# Patient Record
Sex: MALE | Race: WHITE | Employment: UNEMPLOYED | URBAN - METROPOLITAN AREA
[De-identification: names, ages, dates, MRNs, and addresses within clinical notes are randomized per-mention and may not be internally consistent; named-entity substitution may affect disease eponyms.]

---

## 2018-11-22 ENCOUNTER — HOSPITAL ENCOUNTER (EMERGENCY)
Age: 63
Discharge: HOME | End: 2018-11-22
Attending: EMERGENCY MEDICINE
Payer: MEDICAID

## 2018-11-22 VITALS
SYSTOLIC BLOOD PRESSURE: 149 MMHG | WEIGHT: 90 LBS | BODY MASS INDEX: 15.45 KG/M2 | HEART RATE: 73 BPM | OXYGEN SATURATION: 99 % | TEMPERATURE: 97.8 F | DIASTOLIC BLOOD PRESSURE: 92 MMHG | RESPIRATION RATE: 32 BRPM

## 2018-11-22 DIAGNOSIS — G89.29 OTHER CHRONIC PAIN: Primary | ICD-10-CM

## 2018-11-22 PROCEDURE — 99284 EMERGENCY DEPT VISIT MOD MDM: CPT

## 2018-11-22 PROCEDURE — 93005 ELECTROCARDIOGRAM TRACING: CPT

## 2018-11-22 PROCEDURE — 74011250637 HC RX REV CODE- 250/637: Performed by: EMERGENCY MEDICINE

## 2018-11-22 RX ORDER — TRAMADOL HYDROCHLORIDE 50 MG/1
100 TABLET ORAL
Status: COMPLETED | OUTPATIENT
Start: 2018-11-22 | End: 2018-11-22

## 2018-11-22 RX ORDER — TRAMADOL HYDROCHLORIDE 50 MG/1
TABLET ORAL
Status: DISCONTINUED
Start: 2018-11-22 | End: 2018-11-22 | Stop reason: HOSPADM

## 2018-11-22 RX ADMIN — TRAMADOL HYDROCHLORIDE 100 MG: 50 TABLET, FILM COATED ORAL at 01:16

## 2018-11-22 NOTE — ED NOTES
Pt having 5/10 pain relief. Told [facility] ETA 10-15 mins. Urinal provided to pt. Pt aware of ETA of Transport. Paperwork ready.

## 2018-11-22 NOTE — ED TRIAGE NOTES
Patient reports to ED c/c HTN, chronic leg pain,and generalized weakness. Pt brought to ED by EMS reports patient was dx from [facility]. Pt VSS

## 2018-11-22 NOTE — ED NOTES
Pt brought by EMS. Pt seen at [facility] ED and d/c home 2 hrs ago. Pt reports \"I just don't feel good, and my leg hurts. \"  Told that pt has concerns about BP being elevated. Current /92. Had RT upper chest pain earlier per pt. Pt has esteban AKA amputations, has a drsg CDI to LT AKA- dressed \"today\" per pt. DR \"Yuri\" has evaluated pt. Pt has full cardiac/BP/pulseox intact. EKG is complete. No further orders at this time per Dr Nishant Gutierrez. \"  Dr Moises Ramon" mentioning POC is pain medication/and d/c home. NAD observed.

## 2018-11-22 NOTE — ED PROVIDER NOTES
EMERGENCY DEPARTMENT HISTORY AND PHYSICAL EXAM 
 
Date: 11/22/2018 Patient Name: Corinne Simmering History of Presenting Illness Chief Complaint Patient presents with  Fatigue  Leg Pain History Provided By: Patient and EMS Chief Complaint: elevated blood pressure and general malaise/myalgias Duration: 1 Days Timing:  Constant Location: generalized Quality: Aching Severity: Severe Associated Symptoms: denies any other associated signs or symptoms Additional History (Context):  
 
12:33 AM 
 
Corinne Simmering is a 66 y.o. male with pertinent PMHx of HTN, polio, chronic pain, DM, bilateral leg amputation, and HLD presenting via EMS to the ED c/o elevated blood pressure and general malaise/severe generalized myalgias x today. Pt states that \"I feel like I'm going to die\". He states that he normally takes Percocet for pain, but states that he ran out. Per EMS, pt is well known to them and they transport him to [facility] regularly. Pt was seen by [facility] ER earlier today for similar complaints, but called EMS again shortly after being discharged. Per EMS, last blood pressure was 170/80. Pt has a home health nurse who comes daily. Pt specifically denies any fever/chills or N/V/D. 
 
PCP: Kerrie Bennett MD 
Pt does not drink ETOH or do illicit drugs. There are no other complaints, changes, or physical findings at this time. Current Outpatient Medications Medication Sig Dispense Refill  simvastatin (ZOCOR) 20 mg tablet Take 20 mg by mouth nightly.  lisinopril-hydrochlorothiazide (PRINZIDE, ZESTORETIC) 20-12.5 mg per tablet Take 1 Tab by mouth daily.  isosorbide mononitrate ER (IMDUR) 30 mg tablet Take 30 mg by mouth daily.  HYDROcodone-acetaminophen (LORTAB) 2.5-167 mg/5 mL oral solution Take 10 mL by mouth four (4) times daily as needed for Pain. 300 mL 0 Past History Past Medical History: 
Past Medical History:  
Diagnosis Date  Chronic pain back and legs  Diabetes ([facility] [address]  Hypercholesterolemia  Hypertension  Polio Past Surgical History: 
Past Surgical History:  
Procedure Laterality Date  HX HERNIA REPAIR    
 HX OTHER SURGICAL    
 carpal tunnel  HX OTHER SURGICAL    
 cyst  
 
 
Family History: 
Family History Problem Relation Age of Onset  Heart Attack Mother  Heart Attack Father  Malignant Hyperthermia Neg Hx  Pseudocholinesterase Deficiency Neg Hx  Delayed Awakening Neg Hx  Post-op Nausea/Vomiting Neg Hx  Emergence Delirium Neg Hx  Post-op Cognitive Dysfunction Neg Hx   
 Other Neg Hx Social History: 
Social History Tobacco Use  Smoking status: Current Every Day Smoker Packs/day: 2.00 Years: 40.00 Pack years: 80.00 Substance Use Topics  Alcohol use: No  
 Drug use: No  
 
 
Allergies: 
No Known Allergies Review of Systems Review of Systems Constitutional: Negative for chills and fever. Positive for elevated blood pressure and general malaise Gastrointestinal: Negative for diarrhea, nausea and vomiting. Musculoskeletal: Positive for myalgias (generalized). All other systems reviewed and are negative. Physical Exam  
 
Vitals:  
 11/22/18 0041 11/22/18 0047 BP: (!) 149/92 Pulse: (!) 16 Resp: 15 Temp: 97.8 °F (36.6 °C) SpO2: 99% 99% Weight: 40.8 kg (90 lb) Physical Exam  
Constitutional: He is oriented to person, place, and time. He appears well-developed. Chronically ill looking, frail elderly male in NAD HENT:  
Head: Normocephalic and atraumatic. Eyes: Pupils are equal, round, and reactive to light. Neck: Neck supple. Cardiovascular: Normal rate, regular rhythm, S1 normal, S2 normal and normal heart sounds. Pulmonary/Chest: Breath sounds normal. No respiratory distress. He has no wheezes. He has no rales. He exhibits no tenderness. Lungs are clear Abdominal: Soft. He exhibits no distension and no mass. There is no tenderness. There is no guarding. Flat Musculoskeletal: Normal range of motion. He exhibits no edema or tenderness. Left AKA stump with clean dressing and without discharge Neurological: He is alert and oriented to person, place, and time. No cranial nerve deficit. Skin: No rash noted. Psychiatric: He has a normal mood and affect. His behavior is normal. Thought content normal.  
Nursing note and vitals reviewed. Diagnostic Study Results Labs - No results found for this or any previous visit (from the past 12 hour(s)). Radiologic Studies - No orders to display Medical Decision Making I am the first provider for this patient. I reviewed the vital signs, available nursing notes, past medical history, past surgical history, family history and social history. Vital Signs-Reviewed the patient's vital signs. Pulse Oximetry Analysis - 99% on RA  
 
EKG interpretation: (EMS) NSR at 81 bpm. No STEMI. QRS duration is 84 ms. EKG read by Forrest Kitchen MD at 2000 EKG interpretation: (Preliminary) NSR at 81 bpm. No STEMI. QRS duration is 84 ms. Unchanged from transmitted EKG. EKG read by Forrest Kitchen MD at 0699 Records Reviewed: Nursing Notes, Old Medical Records and Previous electrocardiograms PROCEDURES: 
Procedures MEDICATIONS GIVEN IN THE ED: 
Medications - No data to display ED COURSE:  
12:33 AM  
Initial assessment performed. 12:46 AM Reviewed records from [place]. Pt was seen yesterday and today, and just discharged two hours ago for similar sxs. He states he just doesn't feel well, but seems like he is out of his Percocet. He appears that he may have some phantom pain. He claims his blood pressure was high, but it is nml here. He had recent blood work at [place], only showing mild hypokalemia. Will give him something for pain and discharge him home. Diagnosis and Disposition DISCHARGE NOTE: 
12:49 AM 
The patient is ready for discharge. The patient's signs, symptoms, diagnosis, and discharge instructions have been discussed and the patient and/or family has conveyed their understanding. The patient and/or family is to follow up as recommended or return to the ER should their symptoms worsen. Plan has been discussed and the patient and/or family is in agreement. Written by Francies Saint Justice Palau, ED Scribe, as dictated by Bandar Garrett MD.  
 
CLINICAL IMPRESSION: 
1. Other chronic pain PLAN: 
1. D/C Home 2. Current Discharge Medication List  
  
 
3. Follow-up Information Follow up With Specialties Details Why Contact Info Maricarmen Celis MD Internal Medicine Schedule an appointment as soon as possible for a visit for primary care follow up [address] [address] [phone] [facility] EMERGENCY DEPT Emergency Medicine  As needed, If symptoms worsen [address] [place] [address] 
[phone]  
  
 
_______________________________ Attestations: This note is prepared by Pranay Scanlon, acting as Scribe for [employer], MD. [employer], MD:  The scribe's documentation has been prepared under my direction and personally reviewed by me in its entirety. I confirm that the note above accurately reflects all work, treatment, procedures, and medical decision making performed by me. 
_______________________________

## 2018-11-22 NOTE — DISCHARGE INSTRUCTIONS
Above-the-Knee Leg Amputation: What to Expect at Home  Your Recovery  An above-the-knee amputation is surgery to remove your leg above the knee. Your doctor removed the leg while keeping as much healthy bone, skin, blood vessel, and nerve tissue as possible. After an above-the-knee leg amputation, you will probably have bandages, a rigid dressing, or a cast over the remaining part of your leg (residual limb). The leg will be swollen for at least 4 weeks after your surgery. If you have a rigid dressing or cast, your doctor will set up regular visits to change the dressing or cast and check the healing. If you have elastic bandages, your doctor will tell you how to change them. You may have pain in your remaining limb. You also may think you have feeling or pain where your leg was. This is called phantom pain. It is common and may come and go for a year or longer. Your doctor can give you medicine for both types of pain. You may have already started a rehabilitation program (rehab). You will continue this under the guidance of your doctor or physical therapist. Brennan Rosenthal will need to do a lot of work to recondition your muscles and relearn activities, balance, and coordination. Rehab can last as long as 1 year. You may have been fitted with a temporary artificial leg while you were still in the hospital. If this is the case, your doctor will teach you how to care for it. If you are getting an artificial leg, you may need to get used to it before you return to work and your other activities. You will probably not wear it all the time, so you will need to learn how to use a wheelchair, crutches, or other device. You will have to make changes in your home. Your workplace may be able to make allowances for you. Having your leg amputated is traumatic. Learning to live with new limitations can be hard and frustrating. You may feel depressed or grieve for your previous lifestyle.  It is important to understand these feelings. Talking with your family, friends, and health professionals about your frustrations is an important part of your recovery. You may also find that it helps to talk with a person who has had an amputation. Remember that even though losing a limb is difficult, it does not change who you are or prevent you from enjoying life. You will have to adapt and learn new ways to do things, but you will still be able to work and take part in sports and activities. And you can still learn, love, play, and live life to its fullest.  Many organizations can help you adjust to your new life. For example, you can find information at amputee-coalition.org. This care sheet gives you a general idea about how long it will take for you to recover. But each person recovers at a different pace. Follow the steps below to get better as quickly as possible. How can you care for yourself at home? Activity    · Be active. Talk to your doctor about what you can do. If you are active and use your remaining limb, it will heal faster.     · You may shower when your doctor okays it. Wash the remaining limb with soap and water, and pat it dry. You may need help doing this at first.     · You may need to adapt your car to your situation before you drive.     · You will probably be able to return to work and your usual routine when your remaining limb heals. This can be as soon as 4 to 8 weeks after surgery, but it may take longer. Diet    · You can eat your normal diet. If your stomach is upset, try bland, low-fat foods like plain rice, broiled chicken, toast, and yogurt.     · You may notice that your bowel movements are not regular right after your surgery. This is common. Try to avoid constipation and straining with bowel movements. Take a fiber supplement every day. If you have not had a bowel movement after a couple of days, ask your doctor about taking a mild laxative.    Medicines    · Your doctor will tell you if and when you can restart your medicines. He or she will also give you instructions about taking any new medicines.     · If you take blood thinners, such as warfarin (Coumadin), clopidogrel (Plavix), or aspirin, be sure to talk to your doctor. He or she will tell you if and when to start taking those medicines again. Make sure that you understand exactly what your doctor wants you to do.     · Take pain medicines exactly as directed. ? If the doctor gave you a prescription medicine for pain, take it as prescribed. ? If you are not taking a prescription pain medicine, ask your doctor if you can take an over-the-counter medicine.     · If you think your pain medicine is making you sick to your stomach:  ? Take your medicine after meals (unless your doctor has told you not to). ? Ask your doctor for a different pain medicine.     · If your doctor prescribed antibiotics, take them as directed. Do not stop taking them just because you feel better. You need to take the full course of antibiotics.    Remaining limb care    · You may have bandages, a rigid dressing, or a cast on your remaining limb. Your doctor will tell you how to take care of it. Depending on your dressing and the doctor's instructions:  ? Check your remaining limb daily for irritation, skin breaks, and redness. Tell your doctor about any problems you see. ? Wash your remaining limb with mild soap and warm water every night. Pat it dry.     · If you have a temporary artificial leg, remove it before you go to sleep. Exercise    · Rehabilitation is a series of exercises you do after your surgery. This helps you learn to use your remaining limb and artificial leg. You will work with your doctor and physical therapist to plan this exercise program. To get the best results, you need to do the exercises correctly and as often and as long as your doctor tells you. Your rehab program will give you a number of exercises to do. Always do them as your therapist tells you. Other instructions    · Preventing contractures is very important. A contracture occurs when a joint becomes stuck in one position. If this happens, it may be hard or impossible to straighten your remaining limb and use an artificial leg.  ? Make sure you put equal weight on both hips when you sit. Use firm chairs, and sit up straight. ? Keep your remaining limb flat with your legs together while you are lying on your back. ? Lie on your stomach as much as possible to stretch your hip joint. ? Do not sit for more than an hour or two. Stand, or lie on your stomach now and then. ? Do not put pillows under your hips or knees or between your thighs. Follow-up care is a key part of your treatment and safety. Be sure to make and go to all appointments, and call your doctor if you are having problems. It's also a good idea to know your test results and keep a list of the medicines you take. When should you call for help? Call 911 anytime you think you may need emergency care. For example, call if:    · You passed out (lost consciousness).     · You have chest pain, are short of breath, or you cough up blood.    Call your doctor now or seek immediate medical care if:    · You have pain that does not get better after you take pain medicine.     · You are sick to your stomach or cannot drink fluids.     · You have loose stitches, or your incision comes open.     · You have signs of a blood clot in your leg (called a deep vein thrombosis), such as:  ? Pain in your calf, back of the knee, thigh, or groin. ? Redness or swelling in your leg.     · You have signs of infection, such as:  ? Increased pain, swelling, warmth, or redness. ? Red streaks leading from the incision. ? Pus draining from the incision. ? A fever.     · You bleed through your bandage.    Watch closely for any changes in your health, and be sure to contact your doctor if you have any problems. Where can you learn more?   Go to http://elen.info/. Enter W069 in the search box to learn more about \"Above-the-Knee Leg Amputation: What to Expect at Home. \"  Current as of: November 29, 2017  Content Version: 11.8  © 9539-6963 [employer]. Care instructions adapted under license by [employer] (which disclaims liability or warranty for this information). If you have questions about a medical condition or this instruction, always ask your healthcare professional. [place] [address] any warranty or liability for your use of this information. Chronic Pain: Care Instructions  Your Care Instructions    Chronic pain is pain that lasts a long time (months or even years) and may or may not have a clear cause. It is different from acute pain, which usually does have a clear cause--like an injury or illness--and gets better over time. Chronic pain:  · Lasts over time but may vary from day to day. · Does not go away despite efforts to end it. · May disrupt your sleep and lead to fatigue. · May cause depression or anxiety. · May make your muscles tense, causing more pain. · Can disrupt your work, hobbies, home life, and relationships with friends and family. Chronic pain is a very real condition. It is not just in your head. Treatment can help and usually includes several methods used together, such as medicines, physical therapy, exercise, and other treatments. Learning how to relax and changing negative thought patterns can also help you cope. Chronic pain is complex. Taking an active role in your treatment will help you better manage your pain. Tell your doctor if you have trouble dealing with your pain. You may have to try several things before you find what works best for you. Follow-up care is a key part of your treatment and safety. Be sure to make and go to all appointments, and call your doctor if you are having problems.  It's also a good idea to know your test results and keep a list of the medicines you take. How can you care for yourself at home? · Pace yourself. Break up large jobs into smaller tasks. Save harder tasks for days when you have less pain, or go back and forth between hard tasks and easier ones. Take rest breaks. · Relax, and reduce stress. Relaxation techniques such as deep breathing or meditation can help. · Keep moving. Gentle, daily exercise can help reduce pain over the long run. Try low- or no-impact exercises such as walking, swimming, and stationary biking. Do stretches to stay flexible. · Try heat, cold packs, and massage. · Get enough sleep. Chronic pain can make you tired and drain your energy. Talk with your doctor if you have trouble sleeping because of pain. · Think positive. Your thoughts can affect your pain level. Do things that you enjoy to distract yourself when you have pain instead of focusing on the pain. See a movie, read a book, listen to music, or spend time with a friend. · If you think you are depressed, talk to your doctor about treatment. · Keep a daily pain diary. Record how your moods, thoughts, sleep patterns, activities, and medicine affect your pain. You may find that your pain is worse during or after certain activities or when you are feeling a certain emotion. Having a record of your pain can help you and your doctor find the best ways to treat your pain. · Take pain medicines exactly as directed. ? If the doctor gave you a prescription medicine for pain, take it as prescribed. ? If you are not taking a prescription pain medicine, ask your doctor if you can take an over-the-counter medicine. Reducing constipation caused by pain medicine  · Include fruits, vegetables, beans, and whole grains in your diet each day. These foods are high in fiber. · Drink plenty of fluids, enough so that your urine is light yellow or clear like water.  If you have kidney, heart, or liver disease and have to limit fluids, talk with your doctor before you increase the amount of fluids you drink. · If your doctor recommends it, get more exercise. Walking is a good choice. Bit by bit, increase the amount you walk every day. Try for at least 30 minutes on most days of the week. · Schedule time each day for a bowel movement. A daily routine may help. Take your time and do not strain when having a bowel movement. When should you call for help? Call your doctor now or seek immediate medical care if:    · Your pain gets worse or is out of control.     · You feel down or blue, or you do not enjoy things like you once did. You may be depressed, which is common in people with chronic pain. Depression can be treated.     · You have vomiting or cramps for more than 2 hours.    Watch closely for changes in your health, and be sure to contact your doctor if:    · You cannot sleep because of pain.     · You are very worried or anxious about your pain.     · You have trouble taking your pain medicine.     · You have any concerns about your pain medicine.     · You have trouble with bowel movements, such as:  ? No bowel movement in 3 days. ? Blood in the anal area, in your stool, or on the toilet paper. ? Diarrhea for more than 24 hours. Where can you learn more? Go to http://faiza-quoc.info/. Enter N004 in the search box to learn more about \"Chronic Pain: Care Instructions. \"  Current as of: June 4, 2018  Content Version: 11.8  © 1736-8614 [employer]. Care instructions adapted under license by [employer] (which disclaims liability or warranty for this information). If you have questions about a medical condition or this instruction, always ask your healthcare professional. [place] [address] any warranty or liability for your use of this information.

## 2018-11-22 NOTE — ED NOTES
[facility] transport arrive to bedside. Report provided to staff accordingly. Pt leaving with [facility] transport staff.

## 2018-11-22 NOTE — ED NOTES
Pt has removed himself off the monitor/has put his shirt back on. Unable to get another set of VS for pt not wanting completed. See MAR for the adm of Ultram PO to aide his leg pain. Pt being d/c home. D/C instructions reviewed with pt. ED [facility] calling to set up transportation for pt to home. Awaiting for ETA.

## 2018-12-04 ENCOUNTER — APPOINTMENT (OUTPATIENT)
Dept: GENERAL RADIOLOGY | Age: 63
End: 2018-12-04
Attending: EMERGENCY MEDICINE
Payer: MEDICAID

## 2018-12-04 ENCOUNTER — HOSPITAL ENCOUNTER (EMERGENCY)
Age: 63
Discharge: HOME | End: 2018-12-04
Attending: EMERGENCY MEDICINE
Payer: MEDICAID

## 2018-12-04 ENCOUNTER — APPOINTMENT (OUTPATIENT)
Dept: CT IMAGING | Age: 63
End: 2018-12-04
Attending: EMERGENCY MEDICINE
Payer: MEDICAID

## 2018-12-04 VITALS
TEMPERATURE: 97.4 F | WEIGHT: 94.4 LBS | OXYGEN SATURATION: 100 % | DIASTOLIC BLOOD PRESSURE: 78 MMHG | SYSTOLIC BLOOD PRESSURE: 126 MMHG | HEART RATE: 74 BPM | RESPIRATION RATE: 23 BRPM | BODY MASS INDEX: 16.2 KG/M2

## 2018-12-04 DIAGNOSIS — G89.29 OTHER CHRONIC PAIN: ICD-10-CM

## 2018-12-04 DIAGNOSIS — E16.2 HYPOGLYCEMIA: Primary | ICD-10-CM

## 2018-12-04 DIAGNOSIS — G54.6 PHANTOM PAIN (HCC): ICD-10-CM

## 2018-12-04 LAB
ALBUMIN SERPL-MCNC: 3.3 G/DL (ref 3.4–5)
ALBUMIN/GLOB SERPL: 0.8 {RATIO} (ref 0.8–1.7)
ALP SERPL-CCNC: 104 U/L (ref 45–117)
ALT SERPL-CCNC: 28 U/L (ref 16–61)
ANION GAP SERPL CALC-SCNC: 7 MMOL/L (ref 3–18)
APPEARANCE UR: CLEAR
AST SERPL-CCNC: 23 U/L (ref 15–37)
BACTERIA URNS QL MICRO: NEGATIVE /HPF
BASOPHILS # BLD: 0 K/UL (ref 0–0.1)
BASOPHILS NFR BLD: 1 % (ref 0–2)
BILIRUB SERPL-MCNC: 0.2 MG/DL (ref 0.2–1)
BILIRUB UR QL: NEGATIVE
BUN SERPL-MCNC: 35 MG/DL (ref 7–18)
BUN/CREAT SERPL: 56
CALCIUM SERPL-MCNC: 9 MG/DL (ref 8.5–10.1)
CHLORIDE SERPL-SCNC: 104 MMOL/L (ref 100–108)
CK MB CFR SERPL CALC: 4.4 % (ref 0–4)
CK MB SERPL-MCNC: 5.2 NG/ML (ref 5–25)
CK SERPL-CCNC: 119 U/L (ref 39–308)
CO2 SERPL-SCNC: 26 MMOL/L (ref 21–32)
COLOR UR: YELLOW
CREAT SERPL-MCNC: 0.62 MG/DL (ref 0.6–1.3)
DIFFERENTIAL METHOD BLD: ABNORMAL
EOSINOPHIL # BLD: 0.1 K/UL (ref 0–0.4)
EOSINOPHIL NFR BLD: 2 % (ref 0–5)
EPITH CASTS URNS QL MICRO: NORMAL /LPF (ref 0–5)
ERYTHROCYTE [DISTWIDTH] IN BLOOD BY AUTOMATED COUNT: 18 % (ref 11.6–14.5)
ERYTHROCYTE [SEDIMENTATION RATE] IN BLOOD: 45 MM/HR (ref 0–20)
GLOBULIN SER CALC-MCNC: 4.2 G/DL (ref 2–4)
GLUCOSE BLD STRIP.AUTO-MCNC: 143 MG/DL (ref 70–110)
GLUCOSE SERPL-MCNC: 55 MG/DL (ref 74–99)
GLUCOSE UR STRIP.AUTO-MCNC: NEGATIVE MG/DL
HCT VFR BLD AUTO: 37.3 % (ref 36–48)
HGB BLD-MCNC: 11.7 G/DL (ref 13–16)
HGB UR QL STRIP: NEGATIVE
HYALINE CASTS URNS QL MICRO: NORMAL /LPF (ref 0–2)
KETONES UR QL STRIP.AUTO: NEGATIVE MG/DL
LACTATE SERPL-SCNC: 1.2 MMOL/L (ref 0.4–2)
LEUKOCYTE ESTERASE UR QL STRIP.AUTO: NEGATIVE
LYMPHOCYTES # BLD: 1.3 K/UL (ref 0.9–3.6)
LYMPHOCYTES NFR BLD: 16 % (ref 21–52)
MCH RBC QN AUTO: 24.5 PG (ref 24–34)
MCHC RBC AUTO-ENTMCNC: 31.4 G/DL (ref 31–37)
MCV RBC AUTO: 78.2 FL (ref 74–97)
MONOCYTES # BLD: 0.6 K/UL (ref 0.05–1.2)
MONOCYTES NFR BLD: 7 % (ref 3–10)
NEUTS SEG # BLD: 6 K/UL (ref 1.8–8)
NEUTS SEG NFR BLD: 74 % (ref 40–73)
NITRITE UR QL STRIP.AUTO: NEGATIVE
PH UR STRIP: 5 [PH] (ref 5–8)
PLATELET # BLD AUTO: 353 K/UL (ref 135–420)
PMV BLD AUTO: 10.4 FL (ref 9.2–11.8)
POTASSIUM SERPL-SCNC: 4.3 MMOL/L (ref 3.5–5.5)
PROT SERPL-MCNC: 7.5 G/DL (ref 6.4–8.2)
PROT UR STRIP-MCNC: 30 MG/DL
RBC # BLD AUTO: 4.77 M/UL (ref 4.7–5.5)
RBC #/AREA URNS HPF: NEGATIVE /HPF (ref 0–5)
SODIUM SERPL-SCNC: 137 MMOL/L (ref 136–145)
SP GR UR REFRACTOMETRY: 1.02 (ref 1–1.03)
TROPONIN I SERPL-MCNC: 0.02 NG/ML (ref 0–0.04)
UROBILINOGEN UR QL STRIP.AUTO: 0.2 EU/DL (ref 0.2–1)
WBC # BLD AUTO: 8 K/UL (ref 4.6–13.2)
WBC URNS QL MICRO: NORMAL /HPF (ref 0–5)

## 2018-12-04 PROCEDURE — 93005 ELECTROCARDIOGRAM TRACING: CPT

## 2018-12-04 PROCEDURE — 80053 COMPREHEN METABOLIC PANEL: CPT

## 2018-12-04 PROCEDURE — 87077 CULTURE AEROBIC IDENTIFY: CPT

## 2018-12-04 PROCEDURE — 73552 X-RAY EXAM OF FEMUR 2/>: CPT

## 2018-12-04 PROCEDURE — 82553 CREATINE MB FRACTION: CPT

## 2018-12-04 PROCEDURE — 71045 X-RAY EXAM CHEST 1 VIEW: CPT

## 2018-12-04 PROCEDURE — 85025 COMPLETE CBC W/AUTO DIFF WBC: CPT

## 2018-12-04 PROCEDURE — 83605 ASSAY OF LACTIC ACID: CPT

## 2018-12-04 PROCEDURE — 74011000258 HC RX REV CODE- 258: Performed by: EMERGENCY MEDICINE

## 2018-12-04 PROCEDURE — 70450 CT HEAD/BRAIN W/O DYE: CPT

## 2018-12-04 PROCEDURE — 87070 CULTURE OTHR SPECIMN AEROBIC: CPT

## 2018-12-04 PROCEDURE — 99285 EMERGENCY DEPT VISIT HI MDM: CPT

## 2018-12-04 PROCEDURE — 96375 TX/PRO/DX INJ NEW DRUG ADDON: CPT

## 2018-12-04 PROCEDURE — 87040 BLOOD CULTURE FOR BACTERIA: CPT

## 2018-12-04 PROCEDURE — 82962 GLUCOSE BLOOD TEST: CPT

## 2018-12-04 PROCEDURE — 87186 SC STD MICRODIL/AGAR DIL: CPT

## 2018-12-04 PROCEDURE — 81001 URINALYSIS AUTO W/SCOPE: CPT

## 2018-12-04 PROCEDURE — 74011250636 HC RX REV CODE- 250/636: Performed by: EMERGENCY MEDICINE

## 2018-12-04 PROCEDURE — 96365 THER/PROPH/DIAG IV INF INIT: CPT

## 2018-12-04 PROCEDURE — 85652 RBC SED RATE AUTOMATED: CPT

## 2018-12-04 RX ORDER — ATORVASTATIN CALCIUM 20 MG/1
20 TABLET, FILM COATED ORAL DAILY
COMMUNITY

## 2018-12-04 RX ORDER — CLONAZEPAM 0.5 MG/1
0.5 TABLET ORAL
COMMUNITY
End: 2020-11-20

## 2018-12-04 RX ORDER — NITROGLYCERIN 0.4 MG/1
0.4 TABLET SUBLINGUAL
COMMUNITY

## 2018-12-04 RX ORDER — LINEZOLID 600 MG/1
600 TABLET, FILM COATED ORAL 2 TIMES DAILY
COMMUNITY
End: 2020-10-20

## 2018-12-04 RX ORDER — METOPROLOL SUCCINATE 25 MG/1
25 TABLET, EXTENDED RELEASE ORAL DAILY
COMMUNITY

## 2018-12-04 RX ORDER — TRAMADOL HYDROCHLORIDE 50 MG/1
50 TABLET ORAL
Qty: 8 TAB | Refills: 0 | Status: SHIPPED | OUTPATIENT
Start: 2018-12-04 | End: 2020-11-20

## 2018-12-04 RX ORDER — TRAMADOL HYDROCHLORIDE 50 MG/1
100 TABLET ORAL
Status: DISCONTINUED | OUTPATIENT
Start: 2018-12-04 | End: 2018-12-04 | Stop reason: HOSPADM

## 2018-12-04 RX ORDER — ONDANSETRON 2 MG/ML
4 INJECTION INTRAMUSCULAR; INTRAVENOUS
Status: COMPLETED | OUTPATIENT
Start: 2018-12-04 | End: 2018-12-04

## 2018-12-04 RX ORDER — GABAPENTIN 100 MG/1
100 CAPSULE ORAL 3 TIMES DAILY
COMMUNITY
End: 2020-11-20

## 2018-12-04 RX ORDER — SODIUM CHLORIDE 0.9 % (FLUSH) 0.9 %
5-10 SYRINGE (ML) INJECTION AS NEEDED
Status: DISCONTINUED | OUTPATIENT
Start: 2018-12-04 | End: 2018-12-04 | Stop reason: HOSPADM

## 2018-12-04 RX ORDER — AMLODIPINE BESYLATE 5 MG/1
5 TABLET ORAL DAILY
COMMUNITY

## 2018-12-04 RX ORDER — MORPHINE SULFATE 4 MG/ML
4 INJECTION INTRAVENOUS
Status: COMPLETED | OUTPATIENT
Start: 2018-12-04 | End: 2018-12-04

## 2018-12-04 RX ORDER — ACETAMINOPHEN 325 MG/1
TABLET ORAL
COMMUNITY
End: 2020-11-20

## 2018-12-04 RX ORDER — OXYCODONE AND ACETAMINOPHEN 5; 325 MG/1; MG/1
TABLET ORAL
COMMUNITY
End: 2020-11-20

## 2018-12-04 RX ADMIN — SODIUM CHLORIDE 1000 ML: 900 INJECTION, SOLUTION INTRAVENOUS at 12:34

## 2018-12-04 RX ADMIN — ONDANSETRON 4 MG: 2 INJECTION INTRAMUSCULAR; INTRAVENOUS at 12:27

## 2018-12-04 RX ADMIN — MORPHINE SULFATE 4 MG: 4 INJECTION INTRAVENOUS at 12:27

## 2018-12-04 RX ADMIN — PIPERACILLIN SODIUM,TAZOBACTAM SODIUM 3.38 G: 3; .375 INJECTION, POWDER, FOR SOLUTION INTRAVENOUS at 12:37

## 2018-12-04 NOTE — DISCHARGE INSTRUCTIONS
Hypoglycemia: Care Instructions  Your Care Instructions    Hypoglycemia means that your blood sugar is low and your body is not getting enough fuel. Some people get low blood sugar from not eating often enough. Some medicines to treat diabetes can cause low blood sugar. People who have had surgery on their stomachs or intestines may get hypoglycemia. Problems with the pancreas, kidneys, or liver also can cause low blood sugar. A snack or drink with sugar in it will raise your blood sugar and should ease your symptoms right away. Your doctor may recommend that you change or stop your medicines until you can get your blood sugar levels under control. In the long run, you may need to change your diet and eating habits so that you get enough fuel for your body throughout the day. Follow-up care is a key part of your treatment and safety. Be sure to make and go to all appointments, and call your doctor if you are having problems. It's also a good idea to know your test results and keep a list of the medicines you take. How can you care for yourself at home? · Learn to recognize the early signs of low blood sugar. Signs include:  ? Nausea. ? Hunger. ? Feeling nervous, irritable, or shaky. ? Cold, clammy, wet skin. ? Sweating (when you are not exercising). ? A fast heartbeat.  ? Numbness or tingling of the fingertips or lips. · If you feel an episode of low blood sugar coming on, drink fruit juice or sugared (not diet) soda, or eat sugar in the form of candy, cubes, or tablets. [employer] are another [employer]. · Eat small, frequent meals so that you do not get too hungry between meals. · Balance extra exercise with eating more. · Keep a written record of your low blood sugar episodes, including when you last ate and what you ate, so that you can learn what causes your blood sugar to drop.   · Make sure your family, friends, and coworkers know the symptoms of low blood sugar and know what to do to get your sugar level up. · Wear medical alert jewelry that lists your condition. You can buy this at most drugstores. When should you call for help? Call 911 anytime you think you may need emergency care. For example, call if:    · You passed out (lost consciousness).     · You are confused or cannot think clearly.     · Your blood sugar is very high or very low.    Watch closely for changes in your health, and be sure to contact your doctor if:    · Your blood sugar stays outside the level your doctor set for you.     · You have any problems. Where can you learn more? Go to http://faizaIPS Groupquoc.info/. Enter K913 in the search box to learn more about \"Hypoglycemia: Care Instructions. \"  Current as of: December 7, 2017  Content Version: 11.8  © 9375-5378 [employer]. Care instructions adapted under license by [employer] (which disclaims liability or warranty for this information). If you have questions about a medical condition or this instruction, always ask your healthcare professional. [address] any warranty or liability for your use of this information. Chronic Pain: Care Instructions  Your Care Instructions    Chronic pain is pain that lasts a long time (months or even years) and may or may not have a clear cause. It is different from acute pain, which usually does have a clear cause--like an injury or illness--and gets better over time. Chronic pain:  · Lasts over time but may vary from day to day. · Does not go away despite efforts to end it. · May disrupt your sleep and lead to fatigue. · May cause depression or anxiety. · May make your muscles tense, causing more pain. · Can disrupt your work, hobbies, home life, and relationships with friends and family. Chronic pain is a very real condition. It is not just in your head.  Treatment can help and usually includes several methods used together, such as medicines, physical therapy, exercise, and other treatments. Learning how to relax and changing negative thought patterns can also help you cope. Chronic pain is complex. Taking an active role in your treatment will help you better manage your pain. Tell your doctor if you have trouble dealing with your pain. You may have to try several things before you find what works best for you. Follow-up care is a key part of your treatment and safety. Be sure to make and go to all appointments, and call your doctor if you are having problems. It's also a good idea to know your test results and keep a list of the medicines you take. How can you care for yourself at home? · Pace yourself. Break up large jobs into smaller tasks. Save harder tasks for days when you have less pain, or go back and forth between hard tasks and easier ones. Take rest breaks. · Relax, and reduce stress. Relaxation techniques such as deep breathing or meditation can help. · Keep moving. Gentle, daily exercise can help reduce pain over the long run. Try low- or no-impact exercises such as walking, swimming, and stationary biking. Do stretches to stay flexible. · Try heat, cold packs, and massage. · Get enough sleep. Chronic pain can make you tired and drain your energy. Talk with your doctor if you have trouble sleeping because of pain. · Think positive. Your thoughts can affect your pain level. Do things that you enjoy to distract yourself when you have pain instead of focusing on the pain. See a movie, read a book, listen to music, or spend time with a friend. · If you think you are depressed, talk to your doctor about treatment. · Keep a daily pain diary. Record how your moods, thoughts, sleep patterns, activities, and medicine affect your pain. You may find that your pain is worse during or after certain activities or when you are feeling a certain emotion.  Having a record of your pain can help you and your doctor find the best ways to treat your pain.  · Take pain medicines exactly as directed. ? If the doctor gave you a prescription medicine for pain, take it as prescribed. ? If you are not taking a prescription pain medicine, ask your doctor if you can take an over-the-counter medicine. Reducing constipation caused by pain medicine  · Include fruits, vegetables, beans, and whole grains in your diet each day. These foods are high in fiber. · Drink plenty of fluids, enough so that your urine is light yellow or clear like water. If you have kidney, heart, or liver disease and have to limit fluids, talk with your doctor before you increase the amount of fluids you drink. · If your doctor recommends it, get more exercise. Walking is a good choice. Bit by bit, increase the amount you walk every day. Try for at least 30 minutes on most days of the week. · Schedule time each day for a bowel movement. A daily routine may help. Take your time and do not strain when having a bowel movement. When should you call for help? Call your doctor now or seek immediate medical care if:    · Your pain gets worse or is out of control.     · You feel down or blue, or you do not enjoy things like you once did. You may be depressed, which is common in people with chronic pain. Depression can be treated.     · You have vomiting or cramps for more than 2 hours.    Watch closely for changes in your health, and be sure to contact your doctor if:    · You cannot sleep because of pain.     · You are very worried or anxious about your pain.     · You have trouble taking your pain medicine.     · You have any concerns about your pain medicine.     · You have trouble with bowel movements, such as:  ? No bowel movement in 3 days. ? Blood in the anal area, in your stool, or on the toilet paper. ? Diarrhea for more than 24 hours. Where can you learn more? Go to http://faiza-quoc.info/.   Enter N004 in the search box to learn more about \"Chronic Pain: Care Instructions. \"  Current as of: June 4, 2018  Content Version: 11.8  © 7632-1183 [employer], [facility]. Care instructions adapted under license by [employer] (which disclaims liability or warranty for this information). If you have questions about a medical condition or this instruction, always ask your healthcare professional. [place] [address] any warranty or liability for your use of this information.

## 2018-12-04 NOTE — ED NOTES
Wound care done; dressing applied to the left lower leg at the amputation site. Patient tolerated procedure well.

## 2018-12-04 NOTE — ED NOTES
Provided urinal to patient. Specimen obtained and sent. Patient is resting quietly. Requested to lie more flat so that he \"could sleep a bit\".

## 2018-12-04 NOTE — ED NOTES
Diet ordered from the cafe. Patient continues to report being hungry. Dr Kash Xiong ordered regular diet.

## 2018-12-04 NOTE — ED PROVIDER NOTES
EMERGENCY DEPARTMENT HISTORY AND PHYSICAL EXAM 
 
Date: 12/4/2018 Patient Name: Muna Michaud History of Presenting Illness Chief Complaint Patient presents with  Fatigue History Provided By: Patient and EMS Chief Complaint: generalized weakness Duration: 1 Days Timing:  Constant and Worsening Location: generalized Quality: \"I was about to pass out\" Associated Symptoms: left leg pain and drainage Additional History (Context):  
11:58 AM  
Muna Michaud is a 66 y.o. male with PMHX of HTN and Shx of bilateral AKAs secondary to peripheral vascular disease who presents to the emergency department via EMS C/O constant, worsening,  generalized weakness, feeling like \"I was about to pass out\" starting yesterday. Other sxs include left leg pain and drainage. Pt has had a recent amputation of the bilateral lower extremities above the knee performed at [facility] 2 months ago. Pt denies fever, and any other sxs or complaints. PCP: Govind Sharma MD 
 
Current Facility-Administered Medications Medication Dose Route Frequency Provider Last Rate Last Dose  sodium chloride (NS) flush 5-10 mL  5-10 mL IntraVENous PRN Dc Clayton MD      
 traMADol (ULTRAM) tablet 100 mg  100 mg Oral NOW Nolberto Clayton MD      
 
Current Outpatient Medications Medication Sig Dispense Refill  acetaminophen (TYLENOL) 325 mg tablet Take  by mouth every four (4) hours as needed for Pain.  amLODIPine (NORVASC) 5 mg tablet Take 5 mg by mouth daily.  gabapentin (NEURONTIN) 100 mg capsule Take 100 mg by mouth three (3) times daily.  clonazePAM (KLONOPIN) 0.5 mg tablet Take 0.5 mg by mouth nightly as needed.  linezolid (ZYVOX) 600 mg tablet Take 600 mg by mouth two (2) times a day.  atorvastatin (LIPITOR) 20 mg tablet Take 20 mg by mouth daily.     
 nitroglycerin (NITROSTAT) 0.4 mg SL tablet 0.4 mg by SubLINGual route every five (5) minutes as needed for Chest Pain. Up to 3 doses.  metoprolol succinate (TOPROL XL) 25 mg XL tablet Take 25 mg by mouth daily.  oxyCODONE-acetaminophen (PERCOCET) 5-325 mg per tablet Take  by mouth every four (4) hours as needed for Pain.  traMADol (ULTRAM) 50 mg tablet Take 1 Tab by mouth every six (6) hours as needed for Pain. Max Daily Amount: 200 mg. 8 Tab 0  
 simvastatin (ZOCOR) 20 mg tablet Take 20 mg by mouth nightly.  lisinopril-hydrochlorothiazide (PRINZIDE, ZESTORETIC) 20-12.5 mg per tablet Take 1 Tab by mouth daily.  isosorbide mononitrate ER (IMDUR) 30 mg tablet Take 30 mg by mouth daily. Past History Past Medical History: 
Past Medical History:  
Diagnosis Date  Amputation of both lower extremities ([facility] [address]  Amputee, above knee, left ([facility] [address]  At risk for falls  Chronic pain   
 back and legs  Diabetes ([facility] [address]  Hypercholesterolemia  Hypertension  Polio Past Surgical History: 
Past Surgical History:  
Procedure Laterality Date  HX HERNIA REPAIR    
 HX OTHER SURGICAL    
 carpal tunnel  HX OTHER SURGICAL    
 cyst  
 
 
Family History: 
Family History Problem Relation Age of Onset  Heart Attack Mother  Heart Attack Father  Malignant Hyperthermia Neg Hx  Pseudocholinesterase Deficiency Neg Hx  Delayed Awakening Neg Hx  Post-op Nausea/Vomiting Neg Hx  Emergence Delirium Neg Hx  Post-op Cognitive Dysfunction Neg Hx   
 Other Neg Hx Social History: 
Social History Tobacco Use  Smoking status: Current Every Day Smoker Packs/day: 2.00 Years: 40.00 Pack years: 80.00 Substance Use Topics  Alcohol use: No  
 Drug use: No  
 
 
Allergies: 
No Known Allergies Review of Systems Review of Systems Constitutional: Negative for fever. Musculoskeletal:  
     Left leg pain Skin: Positive for wound (drainage from left leg wound). Neurological: Positive for weakness (generalized) and light-headedness. All other systems reviewed and are negative. Physical Exam  
 
Vitals:  
 12/04/18 1211 12/04/18 1300 12/04/18 1500 BP: 131/80 116/63 (!) 138/115 Pulse: 71 62 82 Resp: 20 25 23 Temp: 97.8 °F (36.6 °C) SpO2: 98% 100% 100% Weight: 42.8 kg (94 lb 6.4 oz) Physical Exam  
Constitutional: He is oriented to person, place, and time. He appears well-developed. He appears ill (chronic). He appears distressed (moaning and groaning in pain). HENT:  
Head: Normocephalic and atraumatic. Eyes: Pupils are equal, round, and reactive to light. Neck: Neck supple. Cardiovascular: Normal rate, regular rhythm, S1 normal, S2 normal and normal heart sounds. Pulses: 
     Femoral pulses are 2+ on the right side, and 2+ on the left side. Pulmonary/Chest: Breath sounds normal. No respiratory distress. He has no wheezes. He has no rales. He exhibits no tenderness. Abdominal: Soft. He exhibits no distension and no mass. There is no tenderness. There is no guarding. Musculoskeletal: Normal range of motion. He exhibits deformity (bilateral AKAs). He exhibits no edema. The entire left lower extremity stump is tender to palpation. Right lower extremity stump appears well healed Neurological: He is alert and oriented to person, place, and time. No cranial nerve deficit. Skin: No rash noted. Left lower extremity stump has a small pale appearing ulcerated area with some yellow purulent exudate. There are some dry scabs along the skin covering the stump. Psychiatric: He has a normal mood and affect. His behavior is normal. Thought content normal.  
Nursing note and vitals reviewed. Diagnostic Study Results Labs - Recent Results (from the past 12 hour(s)) METABOLIC PANEL, COMPREHENSIVE Collection Time: 12/04/18 12:11 PM  
Result Value Ref Range Sodium 137 136 - 145 mmol/L  Potassium 4.3 3.5 - 5.5 mmol/L  
 Chloride 104 100 - 108 mmol/L  
 CO2 26 21 - 32 mmol/L Anion gap 7 3.0 - 18 mmol/L Glucose 55 (L) 74 - 99 mg/dL BUN 35 (H) 7.0 - 18 MG/DL Creatinine 0.62 0.6 - 1.3 MG/DL  
 BUN/Creatinine ratio 56 GFR est AA >60 >60 ml/min/1.73m2 GFR est non-AA >60 >60 ml/min/1.73m2 Calcium 9.0 8.5 - 10.1 MG/DL Bilirubin, total 0.2 0.2 - 1.0 MG/DL  
 ALT (SGPT) 28 16 - 61 U/L  
 AST (SGOT) 23 15 - 37 U/L Alk. phosphatase 104 45 - 117 U/L Protein, total 7.5 6.4 - 8.2 g/dL Albumin 3.3 (L) 3.4 - 5.0 g/dL Globulin 4.2 (H) 2.0 - 4.0 g/dL A-G Ratio 0.8 0.8 - 1.7    
CBC WITH AUTOMATED DIFF Collection Time: 12/04/18 12:11 PM  
Result Value Ref Range WBC 8.0 4.6 - 13.2 K/uL  
 RBC 4.77 4.70 - 5.50 M/uL  
 HGB 11.7 (L) 13.0 - 16.0 g/dL HCT 37.3 36.0 - 48.0 % MCV 78.2 74.0 - 97.0 FL  
 MCH 24.5 24.0 - 34.0 PG  
 MCHC 31.4 31.0 - 37.0 g/dL  
 RDW 18.0 (H) 11.6 - 14.5 % PLATELET 566 799 - 197 K/uL MPV 10.4 9.2 - 11.8 FL  
 NEUTROPHILS 74 (H) 40 - 73 % LYMPHOCYTES 16 (L) 21 - 52 % MONOCYTES 7 3 - 10 % EOSINOPHILS 2 0 - 5 % BASOPHILS 1 0 - 2 %  
 ABS. NEUTROPHILS 6.0 1.8 - 8.0 K/UL  
 ABS. LYMPHOCYTES 1.3 0.9 - 3.6 K/UL  
 ABS. MONOCYTES 0.6 0.05 - 1.2 K/UL  
 ABS. EOSINOPHILS 0.1 0.0 - 0.4 K/UL  
 ABS. BASOPHILS 0.0 0.0 - 0.1 K/UL  
 DF AUTOMATED CARDIAC PANEL,(CK, CKMB & TROPONIN) Collection Time: 12/04/18 12:11 PM  
Result Value Ref Range  39 - 308 U/L  
 CK - MB 5.2 (H) <3.6 ng/ml CK-MB Index 4.4 (H) 0.0 - 4.0 % Troponin-I, Qt. 0.02 0.0 - 0.045 NG/ML  
SED RATE (ESR) Collection Time: 12/04/18 12:11 PM  
Result Value Ref Range Sed rate, automated 45 (H) 0 - 20 mm/hr LACTIC ACID Collection Time: 12/04/18 12:25 PM  
Result Value Ref Range Lactic acid 1.2 0.4 - 2.0 MMOL/L  
EKG, 12 LEAD, INITIAL Collection Time: 12/04/18 12:25 PM  
Result Value Ref Range Ventricular Rate 79 BPM  
 Atrial Rate 79 BPM  
 P-R Interval 142 ms QRS Duration 80 ms  
 Q-T Interval 394 ms QTC Calculation (Bezet) 451 ms Calculated P Axis 78 degrees Calculated R Axis 59 degrees Calculated T Axis 81 degrees Diagnosis Normal sinus rhythm Anterior infarct (cited on or before 22-NOV-2018) Abnormal ECG When compared with ECG of 22-NOV-2018 00:37, 
Nonspecific T wave abnormality now evident in Lateral leads URINALYSIS W/ RFLX MICROSCOPIC Collection Time: 12/04/18  3:00 PM  
Result Value Ref Range Color YELLOW Appearance CLEAR Specific gravity 1.017 1.005 - 1.030    
 pH (UA) 5.0 5.0 - 8.0 Protein 30 (A) NEG mg/dL Glucose NEGATIVE  NEG mg/dL Ketone NEGATIVE  NEG mg/dL Bilirubin NEGATIVE  NEG Blood NEGATIVE  NEG Urobilinogen 0.2 0.2 - 1.0 EU/dL Nitrites NEGATIVE  NEG Leukocyte Esterase NEGATIVE  NEG    
URINE MICROSCOPIC ONLY Collection Time: 12/04/18  3:00 PM  
Result Value Ref Range WBC 0 to 1 0 - 5 /hpf  
 RBC NEGATIVE  0 - 5 /hpf Epithelial cells FEW 0 - 5 /lpf Bacteria NEGATIVE  NEG /hpf Hyaline cast 1 to 2 0 - 2 /lpf  
GLUCOSE, POC Collection Time: 12/04/18  3:15 PM  
Result Value Ref Range Glucose (POC) 143 (H) 70 - 110 mg/dL Radiologic Studies -  
XR FEMUR LT 2 V Final Result IMPRESSION: 
 
No radiographically apparent acute osseous or soft tissue findings. As read by the radiologist.   
XR CHEST PORT Final Result IMPRESSION: 
 
No acute cardiopulmonary disease. aortic stenosis CT HEAD WO CONT Final Result IMPRESSION: 
 
No acute intracranial findings. As read by the radiologist.   
 
CT Results  (Last 48 hours) 12/04/18 1256  CT HEAD WO CONT Final result Impression:  IMPRESSION:  
   
No acute intracranial findings. Narrative:  EXAM: CT HEAD WITHOUT CONTRAST CLINICAL INDICATION/HISTORY: Syncope/fainting  
-Additional: None COMPARISON: 1/27/2014 TECHNIQUE: Serial axial images of the head were performed without intravenous  
contrast. Dose reduction techniques:  Automated exposure control, mAs and/or kVp  
reductions based on patient size, and iterative reconstruction. The specific  
techniques utilized on this CT exam have been documented in the patient's  
electronic medical record.  
   
_______________ FINDINGS:  
   
BRAIN: No evidence of acute intraparenchymal hemorrhage. No mass effect or  
edema. No evidence of acute cortical ischemia. Mild white matter hypodensities,  
likely chronic microvascular ischemic changes. Brain volume normal for age. EXTRA-AXIAL SPACES: No extra-axial hemorrhage. No hydrocephalus. CALVARIA: Intact. OTHER: Sinuses are clear. Nonspecific right-sided mastoid effusion. _______________ CXR Results  (Last 48 hours) 12/04/18 1345  XR CHEST PORT Final result Impression:  IMPRESSION:  
   
No acute cardiopulmonary disease.  
   
_______________ Narrative:  EXAM: XR CHEST PORT  
   
CLINICAL HISTORY: Sepsis. -Additional: None. TECHNIQUE: A portable supine AP radiographic view of the chest is compared with  
the radiograph of 01/27/2014.  
_______________ FINDINGS:  
   
The lungs and pleural spaces are clear. The cardiac silhouette, vanessa, and  
mediastinal contours are normal for age. No acute osseous abnormality is  
revealed. _______________ Medications given in the ED- Medications  
sodium chloride (NS) flush 5-10 mL (not administered)  
traMADol (ULTRAM) tablet 100 mg (not administered)  
ondansetron (ZOFRAN) injection 4 mg (4 mg IntraVENous Given 12/4/18 1227) morphine injection 4 mg (4 mg IntraVENous Given 12/4/18 1227) piperacillin-tazobactam (ZOSYN) 3.375 g in 0.9% sodium chloride (MBP/ADV) 100 mL MBP (0 g IntraVENous IV Completed 12/4/18 1307)  
sodium chloride 0.9 % bolus infusion 1,000 mL (0 mL IntraVENous IV Completed 12/4/18 3623) Medical Decision Making I am the first provider for this patient. I reviewed the vital signs, available nursing notes, past medical history, past surgical history, family history and social history. Vital Signs-Reviewed the patient's vital signs. Pulse Oximetry Analysis - 98% on room air Cardiac Monitor: 
Rate: 71 bpm 
Rhythm: NSR 
 
EKG interpretation: (Preliminary) 12:25 PM  
NSR. Rate 79 bpm. Anterior infarct, age undetermined. QRS duration 80 ms. EKG read by Bandar Garrett MD at 12:25 PM  
 
Records Reviewed: Nursing Notes and Old Medical Records Provider Notes (Medical Decision Making):  
INITIAL CLINICAL IMPRESSION and PLANS: 
The patient presents with the primary complaint(s) of: near syncope and left stump pain. The presentation, to include historical aspects and clinical findings are consistent with the DX of wound infection. However, other possible DX's to consider as primary, associated with, or exacerbated by include: 1. Psych 2. Phantom pain 3. Sepsis 4. UTI 5. TIA 6. Electrolyte abnormality Considering the above, my initial management plan to evaluate and therapeutic interventions include the following and as noted in the orders: 
 
1. Labs: SED rate, wound culture, cardiac panel, CMP, UA, Blood culture, plasma lactic acid 2. Radiology: left femur X-ray, CT Head, Chest X-ray 3. EKG Procedures: 
Procedures ED Course:  
11:58 AM Initial assessment performed. The patients presenting problems have been discussed, and they are in agreement with the care plan formulated and outlined with them. I have encouraged them to ask questions as they arise throughout their visit. Diagnosis and Disposition Discussion: I reviewed the patients medical records from [facility]. He presented there 5 times with some pain complaints.  His pain is chronic and according to the last note from [facility] on 12/1/2018, the description of his stump wound is the same as I have seen it here which appears to be a chronic problem for him. He is in pain management and I suspect his dizziness is likely from hypoglycemia. His FSBG was 55 here and was corrected with PO diet which he managed well. He was given 1 dose of Morphine and even with that he continued to ask for pain medications. I reviewed the patients . Will discharge on a small course of Tramadol to add to his Gabapentin and Lyrica. DISCHARGE NOTE: 
3:20 PM  
Anthony Avila  results have been reviewed with him. He has been counseled regarding his diagnosis, treatment, and plan. He verbally conveys understanding and agreement of the signs, symptoms, diagnosis, treatment and prognosis and additionally agrees to follow up as discussed. He also agrees with the care-plan and conveys that all of his questions have been answered. I have also provided discharge instructions for him that include: educational information regarding their diagnosis and treatment, and list of reasons why they would want to return to the ED prior to their follow-up appointment, should his condition change. He has been provided with education for proper emergency department utilization. CLINICAL IMPRESSION: 
 
1. Hypoglycemia 2. Phantom pain ([address] 3. Other chronic pain PLAN: 
1. D/C Home 2. Current Discharge Medication List  
  
START taking these medications Details  
traMADol (ULTRAM) 50 mg tablet Take 1 Tab by mouth every six (6) hours as needed for Pain. Max Daily Amount: 200 mg. Qty: 8 Tab, Refills: 0 Associated Diagnoses: Phantom pain ([address] 3. Follow-up Information Follow up With Specialties Details Why Contact Info Serene Ulloa MD Internal Medicine Schedule an appointment as soon as possible for a visit in 2 days For primary care follow up [phone] [place] [id number] [phone] [facility] EMERGENCY DEPT Emergency Medicine  As needed, If symptoms worsen [address] 
[address] [facility] [address] [phone]  
  
 
_______________________________ Attestations: This note is prepared by Frederic Frausto, acting as Scribe for Mahi Chen MD. Mahi Chen MD:  The scribe's documentation has been prepared under my direction and personally reviewed by me in its entirety. I confirm that the note above accurately reflects all work, treatment, procedures, and medical decision making performed by me. 
_______________________________

## 2018-12-04 NOTE — ED NOTES
Report given to [facility] ambulance medic. Script x1 with discharge instructions sent home with pt. Pt discharged via stretcher to home with [employer]. No distress noted.

## 2018-12-07 ENCOUNTER — HOSPITAL ENCOUNTER (EMERGENCY)
Age: 63
Discharge: HOME | End: 2018-12-08
Attending: EMERGENCY MEDICINE | Admitting: EMERGENCY MEDICINE
Payer: MEDICAID

## 2018-12-07 VITALS
RESPIRATION RATE: 29 BRPM | TEMPERATURE: 97.9 F | WEIGHT: 90 LBS | HEART RATE: 72 BPM | OXYGEN SATURATION: 100 % | DIASTOLIC BLOOD PRESSURE: 98 MMHG | BODY MASS INDEX: 15.45 KG/M2 | SYSTOLIC BLOOD PRESSURE: 141 MMHG

## 2018-12-07 DIAGNOSIS — M25.562 ARTHRALGIA OF BOTH LOWER LEGS: ICD-10-CM

## 2018-12-07 DIAGNOSIS — M25.561 ARTHRALGIA OF BOTH LOWER LEGS: ICD-10-CM

## 2018-12-07 DIAGNOSIS — G54.6 PHANTOM PAIN (HCC): Primary | ICD-10-CM

## 2018-12-07 DIAGNOSIS — G89.29 OTHER CHRONIC PAIN: ICD-10-CM

## 2018-12-07 LAB
ALBUMIN SERPL-MCNC: 3.1 G/DL (ref 3.4–5)
ALBUMIN/GLOB SERPL: 0.8 {RATIO} (ref 0.8–1.7)
ALP SERPL-CCNC: 99 U/L (ref 45–117)
ALT SERPL-CCNC: 27 U/L (ref 16–61)
AMPHET UR QL SCN: NEGATIVE
ANION GAP SERPL CALC-SCNC: 9 MMOL/L (ref 3–18)
APPEARANCE UR: CLEAR
AST SERPL-CCNC: 17 U/L (ref 15–37)
BACTERIA SPEC CULT: ABNORMAL
BACTERIA SPEC CULT: ABNORMAL
BACTERIA URNS QL MICRO: ABNORMAL /HPF
BARBITURATES UR QL SCN: NEGATIVE
BASOPHILS # BLD: 0 K/UL (ref 0–0.1)
BASOPHILS NFR BLD: 0 % (ref 0–2)
BENZODIAZ UR QL: NEGATIVE
BILIRUB SERPL-MCNC: 0.2 MG/DL (ref 0.2–1)
BILIRUB UR QL: NEGATIVE
BUN SERPL-MCNC: 21 MG/DL (ref 7–18)
BUN/CREAT SERPL: 31
CALCIUM SERPL-MCNC: 8.9 MG/DL (ref 8.5–10.1)
CANNABINOIDS UR QL SCN: POSITIVE
CHLORIDE SERPL-SCNC: 102 MMOL/L (ref 100–108)
CK MB CFR SERPL CALC: 5.9 % (ref 0–4)
CK MB SERPL-MCNC: 3.8 NG/ML (ref 5–25)
CK SERPL-CCNC: 64 U/L (ref 39–308)
CO2 SERPL-SCNC: 27 MMOL/L (ref 21–32)
COCAINE UR QL SCN: NEGATIVE
COLOR UR: YELLOW
CREAT SERPL-MCNC: 0.67 MG/DL (ref 0.6–1.3)
DIFFERENTIAL METHOD BLD: ABNORMAL
EOSINOPHIL # BLD: 0.2 K/UL (ref 0–0.4)
EOSINOPHIL NFR BLD: 2 % (ref 0–5)
EPITH CASTS URNS QL MICRO: ABNORMAL /LPF (ref 0–5)
ERYTHROCYTE [DISTWIDTH] IN BLOOD BY AUTOMATED COUNT: 17.7 % (ref 11.6–14.5)
ETHANOL SERPL-MCNC: <3 MG/DL (ref 0–3)
GLOBULIN SER CALC-MCNC: 3.7 G/DL (ref 2–4)
GLUCOSE BLD STRIP.AUTO-MCNC: 84 MG/DL (ref 70–110)
GLUCOSE SERPL-MCNC: 75 MG/DL (ref 74–99)
GLUCOSE UR STRIP.AUTO-MCNC: NEGATIVE MG/DL
GRAM STN SPEC: ABNORMAL
HCT VFR BLD AUTO: 38.8 % (ref 36–48)
HDSCOM,HDSCOM: ABNORMAL
HGB BLD-MCNC: 12 G/DL (ref 13–16)
HGB UR QL STRIP: NEGATIVE
KETONES UR QL STRIP.AUTO: ABNORMAL MG/DL
LEUKOCYTE ESTERASE UR QL STRIP.AUTO: NEGATIVE
LYMPHOCYTES # BLD: 1.4 K/UL (ref 0.9–3.6)
LYMPHOCYTES NFR BLD: 15 % (ref 21–52)
MCH RBC QN AUTO: 24.3 PG (ref 24–34)
MCHC RBC AUTO-ENTMCNC: 30.9 G/DL (ref 31–37)
MCV RBC AUTO: 78.5 FL (ref 74–97)
METHADONE UR QL: NEGATIVE
MONOCYTES # BLD: 0.6 K/UL (ref 0.05–1.2)
MONOCYTES NFR BLD: 6 % (ref 3–10)
MUCOUS THREADS URNS QL MICRO: ABNORMAL /LPF
NEUTS SEG # BLD: 7.1 K/UL (ref 1.8–8)
NEUTS SEG NFR BLD: 77 % (ref 40–73)
NITRITE UR QL STRIP.AUTO: NEGATIVE
OPIATES UR QL: NEGATIVE
PCP UR QL: NEGATIVE
PH UR STRIP: 6.5 [PH] (ref 5–8)
PLATELET # BLD AUTO: 329 K/UL (ref 135–420)
PMV BLD AUTO: 10 FL (ref 9.2–11.8)
POTASSIUM SERPL-SCNC: 3.9 MMOL/L (ref 3.5–5.5)
PROT SERPL-MCNC: 6.8 G/DL (ref 6.4–8.2)
PROT UR STRIP-MCNC: 100 MG/DL
RBC # BLD AUTO: 4.94 M/UL (ref 4.7–5.5)
RBC #/AREA URNS HPF: NEGATIVE /HPF (ref 0–5)
SERVICE CMNT-IMP: ABNORMAL
SODIUM SERPL-SCNC: 138 MMOL/L (ref 136–145)
SP GR UR REFRACTOMETRY: 1.02 (ref 1–1.03)
TROPONIN I SERPL-MCNC: 0.02 NG/ML (ref 0–0.04)
UROBILINOGEN UR QL STRIP.AUTO: 0.2 EU/DL (ref 0.2–1)
WBC # BLD AUTO: 9.2 K/UL (ref 4.6–13.2)
WBC URNS QL MICRO: NEGATIVE /HPF (ref 0–5)

## 2018-12-07 PROCEDURE — 80053 COMPREHEN METABOLIC PANEL: CPT

## 2018-12-07 PROCEDURE — 82962 GLUCOSE BLOOD TEST: CPT

## 2018-12-07 PROCEDURE — 80307 DRUG TEST PRSMV CHEM ANLYZR: CPT

## 2018-12-07 PROCEDURE — 93005 ELECTROCARDIOGRAM TRACING: CPT

## 2018-12-07 PROCEDURE — 77030011943

## 2018-12-07 PROCEDURE — 85025 COMPLETE CBC W/AUTO DIFF WBC: CPT

## 2018-12-07 PROCEDURE — 96374 THER/PROPH/DIAG INJ IV PUSH: CPT

## 2018-12-07 PROCEDURE — 87086 URINE CULTURE/COLONY COUNT: CPT

## 2018-12-07 PROCEDURE — 74011250636 HC RX REV CODE- 250/636: Performed by: NURSE PRACTITIONER

## 2018-12-07 PROCEDURE — 82553 CREATINE MB FRACTION: CPT

## 2018-12-07 PROCEDURE — 81001 URINALYSIS AUTO W/SCOPE: CPT

## 2018-12-07 PROCEDURE — 99285 EMERGENCY DEPT VISIT HI MDM: CPT

## 2018-12-07 RX ORDER — MORPHINE SULFATE 4 MG/ML
4 INJECTION INTRAVENOUS
Status: COMPLETED | OUTPATIENT
Start: 2018-12-07 | End: 2018-12-07

## 2018-12-07 RX ORDER — CLINDAMYCIN HYDROCHLORIDE 300 MG/1
300 CAPSULE ORAL 4 TIMES DAILY
Qty: 28 CAP | Refills: 0 | Status: SHIPPED | OUTPATIENT
Start: 2018-12-07 | End: 2018-12-14

## 2018-12-07 RX ADMIN — MORPHINE SULFATE 4 MG: 4 INJECTION INTRAVENOUS at 21:22

## 2018-12-08 NOTE — ED NOTES
Pt room smells of cigarette smoke, pt denies smoking pt has cigarette box in his underwear, pt's lighter is in his pocket, pt has cigarette ashes on bed linens, pt asked again if he smoked pt stated \" no the f___I didn't\" Pt's lighter removed and given to [facility] from [facility].

## 2018-12-08 NOTE — ED TRIAGE NOTES
Pt arrived to ED via EMS with c/o left leg pain. Pt on stretcher demanding warm blanket and food. Pt able to speak in complete sentences no acute distress noted.

## 2018-12-08 NOTE — DISCHARGE INSTRUCTIONS
Leg Pain: Care Instructions  Your Care Instructions  Many things can cause leg pain. Too much exercise or overuse can cause a muscle cramp (or charley horse). You can get leg cramps from not eating a balanced diet that has enough potassium, calcium, and other minerals. If you do not drink enough fluids or are taking certain medicines, you may develop leg cramps. Other causes of leg pain include injuries, blood flow problems, nerve damage, and twisted and enlarged veins (varicose veins). You can usually ease pain with self-care. Your doctor may recommend that you rest your leg and keep it elevated. Follow-up care is a key part of your treatment and safety. Be sure to make and go to all appointments, and call your doctor if you are having problems. It's also a good idea to know your test results and keep a list of the medicines you take. How can you care for yourself at home? · Take pain medicines exactly as directed. ? If the doctor gave you a prescription medicine for pain, take it as prescribed. ? If you are not taking a prescription pain medicine, ask your doctor if you can take an over-the-counter medicine. · Take any other medicines exactly as prescribed. Call your doctor if you think you are having a problem with your medicine. · Rest your leg while you have pain, and avoid standing for long periods of time. · Prop up your leg at or above the level of your heart when possible. · Make sure you are eating a balanced diet that is rich in calcium, potassium, and magnesium, especially if you are pregnant. · If directed by your doctor, put ice or a cold pack on the area for 10 to 20 minutes at a time. Put a thin cloth between the ice and your skin. · Your leg may be in a splint, a brace, or an elastic bandage, and you may have crutches to help you walk. Follow your doctor's directions about how long to wear supports and how to use the crutches. When should you call for help?   Call 911 anytime you think you may need emergency care. For example, call if:    · You have sudden chest pain and shortness of breath, or you cough up blood.     · Your leg is cool or pale or changes color.    Call your doctor now or seek immediate medical care if:    · You have increasing or severe pain.     · Your leg suddenly feels weak and you cannot move it.     · You have signs of a blood clot, such as:  ? Pain in your calf, back of the knee, thigh, or groin. ? Redness and swelling in your leg or groin.     · You have signs of infection, such as:  ? Increased pain, swelling, warmth, or redness. ? Red streaks leading from the sore area. ? Pus draining from a place on your leg. ? A fever.     · You cannot bear weight on your leg.    Watch closely for changes in your health, and be sure to contact your doctor if:    · You do not get better as expected. Where can you learn more? Go to http://faiza-quoc.info/. Enter W003 in the search box to learn more about \"Leg Pain: Care Instructions. \"  Current as of: November 20, 2017  Content Version: 11.8  © 7965-7377 [employer]. Care instructions adapted under license by [employer] (which disclaims liability or warranty for this information). If you have questions about a medical condition or this instruction, always ask your healthcare professional. [address] any warranty or liability for your use of this information.     Follow up as directed  Take medications as prescribed  Return to the ED for increased pain, swelling, redness, fever, chest pain, sob or worsening of symptoms

## 2018-12-08 NOTE — ED NOTES
I have reviewed discharge instructions with the patient. The patient verbalized understanding. I have reviewed the provider's instructions with the patient, answering all questions to his satisfaction. Discharge medications reviewed with patient and appropriate educational materials and side effects teaching were provided. Pt transported home via [address] transportation.

## 2018-12-08 NOTE — ED PROVIDER NOTES
EMERGENCY DEPARTMENT HISTORY AND PHYSICAL EXAM 
 
Date: 12/7/2018 Patient Name: Kristin Badillo History of Presenting Illness Chief Complaint Patient presents with  Leg Pain History Provided By: Patient Chief Complaint: Leg pain Duration: just PTA Timing:  Worsening Location: left Quality: Aching Severity: Mild Modifying Factors: No modifying factors Associated Symptoms: Fatigue Additional History (Context):  
8:06 PM 
Kristin Badillo is a 66 y.o. male with PMHX of Polio, DM, HTN, Hypercholesterolemia, who presents via EMS to the emergency department C/O left leg pain onset just PTA. Associated sxs include fatigue. Pt states that he \"has not slept for the last 4 nights\" Pt denies fever, chills, taking medications, EtOH use, illicit drug use, and any other sxs or complaints. PCP: Glenna Pace MD 
 
Current Outpatient Medications Medication Sig Dispense Refill  clindamycin (CLEOCIN) 300 mg capsule Take 1 Cap by mouth four (4) times daily for 7 days. 28 Cap 0  
 acetaminophen (TYLENOL) 325 mg tablet Take  by mouth every four (4) hours as needed for Pain.  amLODIPine (NORVASC) 5 mg tablet Take 5 mg by mouth daily.  gabapentin (NEURONTIN) 100 mg capsule Take 100 mg by mouth three (3) times daily.  clonazePAM (KLONOPIN) 0.5 mg tablet Take 0.5 mg by mouth nightly as needed.  linezolid (ZYVOX) 600 mg tablet Take 600 mg by mouth two (2) times a day.  atorvastatin (LIPITOR) 20 mg tablet Take 20 mg by mouth daily.  nitroglycerin (NITROSTAT) 0.4 mg SL tablet 0.4 mg by SubLINGual route every five (5) minutes as needed for Chest Pain. Up to 3 doses.  metoprolol succinate (TOPROL XL) 25 mg XL tablet Take 25 mg by mouth daily.  oxyCODONE-acetaminophen (PERCOCET) 5-325 mg per tablet Take  by mouth every four (4) hours as needed for Pain.     
 traMADol (ULTRAM) 50 mg tablet Take 1 Tab by mouth every six (6) hours as needed for Pain. Max Daily Amount: 200 mg. 8 Tab 0  
 simvastatin (ZOCOR) 20 mg tablet Take 20 mg by mouth nightly.  lisinopril-hydrochlorothiazide (PRINZIDE, ZESTORETIC) 20-12.5 mg per tablet Take 1 Tab by mouth daily.  isosorbide mononitrate ER (IMDUR) 30 mg tablet Take 30 mg by mouth daily. Past History Past Medical History: 
Past Medical History:  
Diagnosis Date  Amputation of both lower extremities ([facility] [address]  Amputee, above knee, left ([facility] [address]  At risk for falls  Chronic pain   
 back and legs  Diabetes ([facility] [address]  Hypercholesterolemia  Hypertension  Polio Past Surgical History: 
Past Surgical History:  
Procedure Laterality Date  HX HERNIA REPAIR    
 HX OTHER SURGICAL    
 carpal tunnel  HX OTHER SURGICAL    
 cyst  
 
 
Family History: 
Family History Problem Relation Age of Onset  Heart Attack Mother  Heart Attack Father  Malignant Hyperthermia Neg Hx  Pseudocholinesterase Deficiency Neg Hx  Delayed Awakening Neg Hx  Post-op Nausea/Vomiting Neg Hx  Emergence Delirium Neg Hx  Post-op Cognitive Dysfunction Neg Hx   
 Other Neg Hx Social History: 
Social History Tobacco Use  Smoking status: Current Every Day Smoker Packs/day: 2.00 Years: 40.00 Pack years: 80.00 Substance Use Topics  Alcohol use: No  
 Drug use: No  
 
 
Allergies: 
No Known Allergies Review of Systems Review of Systems Constitutional: Negative for chills and fever. Musculoskeletal: Positive for arthralgias (left leg) and myalgias (left leg). All other systems reviewed and are negative. Physical Exam  
 
Vitals:  
 12/07/18 2015 12/07/18 2115 12/07/18 2200 12/07/18 2230 BP: 136/87 (!) 142/94 106/61 (!) 141/98 Pulse: 73 80 70 72 Resp: (!) 31 (!) 40 29 29 Temp:      
SpO2: 99% 98% 98% 100% Weight:      
 
Physical Exam  
Constitutional: He is oriented to person, place, and time. Chronically ill appearing, moaning,somewhat sleepy but easily arouses and will answer questions approprietly HENT:  
Head: Normocephalic and atraumatic. Eyes: Conjunctivae and EOM are normal. Pupils are equal, round, and reactive to light. Neck: Normal range of motion. Cardiovascular: Normal rate and regular rhythm. Pulmonary/Chest: Effort normal and breath sounds normal.  
Abdominal: Soft. Bowel sounds are normal. He exhibits no distension. There is no tenderness. There is no rebound. Musculoskeletal: B/l BKA, right stump appears well healed, no swelling or TTP, no erythema, 
C/o pain left extremity, mild TTP, no swelling Left lower extremity stump has a small pale appearing ulcerated area with some yellow purulent exudate. There are some dry scabs along the skin covering the stump, gauze removed has small amount of yellow discharge. No erythema or warmth Neurological: He is alert and oriented to person, place, and time. Skin: Skin is warm and dry. Diagnostic Study Results Labs - Recent Results (from the past 12 hour(s)) GLUCOSE, POC Collection Time: 12/07/18  8:13 PM  
Result Value Ref Range Glucose (POC) 84 70 - 110 mg/dL CBC WITH AUTOMATED DIFF Collection Time: 12/07/18  8:26 PM  
Result Value Ref Range WBC 9.2 4.6 - 13.2 K/uL  
 RBC 4.94 4.70 - 5.50 M/uL  
 HGB 12.0 (L) 13.0 - 16.0 g/dL HCT 38.8 36.0 - 48.0 % MCV 78.5 74.0 - 97.0 FL  
 MCH 24.3 24.0 - 34.0 PG  
 MCHC 30.9 (L) 31.0 - 37.0 g/dL  
 RDW 17.7 (H) 11.6 - 14.5 % PLATELET 622 830 - 071 K/uL MPV 10.0 9.2 - 11.8 FL  
 NEUTROPHILS 77 (H) 40 - 73 % LYMPHOCYTES 15 (L) 21 - 52 % MONOCYTES 6 3 - 10 % EOSINOPHILS 2 0 - 5 % BASOPHILS 0 0 - 2 %  
 ABS. NEUTROPHILS 7.1 1.8 - 8.0 K/UL  
 ABS. LYMPHOCYTES 1.4 0.9 - 3.6 K/UL  
 ABS. MONOCYTES 0.6 0.05 - 1.2 K/UL  
 ABS. EOSINOPHILS 0.2 0.0 - 0.4 K/UL  
 ABS. BASOPHILS 0.0 0.0 - 0.1 K/UL  
 DF AUTOMATED METABOLIC PANEL, COMPREHENSIVE  
 Collection Time: 12/07/18  8:26 PM  
Result Value Ref Range Sodium 138 136 - 145 mmol/L Potassium 3.9 3.5 - 5.5 mmol/L Chloride 102 100 - 108 mmol/L  
 CO2 27 21 - 32 mmol/L Anion gap 9 3.0 - 18 mmol/L Glucose 75 74 - 99 mg/dL BUN 21 (H) 7.0 - 18 MG/DL Creatinine 0.67 0.6 - 1.3 MG/DL  
 BUN/Creatinine ratio 31 GFR est AA >60 >60 ml/min/1.73m2 GFR est non-AA >60 >60 ml/min/1.73m2 Calcium 8.9 8.5 - 10.1 MG/DL Bilirubin, total 0.2 0.2 - 1.0 MG/DL  
 ALT (SGPT) 27 16 - 61 U/L  
 AST (SGOT) 17 15 - 37 U/L Alk. phosphatase 99 45 - 117 U/L Protein, total 6.8 6.4 - 8.2 g/dL Albumin 3.1 (L) 3.4 - 5.0 g/dL Globulin 3.7 2.0 - 4.0 g/dL A-G Ratio 0.8 0.8 - 1.7 CARDIAC PANEL,(CK, CKMB & TROPONIN) Collection Time: 12/07/18  8:26 PM  
Result Value Ref Range CK 64 39 - 308 U/L  
 CK - MB 3.8 (H) <3.6 ng/ml CK-MB Index 5.9 (H) 0.0 - 4.0 % Troponin-I, Qt. 0.02 0.0 - 0.045 NG/ML  
ETHYL ALCOHOL Collection Time: 12/07/18  8:26 PM  
Result Value Ref Range ALCOHOL(ETHYL),SERUM <3 0 - 3 MG/DL  
EKG, 12 LEAD, INITIAL Collection Time: 12/07/18  9:01 PM  
Result Value Ref Range Ventricular Rate 81 BPM  
 Atrial Rate 81 BPM  
 P-R Interval 142 ms QRS Duration 86 ms  
 Q-T Interval 390 ms QTC Calculation (Bezet) 453 ms Calculated P Axis 78 degrees Calculated R Axis 62 degrees Calculated T Axis 64 degrees Diagnosis Normal sinus rhythm Septal infarct (cited on or before 22-NOV-2018) Abnormal ECG When compared with ECG of 04-DEC-2018 12:25, No significant change was found URINALYSIS W/ RFLX MICROSCOPIC Collection Time: 12/07/18  9:20 PM  
Result Value Ref Range Color YELLOW Appearance CLEAR Specific gravity 1.019 1.005 - 1.030    
 pH (UA) 6.5 5.0 - 8.0 Protein 100 (A) NEG mg/dL Glucose NEGATIVE  NEG mg/dL Ketone TRACE (A) NEG mg/dL Bilirubin NEGATIVE  NEG  Blood NEGATIVE  NEG    
 Urobilinogen 0.2 0.2 - 1.0 EU/dL Nitrites NEGATIVE  NEG Leukocyte Esterase NEGATIVE  NEG    
DRUG SCREEN, URINE Collection Time: 12/07/18  9:20 PM  
Result Value Ref Range BENZODIAZEPINES NEGATIVE  NEG    
 BARBITURATES NEGATIVE  NEG    
 THC (TH-CANNABINOL) POSITIVE (A) NEG    
 OPIATES NEGATIVE  NEG    
 PCP(PHENCYCLIDINE) NEGATIVE  NEG    
 COCAINE NEGATIVE  NEG    
 AMPHETAMINES NEGATIVE  NEG METHADONE NEGATIVE  NEG HDSCOM (NOTE) URINE MICROSCOPIC ONLY Collection Time: 12/07/18  9:20 PM  
Result Value Ref Range WBC NEGATIVE  0 - 5 /hpf  
 RBC NEGATIVE  0 - 5 /hpf Epithelial cells FEW 0 - 5 /lpf Bacteria 2+ (A) NEG /hpf Mucus FEW (A) NEG /lpf Radiologic Studies - No orders to display CT Results  (Last 48 hours) None CXR Results  (Last 48 hours) None Medications given in the ED- Medications  
morphine injection 4 mg (4 mg IntraVENous Given 12/7/18 2122) Medical Decision Making I am the first provider for this patient. I reviewed the vital signs, available nursing notes, past medical history, past surgical history, family history and social history. Vital Signs-Reviewed the patient's vital signs. Pulse Oximetry Analysis - 100% on RA Cardiac Monitor: 
Rate: 81 bpm 
Rhythm: NSR 
 
EKG interpretation: (Preliminary) 8:02 PM  
NSR, 81 bpm, No STEMI 
EKG read by [facility] MOUNA at 9:03 PM  
 
Records Reviewed: Nursing Notes and Old Medical Records Pt was seen 12/04/18 c/o generalized weakness feeling near syncope leg pain and drainage. Recent amputation bilateral lower extremities at [facility] 2 months ago. XR of left femur shows no acute process CXR shows nothing acute CT of head shows nothing acute. He was d/c on Tramadol. Review of record from [facility] showed went to [facility] 5 times with the same complaint. Last note from [facility] was on 12/01/18.  Was given Tramadol Gabapentin and Lyrica. BS was 55 and was replaced. SED rate 45. Normal white blood cell count. Provider Notes (Medical Decision Making):  
Discussion: 
Chronically ill pt presents with bilateral leg pain worse on the left. Has been seen for similar sxs at multiple facilites including this facility on 12/04/18. Labs show no signs of infection. per old records leg appears to be in same condition has been . Wound culture obtained on the 4th showed susceptibility to Clindamycin. Pt continues to request pain medications. He was already perscribed Tramadol, Lyrica, and Gabapentin. He will take those medications as previously prescribed. Understands reasons to return. Procedures: 
Procedures ED Course:  
8:06 PM Initial assessment performed. The patients presenting problems have been discussed, and they are in agreement with the care plan formulated and outlined with them. I have encouraged them to ask questions as they arise throughout their visit. Diagnosis and Disposition DISCHARGE NOTE: 
10:43 PM 
Lakeisha Ramirez  results have been reviewed with him. He has been counseled regarding his diagnosis, treatment, and plan. He verbally conveys understanding and agreement of the signs, symptoms, diagnosis, treatment and prognosis and additionally agrees to follow up as discussed. He also agrees with the care-plan and conveys that all of his questions have been answered. I have also provided discharge instructions for him that include: educational information regarding their diagnosis and treatment, and list of reasons why they would want to return to the ED prior to their follow-up appointment, should his condition change. He has been provided with education for proper emergency department utilization. CLINICAL IMPRESSION: 
 
1. Phantom pain ([address] 2. Arthralgia of both lower legs 3. Other chronic pain PLAN: 
1. D/C Home 2.   
Discharge Medication List as of 12/7/2018 10:44 PM  
 START taking these medications Details  
clindamycin (CLEOCIN) 300 mg capsule Take 1 Cap by mouth four (4) times daily for 7 days. , Print, Disp-28 Cap, R-0  
  
  
CONTINUE these medications which have NOT CHANGED Details  
acetaminophen (TYLENOL) 325 mg tablet Take  by mouth every four (4) hours as needed for Pain., Historical Med  
  
amLODIPine (NORVASC) 5 mg tablet Take 5 mg by mouth daily. , Historical Med  
  
gabapentin (NEURONTIN) 100 mg capsule Take 100 mg by mouth three (3) times daily. , Historical Med  
  
clonazePAM (KLONOPIN) 0.5 mg tablet Take 0.5 mg by mouth nightly as needed., Historical Med  
  
linezolid (ZYVOX) 600 mg tablet Take 600 mg by mouth two (2) times a day., Historical Med  
  
atorvastatin (LIPITOR) 20 mg tablet Take 20 mg by mouth daily. , Historical Med  
  
nitroglycerin (NITROSTAT) 0.4 mg SL tablet 0.4 mg by SubLINGual route every five (5) minutes as needed for Chest Pain. Up to 3 doses. , Historical Med  
  
metoprolol succinate (TOPROL XL) 25 mg XL tablet Take 25 mg by mouth daily. , Historical Med  
  
oxyCODONE-acetaminophen (PERCOCET) 5-325 mg per tablet Take  by mouth every four (4) hours as needed for Pain., Historical Med  
  
traMADol (ULTRAM) 50 mg tablet Take 1 Tab by mouth every six (6) hours as needed for Pain. Max Daily Amount: 200 mg., Print, Disp-8 Tab, R-0  
  
simvastatin (ZOCOR) 20 mg tablet Take 20 mg by mouth nightly., Historical Med  
  
lisinopril-hydrochlorothiazide (PRINZIDE, ZESTORETIC) 20-12.5 mg per tablet Take 1 Tab by mouth daily. , Historical Med  
  
isosorbide mononitrate ER (IMDUR) 30 mg tablet Take 30 mg by mouth daily. , Historical Med 3. Follow-up Information Follow up With Specialties Details Why Contact Info Govind Sharma MD Internal Medicine Schedule an appointment as soon as possible for a visit For primary care follow up [address] [address] [facility] [address] [place] [address] 
[phone] [facility] EMERGENCY DEPT Emergency Medicine Go to As needed, if symptoms worsen [address] [id number] 
[phone]  
  
 
_______________________________ Attestations: This note is prepared by [facility], acting as Scribe for Patience FREIRE. Patience FREIRE:  The scribe's documentation has been prepared under my direction and personally reviewed by me in its entirety. I confirm that the note above accurately reflects all work, treatment, procedures, and medical decision making performed by me. 
_______________________________

## 2018-12-10 LAB
BACTERIA SPEC CULT: NORMAL
SERVICE CMNT-IMP: NORMAL

## 2018-12-28 LAB
ATRIAL RATE: 81 BPM
CALCULATED P AXIS, ECG09: 74 DEGREES
CALCULATED R AXIS, ECG10: 65 DEGREES
CALCULATED T AXIS, ECG11: 61 DEGREES
DIAGNOSIS, 93000: NORMAL
P-R INTERVAL, ECG05: 130 MS
Q-T INTERVAL, ECG07: 388 MS
QRS DURATION, ECG06: 84 MS
QTC CALCULATION (BEZET), ECG08: 450 MS
VENTRICULAR RATE, ECG03: 81 BPM

## 2018-12-31 LAB
ATRIAL RATE: 81 BPM
CALCULATED P AXIS, ECG09: 78 DEGREES
CALCULATED R AXIS, ECG10: 62 DEGREES
CALCULATED T AXIS, ECG11: 64 DEGREES
DIAGNOSIS, 93000: NORMAL
P-R INTERVAL, ECG05: 142 MS
Q-T INTERVAL, ECG07: 390 MS
QRS DURATION, ECG06: 86 MS
QTC CALCULATION (BEZET), ECG08: 453 MS
VENTRICULAR RATE, ECG03: 81 BPM

## 2019-01-03 ENCOUNTER — TELEPHONE (OUTPATIENT)
Dept: PAIN MANAGEMENT | Age: 64
End: 2019-01-03

## 2019-01-03 NOTE — TELEPHONE ENCOUNTER
Patient LVM on nurse triage line requesting a call back. Returned patient's call and the phone was answered by another gentleman and passed to the patient. Patient stated he was calling to check the status of his new patient referral. Per Jacey's [place] note, a notification that we are unable to accept new patients at this time was faxed to [facility] [phone] fax [phone] on 12/21/18. Let patient know that he may want to contact his PCP to find out if his referral has been redirected to another facility. Patient verbalized understanding.

## 2019-01-30 LAB
ATRIAL RATE: 79 BPM
CALCULATED P AXIS, ECG09: 78 DEGREES
CALCULATED R AXIS, ECG10: 59 DEGREES
CALCULATED T AXIS, ECG11: 81 DEGREES
DIAGNOSIS, 93000: NORMAL
P-R INTERVAL, ECG05: 142 MS
Q-T INTERVAL, ECG07: 394 MS
QRS DURATION, ECG06: 80 MS
QTC CALCULATION (BEZET), ECG08: 451 MS
VENTRICULAR RATE, ECG03: 79 BPM

## 2019-02-18 ENCOUNTER — HOSPITAL ENCOUNTER (EMERGENCY)
Age: 64
Discharge: HOME | End: 2019-02-19
Attending: EMERGENCY MEDICINE
Payer: MEDICAID

## 2019-02-18 DIAGNOSIS — R79.89 ELEVATED BRAIN NATRIURETIC PEPTIDE (BNP) LEVEL: ICD-10-CM

## 2019-02-18 DIAGNOSIS — J44.1 COPD EXACERBATION (HCC): Primary | ICD-10-CM

## 2019-02-18 LAB
ARTERIAL PATENCY WRIST A: YES
BASE EXCESS BLD CALC-SCNC: 2 MMOL/L
BDY SITE: ABNORMAL
GAS FLOW.O2 O2 DELIVERY SYS: ABNORMAL L/MIN
HCO3 BLD-SCNC: 25.6 MMOL/L (ref 22–26)
O2/TOTAL GAS SETTING VFR VENT: 21 %
PCO2 BLD: 34.9 MMHG (ref 35–45)
PH BLD: 7.47 [PH] (ref 7.35–7.45)
PO2 BLD: 69 MMHG (ref 80–100)
SAO2 % BLD: 95 % (ref 92–97)
SERVICE CMNT-IMP: ABNORMAL
SPECIMEN TYPE: ABNORMAL
TOTAL RESP. RATE, ITRR: 23

## 2019-02-18 PROCEDURE — 82803 BLOOD GASES ANY COMBINATION: CPT

## 2019-02-18 PROCEDURE — 99284 EMERGENCY DEPT VISIT MOD MDM: CPT

## 2019-02-18 PROCEDURE — 83880 ASSAY OF NATRIURETIC PEPTIDE: CPT

## 2019-02-18 PROCEDURE — 82550 ASSAY OF CK (CPK): CPT

## 2019-02-18 PROCEDURE — 36600 WITHDRAWAL OF ARTERIAL BLOOD: CPT

## 2019-02-18 PROCEDURE — 93005 ELECTROCARDIOGRAM TRACING: CPT

## 2019-02-18 RX ORDER — CLONAZEPAM 0.5 MG/1
0.5 TABLET ORAL
Status: COMPLETED | OUTPATIENT
Start: 2019-02-18 | End: 2019-02-19

## 2019-02-19 ENCOUNTER — APPOINTMENT (OUTPATIENT)
Dept: GENERAL RADIOLOGY | Age: 64
End: 2019-02-19
Attending: EMERGENCY MEDICINE
Payer: MEDICAID

## 2019-02-19 VITALS
RESPIRATION RATE: 20 BRPM | DIASTOLIC BLOOD PRESSURE: 84 MMHG | HEART RATE: 71 BPM | WEIGHT: 99.21 LBS | BODY MASS INDEX: 17.03 KG/M2 | TEMPERATURE: 98.1 F | OXYGEN SATURATION: 97 % | SYSTOLIC BLOOD PRESSURE: 166 MMHG

## 2019-02-19 LAB
BNP SERPL-MCNC: 4291 PG/ML (ref 0–900)
CK MB CFR SERPL CALC: 4 % (ref 0–4)
CK MB SERPL-MCNC: 2.2 NG/ML (ref 5–25)
CK SERPL-CCNC: 55 U/L (ref 39–308)
TROPONIN I SERPL-MCNC: 0.04 NG/ML (ref 0–0.04)

## 2019-02-19 PROCEDURE — 96374 THER/PROPH/DIAG INJ IV PUSH: CPT

## 2019-02-19 PROCEDURE — 74011250637 HC RX REV CODE- 250/637: Performed by: EMERGENCY MEDICINE

## 2019-02-19 PROCEDURE — 71045 X-RAY EXAM CHEST 1 VIEW: CPT

## 2019-02-19 PROCEDURE — 74011250636 HC RX REV CODE- 250/636: Performed by: EMERGENCY MEDICINE

## 2019-02-19 RX ORDER — METHYLPREDNISOLONE 4 MG/1
TABLET ORAL
Qty: 1 DOSE PACK | Refills: 0 | Status: SHIPPED | OUTPATIENT
Start: 2019-02-19 | End: 2020-10-20

## 2019-02-19 RX ORDER — FUROSEMIDE 10 MG/ML
20 INJECTION INTRAMUSCULAR; INTRAVENOUS
Status: COMPLETED | OUTPATIENT
Start: 2019-02-19 | End: 2019-02-19

## 2019-02-19 RX ADMIN — FUROSEMIDE 20 MG: 10 INJECTION, SOLUTION INTRAMUSCULAR; INTRAVENOUS at 00:54

## 2019-02-19 RX ADMIN — CLONAZEPAM 0.5 MG: 0.5 TABLET ORAL at 00:22

## 2019-02-19 NOTE — ED NOTES
Patient armband removed and shredded I have reviewed discharge instructions with the patient. The patient verbalized understanding. Pt refused to sign d/c instructions; witnessed by primary nurse Jamie Krishnamurthy.
Pt removed b/p cuff pt refuses to keep cuff on.
Pt urinated on floor in room again, pt provided urinal again and instructed where it is.
Pt urinated on floor in room pt provided urinal.  

no fever and no chills.

## 2019-02-19 NOTE — ED TRIAGE NOTES
Pt brought to ED by [facility] c/c SOB, and CP en route to home from [facility] ER. Per EMS patient was discharged from [facility] and patient was being taken home when began coughing. Pt c/o CP per [facility] , and SpO2 decreased to 88%. Pt arrives to ED refuses to wear O2 via NC, SpO2 94% on RA.

## 2019-02-19 NOTE — DISCHARGE INSTRUCTIONS
COPD Exacerbation Plan: Care Instructions  Your Care Instructions    If you have chronic obstructive pulmonary disease (COPD), your usual shortness of breath could suddenly get worse. You may start coughing more and have more mucus. This flare-up is called a COPD exacerbation (say \"bc-OAY-gg-BAY-lara\"). A lung infection or air pollution could set off an exacerbation. Sometimes it can happen after a quick change in temperature or being around chemicals. Work with your doctor to make a plan for dealing with an exacerbation. You can better manage it if you plan ahead. Follow-up care is a key part of your treatment and safety. Be sure to make and go to all appointments, and call your doctor if you are having problems. It's also a good idea to know your test results and keep a list of the medicines you take. How can you care for yourself at home? During an exacerbation  · Do not panic if you start to have one. Quick treatment at home may help you prevent serious breathing problems. If you have a COPD exacerbation plan that you developed with your doctor, follow it. · Take your medicines exactly as your doctor tells you.  ? Use your inhaler as directed by your doctor. If your symptoms do not get better after you use your medicine, have someone take you to the emergency room. Call an ambulance if necessary. ? With inhaled medicines, a spacer or a nebulizer may help you get more medicine to your lungs. Ask your doctor or pharmacist how to use them properly. Practice using the spacer in front of a mirror before you have an exacerbation. This may help you get the medicine into your lungs quickly. ? If your doctor has given you steroid pills, take them as directed. ? Your doctor may have given you a prescription for antibiotics, which you can fill if you need it. ? Talk to your doctor if you have any problems with your medicine.  And call your doctor if you have to use your antibiotic or steroid pills.  Preventing an exacerbation  · Do not smoke. This is the most important step you can take to prevent more damage to your lungs and prevent problems. If you already smoke, it is never too late to stop. If you need help quitting, talk to your doctor about stop-smoking programs and medicines. These can increase your chances of quitting for good. · Take your daily medicines as prescribed. · Avoid colds and flu. ? Get a pneumococcal vaccine. ? Get a flu vaccine each year, as soon as it is available. Ask those you live or work with to do the same, so they will not get the flu and infect you. ? Try to stay away from people with colds or the flu. ? Wash your hands often. · Avoid secondhand smoke; air pollution; cold, dry air; hot, humid air; and high altitudes. Stay at home with your windows closed when air pollution is bad. · Learn breathing techniques for COPD, such as breathing through pursed lips. These techniques can help you breathe easier during an exacerbation. When should you call for help? Call 911 anytime you think you may need emergency care. For example, call if:    · You have severe trouble breathing.     · You have severe chest pain.    Call your doctor now or seek immediate medical care if:    · You have new or worse shortness of breath.     · You develop new chest pain.     · You are coughing more deeply or more often, especially if you notice more mucus or a change in the color of your mucus.     · You cough up blood.     · You have new or increased swelling in your legs or belly.     · You have a fever.    Watch closely for changes in your health, and be sure to contact your doctor if:    · You need to use your antibiotic or steroid pills.     · Your symptoms are getting worse. Where can you learn more? Go to http://faiza-quoc.info/. Enter K465 in the search box to learn more about \"COPD Exacerbation Plan: Care Instructions. \"  Current as of: September 5, 2018  Content Version: 11.9  © 4947-2608 [employer]. Care instructions adapted under license by [employer] (which disclaims liability or warranty for this information). If you have questions about a medical condition or this instruction, always ask your healthcare professional. [address] any warranty or liability for your use of this information.

## 2019-02-19 NOTE — ED PROVIDER NOTES
EMERGENCY DEPARTMENT HISTORY AND PHYSICAL EXAM 
 
Date: 2/18/2019 Patient Name: Rhonda Maravilla History of Presenting Illness Chief Complaint Patient presents with  Shortness of Breath History Provided By: Patient and EMS Chief Complaint: SOB Duration: Just PTA Timing:  Acute Location: Lungs Severity: Pt de-satted to 88% on RA Modifying Factors: Pt placed on O2 via NC Associated Symptoms: chest pain, mild HA, and worsening cough Additional History (Context):  
11:17 PM 
Rhonda Maravilla is a 66 y.o. male with PMHx of DM, HTN, emphysema s/p bilateral AKA who presents to the emergency department C/O SOB, onset just PTA. Associated sxs include chest pain, mild HA, and worsening cough. Pt was just seen at [facility] for bronchitis and discharged with Suzie. During transport to nursing home via EMS, pt began C/O SOB and chest pain. EMS states pt was de-satting to 88% on RA and placed on O2 via NC however, EMS reports that pt refused to wear his NC. Pt stated that he wanted to go to the hospital and EMS called his nursing home who agreed to have pt seen. Pt is not on O2 at home. Pt denies any other sxs or complaints. PCP: Zofia Raymundo MD 
 
Current Outpatient Medications Medication Sig Dispense Refill  methylPREDNISolone (MEDROL, MARIS,) 4 mg tablet Use as directed 1 Dose Pack 0  
 acetaminophen (TYLENOL) 325 mg tablet Take  by mouth every four (4) hours as needed for Pain.  amLODIPine (NORVASC) 5 mg tablet Take 5 mg by mouth daily.  gabapentin (NEURONTIN) 100 mg capsule Take 100 mg by mouth three (3) times daily.  clonazePAM (KLONOPIN) 0.5 mg tablet Take 0.5 mg by mouth nightly as needed.  linezolid (ZYVOX) 600 mg tablet Take 600 mg by mouth two (2) times a day.  atorvastatin (LIPITOR) 20 mg tablet Take 20 mg by mouth daily.     
 nitroglycerin (NITROSTAT) 0.4 mg SL tablet 0.4 mg by SubLINGual route every five (5) minutes as needed for Chest Pain. Up to 3 doses.  metoprolol succinate (TOPROL XL) 25 mg XL tablet Take 25 mg by mouth daily.  oxyCODONE-acetaminophen (PERCOCET) 5-325 mg per tablet Take  by mouth every four (4) hours as needed for Pain.  traMADol (ULTRAM) 50 mg tablet Take 1 Tab by mouth every six (6) hours as needed for Pain. Max Daily Amount: 200 mg. 8 Tab 0  
 simvastatin (ZOCOR) 20 mg tablet Take 20 mg by mouth nightly.  lisinopril-hydrochlorothiazide (PRINZIDE, ZESTORETIC) 20-12.5 mg per tablet Take 1 Tab by mouth daily.  isosorbide mononitrate ER (IMDUR) 30 mg tablet Take 30 mg by mouth daily. Past History Past Medical History: 
Past Medical History:  
Diagnosis Date  Amputation of both lower extremities ([facility] [address]  Amputee, above knee, left ([facility] [address]  At risk for falls  Chronic pain   
 back and legs  Diabetes ([facility] [address]  Hypercholesterolemia  Hypertension  Polio Past Surgical History: 
Past Surgical History:  
Procedure Laterality Date  HX HERNIA REPAIR    
 HX OTHER SURGICAL    
 carpal tunnel  HX OTHER SURGICAL    
 cyst  
 
 
Family History: 
Family History Problem Relation Age of Onset  Heart Attack Mother  Heart Attack Father  Malignant Hyperthermia Neg Hx  Pseudocholinesterase Deficiency Neg Hx  Delayed Awakening Neg Hx  Post-op Nausea/Vomiting Neg Hx  Emergence Delirium Neg Hx  Post-op Cognitive Dysfunction Neg Hx   
 Other Neg Hx Social History: 
Social History Tobacco Use  Smoking status: Current Every Day Smoker Packs/day: 2.00 Years: 40.00 Pack years: 80.00 Substance Use Topics  Alcohol use: No  
 Drug use: No  
 
 
Allergies: 
No Known Allergies Review of Systems Review of Systems Respiratory: Positive for cough and shortness of breath. Cardiovascular: Positive for chest pain. Neurological: Positive for headaches (mild). All other systems reviewed and are negative. Physical Exam  
 
Vitals:  
 02/18/19 2330 02/18/19 2345 02/19/19 0015 02/19/19 6220 BP: 146/85 163/77 166/84 Pulse: 65 71 72 71 Resp: 26 24 20 Temp:      
SpO2: 94% 96% 97% Weight:      
 
Physical Exam  
Constitutional: He is oriented to person, place, and time. He appears well-developed and well-nourished. No distress. Chronically ill looking male in no distress HENT:  
Head: Normocephalic and atraumatic. Eyes: Pupils are equal, round, and reactive to light. Neck: Neck supple. Cardiovascular: Normal rate, regular rhythm, S1 normal, S2 normal and normal heart sounds. Pulmonary/Chest: Breath sounds normal. No respiratory distress. He has no wheezes. He has no rales. He exhibits no tenderness. Abdominal: Soft. He exhibits no distension and no mass. There is no tenderness. There is no guarding. Musculoskeletal: Normal range of motion. He exhibits no edema or tenderness. S/p bilateral AKA Neurological: He is alert and oriented to person, place, and time. No cranial nerve deficit. Skin: No rash noted. Psychiatric: He has a normal mood and affect. His behavior is normal. Thought content normal.  
Nursing note and vitals reviewed. Diagnostic Study Results Labs - Recent Results (from the past 12 hour(s)) CARDIAC PANEL,(CK, CKMB & TROPONIN) Collection Time: 02/18/19 11:20 PM  
Result Value Ref Range CK 55 39 - 308 U/L  
 CK - MB 2.2 <3.6 ng/ml CK-MB Index 4.0 0.0 - 4.0 % Troponin-I, QT 0.04 0.0 - 0.045 NG/ML  
NT-PRO BNP Collection Time: 02/18/19 11:20 PM  
Result Value Ref Range NT pro-BNP 4,291 (H) 0 - 900 PG/ML  
EKG, 12 LEAD, INITIAL Collection Time: 02/18/19 11:27 PM  
Result Value Ref Range Ventricular Rate 67 BPM  
 Atrial Rate 67 BPM  
 P-R Interval 156 ms QRS Duration 84 ms Q-T Interval 440 ms QTC Calculation (Bezet) 464 ms Calculated P Axis 74 degrees Calculated R Axis 23 degrees Calculated T Axis 33 degrees Diagnosis Normal sinus rhythm Septal infarct (cited on or before 22-NOV-2018) Abnormal ECG When compared with ECG of 07-DEC-2018 21:01, Non-specific change in ST segment in Inferior leads Nonspecific T wave abnormality now evident in Inferior leads POC G3 Collection Time: 02/18/19 11:43 PM  
Result Value Ref Range Device: ROOM AIR    
 FIO2 (POC) 21 % pH (POC) 7.475 (H) 7.35 - 7.45    
 pCO2 (POC) 34.9 (L) 35.0 - 45.0 MMHG  
 pO2 (POC) 69 (L) 80 - 100 MMHG  
 HCO3 (POC) 25.6 22 - 26 MMOL/L  
 sO2 (POC) 95 92 - 97 % Base excess (POC) 2 mmol/L Allens test (POC) YES Total resp. rate 23 Site RIGHT RADIAL Specimen type (POC) ARTERIAL Performed by Sánchez Russo Radiologic Studies -   
XR CHEST PORT Final Result Impression:  
-------------- No active pulmonary disease. Medical Decision Making I am the first provider for this patient. I reviewed the vital signs, available nursing notes, past medical history, past surgical history, family history and social history. Vital Signs-Reviewed the patient's vital signs. Pulse Oximetry Analysis - 94% on RA Cardiac Monitor: 
Rate: 69 bpm 
Rhythm: bpm 
 
EKG interpretation: (Preliminary) 11:27 PM 
NSR at 67 bpm. Septal infarct. EKG read by Vaishali Anne MD  
 
Records Reviewed: Nursing Notes, Old Medical Records, Previous electrocardiograms, Previous Radiology Studies and Previous Laboratory Studies Procedures: 
Procedures MEDICATIONS GIVEN: 
Medications  
clonazePAM (KlonoPIN) tablet 0.5 mg (0.5 mg Oral Given 2/19/19 0022) furosemide (LASIX) injection 20 mg (20 mg IntraVENous Given 2/19/19 0054) ED Course:  
11:17 PM  
Initial assessment performed.  The patients presenting problems have been discussed, and they are in agreement with the care plan formulated and outlined with them. I have encouraged them to ask questions as they arise throughout their visit. Discussion: 
Pt resting comfortably. He states he feels pretty good now and is asking when he can go home. His lungs are clear. I reviewed pts old chart and was see twice at [facility] for same. He was diagnosed with bronchitis and he had Zithromax sent to his pharmacy. He went back for pretty much the same thing: persistent cough. At discharge, was being taken home by [facility] who reports pulse ox of 80% and pt feeling bad and pt wanting to come here. On arrival, he appeared somewhat anxious and pulse ox was 97% and stating that he felt like a dead dog but giving no other specifics. Repeated EKG with no other acute findings. Reviewed labs done at [facility] which were normal for the most part. Repeated cardiac panel which was also normal. BNP was elevated, I am not aware of his baseline. Repeated CXR and noted nothing acute. At some point, he was asking for his Klonopin. I also gave him Solu-medrol and nebulizer treatment and because of elevated BNP and being on Lasix at home, I gave him 20 mg Lasix IV with good diuresis. Will add Medrol dose pack to his medications from [facility] and advised him to continue his other medications and to follow up with his PCP. Diagnosis and Disposition DISCHARGE NOTE: 
3:16 AM 
Yeimi Michele's  results have been reviewed with him. He has been counseled regarding his diagnosis, treatment, and plan. He verbally conveys understanding and agreement of the signs, symptoms, diagnosis, treatment and prognosis and additionally agrees to follow up as discussed. He also agrees with the care-plan and conveys that all of his questions have been answered.   I have also provided discharge instructions for him that include: educational information regarding their diagnosis and treatment, and list of reasons why they would want to return to the ED prior to their follow-up appointment, should his condition change. He has been provided with education for proper emergency department utilization. CLINICAL IMPRESSION: 
 
1. COPD exacerbation ([address] 2. Elevated brain natriuretic peptide (BNP) level PLAN: 
1. D/C Home 2. Current Discharge Medication List  
  
START taking these medications Details  
methylPREDNISolone (MEDROL, MARIS,) 4 mg tablet Use as directed Qty: 1 Dose Pack, Refills: 0  
  
  
 
3. Follow-up Information Follow up With Specialties Details Why Contact Info Danielle Hernandez MD Internal Medicine Schedule an appointment as soon as possible for a visit in 1 day For primary care follow up [address] [address] [address] [address] [phone] [facility] EMERGENCY DEPT Emergency Medicine  As needed, If symptoms worsen [address] [facility] [address] 
[phone]  
  
 
_______________________________ Attestations: This note is prepared by Huy Ford, acting as Scribe for Dc Faye MD. Dc Faye MD:  The scribe's documentation has been prepared under my direction and personally reviewed by me in its entirety. I confirm that the note above accurately reflects all work, treatment, procedures, and medical decision making performed by me. 
 
_______________________________

## 2019-02-21 LAB
ATRIAL RATE: 67 BPM
CALCULATED P AXIS, ECG09: 74 DEGREES
CALCULATED R AXIS, ECG10: 23 DEGREES
CALCULATED T AXIS, ECG11: 33 DEGREES
DIAGNOSIS, 93000: NORMAL
P-R INTERVAL, ECG05: 156 MS
Q-T INTERVAL, ECG07: 440 MS
QRS DURATION, ECG06: 84 MS
QTC CALCULATION (BEZET), ECG08: 464 MS
VENTRICULAR RATE, ECG03: 67 BPM

## 2020-10-14 ENCOUNTER — APPOINTMENT (OUTPATIENT)
Dept: GENERAL RADIOLOGY | Age: 65
DRG: 139 | End: 2020-10-14
Attending: EMERGENCY MEDICINE
Payer: MEDICAID

## 2020-10-14 ENCOUNTER — HOSPITAL ENCOUNTER (INPATIENT)
Age: 65
LOS: 6 days | Discharge: HOME | DRG: 139 | End: 2020-10-20
Attending: EMERGENCY MEDICINE | Admitting: HOSPITALIST
Payer: MEDICAID

## 2020-10-14 DIAGNOSIS — J44.1 COPD EXACERBATION (HCC): ICD-10-CM

## 2020-10-14 DIAGNOSIS — J18.9 COMMUNITY ACQUIRED PNEUMONIA OF RIGHT LUNG, UNSPECIFIED PART OF LUNG: ICD-10-CM

## 2020-10-14 DIAGNOSIS — S88.911A AMPUTATION OF BOTH LOWER EXTREMITIES, INITIAL ENCOUNTER (HCC): ICD-10-CM

## 2020-10-14 DIAGNOSIS — F17.210 CIGARETTE NICOTINE DEPENDENCE WITHOUT COMPLICATION: ICD-10-CM

## 2020-10-14 DIAGNOSIS — Z71.89 ADVANCED CARE PLANNING/COUNSELING DISCUSSION: ICD-10-CM

## 2020-10-14 DIAGNOSIS — R53.81 DEBILITY: ICD-10-CM

## 2020-10-14 DIAGNOSIS — A41.9 SEPSIS, DUE TO UNSPECIFIED ORGANISM, UNSPECIFIED WHETHER ACUTE ORGAN DYSFUNCTION PRESENT (HCC): Primary | ICD-10-CM

## 2020-10-14 DIAGNOSIS — S88.912A AMPUTATION OF BOTH LOWER EXTREMITIES, INITIAL ENCOUNTER (HCC): ICD-10-CM

## 2020-10-14 PROBLEM — H91.90 HARD OF HEARING: Status: ACTIVE | Noted: 2020-10-14

## 2020-10-14 PROBLEM — H54.8 LEGAL BLINDNESS: Status: ACTIVE | Noted: 2020-10-14

## 2020-10-14 LAB
ALBUMIN SERPL-MCNC: 2.9 G/DL (ref 3.4–5)
ALBUMIN/GLOB SERPL: 0.6 {RATIO} (ref 0.8–1.7)
ALP SERPL-CCNC: 130 U/L (ref 45–117)
ALT SERPL-CCNC: 38 U/L (ref 16–61)
AMPHET UR QL SCN: NEGATIVE
ANION GAP SERPL CALC-SCNC: 9 MMOL/L (ref 3–18)
ARTERIAL PATENCY WRIST A: YES
AST SERPL-CCNC: 33 U/L (ref 10–38)
BARBITURATES UR QL SCN: NEGATIVE
BASE DEFICIT BLD-SCNC: 5 MMOL/L
BASOPHILS # BLD: 0 K/UL (ref 0–0.1)
BASOPHILS NFR BLD: 0 % (ref 0–2)
BDY SITE: ABNORMAL
BENZODIAZ UR QL: NEGATIVE
BILIRUB SERPL-MCNC: 0.4 MG/DL (ref 0.2–1)
BODY TEMPERATURE: 98.6
BUN SERPL-MCNC: 44 MG/DL (ref 7–18)
BUN/CREAT SERPL: 32 (ref 12–20)
CALCIUM SERPL-MCNC: 9.1 MG/DL (ref 8.5–10.1)
CANNABINOIDS UR QL SCN: POSITIVE
CHLORIDE SERPL-SCNC: 99 MMOL/L (ref 100–111)
CO2 SERPL-SCNC: 26 MMOL/L (ref 21–32)
COCAINE UR QL SCN: NEGATIVE
COVID-19 RAPID TEST, COVR: NOT DETECTED
CREAT SERPL-MCNC: 1.38 MG/DL (ref 0.6–1.3)
DIFFERENTIAL METHOD BLD: ABNORMAL
EOSINOPHIL # BLD: 0 K/UL (ref 0–0.4)
EOSINOPHIL NFR BLD: 0 % (ref 0–5)
ERYTHROCYTE [DISTWIDTH] IN BLOOD BY AUTOMATED COUNT: 17.8 % (ref 11.6–14.5)
ETHANOL SERPL-MCNC: <3 MG/DL (ref 0–3)
GAS FLOW.O2 O2 DELIVERY SYS: ABNORMAL L/MIN
GLOBULIN SER CALC-MCNC: 4.7 G/DL (ref 2–4)
GLUCOSE SERPL-MCNC: 88 MG/DL (ref 74–99)
HCO3 BLD-SCNC: 19.2 MMOL/L (ref 22–26)
HCT VFR BLD AUTO: 38 % (ref 36–48)
HDSCOM,HDSCOM: ABNORMAL
HGB BLD-MCNC: 12.4 G/DL (ref 13–16)
LACTATE SERPL-SCNC: 1.1 MMOL/L (ref 0.4–2)
LACTATE SERPL-SCNC: 2.8 MMOL/L (ref 0.4–2)
LYMPHOCYTES # BLD: 0.6 K/UL (ref 0.9–3.6)
LYMPHOCYTES NFR BLD: 6 % (ref 21–52)
MAGNESIUM SERPL-MCNC: 2.2 MG/DL (ref 1.6–2.6)
MCH RBC QN AUTO: 28.1 PG (ref 24–34)
MCHC RBC AUTO-ENTMCNC: 32.6 G/DL (ref 31–37)
MCV RBC AUTO: 86.2 FL (ref 74–97)
METHADONE UR QL: NEGATIVE
MONOCYTES # BLD: 1 K/UL (ref 0.05–1.2)
MONOCYTES NFR BLD: 10 % (ref 3–10)
NEUTS SEG # BLD: 8.4 K/UL (ref 1.8–8)
NEUTS SEG NFR BLD: 84 % (ref 40–73)
O2/TOTAL GAS SETTING VFR VENT: 21 %
OPIATES UR QL: NEGATIVE
PCO2 BLD: 29.4 MMHG (ref 35–45)
PCP UR QL: NEGATIVE
PH BLD: 7.42 [PH] (ref 7.35–7.45)
PLATELET # BLD AUTO: 299 K/UL (ref 135–420)
PMV BLD AUTO: 10.4 FL (ref 9.2–11.8)
PO2 BLD: 71 MMHG (ref 80–100)
POTASSIUM SERPL-SCNC: 3.8 MMOL/L (ref 3.5–5.5)
PROT SERPL-MCNC: 7.6 G/DL (ref 6.4–8.2)
RBC # BLD AUTO: 4.41 M/UL (ref 4.7–5.5)
SAO2 % BLD: 95 % (ref 92–97)
SERVICE CMNT-IMP: ABNORMAL
SODIUM SERPL-SCNC: 134 MMOL/L (ref 136–145)
SOURCE, COVRS: NORMAL
SPECIMEN TYPE, XMCV1T: NORMAL
SPECIMEN TYPE: ABNORMAL
TOTAL RESP. RATE, ITRR: 20
WBC # BLD AUTO: 10.1 K/UL (ref 4.6–13.2)

## 2020-10-14 PROCEDURE — 96374 THER/PROPH/DIAG INJ IV PUSH: CPT

## 2020-10-14 PROCEDURE — 80053 COMPREHEN METABOLIC PANEL: CPT

## 2020-10-14 PROCEDURE — 36600 WITHDRAWAL OF ARTERIAL BLOOD: CPT

## 2020-10-14 PROCEDURE — 74011000250 HC RX REV CODE- 250: Performed by: EMERGENCY MEDICINE

## 2020-10-14 PROCEDURE — 74011000250 HC RX REV CODE- 250: Performed by: HOSPITALIST

## 2020-10-14 PROCEDURE — 74011250637 HC RX REV CODE- 250/637: Performed by: EMERGENCY MEDICINE

## 2020-10-14 PROCEDURE — 83735 ASSAY OF MAGNESIUM: CPT

## 2020-10-14 PROCEDURE — 87040 BLOOD CULTURE FOR BACTERIA: CPT

## 2020-10-14 PROCEDURE — 65660000000 HC RM CCU STEPDOWN

## 2020-10-14 PROCEDURE — 80307 DRUG TEST PRSMV CHEM ANLYZR: CPT

## 2020-10-14 PROCEDURE — 83605 ASSAY OF LACTIC ACID: CPT

## 2020-10-14 PROCEDURE — 94640 AIRWAY INHALATION TREATMENT: CPT

## 2020-10-14 PROCEDURE — 74011000258 HC RX REV CODE- 258: Performed by: HOSPITALIST

## 2020-10-14 PROCEDURE — 85025 COMPLETE CBC W/AUTO DIFF WBC: CPT

## 2020-10-14 PROCEDURE — 74011250637 HC RX REV CODE- 250/637: Performed by: HOSPITALIST

## 2020-10-14 PROCEDURE — 96375 TX/PRO/DX INJ NEW DRUG ADDON: CPT

## 2020-10-14 PROCEDURE — 82803 BLOOD GASES ANY COMBINATION: CPT

## 2020-10-14 PROCEDURE — 99285 EMERGENCY DEPT VISIT HI MDM: CPT

## 2020-10-14 PROCEDURE — 74011636637 HC RX REV CODE- 636/637: Performed by: EMERGENCY MEDICINE

## 2020-10-14 PROCEDURE — 74011250636 HC RX REV CODE- 250/636: Performed by: HOSPITALIST

## 2020-10-14 PROCEDURE — 74011250636 HC RX REV CODE- 250/636: Performed by: FAMILY MEDICINE

## 2020-10-14 PROCEDURE — 93005 ELECTROCARDIOGRAM TRACING: CPT

## 2020-10-14 PROCEDURE — 87635 SARS-COV-2 COVID-19 AMP PRB: CPT

## 2020-10-14 PROCEDURE — 74011250636 HC RX REV CODE- 250/636: Performed by: EMERGENCY MEDICINE

## 2020-10-14 PROCEDURE — 94760 N-INVAS EAR/PLS OXIMETRY 1: CPT

## 2020-10-14 PROCEDURE — 71045 X-RAY EXAM CHEST 1 VIEW: CPT

## 2020-10-14 RX ORDER — SODIUM CHLORIDE 0.9 % (FLUSH) 0.9 %
5-40 SYRINGE (ML) INJECTION EVERY 8 HOURS
Status: DISCONTINUED | OUTPATIENT
Start: 2020-10-14 | End: 2020-10-20 | Stop reason: HOSPADM

## 2020-10-14 RX ORDER — FACIAL-BODY WIPES
10 EACH TOPICAL DAILY PRN
Status: DISCONTINUED | OUTPATIENT
Start: 2020-10-14 | End: 2020-10-20 | Stop reason: HOSPADM

## 2020-10-14 RX ORDER — ARFORMOTEROL TARTRATE 15 UG/2ML
15 SOLUTION RESPIRATORY (INHALATION)
Status: DISCONTINUED | OUTPATIENT
Start: 2020-10-14 | End: 2020-10-20 | Stop reason: HOSPADM

## 2020-10-14 RX ORDER — LORAZEPAM 2 MG/ML
1 INJECTION INTRAMUSCULAR ONCE
Status: COMPLETED | OUTPATIENT
Start: 2020-10-14 | End: 2020-10-14

## 2020-10-14 RX ORDER — PROMETHAZINE HYDROCHLORIDE 25 MG/1
12.5 TABLET ORAL
Status: DISCONTINUED | OUTPATIENT
Start: 2020-10-14 | End: 2020-10-20 | Stop reason: HOSPADM

## 2020-10-14 RX ORDER — SODIUM CHLORIDE 0.9 % (FLUSH) 0.9 %
5-40 SYRINGE (ML) INJECTION AS NEEDED
Status: DISCONTINUED | OUTPATIENT
Start: 2020-10-14 | End: 2020-10-20 | Stop reason: HOSPADM

## 2020-10-14 RX ORDER — SODIUM CHLORIDE 0.9 % (FLUSH) 0.9 %
5-10 SYRINGE (ML) INJECTION AS NEEDED
Status: DISCONTINUED | OUTPATIENT
Start: 2020-10-14 | End: 2020-10-20 | Stop reason: HOSPADM

## 2020-10-14 RX ORDER — ONDANSETRON 2 MG/ML
4 INJECTION INTRAMUSCULAR; INTRAVENOUS
Status: COMPLETED | OUTPATIENT
Start: 2020-10-14 | End: 2020-10-14

## 2020-10-14 RX ORDER — PREDNISONE 20 MG/1
60 TABLET ORAL
Status: COMPLETED | OUTPATIENT
Start: 2020-10-14 | End: 2020-10-14

## 2020-10-14 RX ORDER — ONDANSETRON 2 MG/ML
4 INJECTION INTRAMUSCULAR; INTRAVENOUS
Status: DISCONTINUED | OUTPATIENT
Start: 2020-10-14 | End: 2020-10-20 | Stop reason: HOSPADM

## 2020-10-14 RX ORDER — ACETAMINOPHEN 650 MG/1
650 SUPPOSITORY RECTAL
Status: DISCONTINUED | OUTPATIENT
Start: 2020-10-14 | End: 2020-10-20 | Stop reason: HOSPADM

## 2020-10-14 RX ORDER — IPRATROPIUM BROMIDE AND ALBUTEROL SULFATE 2.5; .5 MG/3ML; MG/3ML
3 SOLUTION RESPIRATORY (INHALATION)
Status: DISCONTINUED | OUTPATIENT
Start: 2020-10-14 | End: 2020-10-20 | Stop reason: HOSPADM

## 2020-10-14 RX ORDER — GUAIFENESIN 600 MG/1
600 TABLET, EXTENDED RELEASE ORAL EVERY 12 HOURS
Status: DISCONTINUED | OUTPATIENT
Start: 2020-10-14 | End: 2020-10-20 | Stop reason: HOSPADM

## 2020-10-14 RX ORDER — HEPARIN SODIUM 5000 [USP'U]/ML
5000 INJECTION, SOLUTION INTRAVENOUS; SUBCUTANEOUS EVERY 8 HOURS
Status: DISCONTINUED | OUTPATIENT
Start: 2020-10-14 | End: 2020-10-20 | Stop reason: HOSPADM

## 2020-10-14 RX ORDER — SODIUM CHLORIDE 9 MG/ML
100 INJECTION, SOLUTION INTRAVENOUS CONTINUOUS
Status: DISCONTINUED | OUTPATIENT
Start: 2020-10-14 | End: 2020-10-15

## 2020-10-14 RX ORDER — ACETAMINOPHEN 325 MG/1
650 TABLET ORAL
Status: DISCONTINUED | OUTPATIENT
Start: 2020-10-14 | End: 2020-10-20 | Stop reason: HOSPADM

## 2020-10-14 RX ORDER — HYDROCODONE BITARTRATE AND ACETAMINOPHEN 5; 325 MG/1; MG/1
1 TABLET ORAL
Status: COMPLETED | OUTPATIENT
Start: 2020-10-14 | End: 2020-10-14

## 2020-10-14 RX ORDER — METOPROLOL SUCCINATE 25 MG/1
25 TABLET, EXTENDED RELEASE ORAL DAILY
Status: DISCONTINUED | OUTPATIENT
Start: 2020-10-15 | End: 2020-10-20 | Stop reason: HOSPADM

## 2020-10-14 RX ORDER — LORAZEPAM 2 MG/ML
1 INJECTION, SOLUTION INTRAMUSCULAR; INTRAVENOUS ONCE
Status: DISCONTINUED | OUTPATIENT
Start: 2020-10-14 | End: 2020-10-14

## 2020-10-14 RX ORDER — BUDESONIDE 0.5 MG/2ML
500 INHALANT ORAL
Status: DISCONTINUED | OUTPATIENT
Start: 2020-10-14 | End: 2020-10-20 | Stop reason: HOSPADM

## 2020-10-14 RX ORDER — ATORVASTATIN CALCIUM 10 MG/1
10 TABLET, FILM COATED ORAL DAILY
Status: DISCONTINUED | OUTPATIENT
Start: 2020-10-15 | End: 2020-10-20 | Stop reason: HOSPADM

## 2020-10-14 RX ORDER — IPRATROPIUM BROMIDE AND ALBUTEROL SULFATE 2.5; .5 MG/3ML; MG/3ML
3 SOLUTION RESPIRATORY (INHALATION)
Status: COMPLETED | OUTPATIENT
Start: 2020-10-14 | End: 2020-10-14

## 2020-10-14 RX ADMIN — PIPERACILLIN AND TAZOBACTAM 3.38 G: 3; .375 INJECTION, POWDER, LYOPHILIZED, FOR SOLUTION INTRAVENOUS at 22:27

## 2020-10-14 RX ADMIN — IPRATROPIUM BROMIDE AND ALBUTEROL SULFATE 3 ML: .5; 3 SOLUTION RESPIRATORY (INHALATION) at 14:57

## 2020-10-14 RX ADMIN — GUAIFENESIN 600 MG: 600 TABLET, EXTENDED RELEASE ORAL at 22:27

## 2020-10-14 RX ADMIN — SODIUM CHLORIDE 1000 ML: 900 INJECTION, SOLUTION INTRAVENOUS at 16:42

## 2020-10-14 RX ADMIN — CEFTRIAXONE 2 G: 2 INJECTION, POWDER, FOR SOLUTION INTRAMUSCULAR; INTRAVENOUS at 16:06

## 2020-10-14 RX ADMIN — SODIUM CHLORIDE 100 ML/HR: 900 INJECTION, SOLUTION INTRAVENOUS at 22:27

## 2020-10-14 RX ADMIN — PREDNISONE 60 MG: 20 TABLET ORAL at 14:55

## 2020-10-14 RX ADMIN — ONDANSETRON 4 MG: 2 INJECTION INTRAMUSCULAR; INTRAVENOUS at 16:40

## 2020-10-14 RX ADMIN — ARFORMOTEROL TARTRATE 15 MCG: 15 SOLUTION RESPIRATORY (INHALATION) at 20:55

## 2020-10-14 RX ADMIN — BUDESONIDE 500 MCG: 0.5 INHALANT RESPIRATORY (INHALATION) at 20:55

## 2020-10-14 RX ADMIN — AZITHROMYCIN MONOHYDRATE 500 MG: 500 INJECTION, POWDER, LYOPHILIZED, FOR SOLUTION INTRAVENOUS at 16:10

## 2020-10-14 RX ADMIN — Medication 10 ML: at 22:32

## 2020-10-14 RX ADMIN — HYDROCODONE BITARTRATE AND ACETAMINOPHEN 1 TABLET: 5; 325 TABLET ORAL at 16:16

## 2020-10-14 RX ADMIN — LORAZEPAM 1 MG: 2 INJECTION INTRAMUSCULAR; INTRAVENOUS at 23:08

## 2020-10-14 RX ADMIN — VANCOMYCIN HYDROCHLORIDE 750 MG: 750 INJECTION, POWDER, LYOPHILIZED, FOR SOLUTION INTRAVENOUS at 22:27

## 2020-10-14 NOTE — ROUTINE PROCESS
[facility] Medic Code Sepsis -  
- Time of RRT: N/A 
- Time of Code Sepsis: 8238 
- Team: 
Physician Dr. Molina Dawson Bedside Nurse RN Bean Rosas Medic RRTM MAURISIO Supervisor ROB Glynn - SIRS # 1 [id number]  SIRS # 2 None determined yet - Possible source of infection: Pneumonia - Organ dysfunction related to sepsis: LA > 2 
- Labs:  (pull in Chem 7 and CBC) - Initial Vitals: (pull in from [facility]) - Blood Cultures collected times 2 OR collected within last 24 hours:  1445 
- Lactic Acid Collection time OR within last 6 hours: 1445 
- Antibiotic Start time: 1606 
- Lactic Acid Result: 2.8 
- Target Fluid Bolus Amount: NONE, SEE NOTE BELOW 
- Time of Fluid Bolus initiated: N/A 
- Reason for no target fluid bolus: XRay, Pneumonia, patient weighs 80 lbs. - New PIV / or line: YES, TWO 1ST @ 1445, 2ND @ 1450. -  Time of Fluid Bolus Completion: N/A 
- Cardiopulmonary response after fluid completion: N/A 
- Time of Repeat Lactic Acid: Drawn @ 1610 
- Repeat Lactic Acid Result: 1.1 
- Repeat Vitals: (pull in from [facility]) - Vasopressors Needed? NEGATIVE 
- Debriefed with RN Yes, nothing more needed. - Additional Notes: - Transfer to higher level of care?  TBD

## 2020-10-14 NOTE — ED PROVIDER NOTES
EMERGENCY DEPARTMENT HISTORY AND PHYSICAL EXAM    Date: 10/14/2020  Patient Name: Jessica Prasad.    History of Presenting Illness     Chief Complaint   Patient presents with    Cough         History Provided By: Patient    Additional History (Context):   Jessica Higgins is a 63 y.o. male with PMHX legally blind, lateral AKA, nicotine dependence, emphysema, hypertension presents to the emergency department via EMS C/O worsening cough, shortness of breath after being seen and evaluated at an [facility] emergency department, [facility], yesterday. He said he was prescribed antibiotics however unable to pick it up. Patient reporting right-sided pain along his lower ribs. Pt denies fever, nausea, vomiting, and any other sxs or complaints.      PCP: Lizz Figueroa MD    Current Facility-Administered Medications   Medication Dose Route Frequency Provider Last Rate Last Dose    sodium chloride (NS) flush 5-10 mL  5-10 mL IntraVENous PRN Catalino Villagran DO        cefTRIAXone (ROCEPHIN) 2 g in sterile water (preservative free) 20 mL IV syringe  2 g IntraVENous Q24H Catalino Villagran DO   2 g at 10/14/20 1606    azithromycin (ZITHROMAX) 500 mg in 0.9% sodium chloride 250 mL (VIAL-MATE)  500 mg IntraVENous Q24H Catalino Villagran DO   500 mg at 10/14/20 1610    HYDROcodone-acetaminophen (NORCO) 5-325 mg per tablet 1 Tab  1 Tab Oral NOW Catalino Villagran DO        ondansetron [facility]) injection 4 mg  4 mg IntraVENous NOW Catalino Villagran DO        0.9% sodium chloride infusion  100 mL/hr IntraVENous CONTINUOUS Jovon Jesus MD        sodium chloride (NS) flush 5-40 mL  5-40 mL IntraVENous Q8H Jovon Jesus MD        sodium chloride (NS) flush 5-40 mL  5-40 mL IntraVENous PRN Jovon Jesus MD        acetaminophen (TYLENOL) tablet 650 mg  650 mg Oral Q6H PRN Jovon Jesus MD        Or    acetaminophen (TYLENOL) suppository 650 mg  650 mg Rectal Q6H PRN Kael Shaw, Miranda Parker MD        bisacodyL (DULCOLAX) suppository 10 mg  10 mg Rectal DAILY PRN Horacio Hutson MD        promethazine [facility]) tablet 12.5 mg  12.5 mg Oral Q6H PRN Horacio Hutson MD        Or    ondansetron [facility]) injection 4 mg  4 mg IntraVENous Q6H PRN Horacio Hutson MD        heparin (porcine) injection 5,000 Units  5,000 Units SubCUTAneous Q8H Horacio Hutson MD        sodium chloride 0.9 % bolus infusion 1,000 mL  1,000 mL IntraVENous Sandy Dominguez MD         Current Outpatient Medications   Medication Sig Dispense Refill    methylPREDNISolone (MEDROL, MARIS,) 4 mg tablet Use as directed 1 Dose Pack 0    acetaminophen (TYLENOL) 325 mg tablet Take  by mouth every four (4) hours as needed for Pain.  amLODIPine (NORVASC) 5 mg tablet Take 5 mg by mouth daily.  gabapentin (NEURONTIN) 100 mg capsule Take 100 mg by mouth three (3) times daily.  clonazePAM (KLONOPIN) 0.5 mg tablet Take 0.5 mg by mouth nightly as needed.  linezolid (ZYVOX) 600 mg tablet Take 600 mg by mouth two (2) times a day.  atorvastatin (LIPITOR) 20 mg tablet Take 20 mg by mouth daily.  nitroglycerin (NITROSTAT) 0.4 mg SL tablet 0.4 mg by SubLINGual route every five (5) minutes as needed for Chest Pain. Up to 3 doses.  metoprolol succinate (TOPROL XL) 25 mg XL tablet Take 25 mg by mouth daily.  oxyCODONE-acetaminophen (PERCOCET) 5-325 mg per tablet Take  by mouth every four (4) hours as needed for Pain.  traMADol (ULTRAM) 50 mg tablet Take 1 Tab by mouth every six (6) hours as needed for Pain. Max Daily Amount: 200 mg. 8 Tab 0    simvastatin (ZOCOR) 20 mg tablet Take 20 mg by mouth nightly.  lisinopril-hydrochlorothiazide (PRINZIDE, ZESTORETIC) 20-12.5 mg per tablet Take 1 Tab by mouth daily.  isosorbide mononitrate ER (IMDUR) 30 mg tablet Take 30 mg by mouth daily.          Past History     Past Medical History:  Past Medical History:   Diagnosis Date    Amputation of both lower extremities ([address]     Amputee, above knee, left (HCC)     At risk for falls     Chronic pain     back and legs    Diabetes ([facility] [address]     Hypercholesterolemia     Hypertension     Polio        Past Surgical History:  Past Surgical History:   Procedure Laterality Date    HX HERNIA REPAIR      HX OTHER SURGICAL      carpal tunnel     HX OTHER SURGICAL      cyst       Family History:  Family History   Problem Relation Age of Onset    Heart Attack Mother     Heart Attack Father     Malignant Hyperthermia Neg Hx     Pseudocholinesterase Deficiency Neg Hx     Delayed Awakening Neg Hx     Post-op Nausea/Vomiting Neg Hx     Emergence Delirium Neg Hx     Post-op Cognitive Dysfunction Neg Hx     Other Neg Hx        Social History:  Social History     Tobacco Use    Smoking status: Current Every Day Smoker     Packs/day: 2.00     Years: 40.00     Pack years: 80.00   Substance Use Topics    Alcohol use: No    Drug use: No       Allergies:  No Known Allergies      Review of Systems   Review of Systems   Constitutional: Negative for chills and fever. HENT: Negative for congestion, ear pain, sinus pain and sore throat. Eyes: Negative for pain and visual disturbance. Respiratory: Positive for cough and shortness of breath. Cardiovascular: Positive for chest pain. Negative for leg swelling. Gastrointestinal: Negative for abdominal pain, constipation, diarrhea, nausea and vomiting. Genitourinary: Negative for dysuria and hematuria. Musculoskeletal: Negative for back pain and neck pain. Skin: Negative for pallor and rash. Neurological: Negative for dizziness, tremors, weakness, light-headedness and headaches. All other systems reviewed and are negative.       Physical Exam     Vitals:    10/14/20 1443 10/14/20 1500 10/14/20 1600 10/14/20 1630   BP: 124/75 (!) 145/72 120/69 135/68   Pulse: 80 80 71    Resp: (!) 32 27 21    Temp: 98.6 °F (37 °C)      SpO2: 95% 90% 93% 92%   Weight: 36.3 kg (80 lb)        Physical Exam    Nursing note and vitals reviewed    Constitutional: Thin frail elderly  male, appears older than stated age, in moderate respiratory distress  Head: Normocephalic, Atraumatic  Eyes: Pupils are equal, round, and reactive to light, EOMI  Neck: Supple, non-tender  Cardiovascular: Regular rate and rhythm, no murmurs, rubs, or gallops, + 2 radial pulses bilaterally  Chest: Neck and dyspneic, symmetrical chest excursion bilaterally  Lungs: Patient with significant rhonchi and crackles bilaterally, moderate expiratory wheezes  Abdomen: Soft, non tender, non distended, normoactive bowel sounds  Back: No evidence of trauma or deformity  Extremities: Bilateral AKA.   Skin: Warm and dry, normal cap refill  Neuro: Alert and appropriate, CN intact, normal speech, moving all 4 extremities freely and symmetrically      Diagnostic Study Results     Labs -     Recent Results (from the past 12 hour(s))   EKG, 12 LEAD, INITIAL    Collection Time: 10/14/20  2:41 PM   Result Value Ref Range    Ventricular Rate 81 BPM    Atrial Rate 81 BPM    P-R Interval 128 ms    QRS Duration 94 ms    Q-T Interval 372 ms    QTC Calculation (Bezet) 432 ms    Calculated P Axis 38 degrees    Calculated R Axis 63 degrees    Calculated T Axis 73 degrees    Diagnosis       Normal sinus rhythm with sinus arrhythmia  Anterior infarct (cited on or before 22-NOV-2018)  Abnormal ECG  When compared with ECG of 18-FEB-2019 23:27,  Non-specific change in ST segment in Inferior leads  Nonspecific T wave abnormality no longer evident in Inferior leads     CBC WITH AUTOMATED DIFF    Collection Time: 10/14/20  2:45 PM   Result Value Ref Range    WBC 10.1 4.6 - 13.2 K/uL    RBC 4.41 (L) 4.70 - 5.50 M/uL    HGB 12.4 (L) 13.0 - 16.0 g/dL    HCT 38.0 36.0 - 48.0 %    MCV 86.2 74.0 - 97.0 FL    MCH 28.1 24.0 - 34.0 PG    MCHC 32.6 31.0 - 37.0 g/dL    RDW 17.8 (H) 11.6 - 14.5 %    PLATELET 674 180 - 019 K/uL    MPV 10.4 9.2 - 11.8 FL    NEUTROPHILS 84 (H) 40 - 73 %    LYMPHOCYTES 6 (L) 21 - 52 %    MONOCYTES 10 3 - 10 %    EOSINOPHILS 0 0 - 5 %    BASOPHILS 0 0 - 2 %    ABS. NEUTROPHILS 8.4 (H) 1.8 - 8.0 K/UL    ABS. LYMPHOCYTES 0.6 (L) 0.9 - 3.6 K/UL    ABS. MONOCYTES 1.0 0.05 - 1.2 K/UL    ABS. EOSINOPHILS 0.0 0.0 - 0.4 K/UL    ABS. BASOPHILS 0.0 0.0 - 0.1 K/UL    DF AUTOMATED     METABOLIC PANEL, COMPREHENSIVE    Collection Time: 10/14/20  2:45 PM   Result Value Ref Range    Sodium 134 (L) 136 - 145 mmol/L    Potassium 3.8 3.5 - 5.5 mmol/L    Chloride 99 (L) 100 - 111 mmol/L    CO2 26 21 - 32 mmol/L    Anion gap 9 3.0 - 18 mmol/L    Glucose 88 74 - 99 mg/dL    BUN 44 (H) 7.0 - 18 MG/DL    Creatinine 1.38 (H) 0.6 - 1.3 MG/DL    BUN/Creatinine ratio 32 (H) 12 - 20      GFR est AA >60 >60 ml/min/1.73m2    GFR est non-AA 52 (L) >60 ml/min/1.73m2    Calcium 9.1 8.5 - 10.1 MG/DL    Bilirubin, total 0.4 0.2 - 1.0 MG/DL    ALT (SGPT) 38 16 - 61 U/L    AST (SGOT) 33 10 - 38 U/L    Alk. phosphatase 130 (H) 45 - 117 U/L    Protein, total 7.6 6.4 - 8.2 g/dL    Albumin 2.9 (L) 3.4 - 5.0 g/dL    Globulin 4.7 (H) 2.0 - 4.0 g/dL    A-G Ratio 0.6 (L) 0.8 - 1.7     MAGNESIUM    Collection Time: 10/14/20  2:45 PM   Result Value Ref Range    Magnesium 2.2 1.6 - 2.6 mg/dL   LACTIC ACID    Collection Time: 10/14/20  2:45 PM   Result Value Ref Range    Lactic acid 2.8 (HH) 0.4 - 2.0 MMOL/L   ETHYL ALCOHOL    Collection Time: 10/14/20  2:45 PM   Result Value Ref Range    ALCOHOL(ETHYL),SERUM <3 0 - 3 MG/DL   POC G3    Collection Time: 10/14/20  3:41 PM   Result Value Ref Range    Device: ROOM AIR      FIO2 (POC) 21 %    pH (POC) 7.42 7.35 - 7.45      pCO2 (POC) 29.4 (L) 35.0 - 45.0 MMHG    pO2 (POC) 71 (L) 80 - 100 MMHG    HCO3 (POC) 19.2 (L) 22 - 26 MMOL/L    sO2 (POC) 95 92 - 97 %    Base deficit (POC) 5 mmol/L    Allens test (POC) YES      Total resp. rate 20      Site RIGHT BRACHIAL      Patient temp.  98.6      Specimen type (POC) ARTERIAL      Performed by Pamalee Shone, URINE    Collection Time: 10/14/20  3:53 PM   Result Value Ref Range    BENZODIAZEPINES Negative NEG      BARBITURATES Negative NEG      THC (TH-CANNABINOL) Positive (A) NEG      OPIATES Negative NEG      PCP(PHENCYCLIDINE) Negative NEG      COCAINE Negative NEG      AMPHETAMINES Negative NEG      METHADONE Negative NEG      HDSCOM (NOTE)        Radiologic Studies -   XR CHEST PORT   Final Result   IMPRESSION:      Patchy multifocal airspace disease compatible with pneumonia. Recommend   radiographic follow-up to document expected clinical resolution in 4-6 weeks'   time. CT Results  (Last 48 hours)    None        CXR Results  (Last 48 hours)               10/14/20 1509  XR CHEST PORT Final result    Impression:  IMPRESSION:       Patchy multifocal airspace disease compatible with pneumonia. Recommend   radiographic follow-up to document expected clinical resolution in 4-6 weeks'   time. Narrative:  EXAM: XR CHEST PORT       CLINICAL INDICATION/HISTORY: Shortness of breath with cough   -Additional: None       COMPARISON: Several prior studies, most recently 2/19/2019       TECHNIQUE: Frontal view of the chest       _______________       FINDINGS:       HEART AND MEDIASTINUM: Normal cardiac size and mediastinal contours. LUNGS AND PLEURAL SPACES: Patchy multifocal opacities noted throughout bilateral   upper lobes, right greater than left as well as throughout the periphery of the   right lower lobe. No evidence of pneumothorax. BONY THORAX AND SOFT TISSUES: No acute osseous abnormality       _______________                   Medical Decision Making   I am the first provider for this patient. I reviewed the vital signs, available nursing notes, past medical history, past surgical history, family history and social history. Vital Signs-Reviewed the patient's vital signs.     Pulse Oximetry Analysis -95% on room air    Cardiac Monitor:  Rate: 80 bpm  Rhythm: Regular    Records Reviewed: Nursing Notes and Old Medical Records    Provider Notes:   63 y.o. male presenting with worsening shortness of breath, cough, right-sided chest pain. Was diagnosed with pneumonia and discharged with oral antibiotics yesterday. On exam patient appears ill and frail. In moderate respiratory distress. He has significant crackles, rhonchi and wheezes. Patient is tachypneic, dyspneic. Meeting sirs criteria. Will initiate septic work-up and start antibiotics. Procedures:  Procedures    ED Course:   2:44 PM   Initial assessment performed. The patients presenting problems have been discussed, and they are in agreement with the care plan formulated and outlined with them. I have encouraged them to ask questions as they arise throughout their visit. SMOKING CESSATION:  The patient was counseled on the dangers of tobacco use, and was advised to quit. Reviewed strategies to maximize success, including removing cigarettes and smoking materials from environment. Discussion took 3-5 minutes, and pt expressed understanding. 3:13 PM  Reassessment, patient to hypoxic and 98% on room air. Will check ABG.    4:03 PM  Code sepsis initiated. ABx initiated, will hold IVF due to end-stage, concern for respiratory compromise. Patient not hypotensive, not requiring aggressive fluid hydration. 4:36 PM  X-ray with multifocal pneumonia. Will admit the patient for hypoxia, CAP         Diagnosis and Disposition     4:35 PM  I have spent 90 minutes of critical care time involved in lab review, consultations with specialist, family decision-making, and documentation. During this entire length of time I was immediately available to the patient. Critical Care:   The reason for providing this level of medical care for this critically ill patient was due a critical illness that impaired one or more vital organ systems such that there was a high probability of imminent or life threatening deterioration in the patients condition. This care involved high complexity decision making to assess, manipulate, and support vital system functions, to treat this degreee vital organ system failure and to prevent further life threatening deterioration of the patients condition. Core Measures:  For Hospitalized Patients:    1. Hospitalization Decision Time:  The decision to hospitalize the patient was made by Isaías Rayo DO at 4:37 PM on 10/14/2020    2. Aspirin: Aspirin was not given because the patient did not present with a stroke at the time of their Emergency Department evaluation    4:03 PM  Patient is being admitted to the hospital by Leyla Lorenzo MD. The results of their tests and reasons for their admission have been discussed with them and/or available family. They convey agreement and understanding for the need to be admitted and for their admission diagnosis. CONDITIONS ON ADMISSION:  Sepsis is present at the time of admission. Pneumonia is present at the time of admission. MRSA is not present at the time of admission. Wound infection is not present at the time of admission. Pressure Ulcer is not present at the time of admission. CLINICAL IMPRESSION:    1. Sepsis, due to unspecified organism, unspecified whether acute organ dysfunction present ([address]    2. Community acquired pneumonia of right lung, unspecified part of lung    3. COPD exacerbation ([address]    4. Cigarette nicotine dependence without complication      ____________________________________     Please note that this dictation was completed with [employer], the computer voice recognition software. Quite often unanticipated grammatical, syntax, homophones, and other interpretive errors are inadvertently transcribed by the computer software. Please disregard these errors. Please excuse any errors that have escaped final proofreading.

## 2020-10-14 NOTE — H&P
History & Physical    Patient: Coby Calero Sr. MRN: [id number]  CSN: [id number]    YOB: 1955  Age: 63 y.o. Sex: male      DOA: 10/14/2020  Primary Care Provider:  Sakshi Greer MD      Assessment/Plan     Hospital Problems  Date Reviewed: 10/21/2013          Codes Class Noted POA    Amputation of both lower extremities ([facility] ICD-10-CM: K25.985U, N32.416M  ICD-9-CM: 897.6  Unknown Unknown        * (Principal) PNA (pneumonia) ICD-10-CM: J18.9  ICD-9-CM: 5  Unknown Unknown        ALEXANDER (acute kidney injury) ([facility] ICD-10-CM: N17.9  ICD-9-CM: 798. 9  Unknown Unknown        Hyponatremia ICD-10-CM: E87.1  ICD-9-CM: 276.1  Unknown Unknown        Marijuana abuse ICD-10-CM: F12.10  ICD-9-CM: 305.20  Unknown Unknown        Emphysema lung (HCC) ICD-10-CM: J43.9  ICD-9-CM: 492.8  Unknown Unknown        CAP (community acquired pneumonia) ICD-10-CM: J18.9  ICD-9-CM: 126  10/14/2020 Unknown        Sepsis ([facility] ICD-10-CM: A41.9  ICD-9-CM: 038.9, 995.91  10/14/2020 Unknown        Hard of hearing ICD-10-CM: H91.90  ICD-9-CM: 389.9  10/14/2020 Unknown        Legal blindness ICD-10-CM: H54.8  ICD-9-CM: 369.4  10/14/2020 Unknown                Admit to tele     Sepsis   Due to pna   Septic protocol, pan cx , iv fluid ,lactic acid monitoring  Iv abx started in Er and will continue, received iv n bolus       pna   Continue azithromycin , change to vanc and zosyn   Breathing treatment and symptom treatment   Will check strep and legionella   Rapid covid 19 negative      Emphysema lung   Breathing tx     ALEXANDER   Ns infusion   Continue monitoring    Hyponatremia   Due to lung disease   Ns infusion    Bilateral aka and head hearing and legal blindness   Fall precaution     marijuana use     Dm type II   ssi     Will have palliative care team on board for code status   Estimate  length of stay : 2-3 day    DVT : heparin ppi proph  CC: SOB        HPI:     Coby Calero Sr. is a 63 y.o. male with bilateral aka , emphysema, diabetes, CAD, hypertension, legal blindness, hard of hearing was sent to ER due to shortness of breath. He presented w shortness of breath for several days, he went to [facility], he was giving antibiotics for pneumonia treatment. His shortness of breath was not improving. Checks x-ray in ER indicated  multiple focal pneumonia. Lactic acid was 2.8. Sepsis protocol started in ER.  IV antibiotics was started with breathing treatment and his shortness of breath got improvin. rapid Covid testing was done- was negative.      Visit Vitals  /74 (BP 1 Location: Left arm, BP Patient Position: Sitting)   Pulse 73   Temp 98.6 °F (37 °C)   Resp 24   Wt 36.3 kg (80 lb)   SpO2 94%   BMI 13.73 kg/m²      O2 Device: Room air      Past Medical History:   Diagnosis Date    Amputation of both lower extremities (HCC)     Amputee, above knee, left (HCC)     At risk for falls     Chronic pain     back and legs    Diabetes ([facility]     Hypercholesterolemia     Hypertension     Polio        Past Surgical History:   Procedure Laterality Date    HX HERNIA REPAIR      HX OTHER SURGICAL      carpal tunnel     HX OTHER SURGICAL      cyst       Family History   Problem Relation Age of Onset    Heart Attack Mother     Heart Attack Father     Malignant Hyperthermia Neg Hx     Pseudocholinesterase Deficiency Neg Hx     Delayed Awakening Neg Hx     Post-op Nausea/Vomiting Neg Hx     Emergence Delirium Neg Hx     Post-op Cognitive Dysfunction Neg Hx     Other Neg Hx        Social History     Socioeconomic History    Marital status:      Spouse name: Not on file    Number of children: Not on file    Years of education: Not on file    Highest education level: Not on file   Tobacco Use    Smoking status: Current Every Day Smoker     Packs/day: 2.00     Years: 40.00     Pack years: 80.00   Substance and Sexual Activity    Alcohol use: No    Drug use: No       Prior to Admission medications    Medication Sig Start Date End Date Taking? Authorizing Provider   methylPREDNISolone (MEDROL, MARIS,) 4 mg tablet Use as directed 19   Parish Clayton MD   acetaminophen (TYLENOL) 325 mg tablet Take  by mouth every four (4) hours as needed for Pain. Tiffany Saravia MD   amLODIPine (NORVASC) 5 mg tablet Take 5 mg by mouth daily. Tiffany Saravia MD   gabapentin (NEURONTIN) 100 mg capsule Take 100 mg by mouth three (3) times daily. Tiffany Saravia MD   clonazePAM (KLONOPIN) 0.5 mg tablet Take 0.5 mg by mouth nightly as needed. Tiffany Saravia MD   linezolid (ZYVOX) 600 mg tablet Take 600 mg by mouth two (2) times a day. Tiffany Saravia MD   atorvastatin (LIPITOR) 20 mg tablet Take 20 mg by mouth daily. Tiffany Saravia MD   nitroglycerin (NITROSTAT) 0.4 mg SL tablet 0.4 mg by SubLINGual route every five (5) minutes as needed for Chest Pain. Up to 3 doses. Tiffany Saravia MD   metoprolol succinate (TOPROL XL) 25 mg XL tablet Take 25 mg by mouth daily. Tiffany Saravia MD   oxyCODONE-acetaminophen (PERCOCET) 5-325 mg per tablet Take  by mouth every four (4) hours as needed for Pain. Tiffany Saravia MD   traMADol (ULTRAM) 50 mg tablet Take 1 Tab by mouth every six (6) hours as needed for Pain. Max Daily Amount: 200 mg. 18   Parish Clayton MD   simvastatin (ZOCOR) 20 mg tablet Take 20 mg by mouth nightly. Provider, Historical   lisinopril-hydrochlorothiazide (PRINZIDE, ZESTORETIC) 20-12.5 mg per tablet Take 1 Tab by mouth daily. Provider, Historical   isosorbide mononitrate ER (IMDUR) 30 mg tablet Take 30 mg by mouth daily. Provider, Historical       No Known Allergies    Review of Systems  rom is limited to hard hearing. Denies any chest pain.          Physical Exam:     Physical Exam:  Visit Vitals  /74 (BP 1 Location: Left arm, BP Patient Position: Sitting)   Pulse 73   Temp 98.6 °F (37 °C)   Resp 24   Wt 36.3 kg (80 lb)   SpO2 94%   BMI 13.73 kg/m²      O2 Device: Room air    Temp (24hrs), Av.5 °F (36.9 °C), Min:98.4 °F (36.9 °C), Max:98.6 °F (37 °C)    No intake/output data recorded. 10/13 0701 - 10/14 1900  In: 1000 [I.V.:1000]  Out: 350 [Urine:350]    General:  Awake, cooperative, no distress. Head:  Normocephalic, without obvious abnormality, atraumatic. Eyes:  Conjunctivae/corneas clear, sclera anicteric, PERRL, EOMs intact. Nose: Nares normal. No drainage or sinus tenderness. Throat: Lips, mucosa, and tongue normal. .   Neck: Supple, symmetrical, trachea midline, no adenopathy. Lungs:   Clear to auscultation bilaterally. Heart:  Regular rate and rhythm, S1, S2 normal,+ murmur, no click, rub or gallop. Abdomen: Soft, non-tender. Bowel sounds normal. No masses,  No organomegaly. Extremities: Extremities normal, , no cyanosis or edema. Bilateral aka    Pulses: 2+ and symmetric all extremities. Skin: Skin color-pink, texture, turgor normal. No rashes or lesions. Capillary refill normal    Neurologic: CNII-XII intact. No focal motor or sensory deficit.        Labs Reviewed:    BMP:   Lab Results   Component Value Date/Time     (L) 10/14/2020 02:45 PM    K 3.8 10/14/2020 02:45 PM    CL 99 (L) 10/14/2020 02:45 PM    CO2 26 10/14/2020 02:45 PM    AGAP 9 10/14/2020 02:45 PM    GLU 88 10/14/2020 02:45 PM    BUN 44 (H) 10/14/2020 02:45 PM    CREA 1.38 (H) 10/14/2020 02:45 PM    GFRAA >60 10/14/2020 02:45 PM    GFRNA 52 (L) 10/14/2020 02:45 PM     CMP:   Lab Results   Component Value Date/Time     (L) 10/14/2020 02:45 PM    K 3.8 10/14/2020 02:45 PM    CL 99 (L) 10/14/2020 02:45 PM    CO2 26 10/14/2020 02:45 PM    AGAP 9 10/14/2020 02:45 PM    GLU 88 10/14/2020 02:45 PM    BUN 44 (H) 10/14/2020 02:45 PM    CREA 1.38 (H) 10/14/2020 02:45 PM    GFRAA >60 10/14/2020 02:45 PM    GFRNA 52 (L) 10/14/2020 02:45 PM    CA 9.1 10/14/2020 02:45 PM    MG 2.2 10/14/2020 02:45 PM    ALB 2.9 (L) 10/14/2020 02:45 PM    TP 7.6 10/14/2020 02:45 PM    GLOB 4.7 (H) 10/14/2020 02:45 PM    AGRAT 0.6 (L) 10/14/2020 02:45 PM    ALT 38 10/14/2020 02:45 PM     CBC:   Lab Results   Component Value Date/Time    WBC 10.1 10/14/2020 02:45 PM    HGB 12.4 (L) 10/14/2020 02:45 PM    HCT 38.0 10/14/2020 02:45 PM     10/14/2020 02:45 PM     All Cardiac Markers in the last 24 hours: No results found for: CPK, CK, CKMMB, CKMB, RCK3, CKMBT, CKNDX, CKND1, CHUY, TROPT, TROIQ, DANIEL, TROPT, TNIPOC, BNP, BNPP  Recent Glucose Results:   Lab Results   Component Value Date/Time    GLU 88 10/14/2020 02:45 PM     ABG:   Lab Results   Component Value Date/Time    PHI 7.42 10/14/2020 03:41 PM    PCO2I 29.4 (L) 10/14/2020 03:41 PM    PO2I 71 (L) 10/14/2020 03:41 PM    HCO3I 19.2 (L) 10/14/2020 03:41 PM    FIO2I 21 10/14/2020 03:41 PM     COAGS: No results found for: APTT, PTP, INR, INREXT, INREXT  Liver Panel:   Lab Results   Component Value Date/Time    ALB 2.9 (L) 10/14/2020 02:45 PM    TP 7.6 10/14/2020 02:45 PM    GLOB 4.7 (H) 10/14/2020 02:45 PM    AGRAT 0.6 (L) 10/14/2020 02:45 PM    ALT 38 10/14/2020 02:45 PM     (H) 10/14/2020 02:45 PM     Pancreatic Markers: No results found for: AMYLPOCT, AML, LIPPOCT, LPSE    Xr Chest Port    Result Date: 10/14/2020  EXAM: XR CHEST PORT CLINICAL INDICATION/HISTORY: Shortness of breath with cough -Additional: None COMPARISON: Several prior studies, most recently 2/19/2019 TECHNIQUE: Frontal view of the chest _______________ FINDINGS: HEART AND MEDIASTINUM: Normal cardiac size and mediastinal contours. LUNGS AND PLEURAL SPACES: Patchy multifocal opacities noted throughout bilateral upper lobes, right greater than left as well as throughout the periphery of the right lower lobe. No evidence of pneumothorax. BONY THORAX AND SOFT TISSUES: No acute osseous abnormality _______________     IMPRESSION: Patchy multifocal airspace disease compatible with pneumonia. Recommend radiographic follow-up to document expected clinical resolution in 4-6 weeks' time.      Procedures/imaging: see electronic medical records for all procedures/Xrays and details which were not copied into this note but were reviewed prior to creation of Sally Machado MD, Internal Medicine     CC: Yi Cooper MD

## 2020-10-14 NOTE — PROGRESS NOTES
Pharmacy Dosing Services: Ceftriaxone   Indication CAP  Weight = 36.3 kg ( 80 pounds )   WBC = 10.1  Ceftriaxone 2 gm IV q24h was changed to Ceftriaxone 1 gm IV q24h per protocol   Kayy Espitia RP

## 2020-10-14 NOTE — ED TRIAGE NOTES
Pt arrives per EMS w/ c/o cough and SOB x1 week, EMS reports pt was seen at [facility] yesterday dx w/ PNA and given oral abx. Per ems, pt requested to go anywhere but [facility].

## 2020-10-14 NOTE — PROGRESS NOTES
1830 TRANSFER - IN REPORT:    Verbal report received from Ja Mittal RN(name) on Yumit.  being received from Emergency Department(unit) for routine progression of care      Report consisted of patients Situation, Background, Assessment and   Recommendations(SBAR). Information from the following report(s) SBAR, ED Summary, MAR, Accordion and Cardiac Rhythm NSR was reviewed with the receiving nurse. Opportunity for questions and clarification was provided. Assessment completed upon patients arrival to unit and care assumed. 1935 Bedside and Verbal shift change report given to Yesica Jensen RN (oncoming nurse) by Nina Moss RN (offgoing nurse). Report included the following information SBAR, MAR, Accordion and Cardiac Rhythm NSR.

## 2020-10-15 ENCOUNTER — APPOINTMENT (OUTPATIENT)
Dept: CT IMAGING | Age: 65
DRG: 139 | End: 2020-10-15
Attending: FAMILY MEDICINE
Payer: MEDICAID

## 2020-10-15 PROBLEM — G93.40 ACUTE ENCEPHALOPATHY: Status: ACTIVE | Noted: 2020-10-15

## 2020-10-15 LAB
ANION GAP SERPL CALC-SCNC: 10 MMOL/L (ref 3–18)
APPEARANCE UR: CLEAR
ATRIAL RATE: 81 BPM
BACTERIA URNS QL MICRO: ABNORMAL /HPF
BASOPHILS # BLD: 0 K/UL (ref 0–0.1)
BASOPHILS NFR BLD: 0 % (ref 0–2)
BILIRUB UR QL: NEGATIVE
BUN SERPL-MCNC: 36 MG/DL (ref 7–18)
BUN/CREAT SERPL: 40 (ref 12–20)
CALCIUM SERPL-MCNC: 8.1 MG/DL (ref 8.5–10.1)
CALCULATED P AXIS, ECG09: 38 DEGREES
CALCULATED R AXIS, ECG10: 63 DEGREES
CALCULATED T AXIS, ECG11: 73 DEGREES
CHLORIDE SERPL-SCNC: 106 MMOL/L (ref 100–111)
CO2 SERPL-SCNC: 22 MMOL/L (ref 21–32)
COLOR UR: YELLOW
CREAT SERPL-MCNC: 0.89 MG/DL (ref 0.6–1.3)
DIAGNOSIS, 93000: NORMAL
DIFFERENTIAL METHOD BLD: ABNORMAL
EOSINOPHIL # BLD: 0 K/UL (ref 0–0.4)
EOSINOPHIL NFR BLD: 0 % (ref 0–5)
EPITH CASTS URNS QL MICRO: NEGATIVE /LPF (ref 0–5)
ERYTHROCYTE [DISTWIDTH] IN BLOOD BY AUTOMATED COUNT: 17.8 % (ref 11.6–14.5)
GLUCOSE SERPL-MCNC: 118 MG/DL (ref 74–99)
GLUCOSE UR STRIP.AUTO-MCNC: 500 MG/DL
HCT VFR BLD AUTO: 34 % (ref 36–48)
HGB BLD-MCNC: 11 G/DL (ref 13–16)
HGB UR QL STRIP: NEGATIVE
KETONES UR QL STRIP.AUTO: NEGATIVE MG/DL
L PNEUMO AG UR QL IA: NEGATIVE
LEUKOCYTE ESTERASE UR QL STRIP.AUTO: NEGATIVE
LYMPHOCYTES # BLD: 0.4 K/UL (ref 0.9–3.6)
LYMPHOCYTES NFR BLD: 6 % (ref 21–52)
MCH RBC QN AUTO: 27.6 PG (ref 24–34)
MCHC RBC AUTO-ENTMCNC: 32.4 G/DL (ref 31–37)
MCV RBC AUTO: 85.4 FL (ref 74–97)
MONOCYTES # BLD: 0.3 K/UL (ref 0.05–1.2)
MONOCYTES NFR BLD: 5 % (ref 3–10)
NEUTS SEG # BLD: 6.3 K/UL (ref 1.8–8)
NEUTS SEG NFR BLD: 89 % (ref 40–73)
NITRITE UR QL STRIP.AUTO: NEGATIVE
P-R INTERVAL, ECG05: 128 MS
PH UR STRIP: 6 [PH] (ref 5–8)
PLATELET # BLD AUTO: 264 K/UL (ref 135–420)
PMV BLD AUTO: 10.7 FL (ref 9.2–11.8)
POTASSIUM SERPL-SCNC: 4 MMOL/L (ref 3.5–5.5)
PROT UR STRIP-MCNC: 30 MG/DL
Q-T INTERVAL, ECG07: 372 MS
QRS DURATION, ECG06: 94 MS
QTC CALCULATION (BEZET), ECG08: 432 MS
RBC # BLD AUTO: 3.98 M/UL (ref 4.7–5.5)
RBC #/AREA URNS HPF: ABNORMAL /HPF (ref 0–5)
S PNEUM AG UR QL: NEGATIVE
SODIUM SERPL-SCNC: 138 MMOL/L (ref 136–145)
SP GR UR REFRACTOMETRY: 1.02 (ref 1–1.03)
UROBILINOGEN UR QL STRIP.AUTO: 1 EU/DL (ref 0.2–1)
VENTRICULAR RATE, ECG03: 81 BPM
WBC # BLD AUTO: 7 K/UL (ref 4.6–13.2)
WBC URNS QL MICRO: ABNORMAL /HPF (ref 0–5)

## 2020-10-15 PROCEDURE — 80048 BASIC METABOLIC PNL TOTAL CA: CPT

## 2020-10-15 PROCEDURE — 74011250636 HC RX REV CODE- 250/636: Performed by: FAMILY MEDICINE

## 2020-10-15 PROCEDURE — 99221 1ST HOSP IP/OBS SF/LOW 40: CPT | Performed by: NURSE PRACTITIONER

## 2020-10-15 PROCEDURE — 81001 URINALYSIS AUTO W/SCOPE: CPT

## 2020-10-15 PROCEDURE — 74011250637 HC RX REV CODE- 250/637: Performed by: FAMILY MEDICINE

## 2020-10-15 PROCEDURE — 74011000258 HC RX REV CODE- 258: Performed by: HOSPITALIST

## 2020-10-15 PROCEDURE — 36415 COLL VENOUS BLD VENIPUNCTURE: CPT

## 2020-10-15 PROCEDURE — 87449 NOS EACH ORGANISM AG IA: CPT

## 2020-10-15 PROCEDURE — 74011250636 HC RX REV CODE- 250/636: Performed by: EMERGENCY MEDICINE

## 2020-10-15 PROCEDURE — 87450 LEGIONELLA PNEUMOPHILA AG, URINE: CPT

## 2020-10-15 PROCEDURE — 77030040831 HC BAG URINE DRNG MDII -A

## 2020-10-15 PROCEDURE — 74011250637 HC RX REV CODE- 250/637: Performed by: HOSPITALIST

## 2020-10-15 PROCEDURE — 76450000000

## 2020-10-15 PROCEDURE — 85025 COMPLETE CBC W/AUTO DIFF WBC: CPT

## 2020-10-15 PROCEDURE — 74011000250 HC RX REV CODE- 250: Performed by: HOSPITALIST

## 2020-10-15 PROCEDURE — 70450 CT HEAD/BRAIN W/O DYE: CPT

## 2020-10-15 PROCEDURE — 94760 N-INVAS EAR/PLS OXIMETRY 1: CPT

## 2020-10-15 PROCEDURE — 94640 AIRWAY INHALATION TREATMENT: CPT

## 2020-10-15 PROCEDURE — 65660000000 HC RM CCU STEPDOWN

## 2020-10-15 PROCEDURE — 74011250636 HC RX REV CODE- 250/636: Performed by: HOSPITALIST

## 2020-10-15 RX ORDER — LORAZEPAM 2 MG/ML
2 INJECTION INTRAMUSCULAR
Status: DISCONTINUED | OUTPATIENT
Start: 2020-10-15 | End: 2020-10-15

## 2020-10-15 RX ORDER — LORAZEPAM 2 MG/ML
1 INJECTION INTRAMUSCULAR
Status: DISCONTINUED | OUTPATIENT
Start: 2020-10-15 | End: 2020-10-19

## 2020-10-15 RX ORDER — LORAZEPAM 1 MG/1
2 TABLET ORAL
Status: DISCONTINUED | OUTPATIENT
Start: 2020-10-15 | End: 2020-10-19

## 2020-10-15 RX ORDER — LORAZEPAM 1 MG/1
1 TABLET ORAL
Status: DISCONTINUED | OUTPATIENT
Start: 2020-10-15 | End: 2020-10-15

## 2020-10-15 RX ORDER — SODIUM CHLORIDE 0.9 % (FLUSH) 0.9 %
5-40 SYRINGE (ML) INJECTION EVERY 8 HOURS
Status: DISCONTINUED | OUTPATIENT
Start: 2020-10-15 | End: 2020-10-16 | Stop reason: SDUPTHER

## 2020-10-15 RX ORDER — SODIUM CHLORIDE 0.9 % (FLUSH) 0.9 %
5-40 SYRINGE (ML) INJECTION AS NEEDED
Status: DISCONTINUED | OUTPATIENT
Start: 2020-10-15 | End: 2020-10-16 | Stop reason: SDUPTHER

## 2020-10-15 RX ORDER — QUETIAPINE FUMARATE 25 MG/1
50 TABLET, FILM COATED ORAL
Status: DISCONTINUED | OUTPATIENT
Start: 2020-10-15 | End: 2020-10-16

## 2020-10-15 RX ORDER — SODIUM CHLORIDE 0.9 % (FLUSH) 0.9 %
5-40 SYRINGE (ML) INJECTION EVERY 8 HOURS
Status: DISCONTINUED | OUTPATIENT
Start: 2020-10-15 | End: 2020-10-20 | Stop reason: HOSPADM

## 2020-10-15 RX ORDER — LORAZEPAM 2 MG/ML
1 INJECTION INTRAMUSCULAR
Status: DISCONTINUED | OUTPATIENT
Start: 2020-10-15 | End: 2020-10-15

## 2020-10-15 RX ORDER — LORAZEPAM 1 MG/1
1 TABLET ORAL
Status: DISCONTINUED | OUTPATIENT
Start: 2020-10-15 | End: 2020-10-19

## 2020-10-15 RX ORDER — OXYCODONE HYDROCHLORIDE 5 MG/1
10 TABLET ORAL
Status: DISCONTINUED | OUTPATIENT
Start: 2020-10-15 | End: 2020-10-20 | Stop reason: HOSPADM

## 2020-10-15 RX ORDER — IBUPROFEN 200 MG
1 TABLET ORAL DAILY
Status: DISCONTINUED | OUTPATIENT
Start: 2020-10-15 | End: 2020-10-20 | Stop reason: HOSPADM

## 2020-10-15 RX ORDER — LORAZEPAM 2 MG/ML
1 INJECTION INTRAMUSCULAR ONCE
Status: COMPLETED | OUTPATIENT
Start: 2020-10-15 | End: 2020-10-15

## 2020-10-15 RX ORDER — LORAZEPAM 1 MG/1
2 TABLET ORAL
Status: DISCONTINUED | OUTPATIENT
Start: 2020-10-15 | End: 2020-10-15

## 2020-10-15 RX ORDER — LORAZEPAM 2 MG/ML
3 INJECTION INTRAMUSCULAR
Status: DISCONTINUED | OUTPATIENT
Start: 2020-10-15 | End: 2020-10-15

## 2020-10-15 RX ORDER — SODIUM CHLORIDE 0.9 % (FLUSH) 0.9 %
5-40 SYRINGE (ML) INJECTION AS NEEDED
Status: DISCONTINUED | OUTPATIENT
Start: 2020-10-15 | End: 2020-10-20 | Stop reason: HOSPADM

## 2020-10-15 RX ORDER — LORAZEPAM 2 MG/ML
2 INJECTION INTRAMUSCULAR
Status: DISCONTINUED | OUTPATIENT
Start: 2020-10-15 | End: 2020-10-19

## 2020-10-15 RX ORDER — LORAZEPAM 2 MG/ML
3 INJECTION INTRAMUSCULAR
Status: DISCONTINUED | OUTPATIENT
Start: 2020-10-15 | End: 2020-10-19

## 2020-10-15 RX ADMIN — BUDESONIDE 500 MCG: 0.5 INHALANT RESPIRATORY (INHALATION) at 07:28

## 2020-10-15 RX ADMIN — HEPARIN SODIUM 5000 UNITS: 5000 INJECTION INTRAVENOUS; SUBCUTANEOUS at 03:16

## 2020-10-15 RX ADMIN — LORAZEPAM 1 MG: 1 TABLET ORAL at 09:49

## 2020-10-15 RX ADMIN — LORAZEPAM 1 MG: 2 INJECTION INTRAMUSCULAR; INTRAVENOUS at 05:27

## 2020-10-15 RX ADMIN — GUAIFENESIN 600 MG: 600 TABLET, EXTENDED RELEASE ORAL at 09:49

## 2020-10-15 RX ADMIN — Medication 5 ML: at 14:00

## 2020-10-15 RX ADMIN — PIPERACILLIN AND TAZOBACTAM 3.38 G: 3; .375 INJECTION, POWDER, LYOPHILIZED, FOR SOLUTION INTRAVENOUS at 20:25

## 2020-10-15 RX ADMIN — HEPARIN SODIUM 5000 UNITS: 5000 INJECTION INTRAVENOUS; SUBCUTANEOUS at 09:50

## 2020-10-15 RX ADMIN — BUDESONIDE 500 MCG: 0.5 INHALANT RESPIRATORY (INHALATION) at 19:28

## 2020-10-15 RX ADMIN — ARFORMOTEROL TARTRATE 15 MCG: 15 SOLUTION RESPIRATORY (INHALATION) at 19:28

## 2020-10-15 RX ADMIN — LORAZEPAM 3 MG: 2 INJECTION INTRAMUSCULAR; INTRAVENOUS at 22:34

## 2020-10-15 RX ADMIN — PIPERACILLIN AND TAZOBACTAM 3.38 G: 3; .375 INJECTION, POWDER, LYOPHILIZED, FOR SOLUTION INTRAVENOUS at 09:50

## 2020-10-15 RX ADMIN — METOPROLOL SUCCINATE 25 MG: 25 TABLET, EXTENDED RELEASE ORAL at 09:49

## 2020-10-15 RX ADMIN — ATORVASTATIN CALCIUM 10 MG: 10 TABLET, FILM COATED ORAL at 09:49

## 2020-10-15 RX ADMIN — PROMETHAZINE HYDROCHLORIDE 12.5 MG: 25 TABLET ORAL at 09:49

## 2020-10-15 RX ADMIN — VANCOMYCIN HYDROCHLORIDE 750 MG: 750 INJECTION, POWDER, LYOPHILIZED, FOR SOLUTION INTRAVENOUS at 21:58

## 2020-10-15 RX ADMIN — LORAZEPAM 1 MG: 2 INJECTION INTRAMUSCULAR; INTRAVENOUS at 03:43

## 2020-10-15 RX ADMIN — GUAIFENESIN 600 MG: 600 TABLET, EXTENDED RELEASE ORAL at 20:26

## 2020-10-15 RX ADMIN — AZITHROMYCIN MONOHYDRATE 500 MG: 500 INJECTION, POWDER, LYOPHILIZED, FOR SOLUTION INTRAVENOUS at 18:30

## 2020-10-15 RX ADMIN — HEPARIN SODIUM 5000 UNITS: 5000 INJECTION INTRAVENOUS; SUBCUTANEOUS at 19:04

## 2020-10-15 RX ADMIN — PIPERACILLIN AND TAZOBACTAM 3.38 G: 3; .375 INJECTION, POWDER, LYOPHILIZED, FOR SOLUTION INTRAVENOUS at 03:17

## 2020-10-15 RX ADMIN — ARFORMOTEROL TARTRATE 15 MCG: 15 SOLUTION RESPIRATORY (INHALATION) at 07:28

## 2020-10-15 NOTE — PROGRESS NOTES
Problem: General Medical Care Plan  Goal: *Vital signs within specified parameters  Outcome: Progressing Towards Goal  Goal: *Labs within defined limits  Outcome: Progressing Towards Goal  Goal: *Absence of infection signs and symptoms  Outcome: Progressing Towards Goal  Goal: *Optimal pain control at patient's stated goal  Outcome: Progressing Towards Goal  Goal: *Skin integrity maintained  Outcome: Progressing Towards Goal  Goal: *Fluid volume balance  Outcome: Progressing Towards Goal  Goal: *Optimize nutritional status  Outcome: Progressing Towards Goal  Goal: *Anxiety reduced or absent  Outcome: Progressing Towards Goal  Goal: *Progressive mobility and function (eg: ADL's)  Outcome: Progressing Towards Goal     Problem: Patient Education: Go to Patient Education Activity  Goal: Patient/Family Education  Outcome: Progressing Towards Goal     Problem: Pain  Goal: *Control of Pain  Outcome: Progressing Towards Goal  Goal: *PALLIATIVE CARE:  Alleviation of Pain  Outcome: Progressing Towards Goal     Problem: Patient Education: Go to Patient Education Activity  Goal: Patient/Family Education  Outcome: Progressing Towards Goal     Problem: Patient Education: Go to Patient Education Activity  Goal: Patient/Family Education  Outcome: Progressing Towards Goal     Problem: Pneumonia: Day 1  Goal: Off Pathway (Use only if patient is Off Pathway)  Outcome: Progressing Towards Goal  Goal: Activity/Safety  Outcome: Progressing Towards Goal  Goal: Consults, if ordered  Outcome: Progressing Towards Goal  Goal: Diagnostic Test/Procedures  Outcome: Progressing Towards Goal  Goal: Nutrition/Diet  Outcome: Progressing Towards Goal  Goal: Medications  Outcome: Progressing Towards Goal  Goal: Respiratory  Outcome: Progressing Towards Goal  Goal: Treatments/Interventions/Procedures  Outcome: Progressing Towards Goal  Goal: Psychosocial  Outcome: Progressing Towards Goal  Goal: *Oxygen saturation within defined limits  Outcome: Progressing Towards Goal  Goal: *Influenza vaccine administered (October-March)  Outcome: Progressing Towards Goal  Goal: *Pneumoccocal vaccine administered  Outcome: Progressing Towards Goal  Goal: *Hemodynamically stable  Outcome: Progressing Towards Goal  Goal: *Demonstrates progressive activity  Outcome: Progressing Towards Goal  Goal: *Tolerating diet  Outcome: Progressing Towards Goal     Problem: Pneumonia: Day 2  Goal: Off Pathway (Use only if patient is Off Pathway)  Outcome: Progressing Towards Goal  Goal: Activity/Safety  Outcome: Progressing Towards Goal  Goal: Consults, if ordered  Outcome: Progressing Towards Goal  Goal: Diagnostic Test/Procedures  Outcome: Progressing Towards Goal  Goal: Nutrition/Diet  Outcome: Progressing Towards Goal  Goal: Discharge Planning  Outcome: Progressing Towards Goal  Goal: Medications  Outcome: Progressing Towards Goal  Goal: Respiratory  Outcome: Progressing Towards Goal  Goal: Treatments/Interventions/Procedures  Outcome: Progressing Towards Goal  Goal: Psychosocial  Outcome: Progressing Towards Goal  Goal: *Oxygen saturation within defined limits  Outcome: Progressing Towards Goal  Goal: *Hemodynamically stable  Outcome: Progressing Towards Goal  Goal: *Demonstrates progressive activity  Outcome: Progressing Towards Goal  Goal: *Tolerating diet  Outcome: Progressing Towards Goal  Goal: *Optimal pain control at patient's stated goal  Outcome: Progressing Towards Goal     Problem: Pneumonia: Day 3  Goal: Off Pathway (Use only if patient is Off Pathway)  Outcome: Progressing Towards Goal  Goal: Activity/Safety  Outcome: Progressing Towards Goal  Goal: Consults, if ordered  Outcome: Progressing Towards Goal  Goal: Diagnostic Test/Procedures  Outcome: Progressing Towards Goal  Goal: Nutrition/Diet  Outcome: Progressing Towards Goal  Goal: Discharge Planning  Outcome: Progressing Towards Goal  Goal: Medications  Outcome: Progressing Towards Goal  Goal: Respiratory  Outcome: Progressing Towards Goal  Goal: Treatments/Interventions/Procedures  Outcome: Progressing Towards Goal  Goal: Psychosocial  Outcome: Progressing Towards Goal  Goal: *Oxygen saturation within defined limits  Outcome: Progressing Towards Goal  Goal: *Hemodynamically stable  Outcome: Progressing Towards Goal  Goal: *Demonstrates progressive activity  Outcome: Progressing Towards Goal  Goal: *Tolerating diet  Outcome: Progressing Towards Goal  Goal: *Describes available resources and support systems  Outcome: Progressing Towards Goal  Goal: *Optimal pain control at patient's stated goal  Outcome: Progressing Towards Goal     Problem: Pneumonia: Day 4  Goal: Off Pathway (Use only if patient is Off Pathway)  Outcome: Progressing Towards Goal  Goal: Activity/Safety  Outcome: Progressing Towards Goal  Goal: Nutrition/Diet  Outcome: Progressing Towards Goal  Goal: Discharge Planning  Outcome: Progressing Towards Goal  Goal: Medications  Outcome: Progressing Towards Goal  Goal: Respiratory  Outcome: Progressing Towards Goal  Goal: Treatments/Interventions/Procedures  Outcome: Progressing Towards Goal  Goal: Psychosocial  Outcome: Progressing Towards Goal     Problem: Pneumonia: Discharge Outcomes  Goal: *Demonstrates progressive activity  Outcome: Progressing Towards Goal  Goal: *Describes follow-up/return visits to physicians  Outcome: Progressing Towards Goal  Goal: *Tolerating diet  Outcome: Progressing Towards Goal  Goal: *Verbalizes name, dosage, time, side effects, and number of days to continue medications  Outcome: Progressing Towards Goal  Goal: *Influenza immunization  Outcome: Progressing Towards Goal  Goal: *Pneumococcal immunization  Outcome: Progressing Towards Goal  Goal: *Respiratory status at baseline  Outcome: Progressing Towards Goal  Goal: *Vital signs within defined limits  Outcome: Progressing Towards Goal  Goal: *Describes available resources and support systems  Outcome: Progressing Towards Goal  Goal: *Optimal pain control at patient's stated goal  Outcome: Progressing Towards Goal

## 2020-10-15 NOTE — PROGRESS NOTES
10/15/2020 PT note: consult received and chart reviewed. Evaluation attempted. Pt lethargic and not appropriate for evaluation at this time. Pt just given Ativan and unable to wake up. Nurse Corrina Smalls made aware. Will f/u at later time as pt appropriate. Thank you for this referral.   Sofy Watts, PT    PT evaluation again attempted. Pt found seated EOB with sitter present. Pt confused, appears restless and oriented to person only; not appropriate for participation with PT evaluation at this time. Pt is Bilat AKA, and legally blind at baseline. Demonstrating intact sitting balance EOB by observation. Pt PLOF and living situation unknown. Will re-attempt tomorrow and discuss case with care mgmt; may not be appropriate for PT intervention. Thank you for this evaluation.   Sofy Watts, PT

## 2020-10-15 NOTE — PROGRESS NOTES
OT note: Pt awake for 2nd attempt but confused sitting EOB and oriented to person only with sitter at bedside. Unable to get prior level of function from pt. Pt is B AKA and legally blind at baseline. Question appropriateness of pt and assist needed prior to admission. Noted SOB and decreased activity tolerance. Will re-attempt and reassess if pt is medically appropriate for therapy.  Thank you Fanta Bullock OTR/MARIAN

## 2020-10-15 NOTE — PROGRESS NOTES
Patient Name: Domo Duval   Age: 63 y.o. Sex:  male  YOB: 1955   Medical Record Number: [id number]      Antimicrobial  Vancomycin   Indication CAP   Dose / Interval           Current Antimicrobial Therapy (168h ago, onward)      Ordered     Start Stop    10/14/20 2024  vancomycin (VANCOCIN) 500 mg in 0.9% sodium chloride (MBP/ADV) 100 mL MBP  500 mg,   IntraVENous,   EVERY 24 HOURS      10/15/20 2100 --    10/14/20 2024  vancomycin (VANCOCIN) 750 mg in 0.9% sodium chloride 250 mL (VIAL-MATE)  750 mg,   IntraVENous,   ONCE      10/14/20 2100 10/15/20 0859    10/14/20 1918  piperacillin-tazobactam (ZOSYN) 3.375 g in 0.9% sodium chloride (MBP/ADV) 100 mL MBP  3.375 g,   IntraVENous,   EVERY 6 HOURS      10/14/20 2000 10/21/20 1959    10/14/20 1503  azithromycin (ZITHROMAX) 500 mg in 0.9% sodium chloride 250 mL (VIAL-MATE)  500 mg,   IntraVENous,   EVERY 24 HOURS      10/14/20 1504 --               Last Level (if applicable) No results found for: DOSE, TMG, DTG  Vancomycin   No results found for: VANCP, VANCT, VANCR   Gentamicin   No results found for: GENP, GENT, GENR]  Tobramycin   No results found for: TOBP, TOBT, TOBR   Amikacin   No results found for: AMIKP, DAMIKP, AMIKT, DAMIKT, DAMIKR     Cultures (7 most recent)   GRAM STAIN   Date Value Ref Range Status   12/04/2018 FEW WBC'S   Final   12/04/2018 MODERATE GRAM POSITIVE COCCI IN PAIRS   Final   12/04/2018 FEW GRAM POSITIVE COCCI IN GROUPS   Final     Culture result:   Date Value Ref Range Status   12/07/2018 NO GROWTH 2 DAYS   Final   12/04/2018 MODERATE STAPHYLOCOCCUS AUREUS (A)   Final   12/04/2018 MODERATE DIPHTHEROIDS (A)   Final   12/04/2018 NO GROWTH 6 DAYS   Final   12/04/2018 NO GROWTH 6 DAYS   Final   01/27/2014   Final    MRSA target DNA is detected (presumptive positive for MRSA colonization).   CALLED TO AND CORRECTLY REPEATED BY: Troy Pinedo RN ICU AT 1340 ON 01/28/2014 BY ROJAS.   01/27/2014 NO GROWTH 6 DAYS  Final Renal Overview (72 hr)         Recent Labs     10/14/20  1445   BUN 44*   CREA 1.38*       CrCl (Current): CrCl cannot be calculated (Unknown ideal weight.). Lactic Acid    (Sepsis Criteria: >2.0 mmol/L) Lab Results   Component Value Date/Time    Lactic acid 1.1 10/14/2020 04:10 PM    Lactic acid 2.8 (HH) 10/14/2020 02:45 PM    Lactic acid 1.2 2018 12:25 PM      Procalcitonin (0.10-0.49 NG/ML) No results found for: PCT   Hepatic Overview (72 hr) Recent Labs     10/14/20  1445   ALT 38   *      Temp (24-hr Max) Temp (24hrs), Av.4 °F (36.9 °C), Min:97.9 °F (36.6 °C), Max:98.6 °F (37 °C)       Hematology Recent Labs     10/14/20  1610 10/14/20  1445   WBC  --  10.1   HGB  --  12.4*   HCT  --  38.0   PLT  --  299   GRANS  --  84*   ANEU  --  8.4*   LAC 1.1 2.8*        DOT  1     Notes   Estimated Pharmacokinetic Parameters (based on population kinetics)  Vd: 27 L (0.7 L/kg)  Flako: 0.025 hr-1 (T1/2 = 27.7 hrs)    Dosing Recommendations  Vancomycin dose: 500 mg IV Q24hrs (infused over 0.5 hrs)  Estimated peak: 41.1 mcg/mL  Estimated trough:15.1 mcg/mL  Estimated AUC:JERE: 751 mcg*hr/mL (assumed JERE 1 mcg/mL)    A/P:  1. Recommend vancomycin 500 mg IV Q24hrs (13 mg/kg)  2. Consider a vancomycin trough level prior to the 4th dose. 3. Please monitor renal function (urine output, BUN/SCr). Dose adjustments may be necessary with a significant change in renal function.   4. Vancomycin 750 mg IV x1 given in [facility]  Clinical Pharmacist  [phone]

## 2020-10-15 NOTE — ACP (ADVANCE CARE PLANNING)
Patient has no AMD or Living Will. Patient is  and has 4 children but 3 living (2 sons and 1 daughter). Legal next of kin is the majority of the three living children. Spoke with son Raymundo Awad who states he's patient's primary caregiver and assists with making all medical decisions. Anitra Villeda stated his half-siblings Raleigh Ribeiro and Erlin Nieto don't talk nor visit with father. Anitra Villeda doesn't even know how to get in touch with half siblings. At this time Anitra Villeda is acting legal next of kin. When patient is alert and oriented will attempt to have AMD completed to note medical decision makers.      FULL code    NOK: Raymundo Awad (son)  [phone] (h)  [phone] (c)

## 2020-10-15 NOTE — PROGRESS NOTES
Hospitalist Progress Note-critical care note     Patient: Brea Blanchard Sr. MRN: [id number]  CSN: [id number]    YOB: 1955  Age: 63 y.o. Sex: male    DOA: 10/14/2020 LOS:  LOS: 1 day            Chief complaint: acute encephalopathy, pna. Emphysema lung     Assessment/Plan         Hospital Problems  Date Reviewed: 10/21/2013          Codes Class Noted POA    Acute encephalopathy ICD-10-CM: G93.40  ICD-9-CM: 348.30  10/15/2020 Unknown        Amputation of both lower extremities ([facility] ICD-10-CM: Q04.029W, I15.319S  ICD-9-CM: 897.6  Unknown Unknown        * (Principal) PNA (pneumonia) ICD-10-CM: J18.9  ICD-9-CM: 401  Unknown Unknown        ALEXANDER (acute kidney injury) ([facility] ICD-10-CM: N17.9  ICD-9-CM: 450. 9  Unknown Unknown        Hyponatremia ICD-10-CM: E87.1  ICD-9-CM: 276.1  Unknown Unknown        Marijuana abuse ICD-10-CM: F12.10  ICD-9-CM: 305.20  Unknown Unknown        Emphysema lung (HCC) ICD-10-CM: J43.9  ICD-9-CM: 492.8  Unknown Unknown        CAP (community acquired pneumonia) ICD-10-CM: J18.9  ICD-9-CM: 304  10/14/2020 Unknown        Sepsis ([facility] ICD-10-CM: A41.9  ICD-9-CM: 038.9, 995.91  10/14/2020 Unknown        Hard of hearing ICD-10-CM: H91.90  ICD-9-CM: 389.9  10/14/2020 Unknown        Legal blindness ICD-10-CM: H54.8  ICD-9-CM: 369.4  10/14/2020 Unknown              Sepsis   Due to pna   Lactic wnl now  bcx no growth          pna   Continue azithromycin , vanc and zosyn , f/u with the cx   Breathing treatment and symptom treatment   Strep pneumo  and legionella negative   Rapid covid 19 negative       Emphysema lung   Breathing tx      ALEXANDER   Resolved per Ns infusion      Hyponatremia   Resolved      Bilateral aka and head hearing and legal blindness   Fall precaution      marijuana use      Dm type II   ssi     Acute encephalopathy -like sundowning   Ct head no acute issue  Received ativan   Discussed with son -he does not drink alcohol at tall, agitated at home   Will add mariela       Called son Beatris Wolff    His  Cell phone [phone] would like to be called when father is ready for d.c , he would like to have father going home    All questions have been answered. 55 total min's spent on patient care including >50% on counseling/coordinating care. Discussed the above assessments. also discussed labs, medications and hospital course    Estimate  length of stay : 1-2 days     Disposition :tbd,   Review of systems:    Unable to obtain due to ativan given AM     Vital signs/Intake and Output:  Visit Vitals  /60 (BP 1 Location: Right arm, BP Patient Position: At rest)   Pulse 73   Temp 97.5 °F (36.4 °C)   Resp 24   Ht 5' 4\" (1.626 m)   Wt 36.3 kg (80 lb)   SpO2 96%   BMI 13.73 kg/m²     Current Shift:  10/15 0701 - 10/15 1900  In: 720 [P.O.:620; I.V.:100]  Out: -   Last three shifts:  10/13 1901 - 10/15 0700  In: 1000 [I.V.:1000]  Out: 350 [Urine:350]    Physical Exam:  General: Sleeping   HEENT: NC, Atraumatic. PERRLA, anicteric sclerae. Lungs: CTA Bilaterally. No Wheezing/Rhonchi/Rales. Heart:  Regular  rhythm,  No murmur, No Rubs, No Gallops  Abdomen: Soft, Non distended, Non tender. +Bowel sounds,   Extremities: No c/c/e bilateral aka   Psych:   Calm   Neurologic:  Unable to obtain due to sleeping           Labs: Results:       Chemistry Recent Labs     10/15/20  0410 10/14/20  1445   * 88    134*   K 4.0 3.8    99*   CO2 22 26   BUN 36* 44*   CREA 0.89 1.38*   CA 8.1* 9.1   AGAP 10 9   BUCR 40* 32*   AP  --  130*   TP  --  7.6   ALB  --  2.9*   GLOB  --  4.7*   AGRAT  --  0.6*      CBC w/Diff Recent Labs     10/15/20  0410 10/14/20  1445   WBC 7.0 10.1   RBC 3.98* 4.41*   HGB 11.0* 12.4*   HCT 34.0* 38.0    299   GRANS 89* 84*   LYMPH 6* 6*   EOS 0 0      Cardiac Enzymes No results for input(s): CPK, CKND1, CHUY in the last 72 hours.     No lab exists for component: CKRMB, TROIP   Coagulation No results for input(s): PTP, INR, APTT, INREXT in the last 72 hours. Lipid Panel No results found for: CHOL, CHOLPOCT, CHOLX, CHLST, CHOLV, 577225, HDL, HDLP, LDL, LDLC, DLDLP, 497265, VLDLC, VLDL, TGLX, TRIGL, TRIGP, TGLPOCT, CHHD, CHHDX   BNP No results for input(s): BNPP in the last 72 hours. Liver Enzymes Recent Labs     10/14/20  1445   TP 7.6   ALB 2.9*   *      Thyroid Studies Lab Results   Component Value Date/Time    TSH 0.42 01/27/2014 04:08 PM        Procedures/imaging: see electronic medical records for all procedures/Xrays and details which were not copied into this note but were reviewed prior to creation of Plan    Ct Head Wo Cont    Result Date: 10/15/2020  EXAM: CT head INDICATION: Dental status change COMPARISON: None. TECHNIQUE: Axial CT imaging of the head was performed without intravenous contrast. One or more dose reduction techniques were used on this CT: automated exposure control, adjustment of the mAs and/or kVp according to patient size, and iterative reconstruction techniques. The specific techniques used on this CT exam have been documented in the patient's electronic medical record. Digital Imaging and Communications in Medicine (DICOM) format image data are available to nonaffiliated external healthcare facilities or entities on a secure, media free, reciprocally searchable basis with patient authorization for at least a 12 month period after this study. _______________ FINDINGS: BRAIN AND POSTERIOR FOSSA: The sulci, folia, ventricles and basal cisterns are within normal limits for the patient's with age concordant atrophy There is no intracranial hemorrhage, mass effect, or midline shift. Mild periventricular low-attenuation. Although nonspecific this is frequently seen with chronic small vessel ischemic change. Otherwise, there are no areas of abnormal parenchymal attenuation. EXTRA-AXIAL SPACES AND MENINGES: There are no abnormal extra-axial fluid collections. CALVARIUM: Intact. SINUSES: Clear. OTHER: None. _______________     IMPRESSION: No acute intracranial abnormalities. Xr Chest Port    Result Date: 10/14/2020  EXAM: XR CHEST PORT CLINICAL INDICATION/HISTORY: Shortness of breath with cough -Additional: None COMPARISON: Several prior studies, most recently 2/19/2019 TECHNIQUE: Frontal view of the chest _______________ FINDINGS: HEART AND MEDIASTINUM: Normal cardiac size and mediastinal contours. LUNGS AND PLEURAL SPACES: Patchy multifocal opacities noted throughout bilateral upper lobes, right greater than left as well as throughout the periphery of the right lower lobe. No evidence of pneumothorax. BONY THORAX AND SOFT TISSUES: No acute osseous abnormality _______________     IMPRESSION: Patchy multifocal airspace disease compatible with pneumonia. Recommend radiographic follow-up to document expected clinical resolution in 4-6 weeks' time.        Dylan Elizabeth MD
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
## 2020-10-15 NOTE — PROGRESS NOTES
1900 Bedside and Verbal shift change report given to AMEENA De Jesus (oncoming nurse) by Trina Saeed RN (offgoing nurse). Report included the following information SBAR, Kardex, MAR, Accordion and Recent Results. 200 Paged MD. Pt is anxious and requesting for something to help him sleep. He also states \" I just want to go home. Call me a taxi. Get me out of here. \"     [id number]  Notified  MD that the patient wishes to go outside to smoke. Nurse informed the patient that smoking in the hospital is not allowed. Nurse recommended a nicotine patch. Pt is anxious and agitated and is attempting to scoot out of bed. He periodically complains of phantom pain d/t his bilateral aka. . MD to place orders. 6157 Pt lungs have expiratory wheezes. Nurse paged RT to assist in breathing treatment. 0345 Pt refused breathing treatment and states \" I want a cigarette! \"    0400 Nurse notified MD that the patient is confused and that the charge nurse suggest that he may be alcohol withdrawal. CIWA was 12 for agitation, anxiety, and confusion. MD placed orders. 0430 Attempted to take patient down to CT. He woke up anxious and consistently moving around. Unable to complete CT. Will return once pt is calm. Geraldo BARKER at bedside to assess the patient. Orders received for a sitter and to dc fluids. Pt was given ativan for ciwa of 11.     0535 CT completed. Bedside and Verbal shift change report given to Sandra Salomon RN (oncoming nurse) by April De Jesus RN (offgoing nurse).  Report included the following information SBAR, Kardex, Accordion, Med Rec Status and Cardiac Rhythm SR.

## 2020-10-15 NOTE — PROGRESS NOTES
Speech-Language Pathology  Consult received and chart reviewed. Evaluation attempted. Pt lethargic and not appropriate for evaluation at this time. Pt just given Ativan and unable to wake up. Nurse Jyoti Rossi made aware. Will f/u at later time as pt appropriate. Thank you for this referral.   Mamie ROSALES  [address] [place] [address] LarryEd, [address] [facility]

## 2020-10-15 NOTE — ROUTINE PROCESS
Bedside and Verbal shift change report given to ALIA Holcomb RN (oncoming nurse) by Aggie Archer RN (offgoing nurse). Report included the following information SBAR, Kardex, Intake/Output and MAR.

## 2020-10-15 NOTE — PROGRESS NOTES
The patient was quiet when I arrived, but when he realized he had someone there who was new or different than the people he'd been making his routine requests; he began to ask me for those same items. He asked for his phone and his medicine. Reassurance that the nurses would bring his medications on time did not satisfy the patient. Reassurance that his phone is not in his room with him also was unsatisfactory to him. He also asked me if I could drive. When I said yes he asked me to take him to the pharmacy. When I asked him what is the day of the week he stated that he didn't have his watch on to tell me the day.     [address]   [phone] - Office

## 2020-10-15 NOTE — PROGRESS NOTES
OT eval attempt. Chart reviewed and pt lethargic and not appropriate. Pt just given ativan and unable to wake up. Will re-attempt eval later as pt appropriate.  Thank you Anupam Pinzon OTR/L

## 2020-10-15 NOTE — PROGRESS NOTES
Pharmacy Dosing Services: Vancomycin    Consult for Vancomycin Dosing by Pharmacy by Dr. Ashley Shafer provided for this 63y.o. year old male , for indication of CAP  Day of Therapy 2 of 7    Scr = 0.89     CrCl = 43.6 ml/min    Scr improved from Scr = 1.38 on 10/14/20)     Due to improvement in Scr, maintenance dose adjusted to:  Vancomycin 750 mg IV q24h, next dose scheduled for 10/15/20 at 22:00. Dose calculated to approximate a therapeutic trough:  15 - 20 mcg/mL. Pharmacy to follow daily and will make changes to dose and/or frequency based on clinical status. Ht Readings from Last 1 Encounters:   10/15/20 162.6 cm (64\")        Wt Readings from Last 1 Encounters:   10/14/20 36.3 kg (80 lb)        Previous Regimen Vancomycin 500 mg IV q24h   Other Current Antibiotics Zosyn  / Azithromycin   Significant Cultures pending   Serum Creatinine Lab Results   Component Value Date/Time    Creatinine 0.89 10/15/2020 04:10 AM      Creatinine Clearance Estimated Creatinine Clearance: 43.6 mL/min (based on SCr of 0.89 mg/dL).    BUN Lab Results   Component Value Date/Time    BUN 36 (H) 10/15/2020 04:10 AM      WBC Lab Results   Component Value Date/Time    WBC 7.0 10/15/2020 04:10 AM      H/H Lab Results   Component Value Date/Time    HGB 11.0 (L) 10/15/2020 04:10 AM      Platelets Lab Results   Component Value Date/Time    PLATELET 420 44/27/4880 04:10 AM      Temp 97.5 °F (36.4 °C)     Pharmacist Ronak Soriano, [facility]

## 2020-10-15 NOTE — PROGRESS NOTES
Chart reviewed. Pt admitted to tele for PNA. Pt noted to be lethargic today. Will need therapy recs to assist with dc planning. Pt apparently hard of hearing and with vision deficits. Pt has rapid neg covid 10/14. CM will follow for transition of care needs [phone]    Reason for Admission:   Per H&P pt is \"is a 63 y.o. male with bilateral aka , emphysema, diabetes, CAD, hypertension, legal blindness, hard of hearing was sent to ER due to shortness of breath. He presented w shortness of breath for several days, he went to [facility], he was giving antibiotics for pneumonia treatment. His shortness of breath was not improving. Checks x-ray in ER indicated  multiple focal pneumonia. Lactic acid was 2.8. Sepsis protocol started in ER.  IV antibiotics was started with breathing treatment and his shortness of breath got improvin. rapid Covid testing was done- was negative. \"               RUR Score:   26%      PCP: First and Last name: listed as MD Higinio Montoya   Name of Practice:   Are you a current patient: Yes/No:   Approximate date of last visit:    Can you do a virtual visit with your PCP:              Resources/supports as identified by patient/family:                   Top Challenges facing patient (as identified by patient/family and CM): Finances/Medication cost?                    Transportation? Support system or lack thereof? Living arrangements? Self-care/ADLs/Cognition?             Current Advanced Directive/Advance Care Plan:                            Plan for utilizing home health:    TBD                 Transition of Care Plan:     TBD

## 2020-10-15 NOTE — PROGRESS NOTES
0730: Bedside and Verbal shift change report given to [facility] (oncoming nurse) by Louisa Cruz (offgoing nurse). Report included the following information SBAR and Kardex.      1034: MD Jess Reese gives this nurse update on pt condition and tx plan for the day, no more ativan for CIWA this pt at this time  Pooja Pedraza RN

## 2020-10-15 NOTE — PROGRESS NOTES
Comprehensive Nutrition Assessment    Type and Reason for Visit: Initial    Nutrition Recommendations/Plan: Supplement: add magic cup TID. Nutrition Assessment:  Hx of amputation above knee of both sides, diabetes, hypercholesterolemia, HTN, polio. Pt admitted w/ PNA, spesis, ALEXANDER, hyponatremia, marijuana use, emphysema. Pt is hard of hearing and blind. 10/15/20 1315   Malnutrition Assessment   Context of Malnutrition Chronic illness   Chronic Illness - Energy Intake 7 - 75% or less est energy requirements for 1 month or longer   Chronic Illness - Weight Loss Unable to assess   Chronic Illness - Body Fat Loss 7 - Severe body fat loss   Chronic Illness - Body Fat Loss Locations Buccal region;Orbital   Chronic Illness - Muscle Mass Loss 1 - Mild muscle mass loss   Chronic Illness - Muscle Mass Location Temples (temporalis); Clavicles (pectoralis &deltoids)   Chronic Illness - Fluid Accumulation Unable to assess   Chronic Illness -  Strength Not performed   Chronic Illness - Malnutrition Score  15   Malnutrition Status Severe malnutrition       Estimated Daily Nutrient Needs:  Energy (kcal):  1539  Protein (g):  43-52       Fluid (ml/day):  1539    Nutrition Related Findings:  Lab: Ca 8.1, glucose 118. Med: lipitor, ducolax, phenergan. No BM yet. Reported eating a little bit of breakfast, and was waiting for lunch.       Wounds:    None       Current Nutrition Therapies:  DIET DYSPHAGIA MECH ALTERED (NDD2)    Anthropometric Measures:  Height:  5' 4\" (162.6 cm)  Current Body Wt:  36.3 kg (80 lb 0.4 oz)   Admission Body Wt:  80 lb 0.4 oz    Usual Body Wt:  74 lb 15.3 oz(7/4/20)     Ideal Body Wt:  130 lbs:  61.6 %   Adjusted Body Weight:  96.2; Weight Adjustment for: Amputation   Adjusted BMI:  16.5    BMI Category:  Underweight (BMI less than 18.5)       Nutrition Diagnosis:   Inadequate oral intake related to impaired respiratory function as evidenced by intake 0-25%  Serve protein malnutrition related to inadequate oral intake as evidenced by serve muscle and fat loss. Nutrition Interventions:   Food and/or Nutrient Delivery: Continue current diet  Nutrition Education and Counseling: No recommendations at this time  Coordination of Nutrition Care: Continued inpatient monitoring, Interdisciplinary rounds    Goals:  Encourage PO intake >50% at most meals thoughout the next 4-6 days       Nutrition Monitoring and Evaluation:   Behavioral-Environmental Outcomes:    Food/Nutrient Intake Outcomes: Food and nutrient intake  Physical Signs/Symptoms Outcomes: Biochemical data, Skin, Weight, GI status, Nausea/vomiting    Discharge Planning:     Too soon to determine     Electronically signed by Baron Menjivar on 10/15/2020 at 1:50 PM

## 2020-10-16 LAB
ANION GAP SERPL CALC-SCNC: 7 MMOL/L (ref 3–18)
BASOPHILS # BLD: 0 K/UL (ref 0–0.1)
BASOPHILS NFR BLD: 0 % (ref 0–2)
BUN SERPL-MCNC: 25 MG/DL (ref 7–18)
BUN/CREAT SERPL: 27 (ref 12–20)
CALCIUM SERPL-MCNC: 8.6 MG/DL (ref 8.5–10.1)
CHLORIDE SERPL-SCNC: 108 MMOL/L (ref 100–111)
CO2 SERPL-SCNC: 26 MMOL/L (ref 21–32)
CREAT SERPL-MCNC: 0.93 MG/DL (ref 0.6–1.3)
DIFFERENTIAL METHOD BLD: ABNORMAL
EOSINOPHIL # BLD: 0.1 K/UL (ref 0–0.4)
EOSINOPHIL NFR BLD: 1 % (ref 0–5)
ERYTHROCYTE [DISTWIDTH] IN BLOOD BY AUTOMATED COUNT: 17.7 % (ref 11.6–14.5)
GLUCOSE BLD STRIP.AUTO-MCNC: 85 MG/DL (ref 70–110)
GLUCOSE SERPL-MCNC: 80 MG/DL (ref 74–99)
HCT VFR BLD AUTO: 34.8 % (ref 36–48)
HGB BLD-MCNC: 11.1 G/DL (ref 13–16)
LYMPHOCYTES # BLD: 1 K/UL (ref 0.9–3.6)
LYMPHOCYTES NFR BLD: 12 % (ref 21–52)
MCH RBC QN AUTO: 27.4 PG (ref 24–34)
MCHC RBC AUTO-ENTMCNC: 31.9 G/DL (ref 31–37)
MCV RBC AUTO: 85.9 FL (ref 74–97)
MONOCYTES # BLD: 0.7 K/UL (ref 0.05–1.2)
MONOCYTES NFR BLD: 9 % (ref 3–10)
NEUTS SEG # BLD: 6.3 K/UL (ref 1.8–8)
NEUTS SEG NFR BLD: 78 % (ref 40–73)
PLATELET # BLD AUTO: 314 K/UL (ref 135–420)
PMV BLD AUTO: 9.7 FL (ref 9.2–11.8)
POTASSIUM SERPL-SCNC: 4.1 MMOL/L (ref 3.5–5.5)
RBC # BLD AUTO: 4.05 M/UL (ref 4.7–5.5)
SODIUM SERPL-SCNC: 141 MMOL/L (ref 136–145)
WBC # BLD AUTO: 8.1 K/UL (ref 4.6–13.2)

## 2020-10-16 PROCEDURE — 74011250636 HC RX REV CODE- 250/636: Performed by: HOSPITALIST

## 2020-10-16 PROCEDURE — 85025 COMPLETE CBC W/AUTO DIFF WBC: CPT

## 2020-10-16 PROCEDURE — 80048 BASIC METABOLIC PNL TOTAL CA: CPT

## 2020-10-16 PROCEDURE — 65660000000 HC RM CCU STEPDOWN

## 2020-10-16 PROCEDURE — 74011250637 HC RX REV CODE- 250/637: Performed by: FAMILY MEDICINE

## 2020-10-16 PROCEDURE — 82962 GLUCOSE BLOOD TEST: CPT

## 2020-10-16 PROCEDURE — 74011250637 HC RX REV CODE- 250/637: Performed by: HOSPITALIST

## 2020-10-16 PROCEDURE — 94760 N-INVAS EAR/PLS OXIMETRY 1: CPT

## 2020-10-16 PROCEDURE — 74011250636 HC RX REV CODE- 250/636: Performed by: EMERGENCY MEDICINE

## 2020-10-16 PROCEDURE — 92610 EVALUATE SWALLOWING FUNCTION: CPT

## 2020-10-16 PROCEDURE — 74011250636 HC RX REV CODE- 250/636: Performed by: FAMILY MEDICINE

## 2020-10-16 PROCEDURE — 36415 COLL VENOUS BLD VENIPUNCTURE: CPT

## 2020-10-16 PROCEDURE — 94640 AIRWAY INHALATION TREATMENT: CPT

## 2020-10-16 PROCEDURE — 74011000250 HC RX REV CODE- 250: Performed by: HOSPITALIST

## 2020-10-16 PROCEDURE — 74011000258 HC RX REV CODE- 258: Performed by: HOSPITALIST

## 2020-10-16 RX ORDER — QUETIAPINE FUMARATE 100 MG/1
100 TABLET, FILM COATED ORAL
Status: DISCONTINUED | OUTPATIENT
Start: 2020-10-16 | End: 2020-10-20

## 2020-10-16 RX ORDER — INSULIN LISPRO 100 [IU]/ML
INJECTION, SOLUTION INTRAVENOUS; SUBCUTANEOUS
Status: DISCONTINUED | OUTPATIENT
Start: 2020-10-16 | End: 2020-10-20 | Stop reason: HOSPADM

## 2020-10-16 RX ORDER — HALOPERIDOL 5 MG/ML
5 INJECTION INTRAMUSCULAR ONCE
Status: COMPLETED | OUTPATIENT
Start: 2020-10-16 | End: 2020-10-16

## 2020-10-16 RX ADMIN — LORAZEPAM 2 MG: 2 INJECTION INTRAMUSCULAR; INTRAVENOUS at 04:46

## 2020-10-16 RX ADMIN — HEPARIN SODIUM 5000 UNITS: 5000 INJECTION INTRAVENOUS; SUBCUTANEOUS at 17:26

## 2020-10-16 RX ADMIN — AZITHROMYCIN MONOHYDRATE 500 MG: 500 INJECTION, POWDER, LYOPHILIZED, FOR SOLUTION INTRAVENOUS at 17:22

## 2020-10-16 RX ADMIN — OXYCODONE 10 MG: 5 TABLET ORAL at 10:55

## 2020-10-16 RX ADMIN — Medication 5 ML: at 14:45

## 2020-10-16 RX ADMIN — Medication 10 ML: at 02:41

## 2020-10-16 RX ADMIN — METOPROLOL SUCCINATE 25 MG: 25 TABLET, EXTENDED RELEASE ORAL at 11:00

## 2020-10-16 RX ADMIN — GUAIFENESIN 600 MG: 600 TABLET, EXTENDED RELEASE ORAL at 22:35

## 2020-10-16 RX ADMIN — HALOPERIDOL LACTATE 5 MG: 5 INJECTION, SOLUTION INTRAMUSCULAR at 06:33

## 2020-10-16 RX ADMIN — PIPERACILLIN AND TAZOBACTAM 3.38 G: 3; .375 INJECTION, POWDER, LYOPHILIZED, FOR SOLUTION INTRAVENOUS at 17:22

## 2020-10-16 RX ADMIN — VANCOMYCIN HYDROCHLORIDE 750 MG: 750 INJECTION, POWDER, LYOPHILIZED, FOR SOLUTION INTRAVENOUS at 22:35

## 2020-10-16 RX ADMIN — ATORVASTATIN CALCIUM 10 MG: 10 TABLET, FILM COATED ORAL at 11:00

## 2020-10-16 RX ADMIN — HEPARIN SODIUM 5000 UNITS: 5000 INJECTION INTRAVENOUS; SUBCUTANEOUS at 09:00

## 2020-10-16 RX ADMIN — ARFORMOTEROL TARTRATE 15 MCG: 15 SOLUTION RESPIRATORY (INHALATION) at 21:16

## 2020-10-16 RX ADMIN — GUAIFENESIN 600 MG: 600 TABLET, EXTENDED RELEASE ORAL at 11:00

## 2020-10-16 RX ADMIN — PIPERACILLIN AND TAZOBACTAM 3.38 G: 3; .375 INJECTION, POWDER, LYOPHILIZED, FOR SOLUTION INTRAVENOUS at 02:46

## 2020-10-16 RX ADMIN — BUDESONIDE 500 MCG: 0.5 INHALANT RESPIRATORY (INHALATION) at 21:16

## 2020-10-16 RX ADMIN — LORAZEPAM 1 MG: 2 INJECTION INTRAMUSCULAR; INTRAVENOUS at 02:42

## 2020-10-16 RX ADMIN — LORAZEPAM 2 MG: 2 INJECTION INTRAMUSCULAR; INTRAVENOUS at 03:45

## 2020-10-16 RX ADMIN — PIPERACILLIN AND TAZOBACTAM 3.38 G: 3; .375 INJECTION, POWDER, LYOPHILIZED, FOR SOLUTION INTRAVENOUS at 14:45

## 2020-10-16 RX ADMIN — QUETIAPINE FUMARATE 50 MG: 25 TABLET ORAL at 00:26

## 2020-10-16 RX ADMIN — PIPERACILLIN AND TAZOBACTAM 3.38 G: 3; .375 INJECTION, POWDER, LYOPHILIZED, FOR SOLUTION INTRAVENOUS at 20:14

## 2020-10-16 RX ADMIN — HEPARIN SODIUM 5000 UNITS: 5000 INJECTION INTRAVENOUS; SUBCUTANEOUS at 02:40

## 2020-10-16 NOTE — PROGRESS NOTES
2024-alert and oriented to person only, confused to place, situation, and time. Lungs CTA. BS active x 4 quads. Bilateral AKA. Incontinent of bowel and bladder. Patient is legally blind. [place] bilaterally. Patient has pulled off tele box and refuses to put it back on. IV access to left arm.    2204-Pulled IV out. Dr. Leroy Garcia. 2210-Dr. June Diaz informed of patient throwing things, cussing at staff, having visual hallucination, and auditory hallucinations. [address] put back in place. New orders to give 3 mg now. 0242-1 mg Ativan given for CIWAH score of 15.  0345-Ativan 2 mg given per [address] 0445-Ativan 2 mg given per [address]  0542-IV to right hand pulled out by patient. 0556-Anita See informed of Ativan not working. New order for Haldol 5 mg given. 0630-Haldol 5 mg given, Ativan not working. 0716-CIWAH score high at 28, waiting for Haldol to start working. Shift summary-patient has been agitated, having auditory hallucinations, visual hallucinations, throwing things, cussing at staff, and trying to get OOB. He has been given Ativan through the noc. New IV placed to right hand d/t patient pulled out two IVs. Pulled out third IV to right hand. No IV in place at this time.  Dr. June Diaz gave order for Haldol 5 mg IM at 556 am.

## 2020-10-16 NOTE — PROGRESS NOTES
Problem: Dysphagia (Adult)  Goal: *Acute Goals and Plan of Care (Insert Text)  Description: Recommendations:  Diet: Puree/HONEY thick liquids  Meds: Crushed in puree  Aspiration Precautions  Oral Care TID  Other: MBSS when mental status improves    Goals:  Patient will:  1. Tolerate PO trials with 0 s/s overt distress in 4/5 trials  2. Utilize compensatory swallow strategies/maneuvers (decrease bite/sip, size/rate, alt. liq/sol) with min cues in 4/5 trials  3. Perform oral-motor/laryngeal exercises to increase oropharyngeal swallow function with min cues  4. Complete an objective swallow study (i.e., MBSS) to assess swallow integrity, r/o aspiration, and determine of safest LRD, min A  Outcome: Progressing Towards Goal  SPEECH LANGUAGE PATHOLOGY BEDSIDE SWALLOW EVALUATION    Patient: Parag Borges Sr. (66 y.o. male)  Date: 10/16/2020  Primary Diagnosis: Sepsis ([address] [A41.9]  CAP (community acquired pneumonia) [J18.9]        Precautions: Aspiration  PLOF: Living with family    ASSESSMENT :  Based on the objective data described below, the patient presents with moderate oral and suspected mod-severe pharyngeal dysphagia. CXR completed 10/14/20 revealed \"patchy multifocal airspace disease compatible with pneumonia\". Patient alert, disoriented, agitated, speech unintelligible other than repeatedly stating \"drive\". Oral-motor exam limited, as patient did not follow any commands, however pt observed to have incomplete dentition. Presented with thin liquid via straw, demo immediate wet vocal quality, suspect silent aspiration. Nectar-thick liquid via cup; delayed wet, persistent cough. S/sx of aspiration resolved with small sips of honey-thick liquid and purees. Delayed swallow initiation with all consistencies. Solid trial attempted, however patient did not attempt to masticate cracker despite verbal cues, SLP removed.  Recommend initiation of cautious puree/HONEY thick liquid diet with strict aspiration precautions, feeding assistance. Patient would likely benefit from [facility] when mental status improves, however do not anticipate he would be able to complete it today due to agitation. D/w Dr. Emilee Buerger. SLP will continue to follow as indicated. Patient will benefit from skilled intervention to address the above impairments. Patient's rehabilitation potential is considered to be Guarded  Factors which may influence rehabilitation potential include:   []            None noted  [x]            Mental ability/status  [x]            Medical condition  [x]            Home/family situation and support systems  [x]            Safety awareness  []            Pain tolerance/management  []            Other:      PLAN :  Recommendations and Planned Interventions:  Puree/HONEY thick liquid  Frequency/Duration: Patient will be followed by speech-language pathology 1-2 times per day/4-7 days per week to address goals. Discharge Recommendations: Skilled Nursing Facility     SUBJECTIVE:   Patient stated Drive.     OBJECTIVE:     Past Medical History:   Diagnosis Date    Amputation of both lower extremities ([facility]     Amputee, above knee, left (HCC)     At risk for falls     Chronic pain     back and legs    Diabetes ([facility]     Hypercholesterolemia     Hypertension     Polio      Past Surgical History:   Procedure Laterality Date    HX HERNIA REPAIR      HX OTHER SURGICAL      carpal tunnel     HX OTHER SURGICAL      cyst     Home Situation:   Home Situation  Home Environment: Private residence  # Steps to Enter: 1  One/Two Story Residence: One story  Living Alone: No  Support Systems: Child(megan)  Patient Expects to be Discharged to[de-identified] Private residence  Current DME Used/Available at Home: None    Diet prior to admission: Regular/thin  Current Diet:  Puree/HONEY     Cognitive and Communication Status:  Neurologic State: Confused, Irritable  Orientation Level: Disoriented X4  Cognition: No command following     Perseveration: Perseverates during conversation, Perseverates during mobility  Safety/Judgement: Decreased awareness of environment, Decreased awareness of need for assistance, Decreased awareness of need for safety, Decreased insight into deficits  Oral Assessment:  Oral Assessment  Labial: No impairment  Dentition: Natural;Poor  Oral Hygiene: Fair  Lingual: No impairment  Velum: Unable to visualize  Mandible: No impairment  P.O. Trials:  Patient Position: HOB 60  Vocal quality prior to P.O.: No impairment  Consistency Presented: Thin liquid; Nectar thick liquid;Honey thick liquid;Puree; Solid  How Presented: SLP-fed/presented;Cup/sip;Straw;Spoon     Bolus Acceptance: Impaired  Bolus Formation/Control: Impaired  Type of Impairment: Incomplete;Mastication  Propulsion: Delayed (# of seconds)  Oral Residue: Greater than 50% of bolus  Initiation of Swallow: Delayed (# of seconds)  Laryngeal Elevation: Decreased  Aspiration Signs/Symptoms: Delayed cough/throat clear; Infiltrate on chest xray; Change vocal quality  Pharyngeal Phase Characteristics: Suspected pharyngeal residue  Effective Modifications: Small sips and bites  Cues for Modifications: Maximal       Oral Phase Severity: Moderate  Pharyngeal Phase Severity : Moderate-severe    PAIN:  Pain level pre-treatment: 0/10   Pain level post-treatment: 0/10     After treatment:   []            Patient left in no apparent distress sitting up in chair  [x]            Patient left in no apparent distress in bed  [x]            Call bell left within reach  [x]            Nursing notified  []            Family present  [x]            Sitter present  []            Bed alarm activated    COMMUNICATION/EDUCATION:   [x]            Aspiration precautions; swallow safety; compensatory techniques. []            Patient/family have participated as able in goal setting and plan of care. []            Patient/family agree to work toward stated goals and plan of care.   []            Patient understands intent and goals of therapy; neutral about participation. [x]            Patient unable to participate in goal setting/plan of care; educ ongoing with interdisciplinary staff  []         Posted safety precautions in patient's room.     Thank you for this referral,  Dov Gordon SLP  Time Calculation: 13 mins

## 2020-10-16 NOTE — PROGRESS NOTES
DC Plan: LTC?    1225  Chart reviewed. Pt admitted to tele. Attempted to see pt at bedside earlier, pt with sitter, pt disoriented and unable to participate in conversation. Palliative following. LTC referrals were sent for continuity of care. Received call from Justin Trevizo in admissions @ [facility], they have LTC bed on Monday for pt, if family agrees. It appears UAI was completed by [facility] April 2019. CM will cont to follow [phone]  Attempted to call pts son Ruperto Stewart on cell phone and VM full, attempted to call son on listed home phone and no VM set up.  Will need to discuss LTC as safe dc option

## 2020-10-16 NOTE — PALLIATIVE CARE
Palliative care team including SIMI Valdivia and this NP met with patient at bedside. He is lethargic and speech garbled. When asked how to pronounce his last name, he responded by spelling it. Attempted to reach patient's daughter, Darrick Spencer, at the number listed in the chart. It does not appear to be a valid telephone number. No answer.

## 2020-10-16 NOTE — PROGRESS NOTES
PT note: Evaluation attempted. Per chart, pt remains confused. Pt sitter [place] present. Pt lethargic, sleeping and does not arouse for appropriate participation with PT evaluation. Care Mgr noted noted regarding LTC at discharge. In view of above, and as pt has not been appropriate for PT evaluation, will acknowledge and discontinue PT orders at this time.  Thank you for this referral.  Judy Canchola, PT

## 2020-10-16 NOTE — PROGRESS NOTES
Occupational Therapy Evaluation Attempt/Discharge:    Chart reviewed. Attempted OT evaluation, pt with sitter present and sleeping soundly. Pt responding to loud noise however unable to open eyes. Pt with pending palliative care consult. Pt not appropriate for OT services at this time, will discontinue OT orders. Please re-order OT services is pt appropriate for therapy participation.     Thank you for the referral.  Elvia Notice, OTR/L

## 2020-10-16 NOTE — PROGRESS NOTES
Speech Therapy Note:    SLP orders received and attempted however, patient sleeping soundly. RN, Ino Starks requests patient not to be woken up due to ongoing agitation. SLP will f/u later this day or as medically indicated.  Thank you for this referral.     Lay Velasco M.S., [address] [facility]  Speech Language Pathologist

## 2020-10-16 NOTE — PROGRESS NOTES
Hospitalist Progress Note-critical care note     Patient: Maximino Tuttle Sr. MRN: [id number]  CSN: [id number]    YOB: 1955  Age: 63 y.o. Sex: male    DOA: 10/14/2020 LOS:  LOS: 2 days            Chief complaint: acute encephalopathy, pna. Emphysema lung     Assessment/Plan         Hospital Problems  Date Reviewed: 10/21/2013          Codes Class Noted POA    Acute encephalopathy ICD-10-CM: G93.40  ICD-9-CM: 348.30  10/15/2020 Unknown        [facility] both [facility] ICD-10-CM: A05.629S, S94.117D  ICD-9-CM: 897.6  Unknown Unknown        * (Principal) PNA (pneumonia) ICD-10-CM: J18.9  ICD-9-CM: 758  Unknown Unknown        ALEXANDER (acute kidney injury) ([facility] ICD-10-CM: N17.9  ICD-9-CM: 917. 9  Unknown Unknown        Hyponatremia ICD-10-CM: E87.1  ICD-9-CM: 276.1  Unknown Unknown        Marijuana abuse ICD-10-CM: F12.10  ICD-9-CM: 305.20  Unknown Unknown        Emphysema lung ([facility] ICD-10-CM: J43.9  ICD-9-CM: 492.8  Unknown Unknown        CAP (community acquired pneumonia) ICD-10-CM: J18.9  ICD-9-CM: 136  10/14/2020 Unknown        Sepsis ([facility] ICD-10-CM: A41.9  ICD-9-CM: 038.9, 995.91  10/14/2020 Unknown        Hard of hearing ICD-10-CM: H91.90  ICD-9-CM: 389.9  10/14/2020 Unknown        Legal blindness ICD-10-CM: H54.8  ICD-9-CM: 369.4  10/14/2020 Unknown              Sepsis r/o       pna   Continue azithromycin , vanc and zosyn , f/u with the cx -so far negative   Breathing treatment and symptom treatment   Strep pneumo  and legionella negative   Rapid covid 19 negative    Aspiration precaution        Emphysema lung   Breathing tx      ALEXANDER   Resolved per Ns infusion      Hyponatremia   Resolved      Bilateral aka and head hearing and legal blindness   Fall precaution      marijuana use      Dm type II   ssi     Acute encephalopathy -like sundowning   Agitated last night, received ativan and haldol   Ct head no acute issue  Received ativan   Will increase Seroquel to 100 mg      sitter was at the bedside   Appreciated speech at bedside, aspiration precaution        Disposition :tbd,   Review of systems:    Unable to obtain due to agiation     Vital signs/Intake and Output:  Visit Vitals  BP (!) 167/91   Pulse 92   Temp 99.3 °F (37.4 °C)   Resp 20   Ht 5' 4\" (1.626 m)   Wt 36.3 kg (80 lb)   SpO2 92%   BMI 13.73 kg/m²     Current Shift:  10/16 0701 - 10/16 1900  In: 50 [P.O.:50]  Out: -   Last three shifts:  10/14 1901 - 10/16 0700  In: 720 [P.O.:620; I.V.:100]  Out: 700 [Urine:700]    Physical Exam:  General: Alert, follow some command   HEENT: NC, Atraumatic. PERRLA, anicteric sclerae. Lungs: Rales of rt side, no wheezing   Heart:  Regular  rhythm,  No murmur, No Rubs, No Gallops  Abdomen: Soft, Non distended, Non tender. +Bowel sounds,   Extremities: No c/c/e bilateral aka   Psych:   Agitated    Neurologic:   Alert and agitated, follow some commands         Labs: Results:       Chemistry Recent Labs     10/16/20  0044 10/15/20  0410 10/14/20  1445   GLU 80 118* 88    138 134*   K 4.1 4.0 3.8    106 99*   CO2 26 22 26   BUN 25* 36* 44*   CREA 0.93 0.89 1.38*   CA 8.6 8.1* 9.1   AGAP 7 10 9   BUCR 27* 40* 32*   AP  --   --  130*   TP  --   --  7.6   ALB  --   --  2.9*   GLOB  --   --  4.7*   AGRAT  --   --  0.6*      CBC w/Diff Recent Labs     10/16/20  0044 10/15/20  0410 10/14/20  1445   WBC 8.1 7.0 10.1   RBC 4.05* 3.98* 4.41*   HGB 11.1* 11.0* 12.4*   HCT 34.8* 34.0* 38.0    264 299   GRANS 78* 89* 84*   LYMPH 12* 6* 6*   EOS 1 0 0      Cardiac Enzymes No results for input(s): CPK, CKND1, CHUY in the last 72 hours. No lab exists for component: CKRMB, TROIP   Coagulation No results for input(s): PTP, INR, APTT, INREXT, INREXT in the last 72 hours.     Lipid Panel No results found for: CHOL, CHOLPOCT, CHOLX, CHLST, CHOLV, 146923, HDL, HDLP, LDL, LDLC, DLDLP, 839070, VLDLC, VLDL, TGLX, TRIGL, TRIGP, TGLPOCT, CHHD, CHHDX   BNP No results for input(s): BNPP in the last 72 hours.   Liver Enzymes Recent Labs     10/14/20  1445   TP 7.6   ALB 2.9*   *      Thyroid Studies Lab Results   Component Value Date/Time    TSH 0.42 01/27/2014 04:08 PM        Procedures/imaging: see electronic medical records for all procedures/Xrays and details which were not copied into this note but were reviewed prior to creation of Plan    Ct Head Wo Cont    Result Date: 10/15/2020  EXAM: CT head INDICATION: Dental status change COMPARISON: None. TECHNIQUE: Axial CT imaging of the head was performed without intravenous contrast. One or more dose reduction techniques were used on this CT: automated exposure control, adjustment of the mAs and/or kVp according to patient size, and iterative reconstruction techniques. The specific techniques used on this CT exam have been documented in the patient's electronic medical record. Digital Imaging and Communications in Medicine (DICOM) format image data are available to nonaffiliated external healthcare facilities or entities on a secure, media free, reciprocally searchable basis with patient authorization for at least a 12 month period after this study. _______________ FINDINGS: BRAIN AND POSTERIOR FOSSA: The sulci, folia, ventricles and basal cisterns are within normal limits for the patient's with age concordant atrophy There is no intracranial hemorrhage, mass effect, or midline shift. Mild periventricular low-attenuation. Although nonspecific this is frequently seen with chronic small vessel ischemic change. Otherwise, there are no areas of abnormal parenchymal attenuation. EXTRA-AXIAL SPACES AND MENINGES: There are no abnormal extra-axial fluid collections. CALVARIUM: Intact. SINUSES: Clear. OTHER: None. _______________     IMPRESSION: No acute intracranial abnormalities.     Xr Chest Port    Result Date: 10/14/2020  EXAM: XR CHEST PORT CLINICAL INDICATION/HISTORY: Shortness of breath with cough -Additional: None COMPARISON: Several prior studies, most recently 2/19/2019 TECHNIQUE: Frontal view of the chest _______________ FINDINGS: HEART AND MEDIASTINUM: Normal cardiac size and mediastinal contours. LUNGS AND PLEURAL SPACES: Patchy multifocal opacities noted throughout bilateral upper lobes, right greater than left as well as throughout the periphery of the right lower lobe. No evidence of pneumothorax. BONY THORAX AND SOFT TISSUES: No acute osseous abnormality _______________     IMPRESSION: Patchy multifocal airspace disease compatible with pneumonia. Recommend radiographic follow-up to document expected clinical resolution in 4-6 weeks' time.        Indra Wan MD

## 2020-10-16 NOTE — PROGRESS NOTES
0730: Bedside and Verbal shift change report given to [address] (oncoming nurse) by Sintia Olson RN (offgoing nurse). Report included the following information SBAR and Kardex.      1019: MD Salvador Vanessa discuss pt tx with case management,

## 2020-10-16 NOTE — PROGRESS NOTES
Physician Progress Note      PATIENTFrkalin Moreau  CSN #:                  [id number]  :                       1955  ADMIT DATE:       10/14/2020 2:40 PM  [address] [place] DATE:  RESPONDING  PROVIDER #:        Graeme Cummings MD          QUERY TEXT:    Dear Attending,    Patient admitted with pneumonia. Also noted documentation of sepsis in the 10-14-20 H&P. No elevation in temp nor in WBC noted. Please indicate one of the following and document in the medical record: The medical record reflects the following:  >   Risk Factors: Pt admitted with pneumonia; had elevated lactic acid, elevated resp rate  > Clinical Indicators:  - Per 10-14-20 H&P:  Sepsis due to pna  - Per 10-14-20 ED vital signs:  Temp: 98.6, 98.4;  Pulse range: 80 -> 71;  Resp Rate range: 32 -> 21;  - Per 10-14-20 labs: WBC 10.1 (no bands found); lactic acid 2.8, 1.1;  - Per 10-15-20 labs: WBC 7.0 (no bands found)  > Treatment: labs; IV abx; IV fluids    Thank you for your time,  [facility] Hoffman, [address] [place] [address]  Options provided:  -- Sepsis present as evidenced by, Please document evidence. -- Sepsis was ruled out after study  -- Other - I will add my own diagnosis  -- Disagree - Not applicable / Not valid  -- Disagree - Clinically unable to determine / Unknown  -- Refer to Clinical Documentation Reviewer    PROVIDER RESPONSE TEXT:    Sepsis was ruled out after study. Query created by: Flor Andrade on 10/15/2020 3:32 PM      QUERY TEXT:    Dear Attending,    Pt admitted with pneumonia. Noted documentation of severe protein calorie malnutrition on 10-15-20 by Registered Dietitian consultant.   If possible, please document in progress notes and discharge summary:    The medical record reflects the following:  > Risk Factors: 63 yr old with multiple comorbidities  > Clinical Indicators: Per 10-15-20 Registered Dietitian Nutrition Diagnosis: severe protein malnutrition related to inadequate oral intake as evidenced by severe muscle and fat loss. > Treatment: Registered Dietitian consult; add magic cup TID    Thank you for your time,  Betty Fontenot, [address]  Options provided:  -- severe protein calorie malnutrition confirmed  -- severe protein calorie malnutrition ruled out  -- Other - I will add my own diagnosis  -- Disagree - Not applicable / Not valid  -- Disagree - Clinically unable to determine / Unknown  -- Refer to Clinical Documentation Reviewer    PROVIDER RESPONSE TEXT:    The diagnosis of severe protein calorie malnutrition was confirmed. Query created by:  Malou Pascual on 10/15/2020 3:48 PM      Electronically signed by:  Chirag Griffin MD 10/16/2020 2:09 PM

## 2020-10-16 NOTE — ROUTINE PROCESS
Bedside and Verbal shift change report given to Michelle Mcguire RN (oncoming nurse) by Jennifer Koch RN (offgoing nurse). Report included the following information SBAR, Kardex, Intake/Output and MAR.

## 2020-10-17 ENCOUNTER — APPOINTMENT (OUTPATIENT)
Dept: GENERAL RADIOLOGY | Age: 65
DRG: 139 | End: 2020-10-17
Attending: INTERNAL MEDICINE
Payer: MEDICAID

## 2020-10-17 LAB
ANION GAP SERPL CALC-SCNC: 11 MMOL/L (ref 3–18)
ARTERIAL PATENCY WRIST A: ABNORMAL
BASE DEFICIT BLD-SCNC: 4 MMOL/L
BASOPHILS # BLD: 0 K/UL (ref 0–0.1)
BASOPHILS NFR BLD: 0 % (ref 0–2)
BDY SITE: ABNORMAL
BODY TEMPERATURE: 98.5
BUN SERPL-MCNC: 13 MG/DL (ref 7–18)
BUN/CREAT SERPL: 21 (ref 12–20)
CALCIUM SERPL-MCNC: 8.7 MG/DL (ref 8.5–10.1)
CHLORIDE SERPL-SCNC: 106 MMOL/L (ref 100–111)
CO2 SERPL-SCNC: 23 MMOL/L (ref 21–32)
CREAT SERPL-MCNC: 0.63 MG/DL (ref 0.6–1.3)
DIFFERENTIAL METHOD BLD: ABNORMAL
EOSINOPHIL # BLD: 0.2 K/UL (ref 0–0.4)
EOSINOPHIL NFR BLD: 2 % (ref 0–5)
ERYTHROCYTE [DISTWIDTH] IN BLOOD BY AUTOMATED COUNT: 17.6 % (ref 11.6–14.5)
GAS FLOW.O2 O2 DELIVERY SYS: ABNORMAL L/MIN
GAS FLOW.O2 SETTING OXYMISER: 2 L/M
GLUCOSE BLD STRIP.AUTO-MCNC: 104 MG/DL (ref 70–110)
GLUCOSE BLD STRIP.AUTO-MCNC: 85 MG/DL (ref 70–110)
GLUCOSE BLD STRIP.AUTO-MCNC: 90 MG/DL (ref 70–110)
GLUCOSE SERPL-MCNC: 86 MG/DL (ref 74–99)
HCO3 BLD-SCNC: 20.7 MMOL/L (ref 22–26)
HCT VFR BLD AUTO: 37.1 % (ref 36–48)
HGB BLD-MCNC: 12 G/DL (ref 13–16)
LYMPHOCYTES # BLD: 0.9 K/UL (ref 0.9–3.6)
LYMPHOCYTES NFR BLD: 12 % (ref 21–52)
MCH RBC QN AUTO: 27.5 PG (ref 24–34)
MCHC RBC AUTO-ENTMCNC: 32.3 G/DL (ref 31–37)
MCV RBC AUTO: 84.9 FL (ref 74–97)
MONOCYTES # BLD: 0.7 K/UL (ref 0.05–1.2)
MONOCYTES NFR BLD: 10 % (ref 3–10)
NEUTS SEG # BLD: 5.3 K/UL (ref 1.8–8)
NEUTS SEG NFR BLD: 76 % (ref 40–73)
O2/TOTAL GAS SETTING VFR VENT: 0.8 %
PCO2 BLD: 30.8 MMHG (ref 35–45)
PH BLD: 7.44 [PH] (ref 7.35–7.45)
PLATELET # BLD AUTO: 338 K/UL (ref 135–420)
PMV BLD AUTO: 10.1 FL (ref 9.2–11.8)
PO2 BLD: 108 MMHG (ref 80–100)
POTASSIUM SERPL-SCNC: 3.3 MMOL/L (ref 3.5–5.5)
RBC # BLD AUTO: 4.37 M/UL (ref 4.7–5.5)
SAO2 % BLD: 98 % (ref 92–97)
SERVICE CMNT-IMP: ABNORMAL
SODIUM SERPL-SCNC: 140 MMOL/L (ref 136–145)
SPECIMEN TYPE: ABNORMAL
TOTAL RESP. RATE, ITRR: 20
VANCOMYCIN TROUGH SERPL-MCNC: 5 UG/ML (ref 10–20)
WBC # BLD AUTO: 7.1 K/UL (ref 4.6–13.2)

## 2020-10-17 PROCEDURE — 71045 X-RAY EXAM CHEST 1 VIEW: CPT

## 2020-10-17 PROCEDURE — 82803 BLOOD GASES ANY COMBINATION: CPT

## 2020-10-17 PROCEDURE — 82962 GLUCOSE BLOOD TEST: CPT

## 2020-10-17 PROCEDURE — 85025 COMPLETE CBC W/AUTO DIFF WBC: CPT

## 2020-10-17 PROCEDURE — 74011250636 HC RX REV CODE- 250/636: Performed by: INTERNAL MEDICINE

## 2020-10-17 PROCEDURE — 80202 ASSAY OF VANCOMYCIN: CPT

## 2020-10-17 PROCEDURE — 74011000258 HC RX REV CODE- 258: Performed by: HOSPITALIST

## 2020-10-17 PROCEDURE — 74011000250 HC RX REV CODE- 250: Performed by: HOSPITALIST

## 2020-10-17 PROCEDURE — 36415 COLL VENOUS BLD VENIPUNCTURE: CPT

## 2020-10-17 PROCEDURE — 74011250636 HC RX REV CODE- 250/636: Performed by: EMERGENCY MEDICINE

## 2020-10-17 PROCEDURE — 36600 WITHDRAWAL OF ARTERIAL BLOOD: CPT

## 2020-10-17 PROCEDURE — 74011250636 HC RX REV CODE- 250/636: Performed by: HOSPITALIST

## 2020-10-17 PROCEDURE — 74011250637 HC RX REV CODE- 250/637: Performed by: HOSPITALIST

## 2020-10-17 PROCEDURE — 94640 AIRWAY INHALATION TREATMENT: CPT

## 2020-10-17 PROCEDURE — 74011250637 HC RX REV CODE- 250/637: Performed by: FAMILY MEDICINE

## 2020-10-17 PROCEDURE — 65660000000 HC RM CCU STEPDOWN

## 2020-10-17 PROCEDURE — 80048 BASIC METABOLIC PNL TOTAL CA: CPT

## 2020-10-17 PROCEDURE — 74011250636 HC RX REV CODE- 250/636: Performed by: FAMILY MEDICINE

## 2020-10-17 RX ORDER — POTASSIUM CHLORIDE 7.45 MG/ML
10 INJECTION INTRAVENOUS ONCE
Status: COMPLETED | OUTPATIENT
Start: 2020-10-17 | End: 2020-10-17

## 2020-10-17 RX ADMIN — AZITHROMYCIN MONOHYDRATE 500 MG: 500 INJECTION, POWDER, LYOPHILIZED, FOR SOLUTION INTRAVENOUS at 18:52

## 2020-10-17 RX ADMIN — METOPROLOL SUCCINATE 25 MG: 25 TABLET, EXTENDED RELEASE ORAL at 09:08

## 2020-10-17 RX ADMIN — HEPARIN SODIUM 5000 UNITS: 5000 INJECTION INTRAVENOUS; SUBCUTANEOUS at 08:53

## 2020-10-17 RX ADMIN — POTASSIUM CHLORIDE 10 MEQ: 7.46 INJECTION, SOLUTION INTRAVENOUS at 17:47

## 2020-10-17 RX ADMIN — LORAZEPAM 2 MG: 2 INJECTION INTRAMUSCULAR; INTRAVENOUS at 18:58

## 2020-10-17 RX ADMIN — ARFORMOTEROL TARTRATE 15 MCG: 15 SOLUTION RESPIRATORY (INHALATION) at 09:51

## 2020-10-17 RX ADMIN — LORAZEPAM 2 MG: 2 INJECTION INTRAMUSCULAR; INTRAVENOUS at 08:46

## 2020-10-17 RX ADMIN — LORAZEPAM 2 MG: 2 INJECTION INTRAMUSCULAR; INTRAVENOUS at 12:19

## 2020-10-17 RX ADMIN — IPRATROPIUM BROMIDE AND ALBUTEROL SULFATE 3 ML: .5; 3 SOLUTION RESPIRATORY (INHALATION) at 18:10

## 2020-10-17 RX ADMIN — LORAZEPAM 2 MG: 2 INJECTION INTRAMUSCULAR; INTRAVENOUS at 02:08

## 2020-10-17 RX ADMIN — BUDESONIDE 500 MCG: 0.5 INHALANT RESPIRATORY (INHALATION) at 09:59

## 2020-10-17 RX ADMIN — PIPERACILLIN AND TAZOBACTAM 3.38 G: 3; .375 INJECTION, POWDER, LYOPHILIZED, FOR SOLUTION INTRAVENOUS at 08:52

## 2020-10-17 RX ADMIN — HEPARIN SODIUM 5000 UNITS: 5000 INJECTION INTRAVENOUS; SUBCUTANEOUS at 17:55

## 2020-10-17 RX ADMIN — Medication 10 ML: at 17:56

## 2020-10-17 RX ADMIN — PIPERACILLIN AND TAZOBACTAM 3.38 G: 3; .375 INJECTION, POWDER, LYOPHILIZED, FOR SOLUTION INTRAVENOUS at 01:35

## 2020-10-17 RX ADMIN — HEPARIN SODIUM 5000 UNITS: 5000 INJECTION INTRAVENOUS; SUBCUTANEOUS at 01:36

## 2020-10-17 RX ADMIN — BUDESONIDE 500 MCG: 0.5 INHALANT RESPIRATORY (INHALATION) at 20:23

## 2020-10-17 RX ADMIN — PIPERACILLIN AND TAZOBACTAM 3.38 G: 3; .375 INJECTION, POWDER, LYOPHILIZED, FOR SOLUTION INTRAVENOUS at 14:00

## 2020-10-17 RX ADMIN — LORAZEPAM 1 MG: 2 INJECTION INTRAMUSCULAR; INTRAVENOUS at 23:12

## 2020-10-17 RX ADMIN — ARFORMOTEROL TARTRATE 15 MCG: 15 SOLUTION RESPIRATORY (INHALATION) at 20:23

## 2020-10-17 RX ADMIN — LORAZEPAM 1 MG: 2 INJECTION INTRAMUSCULAR; INTRAVENOUS at 17:42

## 2020-10-17 RX ADMIN — IPRATROPIUM BROMIDE AND ALBUTEROL SULFATE 3 ML: .5; 3 SOLUTION RESPIRATORY (INHALATION) at 09:51

## 2020-10-17 NOTE — PROGRESS NOTES
Hospitalist Progress Note    Patient: Curry Bird Sr. MRN: [id number]  CSN: [id number]    YOB: 1955  Age: 63 y.o. Sex: male    DOA: 10/14/2020 LOS:  LOS: 3 days                Assessment and Plan:    Principal Problem:    PNA (pneumonia) ()    Active Problems:    Amputation of both lower extremities ([facility] ()      ALEXANDER (acute kidney injury) ([facility] [address] [facility] ()      Hyponatremia ()      Marijuana abuse ()      Emphysema lung ([facility] [address] [facility] ()      CAP (community acquired pneumonia) (10/14/2020)      Hard of hearing (10/14/2020)      Legal blindness (10/14/2020)      Acute encephalopathy (10/15/2020)        PNA: he is on antibiotics but is still short of breath. Will add oxygen    Recheck cxr and abg    COPD:  On nebs and was doing well    ALEXANDER: resolved    encephalopathy          Chief complaint:  acute encephalopathy, pna. Emphysema lung       Subjective:  He is a little more short of breath right now          Review of systems:    General: No fevers or chills. Cardiovascular: No chest pain or pressure. No palpitations. Pulmonary: No shortness of breath. Gastrointestinal: No nausea, vomiting. Objective:    Vital signs/Intake and Output:  Visit Vitals  BP (!) 143/92   Pulse 94   Temp 97.8 °F (36.6 °C)   Resp 20   Ht 5' 4\" (1.626 m)   Wt 36.3 kg (80 lb)   SpO2 95%   BMI 13.73 kg/m²     Current Shift:  10/17 0701 - 10/17 1900  In: 100 [I.V.:100]  Out: 300 [Urine:300]  Last three shifts:  10/15 1901 - 10/17 0700  In: 170 [P.O.:170]  Out: 1450 [Urine:1450]    Physical Exam:  General: NAD, AAOx3. Non-toxic. HEENT: NC/AT. PERRLA, EOMI.  MMM. Lungs: Nml inspection. CTA B/L. No wheezing, rales or rhonchi. Heart:  S1S2 RRR,  PMI mid 5th IC space. No M/RG. Abdomen: Soft, NT/ND.  BS+. No peritoneal signs. Extremities: No C/C/E. Psych:   Nml affect. Neurologic:  2-12 intact. Strength 5/5 throughout.   Sensation symmetrical.          Labs: Results:       Chemistry Recent Labs     10/17/20  0117 10/16/20  0044 10/15/20  0410   GLU 86 80 118*    141 138   K 3.3* 4.1 4.0    108 106   CO2 23 26 22   BUN 13 25* 36*   CREA 0.63 0.93 0.89   CA 8.7 8.6 8.1*   AGAP 11 7 10   BUCR 21* 27* 40*      CBC w/Diff Recent Labs     10/17/20  0117 10/16/20  0044 10/15/20  0410   WBC 7.1 8.1 7.0   RBC 4.37* 4.05* 3.98*   HGB 12.0* 11.1* 11.0*   HCT 37.1 34.8* 34.0*    314 264   GRANS 76* 78* 89*   LYMPH 12* 12* 6*   EOS 2 1 0      Cardiac Enzymes No results for input(s): CPK, CKND1, CHUY in the last 72 hours. No lab exists for component: CKRMB, TROIP   Coagulation No results for input(s): PTP, INR, APTT, INREXT in the last 72 hours. Lipid Panel No results found for: CHOL, CHOLPOCT, CHOLX, CHLST, CHOLV, 907209, HDL, HDLP, LDL, LDLC, DLDLP, 894167, VLDLC, VLDL, TGLX, TRIGL, TRIGP, TGLPOCT, CHHD, CHHDX   BNP No results for input(s): BNPP in the last 72 hours. Liver Enzymes No results for input(s): TP, ALB, TBIL, AP in the last 72 hours.     No lab exists for component: SGOT, GPT, DBIL   Thyroid Studies Lab Results   Component Value Date/Time    TSH 0.42 01/27/2014 04:08 PM        Procedures/imaging: see electronic medical records for all procedures/Xrays and details which were not copied into this note but were reviewed prior to creation of Plan

## 2020-10-17 NOTE — ROUTINE PROCESS
0730: Assumed patient care from off going [address]: Notified by Lisa Sood pts bp 140/106. Patient refused PO medication this morning during assessment. This nurse attempted again with no success. Paged physician to inform patients BP and refusal of medication . 1000: After another attempt for patient to take PO medication, attempt was successful. Will continue to monitor patients bp.  
 
1530: Report given to oncoming nurse Timi Reyes.

## 2020-10-17 NOTE — PROGRESS NOTES
Attempted to call pts annette Shirley on cell phone and VM full, attempted to call son on listed home phone and no VM set up, phone just cont to ring and ring. Will need to discuss LTC as safe dc option. LTC beds available for pt. UAI done by [facility].      CM will cont to follow for transition of care needs and will be available via hospital  on weekends

## 2020-10-17 NOTE — PROGRESS NOTES
10/17/20 1811   Post-Treatment   Breathing Pattern Regular   Breath Sounds Bilateral Crackles;Rales   Pulse 102   SpO2 96 %   Respirations 18   Treatment Tolerance Well   Pt.  Lungs are course, Patient is confused and complaining of shot of breath

## 2020-10-17 NOTE — ROUTINE PROCESS
Bedside and Verbal shift change report given to ROB Andino by Marli Dhillon. Report included the following information SBAR, Kardex, OR Summary, Intake/Output and MAR.

## 2020-10-17 NOTE — PROGRESS NOTES
1955 - Bedside report received from MANDI, [facility]. Patient in bed. Pain 0/10. Sitter at bedside. 2025 - Patient in bed at this time. IV to RUE  intact and patent. + CMS. Pt A & O x 1. LS clear, dim, on RA. Abdomen soft, NT and ND. + BS to all 4 quadrants. Denies nausea. Markos. AKA noted. Pt is blind and deaf. Pain 0/10. Call light within reach. 0208-Pt mow up, yelling aloud a certain name, sitting up in bed and demand to have me puts his clothes on, pt agitated. Will give the ativan held earlier. Pt medicated with ativan once this shift. Sitter at bedside.

## 2020-10-18 ENCOUNTER — APPOINTMENT (OUTPATIENT)
Dept: GENERAL RADIOLOGY | Age: 65
DRG: 139 | End: 2020-10-18
Attending: INTERNAL MEDICINE
Payer: MEDICAID

## 2020-10-18 LAB
ANION GAP SERPL CALC-SCNC: 8 MMOL/L (ref 3–18)
ATRIAL RATE: 108 BPM
BASOPHILS # BLD: 0 K/UL (ref 0–0.1)
BASOPHILS NFR BLD: 0 % (ref 0–2)
BUN SERPL-MCNC: 10 MG/DL (ref 7–18)
BUN/CREAT SERPL: 18 (ref 12–20)
CALCIUM SERPL-MCNC: 8.9 MG/DL (ref 8.5–10.1)
CALCULATED P AXIS, ECG09: 56 DEGREES
CALCULATED R AXIS, ECG10: 4 DEGREES
CALCULATED T AXIS, ECG11: 76 DEGREES
CHLORIDE SERPL-SCNC: 108 MMOL/L (ref 100–111)
CO2 SERPL-SCNC: 24 MMOL/L (ref 21–32)
CREAT SERPL-MCNC: 0.56 MG/DL (ref 0.6–1.3)
DIAGNOSIS, 93000: NORMAL
DIFFERENTIAL METHOD BLD: ABNORMAL
EOSINOPHIL # BLD: 0.2 K/UL (ref 0–0.4)
EOSINOPHIL NFR BLD: 3 % (ref 0–5)
ERYTHROCYTE [DISTWIDTH] IN BLOOD BY AUTOMATED COUNT: 17.2 % (ref 11.6–14.5)
GLUCOSE BLD STRIP.AUTO-MCNC: 88 MG/DL (ref 70–110)
GLUCOSE BLD STRIP.AUTO-MCNC: 89 MG/DL (ref 70–110)
GLUCOSE BLD STRIP.AUTO-MCNC: 90 MG/DL (ref 70–110)
GLUCOSE BLD STRIP.AUTO-MCNC: 96 MG/DL (ref 70–110)
GLUCOSE SERPL-MCNC: 78 MG/DL (ref 74–99)
HCT VFR BLD AUTO: 36.7 % (ref 36–48)
HGB BLD-MCNC: 11.9 G/DL (ref 13–16)
LYMPHOCYTES # BLD: 1.1 K/UL (ref 0.9–3.6)
LYMPHOCYTES NFR BLD: 15 % (ref 21–52)
MCH RBC QN AUTO: 27.6 PG (ref 24–34)
MCHC RBC AUTO-ENTMCNC: 32.4 G/DL (ref 31–37)
MCV RBC AUTO: 85.2 FL (ref 74–97)
MONOCYTES # BLD: 0.7 K/UL (ref 0.05–1.2)
MONOCYTES NFR BLD: 10 % (ref 3–10)
NEUTS SEG # BLD: 4.9 K/UL (ref 1.8–8)
NEUTS SEG NFR BLD: 72 % (ref 40–73)
P-R INTERVAL, ECG05: 124 MS
PLATELET # BLD AUTO: 339 K/UL (ref 135–420)
PMV BLD AUTO: 9.9 FL (ref 9.2–11.8)
POTASSIUM SERPL-SCNC: 3.8 MMOL/L (ref 3.5–5.5)
Q-T INTERVAL, ECG07: 324 MS
QRS DURATION, ECG06: 90 MS
QTC CALCULATION (BEZET), ECG08: 434 MS
RBC # BLD AUTO: 4.31 M/UL (ref 4.7–5.5)
SODIUM SERPL-SCNC: 140 MMOL/L (ref 136–145)
VENTRICULAR RATE, ECG03: 108 BPM
WBC # BLD AUTO: 6.9 K/UL (ref 4.6–13.2)

## 2020-10-18 PROCEDURE — 94640 AIRWAY INHALATION TREATMENT: CPT

## 2020-10-18 PROCEDURE — 82962 GLUCOSE BLOOD TEST: CPT

## 2020-10-18 PROCEDURE — 65660000000 HC RM CCU STEPDOWN

## 2020-10-18 PROCEDURE — 74011250637 HC RX REV CODE- 250/637: Performed by: FAMILY MEDICINE

## 2020-10-18 PROCEDURE — 93005 ELECTROCARDIOGRAM TRACING: CPT

## 2020-10-18 PROCEDURE — 74011000250 HC RX REV CODE- 250: Performed by: INTERNAL MEDICINE

## 2020-10-18 PROCEDURE — 71045 X-RAY EXAM CHEST 1 VIEW: CPT

## 2020-10-18 PROCEDURE — 74011250636 HC RX REV CODE- 250/636: Performed by: EMERGENCY MEDICINE

## 2020-10-18 PROCEDURE — 74011250637 HC RX REV CODE- 250/637: Performed by: HOSPITALIST

## 2020-10-18 PROCEDURE — 74011000258 HC RX REV CODE- 258: Performed by: HOSPITALIST

## 2020-10-18 PROCEDURE — 74011000250 HC RX REV CODE- 250: Performed by: HOSPITALIST

## 2020-10-18 PROCEDURE — 85025 COMPLETE CBC W/AUTO DIFF WBC: CPT

## 2020-10-18 PROCEDURE — 36415 COLL VENOUS BLD VENIPUNCTURE: CPT

## 2020-10-18 PROCEDURE — 74011250637 HC RX REV CODE- 250/637: Performed by: INTERNAL MEDICINE

## 2020-10-18 PROCEDURE — 74011250636 HC RX REV CODE- 250/636: Performed by: HOSPITALIST

## 2020-10-18 PROCEDURE — 80048 BASIC METABOLIC PNL TOTAL CA: CPT

## 2020-10-18 PROCEDURE — 74011250636 HC RX REV CODE- 250/636: Performed by: FAMILY MEDICINE

## 2020-10-18 RX ORDER — METOPROLOL TARTRATE 5 MG/5ML
5 INJECTION INTRAVENOUS ONCE
Status: COMPLETED | OUTPATIENT
Start: 2020-10-18 | End: 2020-10-18

## 2020-10-18 RX ORDER — AMLODIPINE BESYLATE 5 MG/1
5 TABLET ORAL DAILY
Status: DISCONTINUED | OUTPATIENT
Start: 2020-10-18 | End: 2020-10-20 | Stop reason: HOSPADM

## 2020-10-18 RX ADMIN — Medication 10 ML: at 09:31

## 2020-10-18 RX ADMIN — ATORVASTATIN CALCIUM 10 MG: 10 TABLET, FILM COATED ORAL at 09:30

## 2020-10-18 RX ADMIN — METOPROLOL TARTRATE 5 MG: 5 INJECTION INTRAVENOUS at 14:32

## 2020-10-18 RX ADMIN — ARFORMOTEROL TARTRATE 15 MCG: 15 SOLUTION RESPIRATORY (INHALATION) at 07:24

## 2020-10-18 RX ADMIN — HEPARIN SODIUM 5000 UNITS: 5000 INJECTION INTRAVENOUS; SUBCUTANEOUS at 01:53

## 2020-10-18 RX ADMIN — OXYCODONE 10 MG: 5 TABLET ORAL at 13:36

## 2020-10-18 RX ADMIN — GUAIFENESIN 600 MG: 600 TABLET, EXTENDED RELEASE ORAL at 22:59

## 2020-10-18 RX ADMIN — Medication 10 ML: at 14:33

## 2020-10-18 RX ADMIN — LORAZEPAM 2 MG: 2 INJECTION INTRAMUSCULAR; INTRAVENOUS at 22:58

## 2020-10-18 RX ADMIN — AMLODIPINE BESYLATE 5 MG: 5 TABLET ORAL at 12:46

## 2020-10-18 RX ADMIN — IPRATROPIUM BROMIDE AND ALBUTEROL SULFATE 3 ML: .5; 3 SOLUTION RESPIRATORY (INHALATION) at 07:24

## 2020-10-18 RX ADMIN — VANCOMYCIN HYDROCHLORIDE 750 MG: 750 INJECTION, POWDER, LYOPHILIZED, FOR SOLUTION INTRAVENOUS at 02:23

## 2020-10-18 RX ADMIN — LORAZEPAM 2 MG: 2 INJECTION INTRAMUSCULAR; INTRAVENOUS at 02:09

## 2020-10-18 RX ADMIN — GUAIFENESIN 600 MG: 600 TABLET, EXTENDED RELEASE ORAL at 09:30

## 2020-10-18 RX ADMIN — PIPERACILLIN AND TAZOBACTAM 3.38 G: 3; .375 INJECTION, POWDER, LYOPHILIZED, FOR SOLUTION INTRAVENOUS at 22:58

## 2020-10-18 RX ADMIN — PIPERACILLIN AND TAZOBACTAM 3.38 G: 3; .375 INJECTION, POWDER, LYOPHILIZED, FOR SOLUTION INTRAVENOUS at 01:20

## 2020-10-18 RX ADMIN — IPRATROPIUM BROMIDE AND ALBUTEROL SULFATE 3 ML: .5; 3 SOLUTION RESPIRATORY (INHALATION) at 11:59

## 2020-10-18 RX ADMIN — HEPARIN SODIUM 5000 UNITS: 5000 INJECTION INTRAVENOUS; SUBCUTANEOUS at 17:45

## 2020-10-18 RX ADMIN — HEPARIN SODIUM 5000 UNITS: 5000 INJECTION INTRAVENOUS; SUBCUTANEOUS at 09:31

## 2020-10-18 RX ADMIN — BUDESONIDE 500 MCG: 0.5 INHALANT RESPIRATORY (INHALATION) at 07:24

## 2020-10-18 RX ADMIN — METOPROLOL SUCCINATE 25 MG: 25 TABLET, EXTENDED RELEASE ORAL at 09:30

## 2020-10-18 RX ADMIN — PIPERACILLIN AND TAZOBACTAM 3.38 G: 3; .375 INJECTION, POWDER, LYOPHILIZED, FOR SOLUTION INTRAVENOUS at 14:32

## 2020-10-18 RX ADMIN — Medication 10 ML: at 00:57

## 2020-10-18 RX ADMIN — PIPERACILLIN AND TAZOBACTAM 3.38 G: 3; .375 INJECTION, POWDER, LYOPHILIZED, FOR SOLUTION INTRAVENOUS at 09:30

## 2020-10-18 RX ADMIN — LORAZEPAM 2 MG: 2 INJECTION INTRAMUSCULAR; INTRAVENOUS at 12:33

## 2020-10-18 RX ADMIN — AZITHROMYCIN MONOHYDRATE 500 MG: 500 INJECTION, POWDER, LYOPHILIZED, FOR SOLUTION INTRAVENOUS at 15:30

## 2020-10-18 NOTE — ROUTINE PROCESS
0700- Called by Marino Crespo RN for secondary Pt assessment. Found Pt sitting up in bed, tripoding and tachypneic on 2lpm NC SpO2 99%. Lung sounds diminished throughout, Pt moving very little air. Pt disoriented, at baseline mental status per RN, Pt on CIWA protocols. Called RT for assistance with respiratory distress. RT arrived and administered PRN nebulizer treatment. Pt uncooperative with directions to cough deeply. Kristen Draper who directed an RRT be called. RRT called overhead, Dayshift hospitalist arrived and ordered stat portable CXR. Pt lung sounds improved with nebulizer treatment, significantly more air being moved. 0730- Nebulizer treatment continued, Pt yelling in confusion which improved air movement in lungs. SpO2 100%, tachypnea and work of breathing improved. 0745- Pt breathing condition continued to improve. RT Kenyon at bedside stated he would be following the Pt throughout the rest of the shift. Debriefed with RN. RRT concluded.

## 2020-10-18 NOTE — PROGRESS NOTES
Attempted to call pts annette Cardenas on cell phone and VM full, attempted to call son on listed home phone and no VM set up, phone just cont to ring and ring. Will need to discuss LTC as safe dc option. LTC beds available for pt at [facility].  UAI done by [facility].      CM will cont to follow for transition of care needs and will be available via hospital  on weekends

## 2020-10-18 NOTE — PROGRESS NOTES
0809-Pt agitated and confused. Dr Chrissy Quiñones instructed not to administer Ativan until results for chest Xray is in.  0943-Pt more calm. Ate some breakfast and took meds  1130- Dr Chrissy Quiñones made aware of Pt's Bp. New orders received. 1340- Pt Complaints of chest pain. EKG done, pain meds administered. Will notify Md  26- Dr Chrissy Quiñones made aware Pt complaints of Chest pain.   A copy of EKG handed to MD. New orders received to administer Lopressor 5 mg IV once

## 2020-10-18 NOTE — ROUTINE PROCESS
Bedside and Verbal shift change report given to Allen Ramirez RN (oncoming nurse) by Laura Vu RN (offgoing nurse). Report included the following information SBAR and Kardex.

## 2020-10-18 NOTE — ROUTINE PROCESS
Bedside and Verbal shift change report given to Prabhakar Rizzo RN (oncoming nurse) by Aneesh George RN (offgoing nurse). Report included the following information SBAR and Kardex.

## 2020-10-18 NOTE — PROGRESS NOTES
Pt restless and agitated, yelling out loud \"give me my blood pressure pill, I'm gonna have a heart attack\" .  made aware and EKG done as ordered.

## 2020-10-18 NOTE — PROGRESS NOTES
Problem: General Medical Care Plan  Goal: *Vital signs within specified parameters  Outcome: Not Progressing Towards Goal  Goal: *Labs within defined limits  Outcome: Not Progressing Towards Goal  Goal: *Absence of infection signs and symptoms  Outcome: Not Progressing Towards Goal  Goal: *Optimal pain control at patient's stated goal  Outcome: Not Progressing Towards Goal  Goal: *Skin integrity maintained  Outcome: Not Progressing Towards Goal  Goal: *Fluid volume balance  Outcome: Not Progressing Towards Goal  Goal: *Optimize nutritional status  Outcome: Not Progressing Towards Goal  Goal: *Anxiety reduced or absent  Outcome: Not Progressing Towards Goal  Goal: *Progressive mobility and function (eg: ADL's)  Outcome: Not Progressing Towards Goal     Problem: Patient Education: Go to Patient Education Activity  Goal: Patient/Family Education  Outcome: Not Progressing Towards Goal     Problem: Pain  Goal: *Control of Pain  Outcome: Not Progressing Towards Goal  Goal: *PALLIATIVE CARE:  Alleviation of Pain  Outcome: Not Progressing Towards Goal     Problem: Patient Education: Go to Patient Education Activity  Goal: Patient/Family Education  Outcome: Not Progressing Towards Goal     Problem: Patient Education: Go to Patient Education Activity  Goal: Patient/Family Education  Outcome: Not Progressing Towards Goal     Problem: Pneumonia: Day 1  Goal: Off Pathway (Use only if patient is Off Pathway)  Outcome: Not Progressing Towards Goal  Goal: Activity/Safety  Outcome: Not Progressing Towards Goal  Goal: Consults, if ordered  Outcome: Not Progressing Towards Goal  Goal: Diagnostic Test/Procedures  Outcome: Not Progressing Towards Goal  Goal: Nutrition/Diet  Outcome: Not Progressing Towards Goal  Goal: Medications  Outcome: Not Progressing Towards Goal  Goal: Respiratory  Outcome: Not Progressing Towards Goal  Goal: Treatments/Interventions/Procedures  Outcome: Not Progressing Towards Goal  Goal: Psychosocial  Outcome: Not Progressing Towards Goal  Goal: *Oxygen saturation within defined limits  Outcome: Not Progressing Towards Goal  Goal: *Influenza vaccine administered (October-March)  Outcome: Not Progressing Towards Goal  Goal: *Pneumoccocal vaccine administered  Outcome: Not Progressing Towards Goal  Goal: *Hemodynamically stable  Outcome: Not Progressing Towards Goal  Goal: *Demonstrates progressive activity  Outcome: Not Progressing Towards Goal  Goal: *Tolerating diet  Outcome: Not Progressing Towards Goal     Problem: Pneumonia: Day 2  Goal: Off Pathway (Use only if patient is Off Pathway)  Outcome: Not Progressing Towards Goal  Goal: Activity/Safety  Outcome: Not Progressing Towards Goal  Goal: Consults, if ordered  Outcome: Not Progressing Towards Goal  Goal: Diagnostic Test/Procedures  Outcome: Not Progressing Towards Goal  Goal: Nutrition/Diet  Outcome: Not Progressing Towards Goal  Goal: Discharge Planning  Outcome: Not Progressing Towards Goal  Goal: Medications  Outcome: Not Progressing Towards Goal  Goal: Respiratory  Outcome: Not Progressing Towards Goal  Goal: Treatments/Interventions/Procedures  Outcome: Not Progressing Towards Goal  Goal: Psychosocial  Outcome: Not Progressing Towards Goal  Goal: *Oxygen saturation within defined limits  Outcome: Not Progressing Towards Goal  Goal: *Hemodynamically stable  Outcome: Not Progressing Towards Goal  Goal: *Demonstrates progressive activity  Outcome: Not Progressing Towards Goal  Goal: *Tolerating diet  Outcome: Not Progressing Towards Goal  Goal: *Optimal pain control at patient's stated goal  Outcome: Not Progressing Towards Goal     Problem: Pneumonia: Day 3  Goal: Off Pathway (Use only if patient is Off Pathway)  Outcome: Not Progressing Towards Goal  Goal: Activity/Safety  Outcome: Not Progressing Towards Goal  Goal: Consults, if ordered  Outcome: Not Progressing Towards Goal  Goal: Diagnostic Test/Procedures  Outcome: Not Progressing Towards Goal  Goal: Nutrition/Diet  Outcome: Not Progressing Towards Goal  Goal: Discharge Planning  Outcome: Not Progressing Towards Goal  Goal: Medications  Outcome: Not Progressing Towards Goal  Goal: Respiratory  Outcome: Not Progressing Towards Goal  Goal: Treatments/Interventions/Procedures  Outcome: Not Progressing Towards Goal  Goal: Psychosocial  Outcome: Not Progressing Towards Goal  Goal: *Oxygen saturation within defined limits  Outcome: Not Progressing Towards Goal  Goal: *Hemodynamically stable  Outcome: Not Progressing Towards Goal  Goal: *Demonstrates progressive activity  Outcome: Not Progressing Towards Goal  Goal: *Tolerating diet  Outcome: Not Progressing Towards Goal  Goal: *Describes available resources and support systems  Outcome: Not Progressing Towards Goal  Goal: *Optimal pain control at patient's stated goal  Outcome: Not Progressing Towards Goal     Problem: Pneumonia: Day 4  Goal: Off Pathway (Use only if patient is Off Pathway)  Outcome: Not Progressing Towards Goal  Goal: Activity/Safety  Outcome: Not Progressing Towards Goal  Goal: Nutrition/Diet  Outcome: Not Progressing Towards Goal  Goal: Discharge Planning  Outcome: Not Progressing Towards Goal  Goal: Medications  Outcome: Not Progressing Towards Goal  Goal: Respiratory  Outcome: Not Progressing Towards Goal  Goal: Treatments/Interventions/Procedures  Outcome: Not Progressing Towards Goal  Goal: Psychosocial  Outcome: Not Progressing Towards Goal     Problem: Pneumonia: Discharge Outcomes  Goal: *Demonstrates progressive activity  Outcome: Not Progressing Towards Goal  Goal: *Describes follow-up/return visits to physicians  Outcome: Not Progressing Towards Goal  Goal: *Tolerating diet  Outcome: Not Progressing Towards Goal  Goal: *Verbalizes name, dosage, time, side effects, and number of days to continue medications  Outcome: Not Progressing Towards Goal  Goal: *Influenza immunization  Outcome: Not Progressing Towards Goal  Goal: *Pneumococcal immunization  Outcome: Not Progressing Towards Goal  Goal: *Respiratory status at baseline  Outcome: Not Progressing Towards Goal  Goal: *Vital signs within defined limits  Outcome: Not Progressing Towards Goal  Goal: *Describes available resources and support systems  Outcome: Not Progressing Towards Goal  Goal: *Optimal pain control at patient's stated goal  Outcome: Not Progressing Towards Goal

## 2020-10-18 NOTE — PROGRESS NOTES
Hospitalist Progress Note    Patient: Beverly Noel Sr. MRN: [id number]  CSN: [id number]    YOB: 1955  Age: 63 y.o.   Sex: male    DOA: 10/14/2020 LOS:  LOS: 4 days            Patient Active Problem List   Diagnosis Code    Bursitis of elbow M70.30    Medication overdose T50.901A    Polio A80.9    Depressive disorder, not elsewhere classified F32.9    Type II or unspecified type diabetes mellitus without mention of complication, uncontrolled [id number]    Hypotension, unspecified I95.9    Amputation of both lower extremities ([address] S88.911A, Q58.888E    PNA (pneumonia) J18.9    ALEXANDER (acute kidney injury) ([address] N17.9    Hyponatremia E87.1    Marijuana abuse F12.10    Emphysema lung ([address] J43.9    CAP (community acquired pneumonia) J18.9    Sepsis ([address] A41.9    Hard of hearing H91.90    Legal blindness H54.8    Acute encephalopathy G93.40        IMPRESSION and Plan:    Beverly Noel Sr. is a 63 y.o. male with   Patient Active Problem List    Diagnosis Date Noted    Acute encephalopathy 10/15/2020    CAP (community acquired pneumonia) 10/14/2020    Sepsis ([address] 10/14/2020    Hard of hearing 10/14/2020    Legal blindness 10/14/2020    Amputation of both lower extremities ([address]     PNA (pneumonia)     ALEXANDER (acute kidney injury) ([address]     Hyponatremia     Marijuana abuse     Emphysema lung ([address]     Medication overdose 01/27/2014    Polio 01/27/2014    Depressive disorder, not elsewhere classified 01/27/2014    Type II or unspecified type diabetes mellitus without mention of complication, uncontrolled 01/27/2014    Hypotension, unspecified 01/27/2014    Bursitis of elbow 08/11/2012     Principal Problem:    PNA (pneumonia) ()    Active Problems:    Amputation of both lower extremities ([address] ()      ALEXANDER (acute kidney injury) ([address] ()      Hyponatremia ()      Marijuana abuse ()      Emphysema lung ([address] ()      CAP (community acquired pneumonia) (10/14/2020)      Hard of hearing (10/14/2020)      Legal blindness (10/14/2020)      Acute encephalopathy (10/15/2020)        Resp distress -- improved     B/LPNA:    cont iV abx as ordered. Cont municex. Sputum gs and culture. Can dc Vanc. Repeat cxr    COPD / emphysema:   cont on nebs    ALEXANDER: resolved     Metabolic encephalopathy with sundowning - cont serooquel    Bilateral aka and head hearing and legal blindness   Fall precaution      marijuana use      Dm type II           Patient's condition is         Recommend to continue hospitalization. Discussed with patient. Chief Complaints:   Chief Complaint   Patient presents with    Cough     SUBJECTIVE:  Pt is seen and examined. chart reviwed  Still co cough, and agitations    Review of systems:    Review of Systems   Constitutional: Positive for malaise/fatigue. Negative for fever. HENT: Negative. Eyes: Negative. Respiratory: Positive for cough and shortness of breath. Cardiovascular: Negative for chest pain, palpitations, orthopnea and leg swelling. Gastrointestinal: Negative. Negative for abdominal pain, diarrhea and heartburn. Genitourinary: Negative for dysuria and hematuria. Skin: Negative. Neurological: Positive for weakness. Psychiatric/Behavioral: Negative for depression, substance abuse and suicidal ideas. The patient is nervous/anxious and has insomnia.         PE:  Patient Vitals for the past 24 hrs:   BP Temp Pulse Resp SpO2   10/18/20 1159     98 %   10/18/20 1115 (!) 180/116  (!) 114     10/18/20 1059 (!) 181/118 97.6 °F (36.4 °C) 99 (!) 32 100 %   10/18/20 0710 (!) 179/101 97.5 °F (36.4 °C) 88 (!) 35 100 %   10/18/20 0215 (!) 142/106 97.6 °F (36.4 °C) 99 28 100 %   10/17/20 2230 (!) 176/89 97.3 °F (36.3 °C) 80 24 97 %   10/17/20 2000 (!) 159/101 97.4 °F (36.3 °C) 96 28 91 %   10/17/20 1835 (!) 149/75 97.5 °F (36.4 °C) (!) 101 22 97 %   10/17/20 1749 (!) 143/92 97.8 °F (36.6 °C) 94 20 95 %   10/17/20 1533 123/84 97.7 °F (36.5 °C)  21 99 % 10/17/20 1238 (!) 150/94 97.5 °F (36.4 °C) 90 22 96 %       Intake/Output Summary (Last 24 hours) at 10/18/2020 1229  Last data filed at 10/18/2020 0008  Gross per 24 hour   Intake 120 ml   Output 550 ml   Net -430 ml     Patient Vitals for the past 120 hrs:   Weight   10/14/20 1443 36.3 kg (80 lb)   10/16/20 0214 36.3 kg (80 lb)         Physical Exam  Vitals signs and nursing note reviewed. Constitutional:       General: He is in acute distress. Neck:      Musculoskeletal: Normal range of motion and neck supple. Vascular: No JVD. Cardiovascular:      Rate and Rhythm: Normal rate and regular rhythm. Heart sounds: Normal heart sounds. Pulmonary:      Effort: Tachypnea and respiratory distress present. Breath sounds: Examination of the right-lower field reveals decreased breath sounds and rhonchi. Examination of the left-lower field reveals decreased breath sounds and rhonchi. Decreased breath sounds and rhonchi present. Abdominal:      General: Bowel sounds are normal. There is no distension. Palpations: Abdomen is soft. Tenderness: There is no abdominal tenderness. There is no rebound. Musculoskeletal: Normal range of motion. Skin:     General: Skin is warm and dry. Neurological:      Mental Status: He is alert and oriented to person, place, and time. Psychiatric:         Mood and Affect: Affect normal.             Intake and Output:  Current Shift:  No intake/output data recorded. Last three shifts:  10/16 1901 - 10/18 0700  In: 220 [P.O.:120;  I.V.:100]  Out: 1000 [Urine:1000]    Lab/Data Reviewed:  Recent Results (from the past 8 hour(s))   GLUCOSE, POC    Collection Time: 10/18/20  6:18 AM   Result Value Ref Range    Glucose (POC) 89 70 - 110 mg/dL   GLUCOSE, POC    Collection Time: 10/18/20 11:17 AM   Result Value Ref Range    Glucose (POC) 96 70 - 110 mg/dL     Medications:  Current Facility-Administered Medications   Medication Dose Route Frequency    Vancomycin trough draw at 2130 on 10/17/20 ( 30 minutes prior to dose at 2200 on 10/17/20 )  1 Each Other Rx Dosing/Monitoring    vancomycin (VANCOCIN) 750 mg in 0.9% sodium chloride 250 mL (VIAL-MATE)  750 mg IntraVENous Q12H    insulin lispro (HUMALOG) injection   SubCUTAneous AC&HS    QUEtiapine (SEROquel) tablet 100 mg  100 mg Oral QHS    nicotine (NICODERM CQ) 14 mg/24 hr patch 1 Patch  1 Patch TransDERmal DAILY    oxyCODONE IR (ROXICODONE) tablet 10 mg  10 mg Oral Q6H PRN    sodium chloride (NS) flush 5-40 mL  5-40 mL IntraVENous Q8H    sodium chloride (NS) flush 5-40 mL  5-40 mL IntraVENous PRN    LORazepam (ATIVAN) tablet 1 mg  1 mg Oral Q1H PRN    Or    LORazepam (ATIVAN) injection 1 mg  1 mg IntraVENous Q1H PRN    LORazepam (ATIVAN) tablet 2 mg  2 mg Oral Q1H PRN    Or    LORazepam (ATIVAN) injection 2 mg  2 mg IntraVENous Q1H PRN    LORazepam (ATIVAN) injection 3 mg  3 mg IntraVENous Q15MIN PRN    sodium chloride (NS) flush 5-10 mL  5-10 mL IntraVENous PRN    azithromycin (ZITHROMAX) 500 mg in 0.9% sodium chloride 250 mL (VIAL-MATE)  500 mg IntraVENous Q24H    sodium chloride (NS) flush 5-40 mL  5-40 mL IntraVENous Q8H    sodium chloride (NS) flush 5-40 mL  5-40 mL IntraVENous PRN    acetaminophen (TYLENOL) tablet 650 mg  650 mg Oral Q6H PRN    Or    acetaminophen (TYLENOL) suppository 650 mg  650 mg Rectal Q6H PRN    bisacodyL (DULCOLAX) suppository 10 mg  10 mg Rectal DAILY PRN    promethazine (PHENERGAN) tablet 12.5 mg  12.5 mg Oral Q6H PRN    Or    ondansetron (ZOFRAN) injection 4 mg  4 mg IntraVENous Q6H PRN    heparin (porcine) injection 5,000 Units  5,000 Units SubCUTAneous Q8H    budesonide (PULMICORT) 500 mcg/2 ml nebulizer suspension  500 mcg Nebulization BID RT    arformoteroL (BROVANA) neb solution 15 mcg  15 mcg Nebulization BID RT    albuterol-ipratropium (DUO-NEB) 2.5 MG-0.5 MG/3 ML  3 mL Nebulization Q4H PRN    guaiFENesin ER (MUCINEX) tablet 600 mg  600 mg Oral Q12H    atorvastatin (LIPITOR) tablet 10 mg  10 mg Oral DAILY    metoprolol succinate (TOPROL-XL) XL tablet 25 mg  25 mg Oral DAILY    piperacillin-tazobactam (ZOSYN) 3.375 g in 0.9% sodium chloride (MBP/ADV) 100 mL MBP  3.375 g IntraVENous Q6H    Vancomycin- dosed by pharmacy  1 Each Other Rx Dosing/Monitoring       Recent Results (from the past 24 hour(s))   GLUCOSE, POC    Collection Time: 10/17/20  5:54 PM   Result Value Ref Range    Glucose (POC) 104 70 - 110 mg/dL   POC G3    Collection Time: 10/17/20  8:21 PM   Result Value Ref Range    Device: NASAL CANNULA      Flow rate (POC) 2.0 L/M    FIO2 (POC) 0.8 %    pH (POC) 7.44 7.35 - 7.45      pCO2 (POC) 30.8 (L) 35.0 - 45.0 MMHG    pO2 (POC) 108 (H) 80 - 100 MMHG    HCO3 (POC) 20.7 (L) 22 - 26 MMOL/L    sO2 (POC) 98 (H) 92 - 97 %    Base deficit (POC) 4 mmol/L    Allens test (POC) N/A      Total resp. rate 20      Site RIGHT RADIAL      Patient temp. 98.5      Specimen type (POC) ARTERIAL      Performed by Qi Nava    GLUCOSE, POC    Collection Time: 10/17/20  9:09 PM   Result Value Ref Range    Glucose (POC) 90 70 - 110 mg/dL   Gargara, TROUGH    Collection Time: 10/17/20 10:50 PM   Result Value Ref Range    Vancomycin,trough 5.0 (L) 10.0 - 20.0 ug/mL   CBC WITH AUTOMATED DIFF    Collection Time: 10/18/20  2:33 AM   Result Value Ref Range    WBC 6.9 4.6 - 13.2 K/uL    RBC 4.31 (L) 4.70 - 5.50 M/uL    HGB 11.9 (L) 13.0 - 16.0 g/dL    HCT 36.7 36.0 - 48.0 %    MCV 85.2 74.0 - 97.0 FL    MCH 27.6 24.0 - 34.0 PG    MCHC 32.4 31.0 - 37.0 g/dL    RDW 17.2 (H) 11.6 - 14.5 %    PLATELET 881 240 - 685 K/uL    MPV 9.9 9.2 - 11.8 FL    NEUTROPHILS 72 40 - 73 %    LYMPHOCYTES 15 (L) 21 - 52 %    MONOCYTES 10 3 - 10 %    EOSINOPHILS 3 0 - 5 %    BASOPHILS 0 0 - 2 %    ABS. NEUTROPHILS 4.9 1.8 - 8.0 K/UL    ABS. LYMPHOCYTES 1.1 0.9 - 3.6 K/UL    ABS. MONOCYTES 0.7 0.05 - 1.2 K/UL    ABS. EOSINOPHILS 0.2 0.0 - 0.4 K/UL    ABS.  BASOPHILS 0.0 0.0 - 0.1 K/UL    DF AUTOMATED METABOLIC PANEL, BASIC    Collection Time: 10/18/20  2:33 AM   Result Value Ref Range    Sodium 140 136 - 145 mmol/L    Potassium 3.8 3.5 - 5.5 mmol/L    Chloride 108 100 - 111 mmol/L    CO2 24 21 - 32 mmol/L    Anion gap 8 3.0 - 18 mmol/L    Glucose 78 74 - 99 mg/dL    BUN 10 7.0 - 18 MG/DL    Creatinine 0.56 (L) 0.6 - 1.3 MG/DL    BUN/Creatinine ratio 18 12 - 20      GFR est AA >60 >60 ml/min/1.73m2    GFR est non-AA >60 >60 ml/min/1.73m2    Calcium 8.9 8.5 - 10.1 MG/DL   GLUCOSE, POC    Collection Time: 10/18/20  6:18 AM   Result Value Ref Range    Glucose (POC) 89 70 - 110 mg/dL   GLUCOSE, POC    Collection Time: 10/18/20 11:17 AM   Result Value Ref Range    Glucose (POC) 96 70 - 110 mg/dL       Procedures/imaging: see electronic medical records for all procedures/Xrays and details which were not copied into this note but were reviewed prior to creation of Mila Pascual MD   10/18/2020, 12:29 PM

## 2020-10-18 NOTE — PROGRESS NOTES
Patient Name: Alistair Field.   Age: 63 y.o.   Sex:  male  YOB: 1955   Medical Record Number: [id number]      Antimicrobial  Vancomycin   Indication CAP   Dose / Interval           Current Antimicrobial Therapy (168h ago, onward)      Ordered     Start Stop    10/17/20 2351  vancomycin (VANCOCIN) 750 mg in 0.9% sodium chloride 250 mL (VIAL-MATE)  750 mg,   IntraVENous,   EVERY 12 HOURS      10/18/20 0000 10/23/20 2359    10/15/20 1054  vancomycin (VANCOCIN) 750 mg in 0.9% sodium chloride 250 mL (VIAL-MATE)  750 mg,   IntraVENous,   EVERY 24 HOURS      10/15/20 2200 10/21/20 2159    10/14/20 1918  piperacillin-tazobactam (ZOSYN) 3.375 g in 0.9% sodium chloride (MBP/ADV) 100 mL MBP  3.375 g,   IntraVENous,   EVERY 6 HOURS      10/14/20 2000 10/21/20 1959    10/14/20 1503  azithromycin (ZITHROMAX) 500 mg in 0.9% sodium chloride 250 mL (VIAL-MATE)  500 mg,   IntraVENous,   EVERY 24 HOURS      10/14/20 1504 --               Last Level (if applicable) No results found for: DOSE, TMG, DTG  Vancomycin   Lab Results   Component Value Date/Time    Vancomycin,trough 5.0 (L) 10/17/2020 10:50 PM      Gentamicin   No results found for: GENP, GENT, GENR]  Tobramycin   No results found for: TOBP, TOBT, TOBR   Amikacin   No results found for: AMIKP, DAMIKP, AMIKT, DAMIKT, DAMIKR     Cultures (7 most recent)   GRAM STAIN   Date Value Ref Range Status   12/04/2018 FEW WBC'S   Final   12/04/2018 MODERATE GRAM POSITIVE COCCI IN PAIRS   Final   12/04/2018 FEW GRAM POSITIVE COCCI IN GROUPS   Final     Culture result:   Date Value Ref Range Status   10/14/2020 NO GROWTH 3 DAYS   Preliminary   10/14/2020 NO GROWTH 3 DAYS   Preliminary   12/07/2018 NO GROWTH 2 DAYS   Final   12/04/2018 MODERATE STAPHYLOCOCCUS AUREUS (A)   Final   12/04/2018 MODERATE DIPHTHEROIDS (A)   Final   12/04/2018 NO GROWTH 6 DAYS   Final   12/04/2018 NO GROWTH 6 DAYS   Final      Renal Overview (72 hr)         Recent Labs     10/17/20  0117 10/16/20  0044 10/15/20  0410   BUN 13 25* 36*   CREA 0.63 0.93 0.89       CrCl (Current): Estimated Creatinine Clearance: 55.5 mL/min (by C-G formula based on SCr of 0.63 mg/dL). Lactic Acid    (Sepsis Criteria: >2.0 mmol/L) Lab Results   Component Value Date/Time    Lactic acid 1.1 10/14/2020 04:10 PM    Lactic acid 2.8 (HH) 10/14/2020 02:45 PM    Lactic acid 1.2 2018 12:25 PM      Procalcitonin (0.10-0.49 NG/ML) No results found for: PCT   Hepatic Overview (72 hr) No results for input(s): ALT, AP in the last 72 hours. No lab exists for component: SGOT   Temp (24-hr Max) Temp (24hrs), Av.6 °F (36.4 °C), Min:97.3 °F (36.3 °C), Max:97.9 °F (36.6 °C)       Hematology Recent Labs     10/17/20  0117 10/16/20  0044 10/15/20  0410   WBC 7.1 8.1 7.0   HGB 12.0* 11.1* 11.0*   HCT 37.1 34.8* 34.0*    314 264   GRANS 76* 78* 89*   ANEU 5.3 6.3 6.3        DOT  4     Notes Estimated Pharmacokinetic Parameters (based on one level)  Vd: 28 L (0.7 L/kg)  Flako: 0.075 hr-1 (T1/2 = 9.2 hrs)    Dosing Recommendations  Vancomycin dose: 750 mg IV Q12hrs (infused over 1 hr)  Estimated peak: 43.5 mcg/mL  Estimated trough: 19.1 mcg/mL  Estimated AUC:JERE: 715 mcg*hr/mL (assumed JERE 1 mcg/mL)    A/P:  1. Recommend vancomycin 750 mg IV Q12hrs (19 mg/kg)  2. Consider a vancomycin trough level prior to the 4th dose. 3. Please monitor renal function (urine output, BUN/SCr). Dose adjustments may be necessary with a significant change in renal function.        FAWN Barraza [facility]  Clinical Pharmacist  [phone]

## 2020-10-19 LAB
ALBUMIN SERPL-MCNC: 2.5 G/DL (ref 3.4–5)
ALBUMIN/GLOB SERPL: 0.6 {RATIO} (ref 0.8–1.7)
ALP SERPL-CCNC: 96 U/L (ref 45–117)
ALT SERPL-CCNC: 35 U/L (ref 16–61)
ANION GAP SERPL CALC-SCNC: 8 MMOL/L (ref 3–18)
AST SERPL-CCNC: 30 U/L (ref 10–38)
BASOPHILS # BLD: 0 K/UL (ref 0–0.1)
BASOPHILS NFR BLD: 0 % (ref 0–2)
BILIRUB SERPL-MCNC: 0.4 MG/DL (ref 0.2–1)
BNP SERPL-MCNC: 3682 PG/ML (ref 0–900)
BUN SERPL-MCNC: 9 MG/DL (ref 7–18)
BUN/CREAT SERPL: 17 (ref 12–20)
CALCIUM SERPL-MCNC: 8.6 MG/DL (ref 8.5–10.1)
CHLORIDE SERPL-SCNC: 105 MMOL/L (ref 100–111)
CO2 SERPL-SCNC: 25 MMOL/L (ref 21–32)
CREAT SERPL-MCNC: 0.54 MG/DL (ref 0.6–1.3)
DIFFERENTIAL METHOD BLD: ABNORMAL
EOSINOPHIL # BLD: 0.2 K/UL (ref 0–0.4)
EOSINOPHIL NFR BLD: 4 % (ref 0–5)
ERYTHROCYTE [DISTWIDTH] IN BLOOD BY AUTOMATED COUNT: 17 % (ref 11.6–14.5)
GLOBULIN SER CALC-MCNC: 4.1 G/DL (ref 2–4)
GLUCOSE BLD STRIP.AUTO-MCNC: 100 MG/DL (ref 70–110)
GLUCOSE BLD STRIP.AUTO-MCNC: 81 MG/DL (ref 70–110)
GLUCOSE BLD STRIP.AUTO-MCNC: 88 MG/DL (ref 70–110)
GLUCOSE BLD STRIP.AUTO-MCNC: 94 MG/DL (ref 70–110)
GLUCOSE SERPL-MCNC: 73 MG/DL (ref 74–99)
HCT VFR BLD AUTO: 36.4 % (ref 36–48)
HGB BLD-MCNC: 11.7 G/DL (ref 13–16)
LYMPHOCYTES # BLD: 0.9 K/UL (ref 0.9–3.6)
LYMPHOCYTES NFR BLD: 13 % (ref 21–52)
MAGNESIUM SERPL-MCNC: 1.6 MG/DL (ref 1.6–2.6)
MCH RBC QN AUTO: 27.6 PG (ref 24–34)
MCHC RBC AUTO-ENTMCNC: 32.1 G/DL (ref 31–37)
MCV RBC AUTO: 85.8 FL (ref 74–97)
MONOCYTES # BLD: 0.7 K/UL (ref 0.05–1.2)
MONOCYTES NFR BLD: 11 % (ref 3–10)
NEUTS SEG # BLD: 4.9 K/UL (ref 1.8–8)
NEUTS SEG NFR BLD: 72 % (ref 40–73)
PHOSPHATE SERPL-MCNC: 2.5 MG/DL (ref 2.5–4.9)
PLATELET # BLD AUTO: 339 K/UL (ref 135–420)
PMV BLD AUTO: 9.8 FL (ref 9.2–11.8)
POTASSIUM SERPL-SCNC: 3.1 MMOL/L (ref 3.5–5.5)
PROT SERPL-MCNC: 6.6 G/DL (ref 6.4–8.2)
RBC # BLD AUTO: 4.24 M/UL (ref 4.7–5.5)
SODIUM SERPL-SCNC: 138 MMOL/L (ref 136–145)
WBC # BLD AUTO: 6.8 K/UL (ref 4.6–13.2)

## 2020-10-19 PROCEDURE — 65660000000 HC RM CCU STEPDOWN

## 2020-10-19 PROCEDURE — 74011250636 HC RX REV CODE- 250/636: Performed by: HOSPITALIST

## 2020-10-19 PROCEDURE — 84100 ASSAY OF PHOSPHORUS: CPT

## 2020-10-19 PROCEDURE — 74011250637 HC RX REV CODE- 250/637: Performed by: FAMILY MEDICINE

## 2020-10-19 PROCEDURE — 74011250637 HC RX REV CODE- 250/637: Performed by: HOSPITALIST

## 2020-10-19 PROCEDURE — 83735 ASSAY OF MAGNESIUM: CPT

## 2020-10-19 PROCEDURE — 80053 COMPREHEN METABOLIC PANEL: CPT

## 2020-10-19 PROCEDURE — 36415 COLL VENOUS BLD VENIPUNCTURE: CPT

## 2020-10-19 PROCEDURE — 94640 AIRWAY INHALATION TREATMENT: CPT

## 2020-10-19 PROCEDURE — 74011250637 HC RX REV CODE- 250/637: Performed by: INTERNAL MEDICINE

## 2020-10-19 PROCEDURE — 74011000258 HC RX REV CODE- 258: Performed by: HOSPITALIST

## 2020-10-19 PROCEDURE — 85025 COMPLETE CBC W/AUTO DIFF WBC: CPT

## 2020-10-19 PROCEDURE — 83880 ASSAY OF NATRIURETIC PEPTIDE: CPT

## 2020-10-19 PROCEDURE — 74011000250 HC RX REV CODE- 250: Performed by: HOSPITALIST

## 2020-10-19 PROCEDURE — 94760 N-INVAS EAR/PLS OXIMETRY 1: CPT

## 2020-10-19 PROCEDURE — 82962 GLUCOSE BLOOD TEST: CPT

## 2020-10-19 RX ORDER — POTASSIUM CHLORIDE 20 MEQ/1
40 TABLET, EXTENDED RELEASE ORAL
Status: COMPLETED | OUTPATIENT
Start: 2020-10-19 | End: 2020-10-19

## 2020-10-19 RX ORDER — HYDRALAZINE HYDROCHLORIDE 25 MG/1
25 TABLET, FILM COATED ORAL
Status: DISCONTINUED | OUTPATIENT
Start: 2020-10-19 | End: 2020-10-20 | Stop reason: HOSPADM

## 2020-10-19 RX ORDER — CLONAZEPAM 0.5 MG/1
0.25 TABLET ORAL EVERY 8 HOURS
Status: DISCONTINUED | OUTPATIENT
Start: 2020-10-19 | End: 2020-10-20 | Stop reason: HOSPADM

## 2020-10-19 RX ADMIN — CLONAZEPAM 0.25 MG: 0.5 TABLET ORAL at 15:52

## 2020-10-19 RX ADMIN — BUDESONIDE 500 MCG: 0.5 INHALANT RESPIRATORY (INHALATION) at 07:57

## 2020-10-19 RX ADMIN — ARFORMOTEROL TARTRATE 15 MCG: 15 SOLUTION RESPIRATORY (INHALATION) at 07:57

## 2020-10-19 RX ADMIN — HEPARIN SODIUM 5000 UNITS: 5000 INJECTION INTRAVENOUS; SUBCUTANEOUS at 17:46

## 2020-10-19 RX ADMIN — HEPARIN SODIUM 5000 UNITS: 5000 INJECTION INTRAVENOUS; SUBCUTANEOUS at 09:56

## 2020-10-19 RX ADMIN — Medication 20 ML: at 01:28

## 2020-10-19 RX ADMIN — HEPARIN SODIUM 5000 UNITS: 5000 INJECTION INTRAVENOUS; SUBCUTANEOUS at 01:27

## 2020-10-19 RX ADMIN — POTASSIUM CHLORIDE 40 MEQ: 20 TABLET, EXTENDED RELEASE ORAL at 15:00

## 2020-10-19 RX ADMIN — HYDRALAZINE HYDROCHLORIDE 25 MG: 25 TABLET, FILM COATED ORAL at 23:05

## 2020-10-19 RX ADMIN — OXYCODONE 10 MG: 5 TABLET ORAL at 13:34

## 2020-10-19 RX ADMIN — PIPERACILLIN AND TAZOBACTAM 3.38 G: 3; .375 INJECTION, POWDER, LYOPHILIZED, FOR SOLUTION INTRAVENOUS at 04:10

## 2020-10-19 RX ADMIN — METOPROLOL SUCCINATE 25 MG: 25 TABLET, EXTENDED RELEASE ORAL at 09:55

## 2020-10-19 RX ADMIN — AMLODIPINE BESYLATE 5 MG: 5 TABLET ORAL at 09:56

## 2020-10-19 RX ADMIN — CLONAZEPAM 0.25 MG: 0.5 TABLET ORAL at 22:21

## 2020-10-19 RX ADMIN — LORAZEPAM 2 MG: 1 TABLET ORAL at 09:55

## 2020-10-19 RX ADMIN — GUAIFENESIN 600 MG: 600 TABLET, EXTENDED RELEASE ORAL at 22:21

## 2020-10-19 RX ADMIN — ACETAMINOPHEN 650 MG: 325 TABLET ORAL at 06:55

## 2020-10-19 RX ADMIN — QUETIAPINE FUMARATE 100 MG: 100 TABLET ORAL at 22:21

## 2020-10-19 RX ADMIN — Medication 10 ML: at 06:52

## 2020-10-19 RX ADMIN — LORAZEPAM 2 MG: 1 TABLET ORAL at 13:34

## 2020-10-19 NOTE — PROGRESS NOTES
Assumed care of male pt. Sitter present in room. Pt with no IV access,pt continually pulls out lines Dr. Nora Sim aware.

## 2020-10-19 NOTE — PROGRESS NOTES
Son of patient, Gabo Reyes, called nurses station, states best number to reach him at is [phone]. Left message on voicemail of LANETTE Ferguson. Son states that he is willing to take pt home and will come get patient if necessary. Son given the contact information for care manager and instructed to call at 0900 tomorrow.

## 2020-10-19 NOTE — PROGRESS NOTES
Pt yelling out constantly, sitter at bedside, medicated for CIWA of 25 and for complaints of pain in bilateral amputation stumps.

## 2020-10-19 NOTE — PROGRESS NOTES
2246 Bedside and Verbal shift change report given to Lilia Prasad RN (oncoming nurse) by Reno Mahmood RN (offgoing nurse). Report included the following information HUNGAR, MAR, Jhon and Cardiac Rhythm NSR.

## 2020-10-19 NOTE — PROGRESS NOTES
Hospitalist Progress Note-critical care note     Patient: Prabhu Dan Sr. MRN: [id number]  CSN: [id number]    YOB: 1955  Age: 63 y.o. Sex: male    DOA: 10/14/2020 LOS:  LOS: 5 days            Chief complaint: acute encephalopathy, pna. Emphysema lung     Assessment/Plan         Hospital Problems  Date Reviewed: 10/21/2013          Codes Class Noted POA    Acute encephalopathy ICD-10-CM: G93.40  ICD-9-CM: 348.30  10/15/2020 Unknown        Amputation of both lower extremities ([facility] ICD-10-CM: J34.637I, Z58.388M  ICD-9-CM: 897.6  Unknown Unknown        * (Principal) PNA (pneumonia) ICD-10-CM: J18.9  ICD-9-CM: 524  Unknown Unknown        ALEXANDER (acute kidney injury) ([facility] ICD-10-CM: N17.9  ICD-9-CM: 327. 9  Unknown Unknown        Hyponatremia ICD-10-CM: E87.1  ICD-9-CM: 276.1  Unknown Unknown        Marijuana abuse ICD-10-CM: F12.10  ICD-9-CM: 305.20  Unknown Unknown        Emphysema lung (HCC) ICD-10-CM: J43.9  ICD-9-CM: 492.8  Unknown Unknown        CAP (community acquired pneumonia) ICD-10-CM: J18.9  ICD-9-CM: 403  10/14/2020 Unknown        Hard of hearing ICD-10-CM: H91.90  ICD-9-CM: 389.9  10/14/2020 Unknown        Legal blindness ICD-10-CM: H54.8  ICD-9-CM: 369.4  10/14/2020 Unknown                   pna   Will d.c  azithromycin , vanc  Continue zosyn   Breathing treatment and symptom treatment   Strep pneumo  and legionella negative   Rapid covid 19 negative    Aspiration precaution        Emphysema lung   Breathing tx      ALEXANDER   Resolved per Ns infusion      Hyponatremia   Resolved      Bilateral aka and head hearing and legal blindness   Fall precaution      marijuana use      Dm type II   ssi     Acute encephalopathy -like sundowning   Continue Seroquel to 100 mg   Will schedule low dose klonipine -use at home-will avoid iv ativan   Per son reported he does not use alcohol      sitter was at the bedside         rn : given ativan     Will replace K     Disposition :tbd,   Review of systems:    Unable to obtain due to given ativan     Vital signs/Intake and Output:  Visit Vitals  BP (!) 152/97   Pulse 87   Temp 97.6 °F (36.4 °C)   Resp 20   Ht 5' 4\" (1.626 m)   Wt 39.1 kg (86 lb 1.6 oz)   SpO2 95%   BMI 14.78 kg/m²     Current Shift:  No intake/output data recorded. Last three shifts:  10/17 1901 - 10/19 0700  In: 340 [P.O.:240; I.V.:100]  Out: 1250 [Urine:1250]    Physical Exam:  General: Sleeping   HEENT: NC, Atraumatic. PERRLA, anicteric sclerae. Lungs: Rales of rt side, no wheezing   Heart:  Regular  rhythm,  No murmur, No Rubs, No Gallops  Abdomen: Soft, Non distended, Non tender. +Bowel sounds,   Extremities: No c/c/e bilateral aka   Psych:   Calm   Neurologic:   Unable to obtain due to sleeping         Labs: Results:       Chemistry Recent Labs     10/19/20  0425 10/18/20  0233 10/17/20  0117   GLU 73* 78 86    140 140   K 3.1* 3.8 3.3*    108 106   CO2 25 24 23   BUN 9 10 13   CREA 0.54* 0.56* 0.63   CA 8.6 8.9 8.7   AGAP 8 8 11   BUCR 17 18 21*   AP 96  --   --    TP 6.6  --   --    ALB 2.5*  --   --    GLOB 4.1*  --   --    AGRAT 0.6*  --   --       CBC w/Diff Recent Labs     10/19/20  0425 10/18/20  0233 10/17/20  0117   WBC 6.8 6.9 7.1   RBC 4.24* 4.31* 4.37*   HGB 11.7* 11.9* 12.0*   HCT 36.4 36.7 37.1    339 338   GRANS 72 72 76*   LYMPH 13* 15* 12*   EOS 4 3 2      Cardiac Enzymes No results for input(s): CPK, CKND1, CHUY in the last 72 hours. No lab exists for component: CKRMB, TROIP   Coagulation No results for input(s): PTP, INR, APTT, INREXT, INREXT in the last 72 hours. Lipid Panel No results found for: CHOL, CHOLPOCT, CHOLX, CHLST, CHOLV, 315744, HDL, HDLP, LDL, LDLC, DLDLP, 669682, VLDLC, VLDL, TGLX, TRIGL, TRIGP, TGLPOCT, CHHD, CHHDX   BNP No results for input(s): BNPP in the last 72 hours.    Liver Enzymes Recent Labs     10/19/20  0425   TP 6.6   ALB 2.5*   AP 96      Thyroid Studies Lab Results   Component Value Date/Time    TSH 0.42 01/27/2014 04:08 PM        Procedures/imaging: see electronic medical records for all procedures/Xrays and details which were not copied into this note but were reviewed prior to creation of Plan    Ct Head Wo Cont    Result Date: 10/15/2020  EXAM: CT head INDICATION: Dental status change COMPARISON: None. TECHNIQUE: Axial CT imaging of the head was performed without intravenous contrast. One or more dose reduction techniques were used on this CT: automated exposure control, adjustment of the mAs and/or kVp according to patient size, and iterative reconstruction techniques. The specific techniques used on this CT exam have been documented in the patient's electronic medical record. Digital Imaging and Communications in Medicine (DICOM) format image data are available to nonaffiliated external healthcare facilities or entities on a secure, media free, reciprocally searchable basis with patient authorization for at least a 12 month period after this study. _______________ FINDINGS: BRAIN AND POSTERIOR FOSSA: The sulci, folia, ventricles and basal cisterns are within normal limits for the patient's with age concordant atrophy There is no intracranial hemorrhage, mass effect, or midline shift. Mild periventricular low-attenuation. Although nonspecific this is frequently seen with chronic small vessel ischemic change. Otherwise, there are no areas of abnormal parenchymal attenuation. EXTRA-AXIAL SPACES AND MENINGES: There are no abnormal extra-axial fluid collections. CALVARIUM: Intact. SINUSES: Clear. OTHER: None. _______________     IMPRESSION: No acute intracranial abnormalities. Xr Chest Port    Result Date: 10/14/2020  EXAM: XR CHEST PORT CLINICAL INDICATION/HISTORY: Shortness of breath with cough -Additional: None COMPARISON: Several prior studies, most recently 2/19/2019 TECHNIQUE: Frontal view of the chest _______________ FINDINGS: HEART AND MEDIASTINUM: Normal cardiac size and mediastinal contours.  LUNGS AND PLEURAL SPACES: Patchy multifocal opacities noted throughout bilateral upper lobes, right greater than left as well as throughout the periphery of the right lower lobe. No evidence of pneumothorax. BONY THORAX AND SOFT TISSUES: No acute osseous abnormality _______________     IMPRESSION: Patchy multifocal airspace disease compatible with pneumonia. Recommend radiographic follow-up to document expected clinical resolution in 4-6 weeks' time.        Kimmy Dugan MD

## 2020-10-19 NOTE — PROGRESS NOTES
Physician Progress Note      Ly Dickey  CSN #:                  [id number]  :                       1955  ADMIT DATE:       10/14/2020 2:40 PM  [address] [place] DATE:  RESPONDING  PROVIDER #:        Cony Sandoval MD          QUERY TEXT:    Dear Attending,    Pt admitted with pneuonia and has encephalopathy documented. If possible, please document in progress notes and discharge summary further specificity regarding the type of encephalopathy:    The medical record reflects the following:  Risk Factors: 63 yr old legally blind, [place] male admitted with pna, ALEXANDER, hyponatremia, marijuana abuse;  Clinical Indicators: Per Hospitalist PN of 10-16-20: acute encephalopathy - like sundowning; CT head no acute issue  Treatment: imaging; Ativan; Seroquel increased; sitter    Thank you for your time,  Venkata Reardon, [facility]  Options provided:  -- Metabolic encephalopathy  -- Toxic encephalopathy  -- Encephalopathy due to - please specify  -- Toxic metabolic encephalopathy  -- Other - I will add my own diagnosis  -- Disagree - Not applicable / Not valid  -- Disagree - Clinically unable to determine / Unknown  -- Refer to Clinical Documentation Reviewer    PROVIDER RESPONSE TEXT:    This patient has metabolic encephalopathy. Query created by:  Cedric Celeste on 10/16/2020 7:43 PM      Electronically signed by:  Cnoy Sandoval MD 10/19/2020 2:22 PM

## 2020-10-19 NOTE — ROUTINE PROCESS
0130  Pt resting in bed, eyes closed. Appears to be asleep. Sitter at bedside, stating pt had been very confused so far and staying awake all day. Pt appears to be comfortable, resp even and unlab. Will not awaken pt at this time to help pt retain rest tonight. 0430  Has been resting quietly, appears comfortable when asleep. Now being wakened by loud noises from adjoining room, very restless and in constant motion. . Asking for food. Refusing all care. 0630  Incont of soft BM, cleaned by sitter. 0755  [facility]Bedside shift change report given to THEO Hopkins (oncoming nurse) by DONNA Martino RN (offgoing nurse). Report included the following information SBAR, Intake/Output, MAR, Recent Results, Cardiac Rhythm unkown, pt taking off monitor and Alarm Parameters .

## 2020-10-19 NOTE — PROGRESS NOTES
Speech Therapy Note:     SLP attempt dysphagia therapy follow up, however, patient sleeping soundly with sitter in room. RN, Eden Lopez, states patient recently medicated per CIWA protocol, and complaints of pain in bilateral legs. Requests patient not to be woken up. SLP will f/u later this day or as medically indicated. Thank you for this referral.      Mamie Maurice Draft, [id number] [facility]  Speech-Language Pathologist

## 2020-10-19 NOTE — PROGRESS NOTES
Problem: General Medical Care Plan  Goal: *Vital signs within specified parameters  Outcome: Progressing Towards Goal  Goal: *Labs within defined limits  Outcome: Progressing Towards Goal  Goal: *Absence of infection signs and symptoms  Outcome: Progressing Towards Goal  Goal: *Optimal pain control at patient's stated goal  Outcome: Progressing Towards Goal  Goal: *Skin integrity maintained  Outcome: Progressing Towards Goal  Goal: *Fluid volume balance  Outcome: Progressing Towards Goal  Goal: *Optimize nutritional status  Outcome: Progressing Towards Goal  Goal: *Anxiety reduced or absent  Outcome: Progressing Towards Goal  Goal: *Progressive mobility and function (eg: ADL's)  Outcome: Progressing Towards Goal     Problem: Pneumonia: Day 4  Goal: Off Pathway (Use only if patient is Off Pathway)  Outcome: Progressing Towards Goal  Goal: Activity/Safety  Outcome: Progressing Towards Goal  Goal: Nutrition/Diet  Outcome: Progressing Towards Goal  Goal: Discharge Planning  Outcome: Progressing Towards Goal  Goal: Medications  Outcome: Progressing Towards Goal  Goal: Respiratory  Outcome: Progressing Towards Goal  Goal: Treatments/Interventions/Procedures  Outcome: Progressing Towards Goal  Goal: Psychosocial  Outcome: Progressing Towards Goal     Problem: Pneumonia: Discharge Outcomes  Goal: *Demonstrates progressive activity  Outcome: Progressing Towards Goal  Goal: *Describes follow-up/return visits to physicians  Outcome: Progressing Towards Goal  Goal: *Tolerating diet  Outcome: Progressing Towards Goal  Goal: *Verbalizes name, dosage, time, side effects, and number of days to continue medications  Outcome: Progressing Towards Goal  Goal: *Influenza immunization  Outcome: Progressing Towards Goal  Goal: *Pneumococcal immunization  Outcome: Progressing Towards Goal  Goal: *Respiratory status at baseline  Outcome: Progressing Towards Goal  Goal: *Vital signs within defined limits  Outcome: Progressing Towards Goal  Goal: *Describes available resources and support systems  Outcome: Progressing Towards Goal  Goal: *Optimal pain control at patient's stated goal  Outcome: Progressing Towards Goal   The care plan was initiated and reviewed with pt. Care plan options were discussed and questions answered. The pt does not understand instructions and family will follow up as directed.

## 2020-10-19 NOTE — ROUTINE PROCESS
Bedside and Verbal shift change report given to Olivia Gaston RN(oncoming nurse) by Vazquez Tellez RN (offgoing nurse). Report included the following information SBAR and Kardex.

## 2020-10-19 NOTE — PROGRESS NOTES
Chart reviewed and called patients son Noelle Hong, called both contact numbers listed on Facesheet  No answer or voicemail on home contact [phone] and cell [phone] did not go thru received [email or web address] message when dialed, cm can be contacted at [phone] if needed.

## 2020-10-19 NOTE — PROGRESS NOTES
800 Assumed care of patient from Helen Leblanc RN    135 Assessment completed, patient is alert and oriented x's 4 with periods of confusion. NSR on the monitor, lung fields are clear, diminished throughout on RA with no cough noted. Patient is tolerating a dysphagia diet without any complications. Scheduled medications administered as ordered without any complications. Patient is currently sitting up in bed agitated with sitter at bedside, no s/s of any distress, will continue to monitor    1245 Bedside and Verbal shift change report given to Huy Nichols RN (oncoming nurse) by Kelly Pak RN (offgoing nurse). Report included the following information SBAR, MAR, Accordion and Cardiac Rhythm NSR.

## 2020-10-19 NOTE — ROUTINE PROCESS
Bedside shift change report given to [address] (oncoming nurse) by Umesh Sebastian RN (offgoing nurse). Report included the following information SBAR, Kardex, Intake/Output, MAR and Recent Results.

## 2020-10-20 VITALS
HEART RATE: 89 BPM | DIASTOLIC BLOOD PRESSURE: 118 MMHG | BODY MASS INDEX: 13.08 KG/M2 | SYSTOLIC BLOOD PRESSURE: 163 MMHG | OXYGEN SATURATION: 99 % | TEMPERATURE: 98.6 F | HEIGHT: 64 IN | RESPIRATION RATE: 18 BRPM | WEIGHT: 76.6 LBS

## 2020-10-20 LAB
BACTERIA SPEC CULT: NORMAL
BACTERIA SPEC CULT: NORMAL
GLUCOSE BLD STRIP.AUTO-MCNC: 106 MG/DL (ref 70–110)
GLUCOSE BLD STRIP.AUTO-MCNC: 156 MG/DL (ref 70–110)
GLUCOSE BLD STRIP.AUTO-MCNC: 81 MG/DL (ref 70–110)
SERVICE CMNT-IMP: NORMAL
SERVICE CMNT-IMP: NORMAL

## 2020-10-20 PROCEDURE — 74011000250 HC RX REV CODE- 250: Performed by: HOSPITALIST

## 2020-10-20 PROCEDURE — 74011636637 HC RX REV CODE- 636/637: Performed by: FAMILY MEDICINE

## 2020-10-20 PROCEDURE — 74011250637 HC RX REV CODE- 250/637: Performed by: INTERNAL MEDICINE

## 2020-10-20 PROCEDURE — 92526 ORAL FUNCTION THERAPY: CPT

## 2020-10-20 PROCEDURE — 99232 SBSQ HOSP IP/OBS MODERATE 35: CPT | Performed by: NURSE PRACTITIONER

## 2020-10-20 PROCEDURE — 74011250636 HC RX REV CODE- 250/636: Performed by: FAMILY MEDICINE

## 2020-10-20 PROCEDURE — 94640 AIRWAY INHALATION TREATMENT: CPT

## 2020-10-20 PROCEDURE — 74011250637 HC RX REV CODE- 250/637: Performed by: HOSPITALIST

## 2020-10-20 PROCEDURE — 74011250637 HC RX REV CODE- 250/637: Performed by: FAMILY MEDICINE

## 2020-10-20 PROCEDURE — 82962 GLUCOSE BLOOD TEST: CPT

## 2020-10-20 PROCEDURE — 74011250636 HC RX REV CODE- 250/636: Performed by: HOSPITALIST

## 2020-10-20 RX ORDER — HALOPERIDOL 5 MG/ML
INJECTION INTRAMUSCULAR
Status: DISPENSED
Start: 2020-10-20 | End: 2020-10-20

## 2020-10-20 RX ORDER — AMOXICILLIN AND CLAVULANATE POTASSIUM 875; 125 MG/1; MG/1
1 TABLET, FILM COATED ORAL EVERY 12 HOURS
Qty: 4 TAB | Refills: 0 | Status: SHIPPED | OUTPATIENT
Start: 2020-10-20 | End: 2020-11-20

## 2020-10-20 RX ORDER — HALOPERIDOL 5 MG/ML
5 INJECTION INTRAMUSCULAR ONCE
Status: COMPLETED | OUTPATIENT
Start: 2020-10-20 | End: 2020-10-20

## 2020-10-20 RX ORDER — AMOXICILLIN AND CLAVULANATE POTASSIUM 875; 125 MG/1; MG/1
1 TABLET, FILM COATED ORAL EVERY 12 HOURS
Status: DISCONTINUED | OUTPATIENT
Start: 2020-10-20 | End: 2020-10-20 | Stop reason: HOSPADM

## 2020-10-20 RX ADMIN — GUAIFENESIN 600 MG: 600 TABLET, EXTENDED RELEASE ORAL at 09:00

## 2020-10-20 RX ADMIN — METOPROLOL SUCCINATE 25 MG: 25 TABLET, EXTENDED RELEASE ORAL at 09:00

## 2020-10-20 RX ADMIN — BUDESONIDE 500 MCG: 0.5 INHALANT RESPIRATORY (INHALATION) at 08:59

## 2020-10-20 RX ADMIN — ARFORMOTEROL TARTRATE 15 MCG: 15 SOLUTION RESPIRATORY (INHALATION) at 08:59

## 2020-10-20 RX ADMIN — OXYCODONE 10 MG: 5 TABLET ORAL at 11:52

## 2020-10-20 RX ADMIN — HEPARIN SODIUM 5000 UNITS: 5000 INJECTION INTRAVENOUS; SUBCUTANEOUS at 05:36

## 2020-10-20 RX ADMIN — HYDRALAZINE HYDROCHLORIDE 25 MG: 25 TABLET, FILM COATED ORAL at 17:21

## 2020-10-20 RX ADMIN — ATORVASTATIN CALCIUM 10 MG: 10 TABLET, FILM COATED ORAL at 09:00

## 2020-10-20 RX ADMIN — AMLODIPINE BESYLATE 5 MG: 5 TABLET ORAL at 09:00

## 2020-10-20 RX ADMIN — AMOXICILLIN AND CLAVULANATE POTASSIUM 1 TABLET: 875; 125 TABLET, FILM COATED ORAL at 09:47

## 2020-10-20 RX ADMIN — HALOPERIDOL LACTATE 5 MG: 5 INJECTION, SOLUTION INTRAMUSCULAR at 02:36

## 2020-10-20 RX ADMIN — INSULIN LISPRO 2 UNITS: 100 INJECTION, SOLUTION INTRAVENOUS; SUBCUTANEOUS at 11:58

## 2020-10-20 RX ADMIN — CLONAZEPAM 0.25 MG: 0.5 TABLET ORAL at 06:54

## 2020-10-20 RX ADMIN — CLONAZEPAM 0.25 MG: 0.5 TABLET ORAL at 16:28

## 2020-10-20 NOTE — ROUTINE PROCESS
TRANSFER - IN REPORT: 
 
Verbal report received from AMEENA Scott RN(name) on [facility].  being received from [facility](unit) for routine progression of care Report consisted of patients Situation, Background, Assessment and  
Recommendations(SBAR). Information from the following report(s) SBAR, Kardex, Intake/Output and MAR was reviewed with the receiving nurse. Opportunity for questions and clarification was provided. Assessment completed upon patients arrival to unit and care assumed.

## 2020-10-20 NOTE — PROGRESS NOTES
D/C Plan: Personal Care and  physician follow up     Noted pt transfer to [address] [place] [address]. CM received a call from pt's son, Anupam Woodruff ([phone]). CM discussed transition of care options. Pt's son has indicated he would like pt to return home with personal care. Pt/family does not feel pt would benefit from [facility] upon discharge. Pt's son has indicted he has been working with pt's CM in the community for personal care. CM offered assistance and pt's son has indicated he will make these arrangements. Provider has indicated pt is medically stable to transition home today with family support. Pt's son is aware and in agreement. CM offered assistance with transport home today. Pt's son has indicated he will transport pt home today. No other transition of care needs have been identified. Care Management Interventions  Mode of Transport at Discharge:  Other (see comment)(Family)  Transition of Care Consult (CM Consult): Discharge Planning, Other(home with personal care)  Health Maintenance Reviewed: Yes  Physical Therapy Consult: Yes  Current Support Network: Relative's Home(lives with son)  Confirm Follow Up Transport: Family  The Plan for Transition of Care is Related to the Following Treatment Goals : Home with family support and personal care  Discharge Location  Discharge Placement: Home with family assistance(and personal care to be arranged by family)

## 2020-10-20 NOTE — PROGRESS NOTES
1900 Bedside and Verbal shift change report given to AMEENA Vela (oncoming nurse) by Jamar Naik RN (offgoing nurse). Report included the following information SBAR, Kardex, MAR, Accordion and Recent Results. 56 Notified MD that the patients BP has consistently been elevated throughout the day. Last BP is 171/105. Pt is at times moving around in bed. Elevated BP may be due to restlessness and agitation. MD to review chart. 2328 . Shit report given to [employer].

## 2020-10-20 NOTE — DISCHARGE SUMMARY
Discharge Summary    Patient: Drake Egan Sr. MRN: [id number]  CSN: [id number]    YOB: 1955  Age: 63 y.o. Sex: male    DOA: 10/14/2020 LOS:  LOS: 6 days   Discharge Date:      Primary Care Provider:  Ayla Chance MD    Admission Diagnoses: Sepsis ([facility] [A41.9]  CAP (community acquired pneumonia) [J18.9]    Discharge Diagnoses:    Hospital Problems  Date Reviewed: 10/21/2013          Codes Class Noted POA    Acute encephalopathy ICD-10-CM: G93.40  ICD-9-CM: 348.30  10/15/2020 Unknown        Amputation of both lower extremities ([facility] ICD-10-CM: C26.462P, C54.958H  ICD-9-CM: 897.6  Unknown Unknown        * (Principal) PNA (pneumonia) ICD-10-CM: J18.9  ICD-9-CM: 5  Unknown Unknown        ALEXANDER (acute kidney injury) ([facility] ICD-10-CM: N17.9  ICD-9-CM: 584. 9  Unknown Unknown        Hyponatremia ICD-10-CM: E87.1  ICD-9-CM: 276.1  Unknown Unknown        Marijuana abuse ICD-10-CM: F12.10  ICD-9-CM: 305.20  Unknown Unknown        Emphysema lung (HCC) ICD-10-CM: J43.9  ICD-9-CM: 492.8  Unknown Unknown        CAP (community acquired pneumonia) ICD-10-CM: J18.9  ICD-9-CM: 116  10/14/2020 Unknown        Hard of hearing ICD-10-CM: H91.90  ICD-9-CM: 389.9  10/14/2020 Unknown        Legal blindness ICD-10-CM: H54.8  ICD-9-CM: 369.4  10/14/2020 Unknown              Discharge Condition: stable     Discharge Medications:     Current Discharge Medication List      START taking these medications    Details   amoxicillin-clavulanate (AUGMENTIN) 875-125 mg per tablet Take 1 Tab by mouth every twelve (12) hours. Qty: 4 Tab, Refills: 0         CONTINUE these medications which have NOT CHANGED    Details   acetaminophen (TYLENOL) 325 mg tablet Take  by mouth every four (4) hours as needed for Pain. amLODIPine (NORVASC) 5 mg tablet Take 5 mg by mouth daily. gabapentin (NEURONTIN) 100 mg capsule Take 100 mg by mouth three (3) times daily.       clonazePAM (KLONOPIN) 0.5 mg tablet Take 0.5 mg by mouth nightly as needed. atorvastatin (LIPITOR) 20 mg tablet Take 20 mg by mouth daily. nitroglycerin (NITROSTAT) 0.4 mg SL tablet 0.4 mg by SubLINGual route every five (5) minutes as needed for Chest Pain. Up to 3 doses. metoprolol succinate (TOPROL XL) 25 mg XL tablet Take 25 mg by mouth daily. oxyCODONE-acetaminophen (PERCOCET) 5-325 mg per tablet Take  by mouth every four (4) hours as needed for Pain.      traMADol (ULTRAM) 50 mg tablet Take 1 Tab by mouth every six (6) hours as needed for Pain. Max Daily Amount: 200 mg. Qty: 8 Tab, Refills: 0    Associated Diagnoses: Phantom pain (HCC)      simvastatin (ZOCOR) 20 mg tablet Take 20 mg by mouth nightly. lisinopril-hydrochlorothiazide (PRINZIDE, ZESTORETIC) 20-12.5 mg per tablet Take 1 Tab by mouth daily. isosorbide mononitrate ER (IMDUR) 30 mg tablet Take 30 mg by mouth daily. STOP taking these medications       methylPREDNISolone (MEDROL, MARIS,) 4 mg tablet Comments:   Reason for Stopping:         linezolid (ZYVOX) 600 mg tablet Comments:   Reason for Stopping:               Procedures : none     Consults: None      PHYSICAL EXAM   Visit Vitals  BP (!) 148/78   Pulse 82   Temp 98 °F (36.7 °C)   Resp 17   Ht 5' 4\" (1.626 m)   Wt 34.7 kg (76 lb 9.6 oz)   SpO2 98%   BMI 13.15 kg/m²     General: Awake, cooperative, no acute distress    HEENT: NC, Atraumatic. PERRLA, EOMI. Anicteric sclerae. Lungs:  CTA Bilaterally. No Wheezing/Rhonchi/Rales. Heart:  Regular  rhythm,  No murmur, No Rubs, No Gallops  Abdomen: Soft, Non distended, Non tender. +Bowel sounds,   Extremities: No c/c, bilateral aka   Psych:   Not anxious or agitated. Neurologic:  No acute neurological deficits. Admission HPI :   Martinez Gustafson is a 63 y.o. male with bilateral aka , emphysema, diabetes, CAD, hypertension, legal blindness, hard of hearing was sent to ER due to shortness of breath.   He presented w shortness of breath for several days, he went to [facility], he was giving antibiotics for pneumonia treatment. His shortness of breath was not improving. Checks x-ray in ER indicated  multiple focal pneumonia. Lactic acid was 2.8. Sepsis protocol started in ER.  IV antibiotics was started with breathing treatment and his shortness of breath got improvin. rapid Covid testing was done- was negative. Hospital Course :    Sanya Vaca is a 63 y.o. male with bilateral aka , emphysema, diabetes, CAD, hypertension, legal blindness, hard of hearing was admitted due to PNA. covid 19 was undetected. Iv abx was started for pna with breathing treatment. With the treatment, he remained room air. Speech was on board for evaluation and aspiration precaution was performed. With the treatment, he remained room air with 98 % O2 Sat. Fall precaution was performed. Pt is hard hearing and blindness, sitter was at the bedside. He did presented agitation during his stay and haloidal and ativan were given. Ct head done no acute issue. He will be benefit for going back familiar environment. Son agrees to take him back home      Activity: Activity as tolerated    Diet: Dysphagia diet-pureed    Follow-up: PCP    Disposition: home     Minutes spent on discharge: 45 min       Labs: Results:       Chemistry Recent Labs     10/19/20  0425 10/18/20  0233   GLU 73* 78    140   K 3.1* 3.8    108   CO2 25 24   BUN 9 10   CREA 0.54* 0.56*   CA 8.6 8.9   AGAP 8 8   BUCR 17 18   AP 96  --    TP 6.6  --    ALB 2.5*  --    GLOB 4.1*  --    AGRAT 0.6*  --       CBC w/Diff Recent Labs     10/19/20  0425 10/18/20  0233   WBC 6.8 6.9   RBC 4.24* 4.31*   HGB 11.7* 11.9*   HCT 36.4 36.7    339   GRANS 72 72   LYMPH 13* 15*   EOS 4 3      Cardiac Enzymes No results for input(s): CPK, CKND1, CHUY in the last 72 hours. No lab exists for component: CKRMB, TROIP   Coagulation No results for input(s): PTP, INR, APTT, INREXT in the last 72 hours.     Lipid Panel No results found for: CHOL, CHOLPOCT, CHOLX, CHLST, CHOLV, 895886, HDL, HDLP, LDL, LDLC, DLDLP, 201534, VLDLC, VLDL, TGLX, TRIGL, TRIGP, TGLPOCT, CHHD, CHHDX   BNP No results for input(s): BNPP in the last 72 hours. Liver Enzymes Recent Labs     10/19/20  0425   TP 6.6   ALB 2.5*   AP 96      Thyroid Studies Lab Results   Component Value Date/Time    TSH 0.42 01/27/2014 04:08 PM          @micro    Significant Diagnostic Studies: Ct Head Wo Cont    Result Date: 10/15/2020  EXAM: CT head INDICATION: Dental status change COMPARISON: None. TECHNIQUE: Axial CT imaging of the head was performed without intravenous contrast. One or more dose reduction techniques were used on this CT: automated exposure control, adjustment of the mAs and/or kVp according to patient size, and iterative reconstruction techniques. The specific techniques used on this CT exam have been documented in the patient's electronic medical record. Digital Imaging and Communications in Medicine (DICOM) format image data are available to nonaffiliated external healthcare facilities or entities on a secure, media free, reciprocally searchable basis with patient authorization for at least a 12 month period after this study. _______________ FINDINGS: BRAIN AND POSTERIOR FOSSA: The sulci, folia, ventricles and basal cisterns are within normal limits for the patient's with age concordant atrophy There is no intracranial hemorrhage, mass effect, or midline shift. Mild periventricular low-attenuation. Although nonspecific this is frequently seen with chronic small vessel ischemic change. Otherwise, there are no areas of abnormal parenchymal attenuation. EXTRA-AXIAL SPACES AND MENINGES: There are no abnormal extra-axial fluid collections. CALVARIUM: Intact. SINUSES: Clear. OTHER: None. _______________     IMPRESSION: No acute intracranial abnormalities.     Xr Chest Port    Result Date: 10/18/2020  EXAM: CHEST RADIOGRAPH CLINICAL INDICATION/HISTORY: SBO > Additional: None COMPARISON: 10/17/2020. TECHNIQUE: Portable frontal view of the chest _______________ FINDINGS: SUPPORT DEVICES: None. HEART AND MEDIASTINUM: No appreciable cardiomegaly. Remaining mediastinal contours within normal limits. LUNGS AND PLEURAL SPACES: No significant change of bilateral upper lobe parenchymal opacities. Hyperexpansion of the lungs. No evidence for pneumothorax. BONY THORAX AND SOFT TISSUES: Unremarkable. _______________     IMPRESSION: 1. No significant change of bilateral lung pneumonia. Follow-up to resolution is recommended. 2. Emphysema. Xr Chest Port    Result Date: 10/18/2020  EXAM: CHEST RADIOGRAPH CLINICAL INDICATION/HISTORY: shortness of breath   > Additional: None COMPARISON: October 14, 2020. TECHNIQUE: Portable frontal view of the chest _______________ FINDINGS: SUPPORT DEVICES: None. HEART AND MEDIASTINUM: No appreciable cardiomegaly. Remaining mediastinal contours within normal limits. LUNGS AND PLEURAL SPACES: No significant change of bilateral upper lobe parenchymal opacities. Hyperexpansion of the lungs. No evidence for pneumothorax or pleural effusion. BONY THORAX AND SOFT TISSUES: Unremarkable. _______________     IMPRESSION: 1. No significant change of bilateral lung pneumonia. Follow-up to resolution is recommended to exclude underlying mass. 2. Emphysema. Xr Chest Port    Result Date: 10/14/2020  EXAM: XR CHEST PORT CLINICAL INDICATION/HISTORY: Shortness of breath with cough -Additional: None COMPARISON: Several prior studies, most recently 2/19/2019 TECHNIQUE: Frontal view of the chest _______________ FINDINGS: HEART AND MEDIASTINUM: Normal cardiac size and mediastinal contours. LUNGS AND PLEURAL SPACES: Patchy multifocal opacities noted throughout bilateral upper lobes, right greater than left as well as throughout the periphery of the right lower lobe. No evidence of pneumothorax.  BONY THORAX AND SOFT TISSUES: No acute osseous abnormality _______________     IMPRESSION: Patchy multifocal airspace disease compatible with pneumonia. Recommend radiographic follow-up to document expected clinical resolution in 4-6 weeks' time.              [facility]     CC: Haroon Millard MD

## 2020-10-20 NOTE — PROGRESS NOTES
Problem: General Medical Care Plan  Goal: *Vital signs within specified parameters  Outcome: Progressing Towards Goal  Goal: *Labs within defined limits  Outcome: Progressing Towards Goal  Goal: *Optimal pain control at patient's stated goal  Outcome: Progressing Towards Goal  Goal: *Skin integrity maintained  Outcome: Progressing Towards Goal  Goal: *Fluid volume balance  Outcome: Progressing Towards Goal  Goal: *Optimize nutritional status  Outcome: Progressing Towards Goal     Problem: Patient Education: Go to Patient Education Activity  Goal: Patient/Family Education  Outcome: Progressing Towards Goal     Problem: Pain  Goal: *Control of Pain  Outcome: Progressing Towards Goal  Goal: *PALLIATIVE CARE:  Alleviation of Pain  Outcome: Progressing Towards Goal     Problem: Patient Education: Go to Patient Education Activity  Goal: Patient/Family Education  Outcome: Progressing Towards Goal     Problem: Falls - Risk of  Goal: *Absence of Falls  Description: Document Toby Fall Risk and appropriate interventions in the flowsheet.   Outcome: Progressing Towards Goal  Note: Fall Risk Interventions:  Mobility Interventions: Patient to call before getting OOB, Communicate number of staff needed for ambulation/transfer, Assess mobility with egress test    Mentation Interventions: Adequate sleep, hydration, pain control, Door open when patient unattended, Evaluate medications/consider consulting pharmacy, More frequent rounding, Reorient patient, Room close to nurse's station, Toileting rounds    Medication Interventions: Teach patient to arise slowly, Patient to call before getting OOB, Evaluate medications/consider consulting pharmacy    Elimination Interventions: Call light in reach, Patient to call for help with toileting needs, Toileting schedule/hourly rounds, Urinal in reach, Stay With Me (per policy)              Problem: Patient Education: Go to Patient Education Activity  Goal: Patient/Family Education  Outcome: Progressing Towards Goal     Problem: Pressure Injury - Risk of  Goal: *Prevention of pressure injury  Description: Document Shelton Scale and appropriate interventions in the flowsheet.   Outcome: Progressing Towards Goal  Note: Pressure Injury Interventions:  Sensory Interventions: Assess changes in LOC, Keep linens dry and wrinkle-free, Maintain/enhance activity level, Minimize linen layers, Assess need for specialty bed    Moisture Interventions: Assess need for specialty bed, Minimize layers, Offer toileting Q_hr, Limit adult briefs    Activity Interventions: PT/OT evaluation, Pressure redistribution bed/mattress(bed type), Increase time out of bed, Assess need for specialty bed    Mobility Interventions: HOB 30 degrees or less, Pressure redistribution bed/mattress (bed type), PT/OT evaluation    Nutrition Interventions: Document food/fluid/supplement intake    Friction and Shear Interventions: Minimize layers, Lift team/patient mobility team, HOB 30 degrees or less, Apply protective barrier, creams and emollients                Problem: Patient Education: Go to Patient Education Activity  Goal: Patient/Family Education  Outcome: Progressing Towards Goal     Problem: Nutrition Deficit  Goal: *Optimize nutritional status  Outcome: Progressing Towards Goal     Problem: Patient Education: Go to Patient Education Activity  Goal: Patient/Family Education  Outcome: Progressing Towards Goal
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
## 2020-10-20 NOTE — PROGRESS NOTES
Problem: Dysphagia (Adult)  Goal: *Acute Goals and Plan of Care (Insert Text)  Description: Recommendations:  Diet: Puree/HONEY thick liquids  Meds: Crushed in puree  Aspiration Precautions  Oral Care TID  Other: MBSS when mental status improves    Goals:  Patient will:  1. Tolerate PO trials with 0 s/s overt distress in 4/5 trials  2. Utilize compensatory swallow strategies/maneuvers (decrease bite/sip, size/rate, alt. liq/sol) with min cues in 4/5 trials  3. Perform oral-motor/laryngeal exercises to increase oropharyngeal swallow function with min cues  4. Complete an objective swallow study (i.e., MBSS) to assess swallow integrity, r/o aspiration, and determine of safest LRD, min A  Outcome: Progressing Towards Goal  SPEECH LANGUAGE PATHOLOGY BEDSIDE SWALLOW THERAPY     Patient: Phill Villa  (66 y.o. male)  Date: 10/20/2020  Primary Diagnosis: Sepsis ([address] [A41.9]  CAP (community acquired pneumonia) [J18.9]        Precautions: Aspiration  PLOF: Living with family     ASSESSMENT :  Based on the objective data described below, the patient presents with moderate oral and suspected mod-severe pharyngeal dysphagia. CXR completed 10/14/20 revealed \"patchy multifocal airspace disease compatible with pneumonia\". Patient alert, disoriented, agitated, repeatedly stating \"I want coffee. \" Oral-motor exam limited, as patient did not follow any commands, however pt observed to have incomplete dentition. Presented with thin liquid via straw, demo immediate wet vocal quality, suspect silent aspiration. Nectar-thick liquid via cup; delayed wet, persistent cough. S/sx of aspiration resolved with small sips of honey-thick liquid and purees. Delayed swallow initiation with all consistencies. Solid trial attempted, however patient did not attempt to masticate cracker despite verbal cues, SLP removed. Recommend initiation of cautious puree/HONEY thick liquid diet with strict aspiration precautions, feeding assistance.  SLP will continue to follow as indicated.      Patient will benefit from skilled intervention to address the above impairments. Patient's rehabilitation potential is considered to be Guarded  Factors which may influence rehabilitation potential include:   []? None noted  [x]? Mental ability/status  [x]? Medical condition  [x]? Home/family situation and support systems  [x]? Safety awareness  []? Pain tolerance/management  []? Other:       PLAN :  Recommendations and Planned Interventions:  Puree/HONEY thick liquid  Frequency/Duration: Patient will be followed by speech-language pathology 1-2 times per day/4-7 days per week to address goals. Discharge Recommendations: Skilled Nursing Facility      SUBJECTIVE:   Patient stated I want coffee. \"     OBJECTIVE:           Past Medical History:   Diagnosis Date    [facility]putation of both lower extremities [address]      Amputee, above knee, left (HCC)      At risk for falls      Chronic pain       back and legs    Diabetes (HCC)      Hypercholesterolemia      Hypertension      Polio              Past Surgical History:   Procedure Laterality Date    HX HERNIA REPAIR        HX OTHER SURGICAL         carpal tunnel     HX OTHER SURGICAL         cyst      Home Situation:   Home Situation  Home Environment: Private residence  # Steps to Enter: 1  One/Two Story Residence: One story  Living Alone: No  Support Systems: Child(megan)  Patient Expects to be Discharged to[de-identified] Private residence  Current DME Used/Available at Home: None     Diet prior to admission: Regular/thin  Current Diet:  Puree/HONEY      Cognitive and Communication Status:  Neurologic State: Confused, Irritable  Orientation Level: Disoriented X4  Cognition: No command following  Perseveration: Perseverates during conversation, Perseverates during mobility  Safety/Judgement: Decreased awareness of environment, Decreased awareness of need for assistance, Decreased awareness of need for safety, Decreased insight into deficits  Oral Assessment:  Oral Assessment  Labial: No impairment  Dentition: Natural;Poor  Oral Hygiene: Fair  Lingual: No impairment  Velum: Unable to visualize  Mandible: No impairment  P.O. Trials:  Patient Position: HOB 60  Vocal quality prior to P.O.: No impairment  Consistency Presented: Thin liquid; Nectar thick liquid;Honey thick liquid;Puree; Solid  How Presented: SLP-fed/presented;Cup/sip;Straw;Spoon  Bolus Acceptance: Impaired  Bolus Formation/Control: Impaired  Type of Impairment: Incomplete;Mastication  Propulsion: Delayed (# of seconds)  Oral Residue: Greater than 50% of bolus  Initiation of Swallow: Delayed (# of seconds)  Laryngeal Elevation: Decreased  Aspiration Signs/Symptoms: Delayed cough/throat clear; Infiltrate on chest xray; Change vocal quality  Pharyngeal Phase Characteristics: Suspected pharyngeal residue  Effective Modifications: Small sips and bites  Cues for Modifications: Maximal     Oral Phase Severity: Moderate  Pharyngeal Phase Severity : Moderate-severe     PAIN:  Pain level pre-treatment: 0/10   Pain level post-treatment: 0/10      After treatment:   []? Patient left in no apparent distress sitting up in chair  [x]? Patient left in no apparent distress in bed  [x]? Call bell left within reach  [x]? Nursing notified  []? Family present  [x]? Sitter present  []? Bed alarm activated     COMMUNICATION/EDUCATION:   [x]? Aspiration precautions; swallow safety; compensatory techniques. []? Patient/family have participated as able in goal setting and plan of care. []? Patient/family agree to work toward stated goals and plan of care. []? Patient understands intent and goals of therapy; neutral about participation. [x]?             Patient unable to participate in goal setting/plan of care; educ ongoing with interdisciplinary staff  []? Posted safety precautions in patient's room.     Thank you for this referral,  JOSEFINA Arauz.Ed, CCC-SLP  Time Calculation: 13 mins

## 2020-10-20 NOTE — ROUTINE PROCESS
Dual AVS reviewed with MIRIAM RN. All medications reviewed individually with patient. Opportunities for questions and concerns provided. Patient to be discharged via (mode of transport ie. Car, ambulance or air transport) car. Patient's arm band appropriately discarded.

## 2020-10-20 NOTE — ROUTINE PROCESS
Bedside and Verbal shift change report given to JOSE ARMANDO Reid RN (oncoming nurse) by [employer] RN (offgoing nurse). Report included the following information SBAR, Kardex, Intake/Output and MAR.

## 2020-10-20 NOTE — PROGRESS NOTES
0009-Alert and oriented x 3 with confusion especially to situation. No IV access. Sitter at bedside d/t patient screaming, agitated, and restless. Patient is legally blind. Will not allow for much of assessment to be done. Uses urinal. Brief in place d/t misses urinal. Denies pain. Bilateral double AKA. 0118-new order for Haldol 5 mg x once given by Dr. Dennis Beaulieu d/t patient screaming, agitated, restless, almost falling on floor, and cussing at staff. 0210-haldol 5 mg IM given d/t screaming, anxiety, agitation, cussing a staff. Patient then ate 1 bowl of grits and 1 bowl of oatmeal. He is now resting in bed quietly.

## 2020-10-20 NOTE — ROUTINE PROCESS
Assumed patient care at this time. Received report from [facility] complete and documented in flowsheets 1152- Gave PRN pain medication at this time. Gave patient 10mg of Roxicodone. Patient stated pain was 10/10. Explanation of side effects given and opportunity for questions given. 1715-Nurse notified of BP. 
 
[id number]- Manual BP was taken 1721-Gave PRN hydralazine 25mg PO due to BP being elevated. 1753-Paged Dr. Kevyn Rodarte for patients BP being elevated despite given the hydralazine. 1810-Talked to Dr. Kevyn Rodarte patient okay to D/c home due to the BP being elevated from agitation.

## 2020-10-20 NOTE — DISCHARGE INSTRUCTIONS
Patient Education        Pneumonia: Care Instructions  Your Care Instructions     Pneumonia is an infection of the lungs. Most cases are caused by infections from bacteria or viruses. Pneumonia may be mild or very severe. If it is caused by bacteria, you will be treated with antibiotics. It may take a few weeks to a few months to recover fully from pneumonia, depending on how sick you were and whether your overall health is good. Follow-up care is a key part of your treatment and safety. Be sure to make and go to all appointments, and call your doctor if you are having problems. It's also a good idea to know your test results and keep a list of the medicines you take. How can you care for yourself at home? · Take your antibiotics exactly as directed. Do not stop taking the medicine just because you are feeling better. You need to take the full course of antibiotics. · Take your medicines exactly as prescribed. Call your doctor if you think you are having a problem with your medicine. · Get plenty of rest and sleep. You may feel weak and tired for a while, but your energy level will improve with time. · To prevent dehydration, drink plenty of fluids, enough so that your urine is light yellow or clear like water. Choose water and other caffeine-free clear liquids until you feel better. If you have kidney, heart, or liver disease and have to limit fluids, talk with your doctor before you increase the amount of fluids you drink. · Take care of your cough so you can rest. A cough that brings up mucus from your lungs is common with pneumonia. It is one way your body gets rid of the infection. But if coughing keeps you from resting or causes severe fatigue and chest-wall pain, talk to your doctor. He or she may suggest that you take a medicine to reduce the cough. · Use a vaporizer or humidifier to add moisture to your bedroom. Follow the directions for cleaning the machine.   · Do not smoke or allow others to smoke around you. Smoke will make your cough last longer. If you need help quitting, talk to your doctor about stop-smoking programs and medicines. These can increase your chances of quitting for good. · Take an over-the-counter pain medicine, such as acetaminophen (Tylenol), ibuprofen (Advil, Motrin), or naproxen (Aleve). Read and follow all instructions on the label. · Do not take two or more pain medicines at the same time unless the doctor told you to. Many pain medicines have acetaminophen, which is Tylenol. Too much acetaminophen (Tylenol) can be harmful. · If you were given a spirometer to measure how well your lungs are working, use it as instructed. This can help your doctor tell how your recovery is going. · To prevent pneumonia in the future, talk to your doctor about getting a flu vaccine (once a year) and a pneumococcal vaccine (one time only for most people). When should you call for help? Call 911 anytime you think you may need emergency care. For example, call if:    · You have severe trouble breathing. Call your doctor now or seek immediate medical care if:    · You cough up dark brown or bloody mucus (sputum).     · You have new or worse trouble breathing.     · You are dizzy or lightheaded, or you feel like you may faint. Watch closely for changes in your health, and be sure to contact your doctor if:    · You have a new or higher fever.     · You are coughing more deeply or more often.     · You are not getting better after 2 days (48 hours).     · You do not get better as expected. Where can you learn more? Go to http://www.[employer].com/  Enter D336 in the search box to learn more about \"Pneumonia: Care Instructions. \"  Current as of: February 24, 2020               Content Version: 12.6  © 8203-3892 [employer]. Care instructions adapted under license by [employer] (which disclaims liability or warranty for this information).  If you have questions about a medical condition or this instruction, always ask your healthcare professional. [place] [address] any warranty or liability for your use of this information.

## 2020-10-20 NOTE — PROGRESS NOTES
Chart reviewed noted pt remains with sitter for safety, pt will need to be sitter free 24hrs prior to discharge for SNF placement,cm called son  To discuss d/c plan [phone] phone went straight to voice mail, unable to leave message d/t voice mail full, per ROB Anderson progress note 10-19-20 @0013 son is willing to take pt home, cm updated unit.

## 2020-10-23 ENCOUNTER — APPOINTMENT (OUTPATIENT)
Dept: CT IMAGING | Age: 65
DRG: 720 | End: 2020-10-23
Attending: EMERGENCY MEDICINE
Payer: MEDICAID

## 2020-10-23 ENCOUNTER — HOSPITAL ENCOUNTER (INPATIENT)
Age: 65
LOS: 28 days | Discharge: NURSING, REHAB OR LONG-TERM CARE | DRG: 720 | End: 2020-11-20
Attending: EMERGENCY MEDICINE | Admitting: INTERNAL MEDICINE
Payer: MEDICAID

## 2020-10-23 ENCOUNTER — APPOINTMENT (OUTPATIENT)
Dept: GENERAL RADIOLOGY | Age: 65
DRG: 720 | End: 2020-10-23
Attending: EMERGENCY MEDICINE
Payer: MEDICAID

## 2020-10-23 DIAGNOSIS — G93.41 METABOLIC ENCEPHALOPATHY: ICD-10-CM

## 2020-10-23 DIAGNOSIS — J18.9 HCAP (HEALTHCARE-ASSOCIATED PNEUMONIA): ICD-10-CM

## 2020-10-23 DIAGNOSIS — G93.40 ACUTE ENCEPHALOPATHY: ICD-10-CM

## 2020-10-23 DIAGNOSIS — A41.9 SEPTIC SHOCK (HCC): Primary | ICD-10-CM

## 2020-10-23 DIAGNOSIS — S88.912S AMPUTATION OF BOTH LOWER EXTREMITIES, SEQUELA (HCC): ICD-10-CM

## 2020-10-23 DIAGNOSIS — R94.31 ABNORMAL EKG: ICD-10-CM

## 2020-10-23 DIAGNOSIS — Z71.89 ADVANCED CARE PLANNING/COUNSELING DISCUSSION: ICD-10-CM

## 2020-10-23 DIAGNOSIS — S88.911S AMPUTATION OF BOTH LOWER EXTREMITIES, SEQUELA (HCC): ICD-10-CM

## 2020-10-23 DIAGNOSIS — J44.1 CHRONIC OBSTRUCTIVE PULMONARY DISEASE WITH ACUTE EXACERBATION (HCC): ICD-10-CM

## 2020-10-23 DIAGNOSIS — R65.21 SEPTIC SHOCK (HCC): Primary | ICD-10-CM

## 2020-10-23 DIAGNOSIS — Z51.5 COMFORT MEASURES ONLY STATUS: ICD-10-CM

## 2020-10-23 DIAGNOSIS — R77.8 ELEVATED TROPONIN: ICD-10-CM

## 2020-10-23 DIAGNOSIS — J96.01 ACUTE RESPIRATORY FAILURE WITH HYPOXIA (HCC): ICD-10-CM

## 2020-10-23 LAB
ABO + RH BLD: NORMAL
ALBUMIN SERPL-MCNC: 2.5 G/DL (ref 3.4–5)
ALBUMIN/GLOB SERPL: 0.7 {RATIO} (ref 0.8–1.7)
ALP SERPL-CCNC: 100 U/L (ref 45–117)
ALT SERPL-CCNC: 42 U/L (ref 16–61)
AMPHET UR QL SCN: NEGATIVE
ANION GAP SERPL CALC-SCNC: 12 MMOL/L (ref 3–18)
APPEARANCE UR: CLEAR
AST SERPL-CCNC: 53 U/L (ref 10–38)
BACTERIA URNS QL MICRO: NORMAL /HPF
BARBITURATES UR QL SCN: NEGATIVE
BASOPHILS # BLD: 0 K/UL (ref 0–0.1)
BASOPHILS NFR BLD: 0 % (ref 0–2)
BENZODIAZ UR QL: NEGATIVE
BILIRUB SERPL-MCNC: 0.2 MG/DL (ref 0.2–1)
BILIRUB UR QL: NEGATIVE
BLOOD GROUP ANTIBODIES SERPL: NORMAL
BUN SERPL-MCNC: 23 MG/DL (ref 7–18)
BUN/CREAT SERPL: 19 (ref 12–20)
CALCIUM SERPL-MCNC: 8.9 MG/DL (ref 8.5–10.1)
CANNABINOIDS UR QL SCN: POSITIVE
CHLORIDE SERPL-SCNC: 100 MMOL/L (ref 100–111)
CK MB CFR SERPL CALC: 2.5 % (ref 0–4)
CK MB SERPL-MCNC: 11.6 NG/ML (ref 5–25)
CK SERPL-CCNC: 471 U/L (ref 39–308)
CO2 SERPL-SCNC: 25 MMOL/L (ref 21–32)
COCAINE UR QL SCN: NEGATIVE
COLOR UR: YELLOW
CREAT SERPL-MCNC: 1.19 MG/DL (ref 0.6–1.3)
DIFFERENTIAL METHOD BLD: ABNORMAL
EOSINOPHIL # BLD: 0.2 K/UL (ref 0–0.4)
EOSINOPHIL NFR BLD: 1 % (ref 0–5)
EPITH CASTS URNS QL MICRO: NORMAL /LPF (ref 0–5)
ERYTHROCYTE [DISTWIDTH] IN BLOOD BY AUTOMATED COUNT: 16.8 % (ref 11.6–14.5)
ETHANOL SERPL-MCNC: <3 MG/DL (ref 0–3)
GLOBULIN SER CALC-MCNC: 3.8 G/DL (ref 2–4)
GLUCOSE BLD STRIP.AUTO-MCNC: 120 MG/DL (ref 70–110)
GLUCOSE SERPL-MCNC: 108 MG/DL (ref 74–99)
GLUCOSE UR STRIP.AUTO-MCNC: NEGATIVE MG/DL
HCT VFR BLD AUTO: 32.9 % (ref 36–48)
HDSCOM,HDSCOM: ABNORMAL
HGB BLD-MCNC: 10.9 G/DL (ref 13–16)
HGB UR QL STRIP: ABNORMAL
KETONES UR QL STRIP.AUTO: NEGATIVE MG/DL
LACTATE SERPL-SCNC: 2.7 MMOL/L (ref 0.4–2)
LACTATE SERPL-SCNC: 3.3 MMOL/L (ref 0.4–2)
LEUKOCYTE ESTERASE UR QL STRIP.AUTO: NEGATIVE
LYMPHOCYTES # BLD: 1.1 K/UL (ref 0.9–3.6)
LYMPHOCYTES NFR BLD: 9 % (ref 21–52)
MCH RBC QN AUTO: 28.6 PG (ref 24–34)
MCHC RBC AUTO-ENTMCNC: 33.1 G/DL (ref 31–37)
MCV RBC AUTO: 86.4 FL (ref 74–97)
METHADONE UR QL: NEGATIVE
MONOCYTES # BLD: 0.6 K/UL (ref 0.05–1.2)
MONOCYTES NFR BLD: 5 % (ref 3–10)
NEUTS SEG # BLD: 10.5 K/UL (ref 1.8–8)
NEUTS SEG NFR BLD: 85 % (ref 40–73)
NITRITE UR QL STRIP.AUTO: NEGATIVE
OPIATES UR QL: NEGATIVE
PCP UR QL: NEGATIVE
PH UR STRIP: 7 [PH] (ref 5–8)
PLATELET # BLD AUTO: 401 K/UL (ref 135–420)
PMV BLD AUTO: 10.1 FL (ref 9.2–11.8)
POTASSIUM SERPL-SCNC: 4.3 MMOL/L (ref 3.5–5.5)
PROT SERPL-MCNC: 6.3 G/DL (ref 6.4–8.2)
PROT UR STRIP-MCNC: 300 MG/DL
RBC # BLD AUTO: 3.81 M/UL (ref 4.7–5.5)
RBC #/AREA URNS HPF: NORMAL /HPF (ref 0–5)
SODIUM SERPL-SCNC: 137 MMOL/L (ref 136–145)
SP GR UR REFRACTOMETRY: 1.01 (ref 1–1.03)
SPECIMEN EXP DATE BLD: NORMAL
SPERM URNS QL MICRO: NORMAL
TROPONIN I SERPL-MCNC: 0.06 NG/ML (ref 0–0.04)
UROBILINOGEN UR QL STRIP.AUTO: 0.2 EU/DL (ref 0.2–1)
WBC # BLD AUTO: 12.5 K/UL (ref 4.6–13.2)
WBC URNS QL MICRO: NORMAL /HPF (ref 0–5)

## 2020-10-23 PROCEDURE — 86900 BLOOD TYPING SEROLOGIC ABO: CPT

## 2020-10-23 PROCEDURE — 99285 EMERGENCY DEPT VISIT HI MDM: CPT

## 2020-10-23 PROCEDURE — 74011250636 HC RX REV CODE- 250/636: Performed by: EMERGENCY MEDICINE

## 2020-10-23 PROCEDURE — 65270000029 HC RM PRIVATE

## 2020-10-23 PROCEDURE — 96368 THER/DIAG CONCURRENT INF: CPT

## 2020-10-23 PROCEDURE — 74011250636 HC RX REV CODE- 250/636

## 2020-10-23 PROCEDURE — 80053 COMPREHEN METABOLIC PANEL: CPT

## 2020-10-23 PROCEDURE — 74011250636 HC RX REV CODE- 250/636: Performed by: INTERNAL MEDICINE

## 2020-10-23 PROCEDURE — 85025 COMPLETE CBC W/AUTO DIFF WBC: CPT

## 2020-10-23 PROCEDURE — 74011000250 HC RX REV CODE- 250: Performed by: EMERGENCY MEDICINE

## 2020-10-23 PROCEDURE — 96375 TX/PRO/DX INJ NEW DRUG ADDON: CPT

## 2020-10-23 PROCEDURE — 96367 TX/PROPH/DG ADDL SEQ IV INF: CPT

## 2020-10-23 PROCEDURE — 83605 ASSAY OF LACTIC ACID: CPT

## 2020-10-23 PROCEDURE — 74011000258 HC RX REV CODE- 258: Performed by: EMERGENCY MEDICINE

## 2020-10-23 PROCEDURE — 75810000455 HC PLCMT CENT VENOUS CATH LVL 2 5182

## 2020-10-23 PROCEDURE — C1751 CATH, INF, PER/CENT/MIDLINE: HCPCS

## 2020-10-23 PROCEDURE — 93005 ELECTROCARDIOGRAM TRACING: CPT

## 2020-10-23 PROCEDURE — 82962 GLUCOSE BLOOD TEST: CPT

## 2020-10-23 PROCEDURE — 83036 HEMOGLOBIN GLYCOSYLATED A1C: CPT

## 2020-10-23 PROCEDURE — 80307 DRUG TEST PRSMV CHEM ANLYZR: CPT

## 2020-10-23 PROCEDURE — 96365 THER/PROPH/DIAG IV INF INIT: CPT

## 2020-10-23 PROCEDURE — 02HV33Z INSERTION OF INFUSION DEVICE INTO SUPERIOR VENA CAVA, PERCUTANEOUS APPROACH: ICD-10-PCS | Performed by: EMERGENCY MEDICINE

## 2020-10-23 PROCEDURE — P9047 ALBUMIN (HUMAN), 25%, 50ML: HCPCS | Performed by: INTERNAL MEDICINE

## 2020-10-23 PROCEDURE — 81001 URINALYSIS AUTO W/SCOPE: CPT

## 2020-10-23 PROCEDURE — 87086 URINE CULTURE/COLONY COUNT: CPT

## 2020-10-23 PROCEDURE — 82550 ASSAY OF CK (CPK): CPT

## 2020-10-23 PROCEDURE — 87040 BLOOD CULTURE FOR BACTERIA: CPT

## 2020-10-23 PROCEDURE — 71045 X-RAY EXAM CHEST 1 VIEW: CPT

## 2020-10-23 PROCEDURE — 70450 CT HEAD/BRAIN W/O DYE: CPT

## 2020-10-23 RX ORDER — SODIUM CHLORIDE 0.9 % (FLUSH) 0.9 %
5-10 SYRINGE (ML) INJECTION AS NEEDED
Status: DISCONTINUED | OUTPATIENT
Start: 2020-10-23 | End: 2020-10-30 | Stop reason: SDUPTHER

## 2020-10-23 RX ORDER — ACETAMINOPHEN 325 MG/1
650 TABLET ORAL
Status: DISCONTINUED | OUTPATIENT
Start: 2020-10-23 | End: 2020-11-20 | Stop reason: HOSPADM

## 2020-10-23 RX ORDER — SODIUM CHLORIDE 9 MG/ML
75 INJECTION, SOLUTION INTRAVENOUS CONTINUOUS
Status: DISCONTINUED | OUTPATIENT
Start: 2020-10-23 | End: 2020-10-24

## 2020-10-23 RX ORDER — DEXTROSE MONOHYDRATE 100 MG/ML
125-250 INJECTION, SOLUTION INTRAVENOUS AS NEEDED
Status: DISCONTINUED | OUTPATIENT
Start: 2020-10-23 | End: 2020-11-05

## 2020-10-23 RX ORDER — ALBUMIN HUMAN 250 G/1000ML
12.5 SOLUTION INTRAVENOUS EVERY 6 HOURS
Status: DISCONTINUED | OUTPATIENT
Start: 2020-10-23 | End: 2020-10-26

## 2020-10-23 RX ORDER — NOREPINEPHRINE BIT/0.9 % NACL 8 MG/250ML
.5-3 INFUSION BOTTLE (ML) INTRAVENOUS
Status: DISCONTINUED | OUTPATIENT
Start: 2020-10-23 | End: 2020-10-25

## 2020-10-23 RX ORDER — SODIUM CHLORIDE 0.9 % (FLUSH) 0.9 %
5-40 SYRINGE (ML) INJECTION AS NEEDED
Status: DISCONTINUED | OUTPATIENT
Start: 2020-10-23 | End: 2020-11-20 | Stop reason: HOSPADM

## 2020-10-23 RX ORDER — ACETAMINOPHEN 650 MG/1
650 SUPPOSITORY RECTAL
Status: DISCONTINUED | OUTPATIENT
Start: 2020-10-23 | End: 2020-11-20 | Stop reason: HOSPADM

## 2020-10-23 RX ORDER — BUDESONIDE 0.25 MG/2ML
500 INHALANT ORAL
Status: DISCONTINUED | OUTPATIENT
Start: 2020-10-23 | End: 2020-11-05

## 2020-10-23 RX ORDER — PROMETHAZINE HYDROCHLORIDE 25 MG/1
12.5 TABLET ORAL
Status: DISCONTINUED | OUTPATIENT
Start: 2020-10-23 | End: 2020-11-20 | Stop reason: HOSPADM

## 2020-10-23 RX ORDER — LEVOFLOXACIN 5 MG/ML
750 INJECTION, SOLUTION INTRAVENOUS EVERY 24 HOURS
Status: DISCONTINUED | OUTPATIENT
Start: 2020-10-23 | End: 2020-10-24

## 2020-10-23 RX ORDER — SODIUM CHLORIDE 9 MG/ML
100 INJECTION, SOLUTION INTRAVENOUS CONTINUOUS
Status: DISCONTINUED | OUTPATIENT
Start: 2020-10-23 | End: 2020-10-23

## 2020-10-23 RX ORDER — POLYETHYLENE GLYCOL 3350 17 G/17G
17 POWDER, FOR SOLUTION ORAL DAILY PRN
Status: DISCONTINUED | OUTPATIENT
Start: 2020-10-23 | End: 2020-11-20 | Stop reason: HOSPADM

## 2020-10-23 RX ORDER — MAGNESIUM SULFATE 100 %
4 CRYSTALS MISCELLANEOUS AS NEEDED
Status: DISCONTINUED | OUTPATIENT
Start: 2020-10-23 | End: 2020-11-05

## 2020-10-23 RX ORDER — SODIUM CHLORIDE 0.9 % (FLUSH) 0.9 %
5-40 SYRINGE (ML) INJECTION EVERY 8 HOURS
Status: DISCONTINUED | OUTPATIENT
Start: 2020-10-23 | End: 2020-11-20 | Stop reason: HOSPADM

## 2020-10-23 RX ORDER — FENTANYL CITRATE 50 UG/ML
INJECTION, SOLUTION INTRAMUSCULAR; INTRAVENOUS
Status: COMPLETED
Start: 2020-10-23 | End: 2020-10-23

## 2020-10-23 RX ORDER — INSULIN LISPRO 100 [IU]/ML
INJECTION, SOLUTION INTRAVENOUS; SUBCUTANEOUS
Status: DISCONTINUED | OUTPATIENT
Start: 2020-10-23 | End: 2020-10-30

## 2020-10-23 RX ORDER — ENOXAPARIN SODIUM 100 MG/ML
40 INJECTION SUBCUTANEOUS DAILY
Status: DISCONTINUED | OUTPATIENT
Start: 2020-10-24 | End: 2020-10-24

## 2020-10-23 RX ORDER — PANTOPRAZOLE SODIUM 40 MG/10ML
40 INJECTION, POWDER, LYOPHILIZED, FOR SOLUTION INTRAVENOUS DAILY
Status: DISCONTINUED | OUTPATIENT
Start: 2020-10-24 | End: 2020-10-23 | Stop reason: SDUPTHER

## 2020-10-23 RX ORDER — ONDANSETRON 2 MG/ML
4 INJECTION INTRAMUSCULAR; INTRAVENOUS
Status: DISCONTINUED | OUTPATIENT
Start: 2020-10-23 | End: 2020-11-20 | Stop reason: HOSPADM

## 2020-10-23 RX ORDER — LORAZEPAM 2 MG/ML
0.5 INJECTION INTRAMUSCULAR ONCE
Status: COMPLETED | OUTPATIENT
Start: 2020-10-23 | End: 2020-10-23

## 2020-10-23 RX ORDER — FENTANYL CITRATE 50 UG/ML
50 INJECTION, SOLUTION INTRAMUSCULAR; INTRAVENOUS ONCE
Status: COMPLETED | OUTPATIENT
Start: 2020-10-23 | End: 2020-10-23

## 2020-10-23 RX ORDER — IPRATROPIUM BROMIDE AND ALBUTEROL SULFATE 2.5; .5 MG/3ML; MG/3ML
3 SOLUTION RESPIRATORY (INHALATION)
Status: DISCONTINUED | OUTPATIENT
Start: 2020-10-23 | End: 2020-10-24

## 2020-10-23 RX ADMIN — FENTANYL CITRATE 50 MCG: 50 INJECTION, SOLUTION INTRAMUSCULAR; INTRAVENOUS at 20:00

## 2020-10-23 RX ADMIN — ALBUMIN (HUMAN) 12.5 G: 0.25 INJECTION, SOLUTION INTRAVENOUS at 21:37

## 2020-10-23 RX ADMIN — VANCOMYCIN HYDROCHLORIDE 1000 MG: 1 INJECTION, POWDER, LYOPHILIZED, FOR SOLUTION INTRAVENOUS at 21:09

## 2020-10-23 RX ADMIN — PIPERACILLIN AND TAZOBACTAM 4.5 G: 4; .5 INJECTION, POWDER, LYOPHILIZED, FOR SOLUTION INTRAVENOUS; PARENTERAL at 18:07

## 2020-10-23 RX ADMIN — Medication 2 MCG/MIN: at 18:23

## 2020-10-23 RX ADMIN — SODIUM CHLORIDE 10 ML: 9 INJECTION, SOLUTION INTRAMUSCULAR; INTRAVENOUS; SUBCUTANEOUS at 22:00

## 2020-10-23 RX ADMIN — LEVOFLOXACIN 750 MG: 5 INJECTION, SOLUTION INTRAVENOUS at 18:49

## 2020-10-23 RX ADMIN — LORAZEPAM 0.5 MG: 2 INJECTION INTRAMUSCULAR; INTRAVENOUS at 18:45

## 2020-10-23 RX ADMIN — SODIUM CHLORIDE 1000 ML: 900 INJECTION, SOLUTION INTRAVENOUS at 17:13

## 2020-10-23 RX ADMIN — SODIUM CHLORIDE 100 ML/HR: 900 INJECTION, SOLUTION INTRAVENOUS at 18:52

## 2020-10-23 NOTE — ED TRIAGE NOTES
Patient arrives via EMS from home residence d/t initial complaint of chest pain between the shoulders. Patient seen and d/c from this ED and dx'd with PNA. Per EMS patient was AO en route until ambulance reached [facility] grounds when patient reported \"I'm going to pass out. \"  EMS reported patient was HYPERtensive
straight cane

## 2020-10-23 NOTE — PROGRESS NOTES
Vancomycin - Pharmacy to Dose    Consult provided for this 63y.o. year old ,male for indication of HCAP. Therapy day 1        Wt Readings from Last 1 Encounters:   10/23/20 40.8 kg (90 lb)       Ht Readings from Last 1 Encounters:   10/15/20 162.6 cm (64\")     Dosing Weight: 40.8 kg    Additional Antibiotics:  Levofloxacin. Zosyn    Date:  10/23/20   Lab Results   Component Value Date/Time    Creatinine 1.19 10/23/2020 04:11 PM     creatinine clearance:  54.6 ml/min    white blood cell count:  12.5    Vancomycin 1000 mg IV ordered. Will continue with Vancomycin 750 mg IV q12hr    Trough Level after 4-5 doses infused. Dose calculated to approximate a therapeutic trough of 15-20mcg/mL. Pharmacy to follow daily and will make changes to dose and/or frequency based on clinical status.         [id number]

## 2020-10-23 NOTE — ED NOTES
Bear-hugger applied to patient at this time d/t rectal temp    Emergency NS IV bolus infusions started d/t recorded BPs. 2L NS.  Beto Barger MD observed

## 2020-10-23 NOTE — ED PROVIDER NOTES
EMERGENCY DEPARTMENT HISTORY AND PHYSICAL EXAM    Date: 10/23/2020  Patient Name: León Antony.    History of Presenting Illness     Chief Complaint   Patient presents with    Chest Pain         History Provided By: Patient    [address] [facility]. is a 63 y.o. male with PMHX of COPD, hypertension, diabetes, high cholesterol, chronic pain, above-knee amputations bilaterally, dementia, tobacco use, legally blind who presents to the emergency department C/O weakness. Patient arrives by EMS that was called by his son. Patient was admitted to this hospital on October 14 for sepsis and treated for pneumonia. He has been seen on October 21, October 22, and today at outside hospitals for pneumonia/chest pain symptoms. Patient is poor historian, not very cooperative, and agitated. Does not provide much history. He is demanding something to help him sleep. He says he is taking the antibiotics that were prescribed to him for PNA.            PCP: Sendy Millan MD    Current Facility-Administered Medications   Medication Dose Route Frequency Provider Last Rate Last Dose    fentaNYL citrate (PF) 50 mcg/mL injection             sodium chloride (NS) flush 5-10 mL  5-10 mL IntraVENous PRN Violeta Hodgkins, MD        NOREPINephrine (LEVOPHED) 8 mg in 0.9% NS 250ml infusion  0.5-30 mcg/min IntraVENous TITRATE Violeta Hodgkins, MD 7.5 mL/hr at 10/23/20 1918 4 mcg/min at 10/23/20 1918    piperacillin-tazobactam (ZOSYN) 4.5 g in 0.9% sodium chloride (MBP/ADV) 100 mL MBP  4.5 g IntraVENous Q6H Violeta Hodgkins, MD   Stopped at 10/23/20 1837    levoFLOXacin (LEVAQUIN) 750 mg in D5W IVPB  750 mg IntraVENous Q24H Violeta Hodgkins,  mL/hr at 10/23/20 1849 750 mg at 10/23/20 1849    vancomycin (VANCOCIN) 1,000 mg in 0.9% sodium chloride 250 mL (VIAL-MATE)  1,000 mg IntraVENous ONCE Violeta Hodgkins, MD        [START ON 10/24/2020] vancomycin (VANCOCIN) 750 mg in 0.9% sodium chloride 250 mL (VIAL-MATE)  750 mg IntraVENous Q12H Radha Franco MD        Vancomycin - Pharmacokinetic Dosing  1 Each Other Rx Dosing/Monitoring Bradly Dalton MD        0.9% sodium chloride infusion  100 mL/hr IntraVENous CONTINUOUS Bradly Dalton  mL/hr at 10/23/20 1852 100 mL/hr at 10/23/20 1852    fentaNYL citrate (PF) injection 50 mcg  50 mcg IntraVENous ONCE Bradly Dalton MD         Current Outpatient Medications   Medication Sig Dispense Refill    amoxicillin-clavulanate (AUGMENTIN) 875-125 mg per tablet Take 1 Tab by mouth every twelve (12) hours. 4 Tab 0    acetaminophen (TYLENOL) 325 mg tablet Take  by mouth every four (4) hours as needed for Pain.  amLODIPine (NORVASC) 5 mg tablet Take 5 mg by mouth daily.  gabapentin (NEURONTIN) 100 mg capsule Take 100 mg by mouth three (3) times daily.  clonazePAM (KLONOPIN) 0.5 mg tablet Take 0.5 mg by mouth nightly as needed.  atorvastatin (LIPITOR) 20 mg tablet Take 20 mg by mouth daily.  nitroglycerin (NITROSTAT) 0.4 mg SL tablet 0.4 mg by SubLINGual route every five (5) minutes as needed for Chest Pain. Up to 3 doses.  metoprolol succinate (TOPROL XL) 25 mg XL tablet Take 25 mg by mouth daily.  oxyCODONE-acetaminophen (PERCOCET) 5-325 mg per tablet Take  by mouth every four (4) hours as needed for Pain.  traMADol (ULTRAM) 50 mg tablet Take 1 Tab by mouth every six (6) hours as needed for Pain. Max Daily Amount: 200 mg. 8 Tab 0    simvastatin (ZOCOR) 20 mg tablet Take 20 mg by mouth nightly.  lisinopril-hydrochlorothiazide (PRINZIDE, ZESTORETIC) 20-12.5 mg per tablet Take 1 Tab by mouth daily.  isosorbide mononitrate ER (IMDUR) 30 mg tablet Take 30 mg by mouth daily.          Past History     Past Medical History:  Past Medical History:   Diagnosis Date    Amputation of both lower extremities ([address]     Amputee, above knee, left ([address]     At risk for falls     Chronic pain     back and legs  Diabetes ([address]     Hypercholesterolemia     Hypertension     Polio        Past Surgical History:  Past Surgical History:   Procedure Laterality Date    HX HERNIA REPAIR      HX OTHER SURGICAL      carpal tunnel     HX OTHER SURGICAL      cyst       Family History:  Family History   Problem Relation Age of Onset    Heart Attack Mother     Heart Attack Father     Malignant Hyperthermia Neg Hx     Pseudocholinesterase Deficiency Neg Hx     Delayed Awakening Neg Hx     Post-op Nausea/Vomiting Neg Hx     Emergence Delirium Neg Hx     Post-op Cognitive Dysfunction Neg Hx     Other Neg Hx        Social History:  Social History     Tobacco Use    Smoking status: Current Every Day Smoker     Packs/day: 2.00     Years: 40.00     Pack years: 80.00   Substance Use Topics    Alcohol use: No    Drug use: No       Allergies:  No Known Allergies      Review of Systems   Review of Systems   Unable to perform ROS: Dementia         Physical Exam     Vitals:    10/23/20 1900 10/23/20 1910 10/23/20 1920 10/23/20 1930   BP: (!) 89/53 (!) 85/43 (!) 104/54 (!) 85/58   Pulse: 79  78 67   Resp: 22  21 22   Temp:       SpO2:       Weight:         Physical Exam  Vitals signs and nursing note reviewed. Constitutional:       General: He is not in acute distress. Appearance: Normal appearance. He is ill-appearing. Comments: Poorly groomed and chronically ill-appearing male with b/l AKA   HENT:      Head: Normocephalic and atraumatic. Eyes:      Extraocular Movements: Extraocular movements intact. Conjunctiva/sclera: Conjunctivae normal.   Neck:      Musculoskeletal: Normal range of motion and neck supple. Cardiovascular:      Rate and Rhythm: Normal rate and regular rhythm. Heart sounds: Normal heart sounds. Pulmonary:      Effort: Pulmonary effort is normal. No accessory muscle usage. Breath sounds: Decreased breath sounds present. Chest:      Chest wall: No mass or tenderness.    Abdominal: General: There is no abdominal bruit. Palpations: Abdomen is soft. Tenderness: There is no abdominal tenderness. Musculoskeletal:         General: No deformity. Skin:     General: Skin is dry. Coloration: Skin is pale. Neurological:      General: No focal deficit present. Mental Status: He is alert. Mental status is at baseline. He is disoriented. Psychiatric:         Behavior: Behavior is agitated. Diagnostic Study Results     Labs -     Recent Results (from the past 12 hour(s))   EKG, 12 LEAD, INITIAL    Collection Time: 10/23/20  4:08 PM   Result Value Ref Range    Ventricular Rate 60 BPM    Atrial Rate 60 BPM    P-R Interval 130 ms    QRS Duration 94 ms    Q-T Interval 518 ms    QTC Calculation (Bezet) 518 ms    Calculated P Axis 61 degrees    Calculated R Axis 78 degrees    Calculated T Axis 112 degrees    Diagnosis       Normal sinus rhythm  Prolonged QT  Abnormal ECG  When compared with ECG of 18-OCT-2020 13:44,  Vent. rate has decreased BY  48 BPM  Criteria for Inferior infarct are no longer present  ST no longer depressed in Anterior leads  T wave inversion now evident in Anterior leads  QT has lengthened     CBC WITH AUTOMATED DIFF    Collection Time: 10/23/20  4:11 PM   Result Value Ref Range    WBC 12.5 4.6 - 13.2 K/uL    RBC 3.81 (L) 4.70 - 5.50 M/uL    HGB 10.9 (L) 13.0 - 16.0 g/dL    HCT 32.9 (L) 36.0 - 48.0 %    MCV 86.4 74.0 - 97.0 FL    MCH 28.6 24.0 - 34.0 PG    MCHC 33.1 31.0 - 37.0 g/dL    RDW 16.8 (H) 11.6 - 14.5 %    PLATELET 372 539 - 987 K/uL    MPV 10.1 9.2 - 11.8 FL    NEUTROPHILS 85 (H) 40 - 73 %    LYMPHOCYTES 9 (L) 21 - 52 %    MONOCYTES 5 3 - 10 %    EOSINOPHILS 1 0 - 5 %    BASOPHILS 0 0 - 2 %    ABS. NEUTROPHILS 10.5 (H) 1.8 - 8.0 K/UL    ABS. LYMPHOCYTES 1.1 0.9 - 3.6 K/UL    ABS. MONOCYTES 0.6 0.05 - 1.2 K/UL    ABS. EOSINOPHILS 0.2 0.0 - 0.4 K/UL    ABS.  BASOPHILS 0.0 0.0 - 0.1 K/UL    DF AUTOMATED     METABOLIC PANEL, COMPREHENSIVE Collection Time: 10/23/20  4:11 PM   Result Value Ref Range    Sodium 137 136 - 145 mmol/L    Potassium 4.3 3.5 - 5.5 mmol/L    Chloride 100 100 - 111 mmol/L    CO2 25 21 - 32 mmol/L    Anion gap 12 3.0 - 18 mmol/L    Glucose 108 (H) 74 - 99 mg/dL    BUN 23 (H) 7.0 - 18 MG/DL    Creatinine 1.19 0.6 - 1.3 MG/DL    BUN/Creatinine ratio 19 12 - 20      GFR est AA >60 >60 ml/min/1.73m2    GFR est non-AA >60 >60 ml/min/1.73m2    Calcium 8.9 8.5 - 10.1 MG/DL    Bilirubin, total 0.2 0.2 - 1.0 MG/DL    ALT (SGPT) 42 16 - 61 U/L    AST (SGOT) 53 (H) 10 - 38 U/L    Alk.  phosphatase 100 45 - 117 U/L    Protein, total 6.3 (L) 6.4 - 8.2 g/dL    Albumin 2.5 (L) 3.4 - 5.0 g/dL    Globulin 3.8 2.0 - 4.0 g/dL    A-G Ratio 0.7 (L) 0.8 - 1.7     LACTIC ACID    Collection Time: 10/23/20  4:11 PM   Result Value Ref Range    Lactic acid 3.3 (HH) 0.4 - 2.0 MMOL/L   ETHYL ALCOHOL    Collection Time: 10/23/20  4:11 PM   Result Value Ref Range    ALCOHOL(ETHYL),SERUM <3 0 - 3 MG/DL   CARDIAC PANEL,(CK, CKMB & TROPONIN)    Collection Time: 10/23/20  4:11 PM   Result Value Ref Range    CK - MB 11.6 (H) <3.6 ng/ml    CK-MB Index 2.5 0.0 - 4.0 %     (H) 39 - 308 U/L    Troponin-I, QT 0.06 (H) 0.0 - 0.045 NG/ML   GLUCOSE, POC    Collection Time: 10/23/20  4:18 PM   Result Value Ref Range    Glucose (POC) 120 (H) 70 - 110 mg/dL   URINALYSIS W/ RFLX MICROSCOPIC    Collection Time: 10/23/20  4:30 PM   Result Value Ref Range    Color YELLOW      Appearance CLEAR      Specific gravity 1.011 1.005 - 1.030      pH (UA) 7.0 5.0 - 8.0      Protein 300 (A) NEG mg/dL    Glucose Negative NEG mg/dL    Ketone Negative NEG mg/dL    Bilirubin Negative NEG      Blood TRACE (A) NEG      Urobilinogen 0.2 0.2 - 1.0 EU/dL    Nitrites Negative NEG      Leukocyte Esterase Negative NEG     DRUG SCREEN, URINE    Collection Time: 10/23/20  4:30 PM   Result Value Ref Range    BENZODIAZEPINES Negative NEG      BARBITURATES Negative NEG      THC (TH-CANNABINOL) Positive (A) NEG      OPIATES Negative NEG      PCP(PHENCYCLIDINE) Negative NEG      COCAINE Negative NEG      AMPHETAMINES Negative NEG      METHADONE Negative NEG      HDSCOM (NOTE)    URINE MICROSCOPIC ONLY    Collection Time: 10/23/20  4:30 PM   Result Value Ref Range    WBC 0 to 2 0 - 5 /hpf    RBC 0 to 1 0 - 5 /hpf    Epithelial cells FEW 0 - 5 /lpf    Bacteria NONE SEEN NEG /hpf    Spermatozoa 1+    TYPE & SCREEN    Collection Time: 10/23/20  5:05 PM   Result Value Ref Range    Crossmatch Expiration 10/26/2020     ABO/Rh(D) A POSITIVE     Antibody screen NEG    LACTIC ACID    Collection Time: 10/23/20  5:05 PM   Result Value Ref Range    Lactic acid 2.7 (HH) 0.4 - 2.0 MMOL/L       Radiologic Studies -   XR CHEST PORT    (Results Pending)   CT HEAD WO CONT    (Results Pending)     CT Results  (Last 48 hours)    None        CXR Results  (Last 48 hours)    None          Medications given in the ED-  Medications   sodium chloride (NS) flush 5-10 mL (has no administration in time range)   NOREPINephrine (LEVOPHED) 8 mg in 0.9% NS 250ml infusion (4 mcg/min IntraVENous Rate Change 10/23/20 1918)   piperacillin-tazobactam (ZOSYN) 4.5 g in 0.9% sodium chloride (MBP/ADV) 100 mL MBP (0 g IntraVENous IV Completed 10/23/20 1837)   levoFLOXacin (LEVAQUIN) 750 mg in D5W IVPB (750 mg IntraVENous New Bag 10/23/20 1849)   vancomycin (VANCOCIN) 1,000 mg in 0.9% sodium chloride 250 mL (VIAL-MATE) (has no administration in time range)   vancomycin (VANCOCIN) 750 mg in 0.9% sodium chloride 250 mL (VIAL-MATE) (has no administration in time range)   Vancomycin - Pharmacokinetic Dosing (has no administration in time range)   0.9% sodium chloride infusion (100 mL/hr IntraVENous New Bag 10/23/20 1852)   fentaNYL citrate (PF) injection 50 mcg (has no administration in time range)   fentaNYL citrate (PF) 50 mcg/mL injection (has no administration in time range)   sodium chloride 0.9 % bolus infusion 1,000 mL (0 mL IntraVENous IV Completed 10/23/20 6692)   LORazepam (ATIVAN) injection 0.5 mg (0.5 mg IntraVENous Given 10/23/20 7245)         Medical Decision Making   I am the first provider for this patient. I reviewed the vital signs, available nursing notes, past medical history, past surgical history, family history and social history. Vital Signs-Reviewed the patient's vital signs. Pulse Oximetry Analysis and Interpretation:   97% on RA, normal    Cardiac Monitor:  Interpreted by Dr. Brandee Lacey at 8129   Rate: 74 bpm  Rhythm: nsr    EKG interpretation: (Preliminary)  Interpreted by Brandee Lacey MD at 4668  Sinus rhythm rate of 60, QTc 518, normal axis, nonspecific EKG    CXR  interpretation: (Preliminary)  CXR read by Dr. Brandee Lacey    Right apical opacity, emphysema       Records Reviewed: Nursing Notes and Old Medical Records    Provider Notes (Medical Decision Making): Brooklyn Garcia is a 63 y.o. male  Who is here with concerns for sepsis and septic shock. Sepsis work-up initiated. Patient with right upper consolidation on x-ray seems grossly unchanged, possibly slightly worse from prior. Patient was recently treated at this hospital for community-acquired pneumonia discharged with a prescription for Augmentin which was never filled per OSH documenation. He subsequently went to [facility] where he had a CTA which confirmed right upper lobe pneumonia but no PE and possible neoplasm. He returned the following 2 days and has been discharged both times. On arrival today patient is hypotensive however other vital signs are normal and patient hypothermic and cold to touch. Not sure where he is coming from other documentation states him and his son are sharing a room at [facility].      Procedures:  Central Line    Date/Time: 10/23/2020 8:18 PM  Performed by: Brunilda Quiles MD  Authorized by: Brunilda Quiles MD     Consent:     Consent obtained:  Verbal and emergent situation    Consent given by:  Patient (Son)    Alternatives discussed:  Alternative treatment  Pre-procedure details:     Hand hygiene: Hand hygiene performed prior to insertion      Skin preparation:  2% chlorhexidine    Skin preparation agent: Skin preparation agent completely dried prior to procedure    Sedation:     Sedation type: Anxiolysis  Anesthesia (see MAR for exact dosages): Anesthesia method:  Local infiltration    Local anesthetic:  Lidocaine 1% w/o epi  Procedure details:     Location:  R femoral    Patient position:  Flat    Procedural supplies:  Triple lumen    Catheter size:  7 Fr    Landmarks identified: yes      Ultrasound guidance: yes      Sterile ultrasound techniques: Sterile gel and sterile probe covers were used      Number of attempts:  1    Successful placement: yes    Post-procedure details:     Post-procedure:  Dressing applied and line sutured    Assessment:  Blood return through all ports and free fluid flow    Patient tolerance of procedure: Tolerated well, no immediate complications        ED Course:   Blood pressure has improved with IV fluids and low-dose peripheral Levophed. Patient is quite agitated sitting up yelling in bed requesting something to help him sleep and also having bowel movement in bed. He is not obtunded, no signs of endorgan injury and his lactate is trending down. We have attempted to call patient's son who is not answering the phone. I do not think patient has capacity to make medical decisions for himself at this time but is also not cooperative for central line placement or other sort of aggressive treatments either    Troponin this morning outside hospital 0.05    CXR this mornin.  Chronic obstructive lung disease    2. Improving right upper lobe airspace opacity consistent with resolving    pneumonia.  Further follow-up by chest radiography recommended.        The follow-up recommendation protocol will be executed to allow for    notification of the appropriate health care provider.          7:25 PM  I spoke with patient's son Tremayne Crouch updated him of patient status  He tells me he called the ambulance today because his father has not been acting appropriately ever since leaving [facility] hospitalist this morning  He tells me that patient took large pills twice a day after discharge from [facility] that he believes are antibiotics for pneumonia  [phone]    CONSULT NOTE:   7:46 PM   Dr. Benoit Del Angel discussed care with Dr Pearl Shell, Hospitalist  It was a standard discussion, including history of patients chief complaint, available diagnostic results, and treatment course. Discussed case. Requests ICU discussion    CONSULT NOTE:   7:55 PM   Dr. Benoit Del Angel discussed care with Dr Sarah Tepmle, ICU  It was a standard discussion, including history of patients chief complaint, available diagnostic results, and treatment course. Discussed case. Agrees with admission      8:17 PM  Right fem line placed      Diagnosis and Disposition     8:18 PM  I have spent 64 minutes of critical care time involved in lab review, consultations with specialist, family decision-making, and documentation. During this entire length of time I was immediately available to the patient. Critical Care: The reason for providing this level of medical care for this critically ill patient was due a critical illness that impaired one or more vital organ systems such that there was a high probability of imminent or life threatening deterioration in the patients condition. This care involved high complexity decision making to assess, manipulate, and support vital system functions, to treat this degreee vital organ system failure and to prevent further life threatening deterioration of the patients condition. Core Measures:  For Hospitalized Patients:    1. Hospitalization Decision Time:  The decision to hospitalize the patient was made by Dr Benoit Del Angel at 2019 on 10/23/2020    2.  Aspirin: Aspirin was not given because the patient did not present with a stroke at the time of their Emergency Department evaluation    8:18 PM  Patient is being admitted to the hospital by Dr Prasad Dawkins. The results of their tests and reasons for their admission have been discussed with them and/or available family. They convey agreement and understanding for the need to be admitted and for their admission diagnosis. CONDITIONS ON ADMISSION:  Sepsis is present at the time of admission. Deep Vein Thrombosis is not present at the time of admission. Thrombosis is not present at the time of admission. Urinary Tract Infection is not present at the time of admission. Pneumonia is present at the time of admission. MRSA is not present at the time of admission. Wound infection is not present at the time of admission. Pressure Ulcer is not present at the time of admission. CLINICAL IMPRESSION:    1. Septic shock ([address]    2. HCAP (healthcare-associated pneumonia)    3. Metabolic encephalopathy    4. Elevated troponin      __________________________      Please note that this dictation was completed with [employer], the computer voice recognition software. Quite often unanticipated grammatical, syntax, homophones, and other interpretive errors are inadvertently transcribed by the computer software. Please disregard these errors. Please excuse any errors that have escaped final proofreading.

## 2020-10-23 NOTE — H&P
History & Physical    Patient: Maximino Tuttle Sr. MRN: [id number]  CSN: [id number]    YOB: 1955  Age: 63 y.o.   Sex: male      DOA: 10/23/2020    Chief Complaint:   Chief Complaint   Patient presents with    Chest Pain          HPI:     León Duran is a 63 y.o.  male who has history of COPD emphysema bilateral above-knee amputation recently discharged from our hospital with pneumonia since discharge about a week ago patient has been in the emergency room 3-4 times at other facilities with complaints of cough shortness of breath and hypoxemia 2 days ago he had a CT scan done at [facility] which showed persistent pneumonia patient had not picked up his antibiotics that he was discharged on until recently or his medications   In the emergency room he was found to be confused and combative with hypothermia and hypotension he was treated with fluids 30 cc/kg and Levophed hypothermia and hypotension improved with pressor support and warming I am asked to admit for further treatment  With convincing a central line was placed in the femoral area for Levophed      Past Medical History:   Diagnosis Date    Amputation of both lower extremities ([address]     Amputee, above knee, left ([address]     At risk for falls     Chronic pain     back and legs    Diabetes ([address]     Hypercholesterolemia     Hypertension     Polio        Past Surgical History:   Procedure Laterality Date    HX HERNIA REPAIR      HX OTHER SURGICAL      carpal tunnel     HX OTHER SURGICAL      cyst       Family History   Problem Relation Age of Onset    Heart Attack Mother     Heart Attack Father     Malignant Hyperthermia Neg Hx     Pseudocholinesterase Deficiency Neg Hx     Delayed Awakening Neg Hx     Post-op Nausea/Vomiting Neg Hx     Emergence Delirium Neg Hx     Post-op Cognitive Dysfunction Neg Hx     Other Neg Hx        Social History     Socioeconomic History    Marital status:      Spouse name: Not on file  Number of children: Not on file    Years of education: Not on file    Highest education level: Not on file   Tobacco Use    Smoking status: Current Every Day Smoker     Packs/day: 2.00     Years: 40.00     Pack years: 80.00   Substance and Sexual Activity    Alcohol use: No    Drug use: No       Prior to Admission medications    Medication Sig Start Date End Date Taking? Authorizing Provider   amoxicillin-clavulanate (AUGMENTIN) 875-125 mg per tablet Take 1 Tab by mouth every twelve (12) hours. 10/20/20   Jason Gimenez MD   acetaminophen (TYLENOL) 325 mg tablet Take  by mouth every four (4) hours as needed for Pain. Tiffany Saravia MD   amLODIPine (NORVASC) 5 mg tablet Take 5 mg by mouth daily. Tiffany Saravia MD   gabapentin (NEURONTIN) 100 mg capsule Take 100 mg by mouth three (3) times daily. Tiffany Saravia MD   clonazePAM (KLONOPIN) 0.5 mg tablet Take 0.5 mg by mouth nightly as needed. Tiffany Saravia MD   atorvastatin (LIPITOR) 20 mg tablet Take 20 mg by mouth daily. Tiffany Saravia MD   nitroglycerin (NITROSTAT) 0.4 mg SL tablet 0.4 mg by SubLINGual route every five (5) minutes as needed for Chest Pain. Up to 3 doses. Tiffany Saravia MD   metoprolol succinate (TOPROL XL) 25 mg XL tablet Take 25 mg by mouth daily. Tiffany Saravia MD   oxyCODONE-acetaminophen (PERCOCET) 5-325 mg per tablet Take  by mouth every four (4) hours as needed for Pain. Tiffany Saravia MD   traMADol (ULTRAM) 50 mg tablet Take 1 Tab by mouth every six (6) hours as needed for Pain. Max Daily Amount: 200 mg. 12/4/18   Bud Clayton MD   simvastatin (ZOCOR) 20 mg tablet Take 20 mg by mouth nightly. Provider, Historical   lisinopril-hydrochlorothiazide (PRINZIDE, ZESTORETIC) 20-12.5 mg per tablet Take 1 Tab by mouth daily. Provider, Historical   isosorbide mononitrate ER (IMDUR) 30 mg tablet Take 30 mg by mouth daily.     Provider, Historical       No Known Allergies      Review of Systems  GENERAL: Patient alert, awake and oriented to name and place not situation  able to communicate full sentences and not in distress. HEENT: No change in vision, no earache, tinnitus, sore throat or sinus congestion. NECK: No pain or stiffness. PULMONARY:+ shortness of breath, cough or wheeze. Cardiovascular: no pnd or orthopnea, + CP  GASTROINTESTINAL: No abdominal pain, nausea, vomiting or diarrhea, melena or bright red blood per rectum. GENITOURINARY: No urinary frequency, urgency, hesitancy or dysuria. MUSCULOSKELETAL: bilateral above knee amputations   DERMATOLOGIC: No rash, no itching, no lesions. ENDOCRINE: No polyuria, polydipsia, no heat or cold intolerance. No recent change in weight. HEMATOLOGICAL: No anemia or easy bruising or bleeding. NEUROLOGIC: No headache, seizures, numbness, tingling or weakness. Chronic neuropathic pain       Physical Exam:     Physical Exam:  Visit Vitals  BP (!) 85/58   Pulse 67   Temp 97.9 °F (36.6 °C)   Resp 22   Wt 40.8 kg (90 lb)   SpO2 97%   BMI 15.45 kg/m²      O2 Device: Room air    Temp (24hrs), Av.6 °F (35.9 °C), Min:95.3 °F (35.2 °C), Max:97.9 °F (36.6 °C)    No intake/output data recorded. 10/22 0701 - 10/23 1900  In: 1000 [I.V.:1000]  Out: -     General:  Alert, cooperative, no distress, appears older than stated age. Head: Normocephalic, without obvious abnormality, atraumatic. Eyes:  Conjunctivae/corneas clear. PERRL, EOMs intact. Nose: Nares normal. No drainage or sinus tenderness. Neck: Supple, symmetrical, trachea midline, no adenopathy, thyroid: no enlargement, no carotid bruit and no JVD. Lungs:   Clear to auscultation bilaterally. occ wheeze diminished breath sounds    Heart:  Regular rate and rhythm, S1, S2 normal.bradycardia      Abdomen: Soft, non-tender. Bowel sounds normal.    Extremities: Bilateral leg amputation stump clean dry intact  Right femoral chath    Pulses:  radial pulse intact .    Skin:  No rashes or lesions   Neurologic: AAOx person place not situation year or month        Labs Reviewed: All lab results for the last 24 hours reviewed. CXR,  and EKG    Procedures/imaging: see electronic medical records for all procedures/Xrays and details which were not copied into this note but were reviewed prior to creation of Plan    left  Assessment/Plan     Active Problems:  1. Cardiovascular septic shock  Currently on Levophed titrate to keep map over 65  Check cardiac enzymes  Check echocardiogram  Pending troponin trend may need cardiology consult    2. Pulmonary  COPD with right upper lobe pneumonia questionable mass  Started on duo nebs Pulmicort Pulmicort and IV steroids covering for hospital-acquired pneumonia with Zosyn vancomycin and Levaquin  Mucous plugging on outpatient CT scan RT consult    3. Heme:   DVT prevention with Lovenox monitor  CBC and platelets  IV albumin for hypotension  Question neoplasm on CT at outside facility await pulmonary input may need repeat CT scan after few days of  IVantibiotic     4. FEN  Placed on ICU protocol monitor I's and O's    5. ID: Aspiration pneumonia on Zosyn sepsis  Blood cultures obtained  On broad antibiotics    6. GI:  Protonix for stress prophylaxis    7.:  Vascular  Bilateral amputee above-knee    8. Endocrine  Sliding scale insulin diabetic diet     9.   Neuro:  Metabolic encephalopathy   CT of head pending continue Klonopin as needed expect improvement as infections improve    DVT/GI Prophylaxis: lovenox/Protonix         Tyrone Flores MD  10/23/2020 7:52 PM

## 2020-10-23 NOTE — ED NOTES
Per verbal orders via Emily Newby MD to start rx Levophed, see MAR, despite no central line access for patient's recorded BPs

## 2020-10-24 PROBLEM — J44.1 CHRONIC OBSTRUCTIVE PULMONARY DISEASE WITH ACUTE EXACERBATION (HCC): Status: ACTIVE | Noted: 2020-10-24

## 2020-10-24 PROBLEM — G93.41 METABOLIC ENCEPHALOPATHY: Status: ACTIVE | Noted: 2020-10-24

## 2020-10-24 PROBLEM — E43 SEVERE PROTEIN-CALORIE MALNUTRITION (HCC): Status: ACTIVE | Noted: 2020-10-24

## 2020-10-24 LAB
ALBUMIN SERPL-MCNC: 2.2 G/DL (ref 3.4–5)
ALBUMIN/GLOB SERPL: 0.7 {RATIO} (ref 0.8–1.7)
ALP SERPL-CCNC: 79 U/L (ref 45–117)
ALT SERPL-CCNC: 33 U/L (ref 16–61)
ANION GAP SERPL CALC-SCNC: 10 MMOL/L (ref 3–18)
AST SERPL-CCNC: 43 U/L (ref 10–38)
ATRIAL RATE: 60 BPM
BACTERIA SPEC CULT: NORMAL
BASOPHILS # BLD: 0 K/UL (ref 0–0.1)
BASOPHILS NFR BLD: 0 % (ref 0–2)
BILIRUB SERPL-MCNC: 0.2 MG/DL (ref 0.2–1)
BUN SERPL-MCNC: 20 MG/DL (ref 7–18)
BUN/CREAT SERPL: 16 (ref 12–20)
CA-I SERPL-SCNC: 1.14 MMOL/L (ref 1.12–1.32)
CALCIUM SERPL-MCNC: 7.4 MG/DL (ref 8.5–10.1)
CALCULATED P AXIS, ECG09: 61 DEGREES
CALCULATED R AXIS, ECG10: 78 DEGREES
CALCULATED T AXIS, ECG11: 112 DEGREES
CHLORIDE SERPL-SCNC: 110 MMOL/L (ref 100–111)
CK MB CFR SERPL CALC: 2.4 % (ref 0–4)
CK MB CFR SERPL CALC: 2.6 % (ref 0–4)
CK MB CFR SERPL CALC: 2.6 % (ref 0–4)
CK MB CFR SERPL CALC: 2.7 % (ref 0–4)
CK MB SERPL-MCNC: 6 NG/ML (ref 5–25)
CK MB SERPL-MCNC: 7.7 NG/ML (ref 5–25)
CK MB SERPL-MCNC: 7.9 NG/ML (ref 5–25)
CK MB SERPL-MCNC: 9.1 NG/ML (ref 5–25)
CK SERPL-CCNC: 246 U/L (ref 39–308)
CK SERPL-CCNC: 283 U/L (ref 39–308)
CK SERPL-CCNC: 307 U/L (ref 39–308)
CK SERPL-CCNC: 352 U/L (ref 39–308)
CO2 SERPL-SCNC: 20 MMOL/L (ref 21–32)
CREAT SERPL-MCNC: 1.24 MG/DL (ref 0.6–1.3)
DIAGNOSIS, 93000: NORMAL
DIFFERENTIAL METHOD BLD: ABNORMAL
EOSINOPHIL # BLD: 0.1 K/UL (ref 0–0.4)
EOSINOPHIL NFR BLD: 1 % (ref 0–5)
ERYTHROCYTE [DISTWIDTH] IN BLOOD BY AUTOMATED COUNT: 17 % (ref 11.6–14.5)
EST. AVERAGE GLUCOSE BLD GHB EST-MCNC: 117 MG/DL
GLOBULIN SER CALC-MCNC: 3 G/DL (ref 2–4)
GLUCOSE BLD STRIP.AUTO-MCNC: 102 MG/DL (ref 70–110)
GLUCOSE BLD STRIP.AUTO-MCNC: 106 MG/DL (ref 70–110)
GLUCOSE BLD STRIP.AUTO-MCNC: 126 MG/DL (ref 70–110)
GLUCOSE BLD STRIP.AUTO-MCNC: 98 MG/DL (ref 70–110)
GLUCOSE SERPL-MCNC: 77 MG/DL (ref 74–99)
HBA1C MFR BLD: 5.7 % (ref 4.2–5.6)
HCT VFR BLD AUTO: 27.9 % (ref 36–48)
HGB BLD-MCNC: 9.1 G/DL (ref 13–16)
LACTATE SERPL-SCNC: 0.9 MMOL/L (ref 0.4–2)
LYMPHOCYTES # BLD: 1.3 K/UL (ref 0.9–3.6)
LYMPHOCYTES NFR BLD: 11 % (ref 21–52)
MAGNESIUM SERPL-MCNC: 1.4 MG/DL (ref 1.6–2.6)
MAGNESIUM SERPL-MCNC: 2.6 MG/DL (ref 1.6–2.6)
MCH RBC QN AUTO: 28.3 PG (ref 24–34)
MCHC RBC AUTO-ENTMCNC: 32.6 G/DL (ref 31–37)
MCV RBC AUTO: 86.6 FL (ref 74–97)
MONOCYTES # BLD: 1 K/UL (ref 0.05–1.2)
MONOCYTES NFR BLD: 8 % (ref 3–10)
NEUTS SEG # BLD: 9.3 K/UL (ref 1.8–8)
NEUTS SEG NFR BLD: 80 % (ref 40–73)
P-R INTERVAL, ECG05: 130 MS
PLATELET # BLD AUTO: 312 K/UL (ref 135–420)
PMV BLD AUTO: 9.9 FL (ref 9.2–11.8)
POTASSIUM SERPL-SCNC: 3.5 MMOL/L (ref 3.5–5.5)
POTASSIUM SERPL-SCNC: 3.6 MMOL/L (ref 3.5–5.5)
POTASSIUM SERPL-SCNC: 3.8 MMOL/L (ref 3.5–5.5)
PROT SERPL-MCNC: 5.2 G/DL (ref 6.4–8.2)
Q-T INTERVAL, ECG07: 518 MS
QRS DURATION, ECG06: 94 MS
QTC CALCULATION (BEZET), ECG08: 518 MS
RBC # BLD AUTO: 3.22 M/UL (ref 4.7–5.5)
SERVICE CMNT-IMP: NORMAL
SODIUM SERPL-SCNC: 140 MMOL/L (ref 136–145)
TROPONIN I SERPL-MCNC: 0.03 NG/ML (ref 0–0.04)
TROPONIN I SERPL-MCNC: 0.04 NG/ML (ref 0–0.04)
TROPONIN I SERPL-MCNC: 0.05 NG/ML (ref 0–0.04)
TROPONIN I SERPL-MCNC: 0.05 NG/ML (ref 0–0.04)
TSH SERPL DL<=0.05 MIU/L-ACNC: 0.78 UIU/ML (ref 0.36–3.74)
VENTRICULAR RATE, ECG03: 60 BPM
WBC # BLD AUTO: 11.7 K/UL (ref 4.6–13.2)

## 2020-10-24 PROCEDURE — 74011000250 HC RX REV CODE- 250: Performed by: INTERNAL MEDICINE

## 2020-10-24 PROCEDURE — 74011250637 HC RX REV CODE- 250/637: Performed by: INTERNAL MEDICINE

## 2020-10-24 PROCEDURE — 74011250637 HC RX REV CODE- 250/637: Performed by: FAMILY MEDICINE

## 2020-10-24 PROCEDURE — 84132 ASSAY OF SERUM POTASSIUM: CPT

## 2020-10-24 PROCEDURE — 74011250636 HC RX REV CODE- 250/636: Performed by: FAMILY MEDICINE

## 2020-10-24 PROCEDURE — 74011250636 HC RX REV CODE- 250/636: Performed by: INTERNAL MEDICINE

## 2020-10-24 PROCEDURE — 74011000258 HC RX REV CODE- 258: Performed by: INTERNAL MEDICINE

## 2020-10-24 PROCEDURE — 94640 AIRWAY INHALATION TREATMENT: CPT

## 2020-10-24 PROCEDURE — 84443 ASSAY THYROID STIM HORMONE: CPT

## 2020-10-24 PROCEDURE — 65610000006 HC RM INTENSIVE CARE

## 2020-10-24 PROCEDURE — 36592 COLLECT BLOOD FROM PICC: CPT

## 2020-10-24 PROCEDURE — 83605 ASSAY OF LACTIC ACID: CPT

## 2020-10-24 PROCEDURE — 85025 COMPLETE CBC W/AUTO DIFF WBC: CPT

## 2020-10-24 PROCEDURE — C9113 INJ PANTOPRAZOLE SODIUM, VIA: HCPCS | Performed by: INTERNAL MEDICINE

## 2020-10-24 PROCEDURE — 83735 ASSAY OF MAGNESIUM: CPT

## 2020-10-24 PROCEDURE — 74011250636 HC RX REV CODE- 250/636

## 2020-10-24 PROCEDURE — 74011000258 HC RX REV CODE- 258: Performed by: EMERGENCY MEDICINE

## 2020-10-24 PROCEDURE — P9047 ALBUMIN (HUMAN), 25%, 50ML: HCPCS | Performed by: INTERNAL MEDICINE

## 2020-10-24 PROCEDURE — 82962 GLUCOSE BLOOD TEST: CPT

## 2020-10-24 PROCEDURE — 74011250636 HC RX REV CODE- 250/636: Performed by: EMERGENCY MEDICINE

## 2020-10-24 PROCEDURE — 82330 ASSAY OF CALCIUM: CPT

## 2020-10-24 PROCEDURE — 82550 ASSAY OF CK (CPK): CPT

## 2020-10-24 RX ORDER — MAGNESIUM SULFATE HEPTAHYDRATE 40 MG/ML
2 INJECTION, SOLUTION INTRAVENOUS ONCE
Status: COMPLETED | OUTPATIENT
Start: 2020-10-24 | End: 2020-10-24

## 2020-10-24 RX ORDER — LORAZEPAM 2 MG/ML
INJECTION INTRAMUSCULAR
Status: DISPENSED
Start: 2020-10-24 | End: 2020-10-24

## 2020-10-24 RX ORDER — POTASSIUM CHLORIDE 20 MEQ/1
20 TABLET, EXTENDED RELEASE ORAL ONCE
Status: COMPLETED | OUTPATIENT
Start: 2020-10-24 | End: 2020-10-24

## 2020-10-24 RX ORDER — HALOPERIDOL 5 MG/ML
5 INJECTION INTRAMUSCULAR
Status: DISCONTINUED | OUTPATIENT
Start: 2020-10-24 | End: 2020-11-05

## 2020-10-24 RX ORDER — IBUPROFEN 200 MG
1 TABLET ORAL DAILY
Status: DISCONTINUED | OUTPATIENT
Start: 2020-10-25 | End: 2020-10-24

## 2020-10-24 RX ORDER — MAGNESIUM SULFATE 1 G/100ML
1 INJECTION INTRAVENOUS ONCE
Status: COMPLETED | OUTPATIENT
Start: 2020-10-24 | End: 2020-10-24

## 2020-10-24 RX ORDER — POTASSIUM CHLORIDE 7.45 MG/ML
10 INJECTION INTRAVENOUS
Status: COMPLETED | OUTPATIENT
Start: 2020-10-24 | End: 2020-10-25

## 2020-10-24 RX ORDER — LORAZEPAM 2 MG/ML
1 INJECTION INTRAMUSCULAR
Status: DISCONTINUED | OUTPATIENT
Start: 2020-10-24 | End: 2020-11-08

## 2020-10-24 RX ORDER — HALOPERIDOL 5 MG/ML
2 INJECTION INTRAMUSCULAR
Status: DISCONTINUED | OUTPATIENT
Start: 2020-10-24 | End: 2020-10-24

## 2020-10-24 RX ORDER — IBUPROFEN 200 MG
1 TABLET ORAL DAILY
Status: DISCONTINUED | OUTPATIENT
Start: 2020-10-24 | End: 2020-10-31

## 2020-10-24 RX ORDER — GABAPENTIN 100 MG/1
100 CAPSULE ORAL 3 TIMES DAILY
Status: DISCONTINUED | OUTPATIENT
Start: 2020-10-24 | End: 2020-11-08

## 2020-10-24 RX ORDER — POTASSIUM CHLORIDE 20 MEQ/1
40 TABLET, EXTENDED RELEASE ORAL ONCE
Status: COMPLETED | OUTPATIENT
Start: 2020-10-24 | End: 2020-10-24

## 2020-10-24 RX ORDER — HEPARIN SODIUM 5000 [USP'U]/ML
5000 INJECTION, SOLUTION INTRAVENOUS; SUBCUTANEOUS EVERY 8 HOURS
Status: DISCONTINUED | OUTPATIENT
Start: 2020-10-24 | End: 2020-10-24

## 2020-10-24 RX ORDER — IPRATROPIUM BROMIDE AND ALBUTEROL SULFATE 2.5; .5 MG/3ML; MG/3ML
3 SOLUTION RESPIRATORY (INHALATION)
Status: DISCONTINUED | OUTPATIENT
Start: 2020-10-24 | End: 2020-11-05

## 2020-10-24 RX ORDER — OXYCODONE AND ACETAMINOPHEN 5; 325 MG/1; MG/1
1 TABLET ORAL
Status: DISCONTINUED | OUTPATIENT
Start: 2020-10-24 | End: 2020-11-05

## 2020-10-24 RX ORDER — LEVOFLOXACIN 5 MG/ML
750 INJECTION, SOLUTION INTRAVENOUS
Status: DISCONTINUED | OUTPATIENT
Start: 2020-10-25 | End: 2020-10-24

## 2020-10-24 RX ORDER — ATORVASTATIN CALCIUM 20 MG/1
20 TABLET, FILM COATED ORAL
Status: DISCONTINUED | OUTPATIENT
Start: 2020-10-24 | End: 2020-11-05

## 2020-10-24 RX ORDER — ENOXAPARIN SODIUM 100 MG/ML
40 INJECTION SUBCUTANEOUS DAILY
Status: DISCONTINUED | OUTPATIENT
Start: 2020-10-24 | End: 2020-10-26

## 2020-10-24 RX ORDER — HALOPERIDOL 5 MG/ML
INJECTION INTRAMUSCULAR
Status: COMPLETED
Start: 2020-10-24 | End: 2020-10-24

## 2020-10-24 RX ORDER — CLONAZEPAM 0.5 MG/1
0.5 TABLET ORAL
Status: DISCONTINUED | OUTPATIENT
Start: 2020-10-24 | End: 2020-11-05

## 2020-10-24 RX ORDER — ATORVASTATIN CALCIUM 20 MG/1
10 TABLET, FILM COATED ORAL
Status: DISCONTINUED | OUTPATIENT
Start: 2020-10-24 | End: 2020-10-24 | Stop reason: ALTCHOICE

## 2020-10-24 RX ADMIN — HALOPERIDOL LACTATE 2 MG: 5 INJECTION, SOLUTION INTRAMUSCULAR at 14:20

## 2020-10-24 RX ADMIN — PIPERACILLIN AND TAZOBACTAM 3.38 G: 3; .375 INJECTION, POWDER, LYOPHILIZED, FOR SOLUTION INTRAVENOUS at 19:52

## 2020-10-24 RX ADMIN — SODIUM CHLORIDE 10 ML: 9 INJECTION, SOLUTION INTRAMUSCULAR; INTRAVENOUS; SUBCUTANEOUS at 14:00

## 2020-10-24 RX ADMIN — VANCOMYCIN HYDROCHLORIDE 750 MG: 750 INJECTION, POWDER, LYOPHILIZED, FOR SOLUTION INTRAVENOUS at 06:24

## 2020-10-24 RX ADMIN — OXYCODONE HYDROCHLORIDE AND ACETAMINOPHEN 1 TABLET: 5; 325 TABLET ORAL at 08:18

## 2020-10-24 RX ADMIN — SODIUM CHLORIDE 75 ML/HR: 900 INJECTION, SOLUTION INTRAVENOUS at 12:30

## 2020-10-24 RX ADMIN — METHYLPREDNISOLONE SODIUM SUCCINATE 60 MG: 40 INJECTION, POWDER, FOR SOLUTION INTRAMUSCULAR; INTRAVENOUS at 00:23

## 2020-10-24 RX ADMIN — GABAPENTIN 100 MG: 100 CAPSULE ORAL at 17:51

## 2020-10-24 RX ADMIN — POTASSIUM CHLORIDE 10 MEQ: 7.46 INJECTION, SOLUTION INTRAVENOUS at 22:01

## 2020-10-24 RX ADMIN — POTASSIUM CHLORIDE 10 MEQ: 7.46 INJECTION, SOLUTION INTRAVENOUS at 20:54

## 2020-10-24 RX ADMIN — SODIUM CHLORIDE 10 ML: 9 INJECTION, SOLUTION INTRAMUSCULAR; INTRAVENOUS; SUBCUTANEOUS at 21:05

## 2020-10-24 RX ADMIN — POTASSIUM CHLORIDE 40 MEQ: 1500 TABLET, EXTENDED RELEASE ORAL at 14:17

## 2020-10-24 RX ADMIN — DOXYCYCLINE 100 MG: 100 INJECTION, POWDER, LYOPHILIZED, FOR SOLUTION INTRAVENOUS at 21:13

## 2020-10-24 RX ADMIN — MAGNESIUM SULFATE HEPTAHYDRATE 1 G: 1 INJECTION, SOLUTION INTRAVENOUS at 03:09

## 2020-10-24 RX ADMIN — PIPERACILLIN AND TAZOBACTAM 3.38 G: 3; .375 INJECTION, POWDER, LYOPHILIZED, FOR SOLUTION INTRAVENOUS at 12:27

## 2020-10-24 RX ADMIN — GABAPENTIN 100 MG: 100 CAPSULE ORAL at 08:18

## 2020-10-24 RX ADMIN — IPRATROPIUM BROMIDE AND ALBUTEROL SULFATE 3 ML: .5; 3 SOLUTION RESPIRATORY (INHALATION) at 08:25

## 2020-10-24 RX ADMIN — HALOPERIDOL LACTATE 2 MG: 5 INJECTION, SOLUTION INTRAMUSCULAR at 08:32

## 2020-10-24 RX ADMIN — LORAZEPAM 1 MG: 2 INJECTION INTRAMUSCULAR at 06:19

## 2020-10-24 RX ADMIN — POTASSIUM CHLORIDE 10 MEQ: 7.46 INJECTION, SOLUTION INTRAVENOUS at 19:51

## 2020-10-24 RX ADMIN — POTASSIUM CHLORIDE 20 MEQ: 1500 TABLET, EXTENDED RELEASE ORAL at 05:59

## 2020-10-24 RX ADMIN — LORAZEPAM 1 MG: 2 INJECTION INTRAMUSCULAR at 17:53

## 2020-10-24 RX ADMIN — METHYLPREDNISOLONE SODIUM SUCCINATE 60 MG: 40 INJECTION, POWDER, FOR SOLUTION INTRAMUSCULAR; INTRAVENOUS at 12:27

## 2020-10-24 RX ADMIN — ALBUMIN (HUMAN) 12.5 G: 0.25 INJECTION, SOLUTION INTRAVENOUS at 17:52

## 2020-10-24 RX ADMIN — BUDESONIDE 500 MCG: 0.25 INHALANT RESPIRATORY (INHALATION) at 08:25

## 2020-10-24 RX ADMIN — LORAZEPAM 1 MG: 2 INJECTION INTRAMUSCULAR at 12:15

## 2020-10-24 RX ADMIN — CLONAZEPAM 0.5 MG: 0.5 TABLET ORAL at 18:50

## 2020-10-24 RX ADMIN — ACETAMINOPHEN 650 MG: 325 TABLET ORAL at 00:21

## 2020-10-24 RX ADMIN — ENOXAPARIN SODIUM 40 MG: 40 INJECTION SUBCUTANEOUS at 08:17

## 2020-10-24 RX ADMIN — IPRATROPIUM BROMIDE AND ALBUTEROL SULFATE 3 ML: .5; 3 SOLUTION RESPIRATORY (INHALATION) at 12:03

## 2020-10-24 RX ADMIN — CLONAZEPAM 0.5 MG: 0.5 TABLET ORAL at 08:18

## 2020-10-24 RX ADMIN — IPRATROPIUM BROMIDE AND ALBUTEROL SULFATE 3 ML: .5; 3 SOLUTION RESPIRATORY (INHALATION) at 02:22

## 2020-10-24 RX ADMIN — PIPERACILLIN AND TAZOBACTAM 4.5 G: 4; .5 INJECTION, POWDER, LYOPHILIZED, FOR SOLUTION INTRAVENOUS; PARENTERAL at 06:00

## 2020-10-24 RX ADMIN — METHYLPREDNISOLONE SODIUM SUCCINATE 60 MG: 40 INJECTION, POWDER, FOR SOLUTION INTRAMUSCULAR; INTRAVENOUS at 17:51

## 2020-10-24 RX ADMIN — PROMETHAZINE HYDROCHLORIDE 12.5 MG: 25 TABLET ORAL at 00:27

## 2020-10-24 RX ADMIN — ALBUMIN (HUMAN) 12.5 G: 0.25 INJECTION, SOLUTION INTRAVENOUS at 04:50

## 2020-10-24 RX ADMIN — ALBUMIN (HUMAN) 12.5 G: 0.25 INJECTION, SOLUTION INTRAVENOUS at 22:00

## 2020-10-24 RX ADMIN — SODIUM CHLORIDE 10 ML: 9 INJECTION, SOLUTION INTRAMUSCULAR; INTRAVENOUS; SUBCUTANEOUS at 06:01

## 2020-10-24 RX ADMIN — METHYLPREDNISOLONE SODIUM SUCCINATE 60 MG: 40 INJECTION, POWDER, FOR SOLUTION INTRAMUSCULAR; INTRAVENOUS at 05:58

## 2020-10-24 RX ADMIN — PIPERACILLIN AND TAZOBACTAM 4.5 G: 4; .5 INJECTION, POWDER, LYOPHILIZED, FOR SOLUTION INTRAVENOUS; PARENTERAL at 00:23

## 2020-10-24 RX ADMIN — GABAPENTIN 100 MG: 100 CAPSULE ORAL at 21:14

## 2020-10-24 RX ADMIN — BUDESONIDE 500 MCG: 0.25 INHALANT RESPIRATORY (INHALATION) at 20:11

## 2020-10-24 RX ADMIN — ALBUMIN (HUMAN) 12.5 G: 0.25 INJECTION, SOLUTION INTRAVENOUS at 12:29

## 2020-10-24 RX ADMIN — SODIUM CHLORIDE 40 MG: 9 INJECTION, SOLUTION INTRAMUSCULAR; INTRAVENOUS; SUBCUTANEOUS at 08:17

## 2020-10-24 RX ADMIN — ATORVASTATIN CALCIUM 20 MG: 20 TABLET, FILM COATED ORAL at 21:14

## 2020-10-24 RX ADMIN — POLYETHYLENE GLYCOL 3350 17 G: 17 POWDER, FOR SOLUTION ORAL at 00:27

## 2020-10-24 RX ADMIN — MAGNESIUM SULFATE HEPTAHYDRATE 2 G: 40 INJECTION, SOLUTION INTRAVENOUS at 01:58

## 2020-10-24 RX ADMIN — DOXYCYCLINE 100 MG: 100 INJECTION, POWDER, LYOPHILIZED, FOR SOLUTION INTRAVENOUS at 12:28

## 2020-10-24 RX ADMIN — HALOPERIDOL LACTATE 5 MG: 5 INJECTION, SOLUTION INTRAMUSCULAR at 18:56

## 2020-10-24 RX ADMIN — CLONAZEPAM 0.5 MG: 0.5 TABLET ORAL at 00:21

## 2020-10-24 NOTE — PROGRESS NOTES
Spoke with son. Patient went home w/o any confusion  Son feels confusion started after patient went to [facility] and got some medications restarted but does not know which one. He denies any ETOH use by patient while patient was living with him since last DC home before this admission. Other information remained limited.     Tate Jasmine MD

## 2020-10-24 NOTE — PROGRESS NOTES
Hospitalist Progress Note    Patient: Clark Diaz Sr. MRN: [id number]  CSN: [id number]    YOB: 1955  Age: 63 y.o. Sex: male    DOA: 10/23/2020 LOS:  LOS: 1 day            Assessment/Plan     Principal Problem:    Septic shock ([facility] (10/23/2020)    Active Problems:    Hypotension, unspecified (1/27/2014)      Amputation of both lower extremities (HCC) ()      PNA (pneumonia) ()      Marijuana abuse ()      Legal blindness (10/14/2020)      Chronic obstructive pulmonary disease with acute exacerbation ([facility] (10/24/2020)      Severe protein-calorie malnutrition ([facility] (35/36/1941)      Metabolic encephalopathy (43/25/5380)      CC: A 82-year-old male with a history of a COPD, bilateral above knee amputation, recently discharged from [facility] after treated for pneumonia who was admitted for septic shock and metabolic encephalopathy. Cardiovascular septic shock:  on Levophed titrate to keep map over 65  Check cardiac enzymes/troponin was up. Consult to cardiology. Discussed the case with Dr. Antonia Chaudhari  Pending echocardiogram       Pulmonary: : COPD with right upper lobe pneumonia questionable mass  Started on duo nebs Pulmicort Pulmicort and IV steroids covering for hospital-acquired pneumonia with Zosyn vancomycin and doxycycline. Mucous plugging on outpatient CT scan. Discussed the case with Dr. Rachelle Mauricio. Heme: DVT prevention with Lovenox monitor  CBC and platelets  IV albumin for hypotension  Question neoplasm on CT at outside facility await pulmonary input may need repeat CT scan after few days of  IVantibiotic  and more stable     FEN: Placed on ICU protocol monitor I's and O's      ID: Aspiration pneumonia on Zosyn sepsis  Blood cultures obtained  On broad antibiotics     GI: Protonix for stress prophylaxis     Vascular: Bilateral amputee above-knee     Endocrine: Sliding scale insulin diabetic diet     Neuro:Metabolic encephalopathy. Confused, on wrist restraint.    CT of head on admission reviewed with chronic microvascular disease no acute intracranial abnormality. Will check ammonia level, Haldol as needed     DVT/GI Prophylaxis: lovenox/Protonix              Subjective:     Pt was seen and examined with the nurse in the morning round. Per nursing staff patient was confused, agitated and combative for which require esteban wrist restraints. Moaning and yelling. Review of systems  Obtainable  Objective:      Visit Vitals  /60   Pulse 65   Temp 97.6 °F (36.4 °C)   Resp 22   Wt 43.3 kg (95 lb 7.4 oz)   SpO2 96%   BMI 16.39 kg/m²       Physical Exam:    Gen: NAD, agitated. , confused, no respiratory distress  Heent:  MMM, NC, AT. Cor: s1s2 RRR. No MRG. PMI mid 5th intercostal space. Resp: Rhonchi scattered bilateral lung base  Abd:  NT ND.  BS positive. No rebound or guarding. No masses. Ext: S/P bilateral above knee amputation      Intake and Output:  Current Shift:  No intake/output data recorded. Last three shifts:  10/22 1901 - 10/24 0700  In: 5295.8 [P.O.:120; I.V.:5175.8]  Out: 1000 [Urine:1000]    Labs: Results:       Chemistry Recent Labs     10/24/20  0100 10/23/20  1611   GLU 77 108*    137   K 3.8 4.3    100   CO2 20* 25   BUN 20* 23*   CREA 1.24 1.19   CA 7.4* 8.9   AGAP 10 12   BUCR 16 19   AP 79 100   TP 5.2* 6.3*   ALB 2.2* 2.5*   GLOB 3.0 3.8   AGRAT 0.7* 0.7*      CBC w/Diff Recent Labs     10/24/20  0100 10/23/20  1611   WBC 11.7 12.5   RBC 3.22* 3.81*   HGB 9.1* 10.9*   HCT 27.9* 32.9*    401   GRANS 80* 85*   LYMPH 11* 9*   EOS 1 1      Cardiac Enzymes Recent Labs     10/24/20  0100 10/23/20  1611   * 471*   CKND1 2.6 2.5      Coagulation No results for input(s): PTP, INR, APTT, INREXT, INREXT in the last 72 hours.     Lipid Panel No results found for: CHOL, CHOLPOCT, CHOLX, CHLST, CHOLV, 836024, HDL, HDLP, LDL, LDLC, DLDLP, 929258, VLDLC, VLDL, TGLX, TRIGL, TRIGP, TGLPOCT, CHHD, CHHDX   BNP No results for input(s): BNPP in the last 72 hours.   Liver Enzymes Recent Labs     10/24/20  0100   TP 5.2*   ALB 2.2*   AP 79      Thyroid Studies Lab Results   Component Value Date/Time    TSH 0.42 01/27/2014 04:08 PM        Procedures/imaging: see electronic medical records for all procedures/Xrays and details which were not copied into this note but were reviewed prior to creation of Plan      Medications Reviewed  Kallie Corrigan MD

## 2020-10-24 NOTE — PROGRESS NOTES
Pharmacy Dosing Services:  Levofloxacin    Indication: HCAP    Previous Regimen Levofloxacin 750 mg IV q24hrs   Serum Creatinine Lab Results   Component Value Date/Time    Creatinine 1.24 10/24/2020 01:00 AM      Creatinine Clearance Estimated Creatinine Clearance: 37.3 mL/min (based on SCr of 1.24 mg/dL). BUN Lab Results   Component Value Date/Time    BUN 20 (H) 10/24/2020 01:00 AM         Dose administration notes:   Changed to Levofloxacin 750 mg IV q48hrs per renal dosing protocol. Plan:  Continue to monitor     Bunny Perkins Counts  [phone]

## 2020-10-24 NOTE — PROGRESS NOTES
Zosyn (Piperacillin/Tazobactam) Extended Infusion    [facility] a 63 y.o. yo male, has been converted to an extended infusion of Zosyn while in the intensive care unit. A loading dose of 3.375 or 4.5 gm will be given over 30 minutes depending on indication. Extended infusions will begin 4 hours after the initial dose if CrCl  >/= 20 ml/min or 8 hours after the initial dose if CrCl < 20 ml/min. Extended infusions will run over 4 hours (240 minutes).     Recent Labs     10/24/20  0100 10/23/20  1611   CREA 1.24 1.19     Ht Readings from Last 1 Encounters:   10/15/20 162.6 cm (64\")     Wt Readings from Last 1 Encounters:   10/24/20 43.3 kg (95 lb 7.4 oz)       CrCl : Serum creatinine: 1.24 mg/dL 10/24/20 0100  Estimated creatinine clearance: 37.3 mL/min    Renal adjustment of extended infusion of Zosyn  3.375 or 4.5 gm every 8 hours for CrCl >/= 20 ml/min  3.375 or 4.5 gm every 12 hours for CrCl < 20 ml/min, intermittent HD or PD

## 2020-10-24 NOTE — ED NOTES
TRANSFER - OUT REPORT:    Verbal report given to RN(name) on Jerry Michele Sr.  being transferred to ICU(unit) for routine progression of care       Report consisted of patients Situation, Background, Assessment and   Recommendations(SBAR). Information from the following report(s) SBAR, Kardex and ED Summary was reviewed with the receiving nurse. Lines:   Triple Lumen 10/23/20 Proximal;Right Femoral (Active)       Peripheral IV 10/23/20 Anterior;Left;Proximal Forearm (Active)   Site Assessment Clean, dry, & intact 10/23/20 1616   Phlebitis Assessment 0 10/23/20 1616   Infiltration Assessment 0 10/23/20 1616   Dressing Status Clean, dry, & intact 10/23/20 1616   Hub Color/Line Status Green 10/23/20 1616       Peripheral IV 10/23/20 Right Antecubital (Active)   Site Assessment Clean, dry, & intact 10/23/20 1611   Phlebitis Assessment 0 10/23/20 1611   Infiltration Assessment 0 10/23/20 1611   Dressing Status Clean, dry, & intact 10/23/20 1611   Dressing Type Tape;Transparent 10/23/20 1611   Hub Color/Line Status Green;Flushed;Patent 10/23/20 1611   Action Taken Blood drawn 10/23/20 1611       Peripheral IV 10/23/20 Left;Posterior Forearm (Active)        Opportunity for questions and clarification was provided.       Patient transported with:   Monitor  Registered Nurse

## 2020-10-24 NOTE — PROGRESS NOTES
Bedside and Verbal shift change report given to Ac Moran RN (oncoming nurse) by Destin Lemus RN (offgoing nurse). Report included the following information SBAR, Kardex, Intake/Output and Recent Results. 0800 Shift assessment completed. Patient in esteban wrist restraints for patient safety and line integrity. Patient is very agitated, yelling out, and cursing at staff. Difficult to redirect. Alert to self only. Able to follow simple commands. C/O pain in stumps. Klonopin and percocet given. 0830 Patient becoming increasingly agitated. Order obtained for Haldol. 0930 Patient is sleeping at this time. 1215 Reassessment completed. Patient agitated, cursing, and yelling racial slurs. Ativan given. 1430 Patient continues to be agitated and yelling out for a cigarette. Order obtained for nicotine patch and Haldol increased to 5 mg.     1600 Reassessment completed. 1700 O2 Sats 88-90%, placed on 2L NC, increased to 96-98%    1745 Consistently yelling out and agitated. Ativan given. 1851 Patient continues to yell and curse, Klonopin given. Bedside and Verbal shift change report given to Blanca Jacobsen rn (oncoming nurse) by Ac Moran RN (offgoing nurse). Report included the following information SBAR, Kardex, Intake/Output and Recent Results.

## 2020-10-24 NOTE — PROGRESS NOTES
Problem: Falls - Risk of  Goal: *Absence of Falls  Description: Document Amber Moncada Fall Risk and appropriate interventions in the flowsheet.   Outcome: Progressing Towards Goal  Note: Fall Risk Interventions:  Mobility Interventions: Bed/chair exit alarm, Patient to call before getting OOB, PT Consult for mobility concerns    Mentation Interventions: Bed/chair exit alarm, Door open when patient unattended, Increase mobility, More frequent rounding, Reorient patient, Adequate sleep, hydration, pain control    Medication Interventions: Bed/chair exit alarm, Patient to call before getting OOB    Elimination Interventions: Bed/chair exit alarm, Call light in reach, Patient to call for help with toileting needs              Problem: Patient Education: Go to Patient Education Activity  Goal: Patient/Family Education  Outcome: Progressing Towards Goal     Problem: Non-Violent Restraints  Goal: *Removal from restraints as soon as assessed to be safe  Outcome: Progressing Towards Goal  Goal: *No harm/injury to patient while restraints in use  Outcome: Progressing Towards Goal  Goal: *Patient's dignity will be maintained  Outcome: Progressing Towards Goal  Goal: *Patient Specific Goal (EDIT GOAL, INSERT TEXT)  Outcome: Progressing Towards Goal  Goal: Non-violent Restaints:Standard Interventions  Outcome: Progressing Towards Goal  Goal: Non-violent Restraints:Patient Interventions  Outcome: Progressing Towards Goal  Goal: Patient/Family Education  Outcome: Progressing Towards Goal

## 2020-10-24 NOTE — PROGRESS NOTES
Vancomycin - Pharmacy to Dose ( HCAP )    Patient received Vancomycin 1000 mg IV at 21:09 10/23  And Vancomycin 750 mg IV at 06:24 10/24. S. Creat increased to 1.24 today ( CrCl 37.3 ml/min )  Patient weighs 43.3 kg  Will change Vancomycin dosing to 750 mg IV q24hrs starting at 07:00 10/25. Will plan for a trough level before dose on 10/26. Note:  Levofloxacin and Zosyn also adjusted. Pharmacy will continue to follow and adjust.    Suha Lloyd  [phone]

## 2020-10-24 NOTE — CONSULTS
[facility] Consult Note      Patient: Lalita Saravia Sr. MRN: [id number]  SSN: [id number]    YOB: 1955  Age: 63 y.o. Sex: male    Date of Consultation: 10/24/2020  Referring Physician: Bentley Phan MD  Reason for Consultation: Abnormal troponin    Chief complain: AMS    HPI:  82-year-old gentleman brought to emergency room with confusion and hypotension. Cardiology consult called for abnormal troponin. Patient is confused and not in state to provide any history. He mentioned that he is having leg pain. He kept his eyes closed during conversation. He has bilateral above knee amputation.     Past Medical History:   Diagnosis Date    Amputation of both lower extremities ([address]     Amputee, above knee, left ([address]     At risk for falls     Chronic pain     back and legs    Diabetes ([address]     Hypercholesterolemia     Hypertension     Polio      Past Surgical History:   Procedure Laterality Date    HX HERNIA REPAIR      HX OTHER SURGICAL      carpal tunnel     HX OTHER SURGICAL      cyst     Current Facility-Administered Medications   Medication Dose Route Frequency    atorvastatin (LIPITOR) tablet 20 mg  20 mg Oral QHS    clonazePAM (KlonoPIN) tablet 0.5 mg  0.5 mg Oral TID PRN    gabapentin (NEURONTIN) capsule 100 mg  100 mg Oral TID    oxyCODONE-acetaminophen (PERCOCET) 5-325 mg per tablet 1 Tab  1 Tab Oral Q4H PRN    LORazepam (ATIVAN) injection 1 mg  1 mg IntraVENous Q4H PRN    LORazepam (ATIVAN) 2 mg/mL injection        enoxaparin (LOVENOX) injection 40 mg  40 mg SubCUTAneous DAILY    haloperidol lactate (HALDOL) injection 2 mg  2 mg IntraVENous Q6H PRN    piperacillin-tazobactam (ZOSYN) 3.375 g in 0.9% sodium chloride (MBP/ADV) 100 mL MBP  3.375 g IntraVENous Q8H    doxycycline (VIBRAMYCIN) 100 mg in 0.9% sodium chloride (MBP/ADV) 100 mL MBP  100 mg IntraVENous Q12H    [START ON 10/25/2020] vancomycin (VANCOCIN) 750 mg in 0.9% sodium chloride 250 mL (VIAL-MATE)  750 mg IntraVENous Q24H    [START ON 10/25/2020] nicotine (NICODERM CQ) 21 mg/24 hr patch 1 Patch  1 Patch TransDERmal DAILY    sodium chloride (NS) flush 5-10 mL  5-10 mL IntraVENous PRN    NOREPINephrine (LEVOPHED) 8 mg in 0.9% NS 250ml infusion  0.5-30 mcg/min IntraVENous TITRATE    Vancomycin - Pharmacokinetic Dosing  1 Each Other Rx Dosing/Monitoring    insulin lispro (HUMALOG) injection   SubCUTAneous AC&HS    glucose chewable tablet 16 g  4 Tab Oral PRN    glucagon (GLUCAGEN) injection 1 mg  1 mg IntraMUSCular PRN    dextrose 10% infusion 125-250 mL  125-250 mL IntraVENous PRN    albuterol-ipratropium (DUO-NEB) 2.5 MG-0.5 MG/3 ML  3 mL Nebulization Q4H RT    budesonide (PULMICORT) 250 mcg/2ml nebulizer susp  500 mcg Nebulization BID RT    methylPREDNISolone (PF) (SOLU-MEDROL) injection 60 mg  60 mg IntraVENous Q6H    ELECTROLYTE REPLACEMENT PROTOCOL - Potassium Standard Dosing   1 Each Other PRN    ELECTROLYTE REPLACEMENT PROTOCOL - Potassium Standard  Dosing Details for Fluid Restricted Patients  1 Each Other PRN    ELECTROLYTE REPLACEMENT PROTOCOL  - Phosphorus Renal Dosing  1 Each Other PRN    sodium chloride (NS) flush 5-40 mL  5-40 mL IntraVENous Q8H    sodium chloride (NS) flush 5-40 mL  5-40 mL IntraVENous PRN    acetaminophen (TYLENOL) tablet 650 mg  650 mg Oral Q6H PRN    Or    acetaminophen (TYLENOL) suppository 650 mg  650 mg Rectal Q6H PRN    polyethylene glycol (MIRALAX) packet 17 g  17 g Oral DAILY PRN    promethazine (PHENERGAN) tablet 12.5 mg  12.5 mg Oral Q6H PRN    Or    ondansetron (ZOFRAN) injection 4 mg  4 mg IntraVENous Q6H PRN    albumin human 25% (BUMINATE) solution 12.5 g  12.5 g IntraVENous Q6H    0.9% sodium chloride infusion  75 mL/hr IntraVENous CONTINUOUS    pantoprazole (PROTONIX) 40 mg in 0.9% sodium chloride 10 mL injection  40 mg IntraVENous DAILY       Allergies and Intolerances:   No Known Allergies    Family History:   Family History   Problem Relation Age of Onset  Heart Attack Mother     Heart Attack Father     Malignant Hyperthermia Neg Hx     Pseudocholinesterase Deficiency Neg Hx     Delayed Awakening Neg Hx     Post-op Nausea/Vomiting Neg Hx     Emergence Delirium Neg Hx     Post-op Cognitive Dysfunction Neg Hx     Other Neg Hx        Social History:   He  reports that he has been smoking. He has a 80.00 pack-year smoking history. His smokeless tobacco use includes snuff. He  reports no history of alcohol use. Review of Systems:     Can not obtain     Physical:   Patient Vitals for the past 6 hrs:   Temp Pulse Resp BP SpO2   10/24/20 1204     96 %   10/24/20 1132 98.4 °F (36.9 °C)       10/24/20 1130  69 19 135/79 98 %   10/24/20 1030  66 19 127/72 97 %   10/24/20 1000  67 19 118/65 95 %         Exam:   General Appearance: Comfortable, not using accessory muscles of respiration. HEENT: [place]. HEAD: Atraumatic  NECK: No JVD, no thyroidomeglay. CAROTIDS: No bruit  LUNGS: Clear bilaterally. HEART: S1+S2 audible, no murmur, no pericardial rub. ABD: Non-tender, BS Audible    EXT: bilateral above knee amputation   MUSCULOSKELETAL: Grossly no joint deformity.   NEUROLOGICAL: Confused   Review of Data:   LABS:   Lab Results   Component Value Date/Time    WBC 11.7 10/24/2020 01:00 AM    HGB 9.1 (L) 10/24/2020 01:00 AM    HCT 27.9 (L) 10/24/2020 01:00 AM    PLATELET 873 08/52/4472 01:00 AM     Lab Results   Component Value Date/Time    Sodium 140 10/24/2020 01:00 AM    Potassium 3.6 10/24/2020 11:30 AM    Chloride 110 10/24/2020 01:00 AM    CO2 20 (L) 10/24/2020 01:00 AM    Glucose 77 10/24/2020 01:00 AM    BUN 20 (H) 10/24/2020 01:00 AM    Creatinine 1.24 10/24/2020 01:00 AM     No results found for: CHOL, CHOLX, CHLST, CHOLV, HDL, HDLP, LDL, LDLC, DLDLP, TGLX, TRIGL, TRIGP  No components found for: GPT  Lab Results   Component Value Date/Time    Hemoglobin A1c 5.7 (H) 10/23/2020 04:11 PM         Cardiology Procedures:   Results for orders placed or performed during the hospital encounter of 10/23/20   EKG, 12 LEAD, INITIAL   Result Value Ref Range    Ventricular Rate 60 BPM    Atrial Rate 60 BPM    P-R Interval 130 ms    QRS Duration 94 ms    Q-T Interval 518 ms    QTC Calculation (Bezet) 518 ms    Calculated P Axis 61 degrees    Calculated R Axis 78 degrees    Calculated T Axis 112 degrees    Diagnosis       Poor data quality, interpretation may be adversely affected  Normal sinus rhythm  Possible Anteroseptal infarct , age undetermined  Prolonged QT  Abnormal ECG  Confirmed by Stephani Isaacs MD, Tanja Mendoza ([id number]) on 10/24/2020 1:44:07 AM             Impression / Plan:    Patient Active Problem List   Diagnosis Code    Bursitis of elbow M70.30    Medication overdose T50.901A    Polio A80.9    Depressive disorder, not elsewhere classified F32.9    Type II or unspecified type diabetes mellitus without mention of complication, uncontrolled [id number]    Hypotension, unspecified I95.9    Amputation of both lower extremities ([address] S88.911A, L83.193D    PNA (pneumonia) J18.9    ALEXANDER (acute kidney injury) ([address] N17.9    Hyponatremia E87.1    Marijuana abuse F12.10    Emphysema lung ([address] J43.9    CAP (community acquired pneumonia) J18.9    Sepsis ([address] A41.9    Hard of hearing H91.90    Legal blindness H54.8    Acute encephalopathy G93.40    Septic shock (HCC) A41.9, R65.21    Chronic obstructive pulmonary disease with acute exacerbation (HCC) J44.1    Severe protein-calorie malnutrition (HCC) N40    Metabolic encephalopathy K31.64      63year old gentleman came with  Altered mental status and hypotension. Troponin is very borderline elevated. Currently he is normotensive. Echocardiogram   continue IV antibiotic   further cardiac workup as clinically indicated.   Plan discussed with Dona Hollis       Signed By: Marie Arce MD     October 24, 2020

## 2020-10-24 NOTE — CONSULTS
[facility]  Pulmonary, Critical Care, and Sleep Medicine    Name: Enedina Baron MRN: [id number]   : 1955 Hospital: [facility] [place]   Date: 10/24/2020        Pulmonary Critical Care Note    IMPRESSION:   Patient Active Problem List   Diagnosis Code    Septic shock ([facility] A41.9, R65.21    Acute encephalopathy G93.40    CAP (community acquired pneumonia) J18.9    Emphysema lung ([facility] J44.9    Abnormal troponin R77.8    Anemia, mild, no active bleeding D64.9    Hypomagnesemia E83.42    Lactic acidosis, improved E87.2    Bursitis of elbow M70.30    Medication overdose T50.901A    Polio A80.9    Depressive disorder, not elsewhere classified F32.9    Type II or unspecified type diabetes mellitus without mention of complication, uncontrolled [id number]    Amputation of both lower extremities ([facility] U90.530K, G57.040M    ALEXANDER (acute kidney injury) ([facility] N17.9    Marijuana abuse F12.10    Hard of hearing H91.90    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 Will stop Levofloxacin for QT and change to Doxycycline for atypical coverage   Cultures will be followed. Deescalate antibiotic when appropriate. Lactic acid initial elevated and repeat normalized.   Fluids: NS at 75 mL/hr  CVS: Monitor CVP Hemodynamics  Actively titrated off of vasopressors this AM and aim SBP > 100 mmHg if need to restart  Check cardiac panel, await ECHO  Cardiology consult requested  Renal: Monitor renal functions, lytes  Replace lytes per unit protocol  Heme: Monitor CBC H/H, plt stable  No evidence of active bleeding  Endo: Glycemic control  TSH check  GI: diet as tolerated  Neurology: remained confused, restless in bed  CT head on admission chronic microvascular disease, no acute intracranial abnormality  Ammonia  AM labs    Will defer respective systems problem management to primary and other consultant and follow patient in ICU with primary and other medical team  Further recommendations will be based on the patient's response to recommended treatment and results of the investigation ordered. Quality Care: PPI, DVT prophylaxis, HOB elevated, Infection control all reviewed and addressed. PAIN AND SEDATION: none  Skin/Wound: skin of LT face  Prophylaxis: DVT and GI Prophylaxis reviewed. Restraints: Wrist soft restraints for patient interfering with medical therapy/management and patient safety. PT/OT eval and treat: as needed when stable   Lines/Tubes: Central Line Bundle Followed  and Lindo Bundle Followed   Central line: 10/23/20 (site examined, no erythema, induration, discharge or sign of infection. Dressing intact. Medically necessary, will remove it when not needed. Central line bundle followed). Lindo: 10/23/20 (Medically necessary for strict input/output monitoring in critically ill patient, will remove it when not needed. Lindo bundle followed). ADVANCE DIRECTIVE: Full code  DISCUSSION: Events and notes from last 24 hours reviewed. Care plan discussed with nursing, hospitalist, RT  D/w patient (answered all questions to satisfaction). Quality Care: PPI, DVT prophylaxis, HOB elevated, Infection control all reviewed and addressed. High complexity decision making was performed during the evaluation of this patient at high risk for decompensation with multiple organ involvement. Total critical care time spent rendering care exclusive of procedures/family discussion/coordination of care: 60 minutes.        Subjective/History:   Mr. Pandeylla Alcides. has been seen and evaluated as Dr. Dixie Rothman requested for assisting with ICU care of septic shock. Patient unable to provide history. Patient is a 63 y.o. male COPD, bilateral above-knee amputation, recently discharged from [facility] after treated for pneumonia. Per chart, since discharge about a week ago patient has been in the ER few times at other facilities with complaints of cough, shortness of breath and hypoxemia; had a CT scan done at [facility] which showed persistent pneumonia. Per chart, patient had not picked up his antibiotics that he was discharged on until recently. He was brought to ER with c/o of chest pain between shoulders by EMS. In the ER he was found to be confused and combative with hypothermia, hypotension. He was treated with IVF boluses and Levophed. CT head on admission nil acute. CXR showed improving pneumonia. He has remained confused at the time of this evaluation at bedside in rm 102 in ICU. Review of Systems:  Review of systems not obtained due to patient factors. Latest lactic acid:   Lactic acid   Date Value Ref Range Status   10/24/2020 0.9 0.4 - 2.0 MMOL/L Final   10/23/2020 2.7 (HH) 0.4 - 2.0 MMOL/L Final     Comment:     CALLED TO AND CORRECTLY REPEATED BY:  LEXIE ROJAS RN ED BY CAM ON 10/23/20 AT 1738.     10/23/2020 3.3 (HH) 0.4 - 2.0 MMOL/L Final     Comment:     CALLED TO AND CORRECTLY REPEATED BY:  Kana Peguero RN ED BY CAM ON 10/23/20 AT 1700. Past Medical History:  Past Medical History:   Diagnosis Date    Amputation of both lower extremities ([address]     Amputee, above knee, left ([address]     At risk for falls     Chronic pain     back and legs    Diabetes ([address]     Hypercholesterolemia     Hypertension     Polio         Past Surgical History:  Past Surgical History:   Procedure Laterality Date    HX HERNIA REPAIR      HX OTHER SURGICAL      carpal tunnel     HX OTHER SURGICAL      cyst        Medications:  Prior to Admission medications    Medication Sig Start Date End Date Taking?  Authorizing Provider amoxicillin-clavulanate (AUGMENTIN) 875-125 mg per tablet Take 1 Tab by mouth every twelve (12) hours. 10/20/20   Nickolas Gregg MD   acetaminophen (TYLENOL) 325 mg tablet Take  by mouth every four (4) hours as needed for Pain. Tiffany Saravia MD   amLODIPine (NORVASC) 5 mg tablet Take 5 mg by mouth daily. Tiffany Saravia MD   gabapentin (NEURONTIN) 100 mg capsule Take 100 mg by mouth three (3) times daily. Tiffany Saravia MD   clonazePAM (KLONOPIN) 0.5 mg tablet Take 0.5 mg by mouth nightly as needed. Tiffany Saravia MD   atorvastatin (LIPITOR) 20 mg tablet Take 20 mg by mouth daily. Tiffany Saravia MD   nitroglycerin (NITROSTAT) 0.4 mg SL tablet 0.4 mg by SubLINGual route every five (5) minutes as needed for Chest Pain. Up to 3 doses. Tiffany Saravia MD   metoprolol succinate (TOPROL XL) 25 mg XL tablet Take 25 mg by mouth daily. Tiffany Saravia MD   oxyCODONE-acetaminophen (PERCOCET) 5-325 mg per tablet Take  by mouth every four (4) hours as needed for Pain. Tiffany Saravia MD   traMADol (ULTRAM) 50 mg tablet Take 1 Tab by mouth every six (6) hours as needed for Pain. Max Daily Amount: 200 mg. 12/4/18   Wen Clayton MD   simvastatin (ZOCOR) 20 mg tablet Take 20 mg by mouth nightly. Provider, Historical   lisinopril-hydrochlorothiazide (PRINZIDE, ZESTORETIC) 20-12.5 mg per tablet Take 1 Tab by mouth daily. Provider, Historical   isosorbide mononitrate ER (IMDUR) 30 mg tablet Take 30 mg by mouth daily.     Provider, Historical       Current Facility-Administered Medications   Medication Dose Route Frequency    atorvastatin (LIPITOR) tablet 20 mg  20 mg Oral QHS    gabapentin (NEURONTIN) capsule 100 mg  100 mg Oral TID    LORazepam (ATIVAN) 2 mg/mL injection        enoxaparin (LOVENOX) injection 40 mg  40 mg SubCUTAneous DAILY    NOREPINephrine (LEVOPHED) 8 mg in 0.9% NS 250ml infusion  0.5-30 mcg/min IntraVENous TITRATE    piperacillin-tazobactam (ZOSYN) 4.5 g in 0.9% sodium chloride (MBP/ADV) 100 mL MBP  4.5 g IntraVENous Q6H    levoFLOXacin (LEVAQUIN) 750 mg in D5W IVPB  750 mg IntraVENous Q24H    vancomycin (VANCOCIN) 750 mg in 0.9% sodium chloride 250 mL (VIAL-MATE)  750 mg IntraVENous Q12H    Vancomycin - Pharmacokinetic Dosing  1 Each Other Rx Dosing/Monitoring    insulin lispro (HUMALOG) injection   SubCUTAneous AC&HS    albuterol-ipratropium (DUO-NEB) 2.5 MG-0.5 MG/3 ML  3 mL Nebulization Q4H RT    budesonide (PULMICORT) 250 mcg/2ml nebulizer susp  500 mcg Nebulization BID RT    methylPREDNISolone (PF) (SOLU-MEDROL) injection 60 mg  60 mg IntraVENous Q6H    sodium chloride (NS) flush 5-40 mL  5-40 mL IntraVENous Q8H    albumin human 25% (BUMINATE) solution 12.5 g  12.5 g IntraVENous Q6H    0.9% sodium chloride infusion  75 mL/hr IntraVENous CONTINUOUS    pantoprazole (PROTONIX) 40 mg in 0.9% sodium chloride 10 mL injection  40 mg IntraVENous DAILY       Allergy:  No Known Allergies     Social History:  Social History     Tobacco Use    Smoking status: Current Every Day Smoker     Packs/day: 2.00     Years: 40.00     Pack years: 80.00    Smokeless tobacco: Current User     Types: Snuff   Substance Use Topics    Alcohol use: No    Drug use: No        Family History:  Family History   Problem Relation Age of Onset    Heart Attack Mother     Heart Attack Father     Malignant Hyperthermia Neg Hx     Pseudocholinesterase Deficiency Neg Hx     Delayed Awakening Neg Hx     Post-op Nausea/Vomiting Neg Hx     Emergence Delirium Neg Hx     Post-op Cognitive Dysfunction Neg Hx     Other Neg Hx           Objective:   Vital Signs:    Blood pressure 106/60, pulse 65, temperature 97.6 °F (36.4 °C), resp. rate 22, weight 43.3 kg (95 lb 7.4 oz), SpO2 96 %. Body mass index is 16.39 kg/m². O2 Device: Room air       Temp (24hrs), Av.2 °F (36.2 °C), Min:95.3 °F (35.2 °C), Max:97.9 °F (36.6 °C)         Intake/Output:   Last shift:      No intake/output data recorded.   Last 3 shifts: 10/22 190 - 10/24 0700  In: 5295.8 [P.O.:120; I.V.:5175.8]  Out: 1000 [Urine:1000]    Intake/Output Summary (Last 24 hours) at 10/24/2020 0847  Last data filed at 10/24/2020 0700  Gross per 24 hour   Intake 5295.76 ml   Output 1000 ml   Net 4295.76 ml       Physical Exam:  General: disoriented, agitated, in no respiratory distress and acyanotic, awake, uncooperative, combative, appears older than stated age, on RA  HEENT: PERRLA, EOMI, fundi benign, throat normal without erythema or exudate  Neck: No abnormally enlarged lymph nodes or thyroid, supple  Chest: increased AP diameter  Lungs: moderate air entry, few rhonchi scattered RT anterior chest, breathing normal , normal percussion bilaterally, no tenderness/ rash  Heart: Regular rate and rhythm, S1S2 present or without murmur or extra heart sounds  Abdomen: non distended, bowel sounds normoactive, tympanic, abdomen is soft without significant tenderness, masses, organomegaly or guarding, rigidity, rebound  Extremity: negative edema, cyanosis, clubbing in BL UE; BL AKA  Capillary refill: normal  Neuro: awake, confused, moves all extremities well, no involuntary movements, exam limitations  Skin: Skin color, texture, turgor fair.  Skin dry, warm, non-diaphoretic    Data:     Recent Results (from the past 24 hour(s))   EKG, 12 LEAD, INITIAL    Collection Time: 10/23/20  4:08 PM   Result Value Ref Range    Ventricular Rate 60 BPM    Atrial Rate 60 BPM    P-R Interval 130 ms    QRS Duration 94 ms    Q-T Interval 518 ms    QTC Calculation (Bezet) 518 ms    Calculated P Axis 61 degrees    Calculated R Axis 78 degrees    Calculated T Axis 112 degrees    Diagnosis       Poor data quality, interpretation may be adversely affected  Normal sinus rhythm  Possible Anteroseptal infarct , age undetermined  Prolonged QT  Abnormal ECG  Confirmed by Nile Santiago MD, Jovon Benitez ([id number]) on 10/24/2020 1:44:07 AM     CBC WITH AUTOMATED DIFF    Collection Time: 10/23/20  4:11 PM   Result Value Ref Range WBC 12.5 4.6 - 13.2 K/uL    RBC 3.81 (L) 4.70 - 5.50 M/uL    HGB 10.9 (L) 13.0 - 16.0 g/dL    HCT 32.9 (L) 36.0 - 48.0 %    MCV 86.4 74.0 - 97.0 FL    MCH 28.6 24.0 - 34.0 PG    MCHC 33.1 31.0 - 37.0 g/dL    RDW 16.8 (H) 11.6 - 14.5 %    PLATELET 216 996 - 250 K/uL    MPV 10.1 9.2 - 11.8 FL    NEUTROPHILS 85 (H) 40 - 73 %    LYMPHOCYTES 9 (L) 21 - 52 %    MONOCYTES 5 3 - 10 %    EOSINOPHILS 1 0 - 5 %    BASOPHILS 0 0 - 2 %    ABS. NEUTROPHILS 10.5 (H) 1.8 - 8.0 K/UL    ABS. LYMPHOCYTES 1.1 0.9 - 3.6 K/UL    ABS. MONOCYTES 0.6 0.05 - 1.2 K/UL    ABS. EOSINOPHILS 0.2 0.0 - 0.4 K/UL    ABS. BASOPHILS 0.0 0.0 - 0.1 K/UL    DF AUTOMATED     METABOLIC PANEL, COMPREHENSIVE    Collection Time: 10/23/20  4:11 PM   Result Value Ref Range    Sodium 137 136 - 145 mmol/L    Potassium 4.3 3.5 - 5.5 mmol/L    Chloride 100 100 - 111 mmol/L    CO2 25 21 - 32 mmol/L    Anion gap 12 3.0 - 18 mmol/L    Glucose 108 (H) 74 - 99 mg/dL    BUN 23 (H) 7.0 - 18 MG/DL    Creatinine 1.19 0.6 - 1.3 MG/DL    BUN/Creatinine ratio 19 12 - 20      GFR est AA >60 >60 ml/min/1.73m2    GFR est non-AA >60 >60 ml/min/1.73m2    Calcium 8.9 8.5 - 10.1 MG/DL    Bilirubin, total 0.2 0.2 - 1.0 MG/DL    ALT (SGPT) 42 16 - 61 U/L    AST (SGOT) 53 (H) 10 - 38 U/L    Alk.  phosphatase 100 45 - 117 U/L    Protein, total 6.3 (L) 6.4 - 8.2 g/dL    Albumin 2.5 (L) 3.4 - 5.0 g/dL    Globulin 3.8 2.0 - 4.0 g/dL    A-G Ratio 0.7 (L) 0.8 - 1.7     CULTURE, BLOOD    Collection Time: 10/23/20  4:11 PM    Specimen: Blood   Result Value Ref Range    Special Requests: NO SPECIAL REQUESTS      Culture result: NO GROWTH AFTER 14 HOURS     LACTIC ACID    Collection Time: 10/23/20  4:11 PM   Result Value Ref Range    Lactic acid 3.3 (HH) 0.4 - 2.0 MMOL/L   ETHYL ALCOHOL    Collection Time: 10/23/20  4:11 PM   Result Value Ref Range    ALCOHOL(ETHYL),SERUM <3 0 - 3 MG/DL   CARDIAC PANEL,(CK, CKMB & TROPONIN)    Collection Time: 10/23/20  4:11 PM   Result Value Ref Range    CK - MB 11.6 (H) <3.6 ng/ml    CK-MB Index 2.5 0.0 - 4.0 %     (H) 39 - 308 U/L    Troponin-I, QT 0.06 (H) 0.0 - 0.045 NG/ML   CULTURE, BLOOD    Collection Time: 10/23/20  4:15 PM    Specimen: Blood   Result Value Ref Range    Special Requests: NO SPECIAL REQUESTS      Culture result: NO GROWTH AFTER 14 HOURS     GLUCOSE, POC    Collection Time: 10/23/20  4:18 PM   Result Value Ref Range    Glucose (POC) 120 (H) 70 - 110 mg/dL   URINALYSIS W/ RFLX MICROSCOPIC    Collection Time: 10/23/20  4:30 PM   Result Value Ref Range    Color YELLOW      Appearance CLEAR      Specific gravity 1.011 1.005 - 1.030      pH (UA) 7.0 5.0 - 8.0      Protein 300 (A) NEG mg/dL    Glucose Negative NEG mg/dL    Ketone Negative NEG mg/dL    Bilirubin Negative NEG      Blood TRACE (A) NEG      Urobilinogen 0.2 0.2 - 1.0 EU/dL    Nitrites Negative NEG      Leukocyte Esterase Negative NEG     DRUG SCREEN, URINE    Collection Time: 10/23/20  4:30 PM   Result Value Ref Range    BENZODIAZEPINES Negative NEG      BARBITURATES Negative NEG      THC (TH-CANNABINOL) Positive (A) NEG      OPIATES Negative NEG      PCP(PHENCYCLIDINE) Negative NEG      COCAINE Negative NEG      AMPHETAMINES Negative NEG      METHADONE Negative NEG      HDSCOM (NOTE)    URINE MICROSCOPIC ONLY    Collection Time: 10/23/20  4:30 PM   Result Value Ref Range    WBC 0 to 2 0 - 5 /hpf    RBC 0 to 1 0 - 5 /hpf    Epithelial cells FEW 0 - 5 /lpf    Bacteria NONE SEEN NEG /hpf    Spermatozoa 1+    TYPE & SCREEN    Collection Time: 10/23/20  5:05 PM   Result Value Ref Range    Crossmatch Expiration 10/26/2020     ABO/Rh(D) A POSITIVE     Antibody screen NEG    LACTIC ACID    Collection Time: 10/23/20  5:05 PM   Result Value Ref Range    Lactic acid 2.7 (HH) 0.4 - 2.0 MMOL/L   METABOLIC PANEL, COMPREHENSIVE    Collection Time: 10/24/20  1:00 AM   Result Value Ref Range    Sodium 140 136 - 145 mmol/L    Potassium 3.8 3.5 - 5.5 mmol/L    Chloride 110 100 - 111 mmol/L    CO2 20 (L) 21 - 32 mmol/L    Anion gap 10 3.0 - 18 mmol/L    Glucose 77 74 - 99 mg/dL    BUN 20 (H) 7.0 - 18 MG/DL    Creatinine 1.24 0.6 - 1.3 MG/DL    BUN/Creatinine ratio 16 12 - 20      GFR est AA >60 >60 ml/min/1.73m2    GFR est non-AA 59 (L) >60 ml/min/1.73m2    Calcium 7.4 (L) 8.5 - 10.1 MG/DL    Bilirubin, total 0.2 0.2 - 1.0 MG/DL    ALT (SGPT) 33 16 - 61 U/L    AST (SGOT) 43 (H) 10 - 38 U/L    Alk. phosphatase 79 45 - 117 U/L    Protein, total 5.2 (L) 6.4 - 8.2 g/dL    Albumin 2.2 (L) 3.4 - 5.0 g/dL    Globulin 3.0 2.0 - 4.0 g/dL    A-G Ratio 0.7 (L) 0.8 - 1.7     CBC WITH AUTOMATED DIFF    Collection Time: 10/24/20  1:00 AM   Result Value Ref Range    WBC 11.7 4.6 - 13.2 K/uL    RBC 3.22 (L) 4.70 - 5.50 M/uL    HGB 9.1 (L) 13.0 - 16.0 g/dL    HCT 27.9 (L) 36.0 - 48.0 %    MCV 86.6 74.0 - 97.0 FL    MCH 28.3 24.0 - 34.0 PG    MCHC 32.6 31.0 - 37.0 g/dL    RDW 17.0 (H) 11.6 - 14.5 %    PLATELET 858 197 - 622 K/uL    MPV 9.9 9.2 - 11.8 FL    NEUTROPHILS 80 (H) 40 - 73 %    LYMPHOCYTES 11 (L) 21 - 52 %    MONOCYTES 8 3 - 10 %    EOSINOPHILS 1 0 - 5 %    BASOPHILS 0 0 - 2 %    ABS. NEUTROPHILS 9.3 (H) 1.8 - 8.0 K/UL    ABS. LYMPHOCYTES 1.3 0.9 - 3.6 K/UL    ABS. MONOCYTES 1.0 0.05 - 1.2 K/UL    ABS. EOSINOPHILS 0.1 0.0 - 0.4 K/UL    ABS.  BASOPHILS 0.0 0.0 - 0.1 K/UL    DF AUTOMATED     MAGNESIUM    Collection Time: 10/24/20  1:00 AM   Result Value Ref Range    Magnesium 1.4 (L) 1.6 - 2.6 mg/dL   LACTIC ACID    Collection Time: 10/24/20  1:00 AM   Result Value Ref Range    Lactic acid 0.9 0.4 - 2.0 MMOL/L   CARDIAC PANEL,(CK, CKMB & TROPONIN)    Collection Time: 10/24/20  1:00 AM   Result Value Ref Range    CK - MB 9.1 (H) <3.6 ng/ml    CK-MB Index 2.6 0.0 - 4.0 %     (H) 39 - 308 U/L    Troponin-I, QT 0.05 (H) 0.0 - 0.045 NG/ML   GLUCOSE, POC    Collection Time: 10/24/20  8:15 AM   Result Value Ref Range    Glucose (POC) 98 70 - 110 mg/dL           No results for input(s): FIO2I, IFO2, HCO3I, IHCO3, HCOPOC, PCO2I, PCOPOC, IPHI, PHI, PHPOC, PO2I, PO2POC in the last 72 hours. No lab exists for component: IPOC2    All Micro Results     Procedure Component Value Units Date/Time    CULTURE, BLOOD [[id number]] Collected:  10/23/20 1611    Order Status:  Completed Specimen:  Blood Updated:  10/24/20 0711     Special Requests: NO SPECIAL REQUESTS        Culture result: NO GROWTH AFTER 14 HOURS       CULTURE, BLOOD [[id number]] Collected:  10/23/20 1615    Order Status:  Completed Specimen:  Blood Updated:  10/24/20 0711     Special Requests: NO SPECIAL REQUESTS        Culture result: NO GROWTH AFTER 14 HOURS       CULTURE, URINE [[id number]] Collected:  10/23/20 1630    Order Status:  Completed Specimen:  Urine from Clean catch Updated:  10/23/20 2308          Telemetry: normal sinus rhythm      Imaging:  [x]I have personally reviewed the patients chest radiographs images and report     Results from Hospital Encounter encounter on 10/23/20   XR CHEST PORT    Narrative EXAM: XR CHEST PORT. CLINICAL INDICATION/HISTORY: meets SIRS criteria.  -Additional: None. TECHNIQUE: A portable erect AP radiographic view of the chest is compared with  several other chest radiographs performed the same month.  _______________    FINDINGS:    See impression. No pneumothorax or appreciable significant pleural effusion. The  cardiac silhouette, vanessa, and mediastinal contours are normal for age. No acute  osseous abnormality is revealed. _______________      Impression IMPRESSION:    Slight improvement in multifocal airspace disease most in keeping with  pneumonia, again most pronounced at the right lung apex with no new or worsening  findings. Recommend continued radiographic follow-up to resolution. _______________         Results from Hospital Encounter encounter on 10/23/20   CT HEAD WO CONT    Narrative EXAM: CT head    INDICATION: Altered mental status. COMPARISON: October 15, 2020.     TECHNIQUE: Axial CT imaging of the head was performed without intravenous  contrast. Dose reduction techniques used: automated exposure control, adjustment  of the mAs and/or kVp according to patient size, and iterative reconstruction  techniques. Digital imaging and communications in Medicine (DICOM) format image  data are available to nonaffiliated external healthcare facilities or entities  on a secure, media free, reciprocally searchable basis with patient  authorization for at least 12 months after this study. _______________    FINDINGS:    BRAIN AND POSTERIOR FOSSA: There is cerebral volume loss with prominence of the  lateral and the third ventricles. The cortical sulci are widened appropriately. The fourth ventricle and basal cisterns are normally outlined. There is mild  bilateral periventricular and central white matter diminished attenuation. There  is no acute territorial defect, hemorrhage or midline shift. EXTRA-AXIAL SPACES AND MENINGES: There are no abnormal extra-axial fluid  collections. CALVARIUM: Intact. SINUSES: Clear. OTHER: Partial right mastoid opacification is again seen. _______________      Impression IMPRESSION:    Cerebral volume loss and mild bilateral periventricular and central white matter  diminished attenuation which is nonspecific but likely to represent  microvascular disease. There is no acute intracranial abnormality. No significant interval change.            [x]See my orders for details    My assessment, plan of care, findings, medications, side effects etc were discussed with:  [x]nursing []PT/OT    []respiratory therapy []Dr. Grecia Mantilla [x]Patient       Burt Caputo MD

## 2020-10-25 ENCOUNTER — APPOINTMENT (OUTPATIENT)
Dept: NON INVASIVE DIAGNOSTICS | Age: 65
DRG: 720 | End: 2020-10-25
Attending: INTERNAL MEDICINE
Payer: MEDICAID

## 2020-10-25 LAB
ALBUMIN SERPL-MCNC: 3.1 G/DL (ref 3.4–5)
ALBUMIN/GLOB SERPL: 1 {RATIO} (ref 0.8–1.7)
ALP SERPL-CCNC: 71 U/L (ref 45–117)
ALT SERPL-CCNC: 28 U/L (ref 16–61)
ANION GAP SERPL CALC-SCNC: 3 MMOL/L (ref 3–18)
AST SERPL-CCNC: 28 U/L (ref 10–38)
ATRIAL RATE: 81 BPM
AV VELOCITY RATIO: 0.93
AV VTI RATIO: 1
BASOPHILS # BLD: 0 K/UL (ref 0–0.1)
BASOPHILS NFR BLD: 0 % (ref 0–2)
BILIRUB SERPL-MCNC: 0.3 MG/DL (ref 0.2–1)
BUN SERPL-MCNC: 15 MG/DL (ref 7–18)
BUN/CREAT SERPL: 16 (ref 12–20)
CA-I SERPL-SCNC: 1.22 MMOL/L (ref 1.12–1.32)
CALCIUM SERPL-MCNC: 8.4 MG/DL (ref 8.5–10.1)
CALCULATED P AXIS, ECG09: 76 DEGREES
CALCULATED R AXIS, ECG10: 71 DEGREES
CALCULATED T AXIS, ECG11: 79 DEGREES
CHLORIDE SERPL-SCNC: 116 MMOL/L (ref 100–111)
CO2 SERPL-SCNC: 22 MMOL/L (ref 21–32)
CREAT SERPL-MCNC: 0.96 MG/DL (ref 0.6–1.3)
DIAGNOSIS, 93000: NORMAL
DIFFERENTIAL METHOD BLD: ABNORMAL
ECHO AV ANNULUS DIAM: 3.72 CM
ECHO AV AREA PEAK VELOCITY: 2.8 CM2
ECHO AV AREA VTI: 3.2 CM2
ECHO AV AREA/BSA PEAK VELOCITY: 2 CM2/M2
ECHO AV AREA/BSA VTI: 2.2 CM2/M2
ECHO AV MEAN GRADIENT: 1.1 MMHG
ECHO AV MEAN VELOCITY: 0.48 M/S
ECHO AV PEAK GRADIENT: 2.4 MMHG
ECHO AV PEAK VELOCITY: 77.25 CM/S
ECHO AV VTI: 15.09 CM
ECHO LA AREA 2C: 16.87 CM2
ECHO LA AREA 4C: 14.1 CM2
ECHO LA MAJOR AXIS: 4.11 CM
ECHO LA MINOR AXIS: 2.88 CM
ECHO LA VOL 2C: 44.82 ML (ref 18–58)
ECHO LA VOL 4C: 32.3 ML (ref 18–58)
ECHO LA VOL BP: 42.21 ML (ref 18–58)
ECHO LA VOLUME INDEX A2C: 31.44 ML/M2 (ref 16–28)
ECHO LA VOLUME INDEX A4C: 22.66 ML/M2 (ref 16–28)
ECHO LV E' LATERAL VELOCITY: 7 CM/S
ECHO LV E' SEPTAL VELOCITY: 5 CM/S
ECHO LV EDV A2C: 84.3 ML
ECHO LV EDV A4C: 104.1 ML
ECHO LV EDV BP: 94.6 ML (ref 67–155)
ECHO LV EDV INDEX A4C: 73 ML/M2
ECHO LV EDV INDEX BP: 66.4 ML/M2
ECHO LV EDV NDEX A2C: 59.1 ML/M2
ECHO LV EDV TEICHHOLZ: 0.77 ML
ECHO LV EJECTION FRACTION A2C: 57 %
ECHO LV EJECTION FRACTION A4C: 58 %
ECHO LV EJECTION FRACTION BIPLANE: 57.3 % (ref 55–100)
ECHO LV ESV A2C: 36.2 ML
ECHO LV ESV A4C: 43.9 ML
ECHO LV ESV BP: 40.4 ML (ref 22–58)
ECHO LV ESV INDEX A2C: 25.4 ML/M2
ECHO LV ESV INDEX A4C: 30.8 ML/M2
ECHO LV ESV INDEX BP: 28.3 ML/M2
ECHO LV ESV TEICHHOLZ: 0.46 ML
ECHO LV INTERNAL DIMENSION DIASTOLIC: 5.13 CM (ref 4.2–5.9)
ECHO LV INTERNAL DIMENSION SYSTOLIC: 4.12 CM
ECHO LV IVSD: 0.57 CM (ref 0.6–1)
ECHO LV MASS 2D: 112.6 G (ref 88–224)
ECHO LV MASS INDEX 2D: 79 G/M2 (ref 49–115)
ECHO LV POSTERIOR WALL DIASTOLIC: 0.76 CM (ref 0.6–1)
ECHO LVOT CARDIAC OUTPUT: 3.7 L/MIN
ECHO LVOT DIAM: 1.97 CM
ECHO LVOT PEAK GRADIENT: 2.1 MMHG
ECHO LVOT PEAK VELOCITY: 72.11 CM/S
ECHO LVOT SV: 47.8 ML
ECHO LVOT VTI: 15.76 CM
ECHO MV A VELOCITY: 74.7 CM/S
ECHO MV AREA PHT: 3.6 CM2
ECHO MV E DECELERATION TIME (DT): 209.9 MS
ECHO MV E VELOCITY: 60.15 CM/S
ECHO MV E/A RATIO: 0.81
ECHO MV E/E' LATERAL: 8.59
ECHO MV E/E' RATIO (AVERAGED): 10.31
ECHO MV E/E' SEPTAL: 12.03
ECHO MV PRESSURE HALF TIME (PHT): 60.9 MS
ECHO RA AREA 4C: 15.88 CM2
ECHO RA VOLUME: 40.4 ML
ECHO RV INTERNAL DIMENSION: 4.9 CM
ECHO TV REGURGITANT MAX VELOCITY: 399.74 CM/S
ECHO TV REGURGITANT PEAK GRADIENT: 63.9 MMHG
EOSINOPHIL # BLD: 0 K/UL (ref 0–0.4)
EOSINOPHIL NFR BLD: 0 % (ref 0–5)
ERYTHROCYTE [DISTWIDTH] IN BLOOD BY AUTOMATED COUNT: 17.3 % (ref 11.6–14.5)
GLOBULIN SER CALC-MCNC: 3.1 G/DL (ref 2–4)
GLUCOSE BLD STRIP.AUTO-MCNC: 101 MG/DL (ref 70–110)
GLUCOSE BLD STRIP.AUTO-MCNC: 108 MG/DL (ref 70–110)
GLUCOSE BLD STRIP.AUTO-MCNC: 120 MG/DL (ref 70–110)
GLUCOSE BLD STRIP.AUTO-MCNC: 146 MG/DL (ref 70–110)
GLUCOSE SERPL-MCNC: 128 MG/DL (ref 74–99)
HCT VFR BLD AUTO: 27 % (ref 36–48)
HGB BLD-MCNC: 8.8 G/DL (ref 13–16)
LVOT MG: 1.08 MMHG
LVOT MV: 0.49 CM/S
LVSV (MOD BI): 39.87 ML
LVSV (MOD SINGLE 4C): 44.3 ML
LVSV (MOD SINGLE): 35.36 ML
LYMPHOCYTES # BLD: 0.4 K/UL (ref 0.9–3.6)
LYMPHOCYTES NFR BLD: 4 % (ref 21–52)
MAGNESIUM SERPL-MCNC: 2.3 MG/DL (ref 1.6–2.6)
MCH RBC QN AUTO: 28.2 PG (ref 24–34)
MCHC RBC AUTO-ENTMCNC: 32.6 G/DL (ref 31–37)
MCV RBC AUTO: 86.5 FL (ref 74–97)
MONOCYTES # BLD: 0.1 K/UL (ref 0.05–1.2)
MONOCYTES NFR BLD: 2 % (ref 3–10)
MV DEC SLOPE: 2.87
NEUTS SEG # BLD: 9 K/UL (ref 1.8–8)
NEUTS SEG NFR BLD: 94 % (ref 40–73)
P-R INTERVAL, ECG05: 162 MS
PHOSPHATE SERPL-MCNC: 2.2 MG/DL (ref 2.5–4.9)
PLATELET # BLD AUTO: 255 K/UL (ref 135–420)
PMV BLD AUTO: 9.7 FL (ref 9.2–11.8)
POTASSIUM SERPL-SCNC: 3.9 MMOL/L (ref 3.5–5.5)
PROT SERPL-MCNC: 6.2 G/DL (ref 6.4–8.2)
Q-T INTERVAL, ECG07: 406 MS
QRS DURATION, ECG06: 94 MS
QTC CALCULATION (BEZET), ECG08: 471 MS
RBC # BLD AUTO: 3.12 M/UL (ref 4.7–5.5)
SODIUM SERPL-SCNC: 141 MMOL/L (ref 136–145)
VENTRICULAR RATE, ECG03: 81 BPM
WBC # BLD AUTO: 9.5 K/UL (ref 4.6–13.2)

## 2020-10-25 PROCEDURE — 74011250636 HC RX REV CODE- 250/636: Performed by: FAMILY MEDICINE

## 2020-10-25 PROCEDURE — 74011250636 HC RX REV CODE- 250/636: Performed by: INTERNAL MEDICINE

## 2020-10-25 PROCEDURE — 82962 GLUCOSE BLOOD TEST: CPT

## 2020-10-25 PROCEDURE — 74011250637 HC RX REV CODE- 250/637: Performed by: INTERNAL MEDICINE

## 2020-10-25 PROCEDURE — 74011250637 HC RX REV CODE- 250/637: Performed by: FAMILY MEDICINE

## 2020-10-25 PROCEDURE — 85025 COMPLETE CBC W/AUTO DIFF WBC: CPT

## 2020-10-25 PROCEDURE — 82330 ASSAY OF CALCIUM: CPT

## 2020-10-25 PROCEDURE — 83735 ASSAY OF MAGNESIUM: CPT

## 2020-10-25 PROCEDURE — 93306 TTE W/DOPPLER COMPLETE: CPT

## 2020-10-25 PROCEDURE — C9113 INJ PANTOPRAZOLE SODIUM, VIA: HCPCS | Performed by: INTERNAL MEDICINE

## 2020-10-25 PROCEDURE — 74011000250 HC RX REV CODE- 250: Performed by: INTERNAL MEDICINE

## 2020-10-25 PROCEDURE — 74011000258 HC RX REV CODE- 258: Performed by: EMERGENCY MEDICINE

## 2020-10-25 PROCEDURE — 65610000006 HC RM INTENSIVE CARE

## 2020-10-25 PROCEDURE — P9047 ALBUMIN (HUMAN), 25%, 50ML: HCPCS | Performed by: INTERNAL MEDICINE

## 2020-10-25 PROCEDURE — 74011000258 HC RX REV CODE- 258: Performed by: INTERNAL MEDICINE

## 2020-10-25 PROCEDURE — 94640 AIRWAY INHALATION TREATMENT: CPT

## 2020-10-25 PROCEDURE — 84132 ASSAY OF SERUM POTASSIUM: CPT

## 2020-10-25 PROCEDURE — 84100 ASSAY OF PHOSPHORUS: CPT

## 2020-10-25 PROCEDURE — 74011250636 HC RX REV CODE- 250/636: Performed by: EMERGENCY MEDICINE

## 2020-10-25 PROCEDURE — 80053 COMPREHEN METABOLIC PANEL: CPT

## 2020-10-25 PROCEDURE — 93005 ELECTROCARDIOGRAM TRACING: CPT

## 2020-10-25 RX ORDER — SODIUM,POTASSIUM PHOSPHATES 280-250MG
2 POWDER IN PACKET (EA) ORAL
Status: COMPLETED | OUTPATIENT
Start: 2020-10-25 | End: 2020-10-25

## 2020-10-25 RX ORDER — METOPROLOL TARTRATE 5 MG/5ML
5 INJECTION INTRAVENOUS EVERY 8 HOURS
Status: DISCONTINUED | OUTPATIENT
Start: 2020-10-25 | End: 2020-10-26

## 2020-10-25 RX ORDER — POTASSIUM CHLORIDE 7.45 MG/ML
10 INJECTION INTRAVENOUS
Status: COMPLETED | OUTPATIENT
Start: 2020-10-25 | End: 2020-10-25

## 2020-10-25 RX ORDER — AMLODIPINE BESYLATE 5 MG/1
5 TABLET ORAL DAILY
Status: DISCONTINUED | OUTPATIENT
Start: 2020-10-25 | End: 2020-10-26

## 2020-10-25 RX ADMIN — ATORVASTATIN CALCIUM 20 MG: 20 TABLET, FILM COATED ORAL at 21:01

## 2020-10-25 RX ADMIN — CLONAZEPAM 0.5 MG: 0.5 TABLET ORAL at 17:09

## 2020-10-25 RX ADMIN — METOPROLOL TARTRATE 5 MG: 5 INJECTION INTRAVENOUS at 13:31

## 2020-10-25 RX ADMIN — PIPERACILLIN AND TAZOBACTAM 3.38 G: 3; .375 INJECTION, POWDER, LYOPHILIZED, FOR SOLUTION INTRAVENOUS at 03:08

## 2020-10-25 RX ADMIN — METHYLPREDNISOLONE SODIUM SUCCINATE 40 MG: 40 INJECTION, POWDER, FOR SOLUTION INTRAMUSCULAR; INTRAVENOUS at 13:31

## 2020-10-25 RX ADMIN — CLONAZEPAM 0.5 MG: 0.5 TABLET ORAL at 08:19

## 2020-10-25 RX ADMIN — SODIUM CHLORIDE 10 ML: 9 INJECTION, SOLUTION INTRAMUSCULAR; INTRAVENOUS; SUBCUTANEOUS at 15:34

## 2020-10-25 RX ADMIN — METHYLPREDNISOLONE SODIUM SUCCINATE 40 MG: 40 INJECTION, POWDER, FOR SOLUTION INTRAMUSCULAR; INTRAVENOUS at 17:10

## 2020-10-25 RX ADMIN — ALBUMIN (HUMAN) 12.5 G: 0.25 INJECTION, SOLUTION INTRAVENOUS at 22:01

## 2020-10-25 RX ADMIN — POTASSIUM & SODIUM PHOSPHATES POWDER PACK 280-160-250 MG 2 PACKET: 280-160-250 PACK at 05:21

## 2020-10-25 RX ADMIN — HALOPERIDOL LACTATE 5 MG: 5 INJECTION, SOLUTION INTRAMUSCULAR at 02:58

## 2020-10-25 RX ADMIN — DOXYCYCLINE 100 MG: 100 INJECTION, POWDER, LYOPHILIZED, FOR SOLUTION INTRAVENOUS at 10:52

## 2020-10-25 RX ADMIN — SODIUM CHLORIDE 10 ML: 9 INJECTION, SOLUTION INTRAMUSCULAR; INTRAVENOUS; SUBCUTANEOUS at 21:02

## 2020-10-25 RX ADMIN — BUDESONIDE 500 MCG: 0.25 INHALANT RESPIRATORY (INHALATION) at 19:24

## 2020-10-25 RX ADMIN — LORAZEPAM 1 MG: 2 INJECTION INTRAMUSCULAR at 21:32

## 2020-10-25 RX ADMIN — PIPERACILLIN AND TAZOBACTAM 3.38 G: 3; .375 INJECTION, POWDER, LYOPHILIZED, FOR SOLUTION INTRAVENOUS at 19:28

## 2020-10-25 RX ADMIN — LORAZEPAM 1 MG: 2 INJECTION INTRAMUSCULAR at 13:32

## 2020-10-25 RX ADMIN — HALOPERIDOL LACTATE 5 MG: 5 INJECTION, SOLUTION INTRAMUSCULAR at 19:36

## 2020-10-25 RX ADMIN — ALBUMIN (HUMAN) 12.5 G: 0.25 INJECTION, SOLUTION INTRAVENOUS at 17:10

## 2020-10-25 RX ADMIN — METOPROLOL TARTRATE 5 MG: 5 INJECTION INTRAVENOUS at 10:52

## 2020-10-25 RX ADMIN — METHYLPREDNISOLONE SODIUM SUCCINATE 60 MG: 40 INJECTION, POWDER, FOR SOLUTION INTRAMUSCULAR; INTRAVENOUS at 00:04

## 2020-10-25 RX ADMIN — BUDESONIDE 500 MCG: 0.25 INHALANT RESPIRATORY (INHALATION) at 08:00

## 2020-10-25 RX ADMIN — ALBUMIN (HUMAN) 12.5 G: 0.25 INJECTION, SOLUTION INTRAVENOUS at 10:52

## 2020-10-25 RX ADMIN — SODIUM CHLORIDE 40 MG: 9 INJECTION, SOLUTION INTRAMUSCULAR; INTRAVENOUS; SUBCUTANEOUS at 08:18

## 2020-10-25 RX ADMIN — GABAPENTIN 100 MG: 100 CAPSULE ORAL at 21:01

## 2020-10-25 RX ADMIN — POTASSIUM & SODIUM PHOSPHATES POWDER PACK 280-160-250 MG 2 PACKET: 280-160-250 PACK at 07:57

## 2020-10-25 RX ADMIN — LORAZEPAM 1 MG: 2 INJECTION INTRAMUSCULAR at 17:10

## 2020-10-25 RX ADMIN — HALOPERIDOL LACTATE 5 MG: 5 INJECTION, SOLUTION INTRAMUSCULAR at 11:16

## 2020-10-25 RX ADMIN — PIPERACILLIN AND TAZOBACTAM 3.38 G: 3; .375 INJECTION, POWDER, LYOPHILIZED, FOR SOLUTION INTRAVENOUS at 13:31

## 2020-10-25 RX ADMIN — AMLODIPINE BESYLATE 5 MG: 5 TABLET ORAL at 10:52

## 2020-10-25 RX ADMIN — LORAZEPAM 1 MG: 2 INJECTION INTRAMUSCULAR at 08:20

## 2020-10-25 RX ADMIN — VANCOMYCIN HYDROCHLORIDE 750 MG: 750 INJECTION, POWDER, LYOPHILIZED, FOR SOLUTION INTRAVENOUS at 06:08

## 2020-10-25 RX ADMIN — POTASSIUM CHLORIDE 10 MEQ: 7.46 INJECTION, SOLUTION INTRAVENOUS at 00:00

## 2020-10-25 RX ADMIN — DOXYCYCLINE 100 MG: 100 INJECTION, POWDER, LYOPHILIZED, FOR SOLUTION INTRAVENOUS at 21:01

## 2020-10-25 RX ADMIN — ALBUMIN (HUMAN) 12.5 G: 0.25 INJECTION, SOLUTION INTRAVENOUS at 04:08

## 2020-10-25 RX ADMIN — METHYLPREDNISOLONE SODIUM SUCCINATE 60 MG: 40 INJECTION, POWDER, FOR SOLUTION INTRAMUSCULAR; INTRAVENOUS at 05:07

## 2020-10-25 RX ADMIN — POTASSIUM CHLORIDE 10 MEQ: 7.46 INJECTION, SOLUTION INTRAVENOUS at 05:20

## 2020-10-25 RX ADMIN — METOPROLOL TARTRATE 5 MG: 5 INJECTION INTRAVENOUS at 21:38

## 2020-10-25 RX ADMIN — GABAPENTIN 100 MG: 100 CAPSULE ORAL at 15:40

## 2020-10-25 RX ADMIN — GABAPENTIN 100 MG: 100 CAPSULE ORAL at 08:19

## 2020-10-25 RX ADMIN — SODIUM CHLORIDE 10 ML: 9 INJECTION, SOLUTION INTRAMUSCULAR; INTRAVENOUS; SUBCUTANEOUS at 05:07

## 2020-10-25 RX ADMIN — ENOXAPARIN SODIUM 40 MG: 40 INJECTION SUBCUTANEOUS at 08:18

## 2020-10-25 NOTE — PROGRESS NOTES
Cardiology Progress Note        Patient: Phill Villa Sr.        Sex: male          DOA: 10/23/2020  YOB: 1955      Age:  63 y.o.        LOS:  LOS: 2 days    Patient seen and examined, chart reviewed. Assessment/Plan     Patient Active Problem List   Diagnosis Code    Bursitis of elbow M70.30    Medication overdose T50.901A    Polio A80.9    Depressive disorder, not elsewhere classified F32.9    Type II or unspecified type diabetes mellitus without mention of complication, uncontrolled ANK2001    Hypotension, unspecified I95.9    Amputation of both lower extremities ([facility] S88.911A, W88.459V    PNA (pneumonia) J18.9    ALEXANDER (acute kidney injury) ([facility] N17.9    Hyponatremia E87.1    Marijuana abuse F12.10    Emphysema lung ([facility] J43.9    CAP (community acquired pneumonia) J18.9    Sepsis ([facility] A41.9    Hard of hearing H91.90    Legal blindness H54.8    Acute encephalopathy G93.40    Septic shock (HCC) A41.9, R65.21    Chronic obstructive pulmonary disease with acute exacerbation (HCC) J44.1    Severe protein-calorie malnutrition ([facility] W02    Metabolic encephalopathy S52.91     Pulmonary hypertension  Borderline troponin elevation of unknown significance could be from pulmonary hypertension     Echocardiogram revealed     · Left Ventricle: Normal cavity size and wall thickness. The estimated EF is 50 - 55%. Low normal systolic function. There is mild (grade 1) left ventricular diastolic dysfunction E/E' ratio is 10.31.  · Pulmonary Artery: Pulmonary arteries not well visualized. Pulmonary arterial systolic pressure (PASP) is 74 mmHg. Pulmonary hypertension found to be severe. Plan:    Continue amlodipine and atorvastatin. Will follow up.   Plan discussed with patient's nurse                 Subjective:    cc:  Sedated       REVIEW OF SYSTEMS:     Can not obtain     Objective:      Visit Vitals  BP (!) 159/90   Pulse 72   Temp 97.8 °F (36.6 °C)   Resp 18   Ht 5' 4\" (1.626 m)   Wt 43.1 kg (95 lb)   SpO2 95%   BMI 16.31 kg/m²     Body mass index is 16.31 kg/m². Physical Exam:  General Appearance: Comfortable, not using accessory muscles of respiration. HEENT: HANNA[place]. HEAD: Atraumatic  NECK: No JVD, no thyroidomeglay. CAROTIDS: No bruit  LUNGS: Clear bilaterally. HEART: S1+S2 audible, no murmur, no pericardial rub.      ABD: Non-tender, BS Audible    NEUROLOGICAL: Sedated     Medication:  Current Facility-Administered Medications   Medication Dose Route Frequency    methylPREDNISolone (PF) (SOLU-MEDROL) injection 40 mg  40 mg IntraVENous Q6H    metoprolol (LOPRESSOR) injection 5 mg  5 mg IntraVENous Q8H    amLODIPine (NORVASC) tablet 5 mg  5 mg Oral DAILY    [START ON 10/26/2020] Vancomycin Trough Level - 30 minutes prior to 07:00 dose 10/26/20  1 Each Other ONCE    atorvastatin (LIPITOR) tablet 20 mg  20 mg Oral QHS    clonazePAM (KlonoPIN) tablet 0.5 mg  0.5 mg Oral TID PRN    gabapentin (NEURONTIN) capsule 100 mg  100 mg Oral TID    oxyCODONE-acetaminophen (PERCOCET) 5-325 mg per tablet 1 Tab  1 Tab Oral Q4H PRN    LORazepam (ATIVAN) injection 1 mg  1 mg IntraVENous Q4H PRN    enoxaparin (LOVENOX) injection 40 mg  40 mg SubCUTAneous DAILY    piperacillin-tazobactam (ZOSYN) 3.375 g in 0.9% sodium chloride (MBP/ADV) 100 mL MBP  3.375 g IntraVENous Q8H    doxycycline (VIBRAMYCIN) 100 mg in 0.9% sodium chloride (MBP/ADV) 100 mL MBP  100 mg IntraVENous Q12H    vancomycin (VANCOCIN) 750 mg in 0.9% sodium chloride 250 mL (VIAL-MATE)  750 mg IntraVENous Q24H    haloperidol lactate (HALDOL) injection 5 mg  5 mg IntraVENous Q8H PRN    nicotine (NICODERM CQ) 21 mg/24 hr patch 1 Patch  1 Patch TransDERmal DAILY    albuterol-ipratropium (DUO-NEB) 2.5 MG-0.5 MG/3 ML  3 mL Nebulization Q4H PRN    sodium chloride (NS) flush 5-10 mL  5-10 mL IntraVENous PRN    Vancomycin - Pharmacokinetic Dosing  1 Each Other Rx Dosing/Monitoring    insulin lispro (HUMALOG) injection   SubCUTAneous AC&HS    glucose chewable tablet 16 g  4 Tab Oral PRN    glucagon (GLUCAGEN) injection 1 mg  1 mg IntraMUSCular PRN    dextrose 10% infusion 125-250 mL  125-250 mL IntraVENous PRN    budesonide (PULMICORT) 250 mcg/2ml nebulizer susp  500 mcg Nebulization BID RT    ELECTROLYTE REPLACEMENT PROTOCOL - Potassium Standard Dosing   1 Each Other PRN    ELECTROLYTE REPLACEMENT PROTOCOL - Potassium Standard  Dosing Details for Fluid Restricted Patients  1 Each Other PRN    ELECTROLYTE REPLACEMENT PROTOCOL  - Phosphorus Renal Dosing  1 Each Other PRN    sodium chloride (NS) flush 5-40 mL  5-40 mL IntraVENous Q8H    sodium chloride (NS) flush 5-40 mL  5-40 mL IntraVENous PRN    acetaminophen (TYLENOL) tablet 650 mg  650 mg Oral Q6H PRN    Or    acetaminophen (TYLENOL) suppository 650 mg  650 mg Rectal Q6H PRN    polyethylene glycol (MIRALAX) packet 17 g  17 g Oral DAILY PRN    promethazine (PHENERGAN) tablet 12.5 mg  12.5 mg Oral Q6H PRN    Or    ondansetron (ZOFRAN) injection 4 mg  4 mg IntraVENous Q6H PRN    albumin human 25% (BUMINATE) solution 12.5 g  12.5 g IntraVENous Q6H    pantoprazole (PROTONIX) 40 mg in 0.9% sodium chloride 10 mL injection  40 mg IntraVENous DAILY               Lab/Data Reviewed:       Recent Labs     10/25/20  0345 10/24/20  0100 10/23/20  1611   WBC 9.5 11.7 12.5   HGB 8.8* 9.1* 10.9*   HCT 27.0* 27.9* 32.9*    312 401     Recent Labs     10/25/20  0345 10/24/20  1645 10/24/20  1130 10/24/20  0100 10/23/20  1611     --   --  140 137   K 3.9 3.5 3.6 3.8 4.3   *  --   --  110 100   CO2 22  --   --  20* 25   *  --   --  77 108*   BUN 15  --   --  20* 23*   CREA 0.96  --   --  1.24 1.19   CA 8.4*  --   --  7.4* 8.9       Signed By: Skylar Mattson MD     October 25, 2020

## 2020-10-25 NOTE — PROGRESS NOTES
0514 Bedside and Verbal shift change report received from Chapis Meeks RN. Report included the following information SBAR, Kardex, Intake/Output, MAR, Recent Results, Med Rec Status and Alarm Parameters . 1000 Rounds completed with physician. Plan of care discussed and orders received. 1010 Follow up EKG completed.

## 2020-10-25 NOTE — PROGRESS NOTES
@2000 pt taken over drowsy in bed with intermittent periods of restlessness. Denies pain when awake. Remains on 2L/nc soft restraints to bilateral wrist. Lindo remains in situ draining clear yellow urine. Assessment done and charted in appropriate flow sheets. Nursing management continues. @0000 reassessment completed no changes,continues to be monitored. @0400 pt reassessed no changes. @2254 Bedside and Verbal shift change report given to Varghese Waggoner (oncoming nurse) by Max Rojo (offgoing nurse). Report included the following information SBAR, Kardex, Intake/Output, MAR, Recent Results, Med Rec Status and Alarm Parameters .

## 2020-10-25 NOTE — PROGRESS NOTES
@2919 pt taken over awake and restless in bed. Denies pain presently. Remains on 2L/nc soft restraints to bilateral wrist. Lindo remains in situ draining clear yellow urine. Assessment done and charted in appropriate flow sheets. Nursing management continues. @0175 pt became very agitated haldol administered as prescribed . Nursing care continues. @5796 pt very anxious ativan administered as ordered, continues to be monitored      @2330 pt calmer , continues to deny pain, reassessment completed and changes documented nursing observation continues. @8125 reassessment continues to changes      @0630 pt spo2 dropped into 80's and respirations increased. RT present breathing treatment given and MD was called and came to bedside. Xray was completed and labs done. ABG's was ordered and done, pt placed on High flow nasal cannula , nursing management and observation continues. @7866 Bedside and Verbal shift change report given to [place] (oncoming nurse) by Juvencio Ruiz (offgoing nurse). Report included the following information SBAR, Kardex, Intake/Output, MAR, Recent Results, Med Rec Status and Alarm Parameters .

## 2020-10-25 NOTE — PROGRESS NOTES
Reason for Admission:   Chart reviewed and noted Pt is readmission. Pt c/o chest pain. Has history of COPD emphysema bilateral above-knee amputation recently discharged from our hospital with pneumonia since discharge about a week ago patient has been in the emergency room 3-4 times at other facilities with complaints of cough shortness of breath and hypoxemia 2 days ago he had a CT scan done at [facility] which showed persistent pneumonia patient had not picked up his antibiotics that he was discharged on until recently. In the emergency room he was found to be confused and combative with hypothermia and hypotension he was treated with fluids 30 cc/kg and Levophed hypothermia and hypotension improved with pressor support and warming I am asked to admit for further treatment  For cardiovascular septic shock, COPD, asp pna on abx    On previous admission CM attempted to arrange [facility] services and it was declined by patient/family. Son is working with CM in community to arrange personal care and son declined assistance from hospital CM at that time. CM following and will attempt to assist with arrangements following dc this admission. Estimated LOS- 3 nights      RUR Score:   38%      PCP: First and Last name:  Mikal gandhi   Name of Practice:   Are you a current patient: Yes/No:   Approximate date of last visit:    Can you do a virtual visit with your PCP:              Resources/supports as identified by patient/family:   Has Medicaid                Top Challenges facing patient (as identified by patient/family and CM): Finances/Medication cost?    Medication is covered fully by [employer] plan, any medications pateint needs at time of dc should be filled at the [facility] in order to confirm he has them after this admission and there is not a possible delay in obtaining. Transportation?   Medicaid policy covers taxi for [employer]              Support system or lack thereof? Living arrangements? Self-care/ADLs/Cognition? Current Advanced Directive/Advance Care Plan:  No ACP                          Plan for utilizing home health:    TBD                 Transition of Care Plan:  TBD                 Care Management Interventions  PCP Verified by CM:  Yes  Transition of Care Consult (CM Consult): Discharge Planning  Current Support Network: [facility] [place]

## 2020-10-25 NOTE — PROGRESS NOTES
Hospitalist Progress Note    Patient: Fareed Espino Sr. MRN: [id number]  CSN: [id number]    YOB: 1955  Age: 63 y.o. Sex: male    DOA: 10/23/2020 LOS:  LOS: 2 days            Assessment/Plan     Principal Problem:    Septic shock ([facility] (10/23/2020)    Active Problems:    Hypotension, unspecified (1/27/2014)      Amputation of both lower extremities (HCC) ()      PNA (pneumonia) ()      Marijuana abuse ()      Legal blindness (10/14/2020)      Chronic obstructive pulmonary disease with acute exacerbation ([facility] (10/24/2020)      Severe protein-calorie malnutrition ([facility] (84/42/9326)      Metabolic encephalopathy (90/14/2583)    CC: A 68-year-old male with a history of a COPD, bilateral above knee amputation, recently discharged from [facility] [place] after treated for pneumonia who was admitted for septic shock and metabolic encephalopathy.     Cardiovascular septic shock: resolved. Off Levophed since yesterday pm. check cardiac enzymes/troponin was up. Discussed the case with Dr. Ronan Landrum  Pending echocardiogram, EKG today for QTC monitoring  HTN: Start home meds/ Norvasc and metoprolol.     Pulmonary: : COPD with right upper lobe pneumonia questionable mass  Started on duo nebs Pulmicort Pulmicort and IV steroids covering for hospital-acquired pneumonia with Zosyn vancomycin and doxycycline. Mucous plugging on outpatient CT scan. Discussed the case with Dr. Kathya Thrasher.     Heme: DVT prevention with Lovenox monitor [address] and platelets  IV albumin for hypotension  Question neoplasm on CT at outside facility await pulmonary input may need repeat CT scan after few days of  IVantibiotic  and more stable     FEN: Placed on ICU protocol monitor I's and O's      ID: Aspiration pneumonia on Zosyn sepsis  Blood cultures obtained  On broad antibiotics     GI: Protonix for stress prophylaxis     Vascular: Bilateral amputee above-knee     Endocrine: Sliding scale insulin diabetic diet     Neuro:Metabolic encephalopathy. Confused, on wrist restraint.   CT of head on admission reviewed with chronic microvascular disease no acute intracranial abnormality. Haldol as needed     DVT/GI Prophylaxis: lovenox/Protonix     Maintain ICU care             Subjective:      Pt was seen and examined with the nurse in the morning round.      Per nursing staff, patient remained off and on confused, agitated and combative for which requiring Haldol and Ativan for behavior control. ON esteban wrist restraints. Does not follow verbal commands    Review of systems  Unobtainable    Objective:      Visit Vitals  BP (!) 157/87   Pulse 67   Temp 97.5 °F (36.4 °C)   Resp 21   Wt 43.3 kg (95 lb 7.4 oz)   SpO2 99%   BMI 16.39 kg/m²       Physical Exam:    Gen: NAD, confused  Heent:  MMM, NC, AT. Cor: s1s2 RRR. No MRG. PMI mid 5th intercostal space. Resp: Rhonchi scattered bilateral lung base  Abd:  NT ND.  BS positive. No rebound or guarding. No masses. Ext: S/P bilateral above knee amputation    Intake and Output:  Current Shift:  No intake/output data recorded.   Last three shifts:  10/23 1901 - 10/25 0700  In: 6973.3 [P.O.:1080; I.V.:5893.3]  Out: 4450 [Urine:4450]    Labs: Results:       Chemistry Recent Labs     10/25/20  0345 10/24/20  1645 10/24/20  1130 10/24/20  0100 10/23/20  1611   *  --   --  77 108*     --   --  140 137   K 3.9 3.5 3.6 3.8 4.3   *  --   --  110 100   CO2 22  --   --  20* 25   BUN 15  --   --  20* 23*   CREA 0.96  --   --  1.24 1.19   CA 8.4*  --   --  7.4* 8.9   AGAP 3  --   --  10 12   BUCR 16  --   --  16 19   AP 71  --   --  79 100   TP 6.2*  --   --  5.2* 6.3*   ALB 3.1*  --   --  2.2* 2.5*   GLOB 3.1  --   --  3.0 3.8   AGRAT 1.0  --   --  0.7* 0.7*      CBC w/Diff Recent Labs     10/25/20  0345 10/24/20  0100 10/23/20  1611   WBC 9.5 11.7 12.5   RBC 3.12* 3.22* 3.81*   HGB 8.8* 9.1* 10.9*   HCT 27.0* 27.9* 32.9*    312 401   GRANS 94* 80* 85*   LYMPH 4* 11* 9*   EOS 0 1 1      Cardiac Enzymes Recent Labs     10/24/20  1645 10/24/20  1130    283   CKND1 2.4 2.7      Coagulation No results for input(s): PTP, INR, APTT, INREXT in the last 72 hours. Lipid Panel No results found for: CHOL, CHOLPOCT, CHOLX, CHLST, CHOLV, 951947, HDL, HDLP, LDL, LDLC, DLDLP, 606444, VLDLC, VLDL, TGLX, TRIGL, TRIGP, TGLPOCT, CHHD, CHHDX   BNP No results for input(s): BNPP in the last 72 hours.    Liver Enzymes Recent Labs     10/25/20  0345   TP 6.2*   ALB 3.1*   AP 71      Thyroid Studies Lab Results   Component Value Date/Time    TSH 0.78 10/24/2020 11:30 AM        Procedures/imaging: see electronic medical records for all procedures/Xrays and details which were not copied into this note but were reviewed prior to creation of Plan      Medications Reviewed  Jayshree Roa MD

## 2020-10-25 NOTE — PROGRESS NOTES
[facility]  Pulmonary, Critical Care, and Sleep Medicine    Name: Lina Wellington. MRN: [id number]   : 1955 Hospital: [facility]   Date: 10/25/2020        Pulmonary Critical Care Note    IMPRESSION:   Patient Active Problem List   Diagnosis Code    Sepsis with organ dysfunction, resolved shock ([facility] [address] A41.9, R65.20    Acute encephalopathy G93.40    CAP (community acquired pneumonia) J18.9    Emphysema lung (HCC) J44.9    Abnormal troponin, improved R77.8    Anemia, mild, no active bleeding D64.9    Hypophosphatemia E83.42    Lactic acidosis, improved E87.2    Bursitis of elbow M70.30    Medication overdose T50.901A    Polio A80.9    Depressive disorder, not elsewhere classified F32.9    Type II or unspecified type diabetes mellitus without mention of complication, uncontrolled [id number]    Amputation of both lower extremities ([facility] [address] A74.627O, L34.892K    ALEXANDER (acute kidney injury) ([facility] [address] N17.9    Marijuana abuse F12.10    Hard of hearing H91.90    Legal blindness H54.8      RECOMMENDATIONS:   Respiratory: compensated respiration, on O2 via NC, monitor for goal SPO2> 91%  CXR for follow up in AM  Aspiration prevention bundle followed, head of the bed at 30' all times  Continue bronchodilators, pulmonary hygiene care  Steroids - taper to 40 mg Q6  CT chest when more stable and cooperative    ID: Sepsis bundle per hospital protocol  Antibiotic choice:  Zosyn, Doxy, Vancomycin  Cultures NGTD. Deescalate antibiotic when appropriate. Lactic acid initial elevated and repeat normalized. Stop IV Fluid    CVS: stable Hemodynamics - now more on HTN side  Start home medications with Metoprolol and Norvasc  Await ECHO.  Cardiology input appreciated  EKG today for QTc monitoring    Renal: Monitor renal functions, lytes  Replace lytes per unit protocol  Heme: Monitor CBC daily- H/H possible dilutional drop, plt stable  No evidence of active bleeding  Endo: Glycemic control  TSH normal  GI: diet as tolerated  Neurology: remained confused, restless in bed  Receiving haldol and Ativan with control of agitations  CT head on admission chronic microvascular disease, no acute intracranial abnormality  Ammonia  AM labs    Will defer respective systems problem management to primary and other consultant and follow patient in ICU with primary and other medical team  Further recommendations will be based on the patient's response to recommended treatment and results of the investigation ordered. Quality Care: PPI, DVT prophylaxis, HOB elevated, Infection control all reviewed and addressed. PAIN AND SEDATION: none  Skin/Wound: skin of LT face  Prophylaxis: DVT and GI Prophylaxis reviewed. Restraints: Wrist soft restraints for patient interfering with medical therapy/management and patient safety. PT/OT eval and treat: as needed when stable   Lines/Tubes: Central Line and Lindo Bundles Followed   Central line: 10/23/20 (site examined, no erythema, induration, discharge or sign of infection. Dressing intact. Medically necessary, will remove it when not needed. Central line bundle followed). Lindo: 10/23/20 (Medically necessary for strict input/output monitoring in critically ill patient, will remove it when not needed. Lindo bundle followed). ADVANCE DIRECTIVE: Full code  DISCUSSION: Events and notes from last 24 hours reviewed. Care plan discussed with nursing, hospitalist, ICU staff  D/w patient (answered all questions to satisfaction). Quality Care: PPI, DVT prophylaxis, HOB elevated, Infection control all reviewed and addressed. High complexity decision making was performed during the evaluation of this patient at high risk for decompensation with multiple organ involvement. Total critical care time spent rendering care exclusive of procedures/family discussion/coordination of care: 35 minutes.        Subjective/History:   Larry Dion Ramirez. has been seen and evaluated as Dr. Sharona Rodriguez requested for assisting with ICU care of septic shock. Patient unable to provide history. Patient is a 63 y.o. male COPD, bilateral above-knee amputation, recently discharged from [facility] after treated for pneumonia. Per chart, since discharge about a week ago patient has been in the ER few times at other facilities with complaints of cough, shortness of breath and hypoxemia; had a CT scan done at [facility] which showed persistent pneumonia. Per chart, patient had not picked up his antibiotics that he was discharged on until recently. He was brought to ER with c/o of chest pain between shoulders by EMS. In the ER he was found to be confused and combative with hypothermia, hypotension. He was treated with IVF boluses and Levophed. CT head on admission nil acute. CXR showed improving pneumonia. He has remained confused at the time of this evaluation at bedside in  102 in ICU.      10/25/20  Patient remained in ICU, seen in  102 at bedside  Remained off and on confused requiring Haldol and Ativan for behavioral control  Placed on NC per staff for comfort; compensated respiratory status, protecting airways  Sedated but screaming off and on in the room with eyes closed. Doesn't follow verbal commands  Frequently trying to reach for monitor cables, IV lines; redirectable  Remained off of vasopressor since y'day; no arrhythmia, now more on HTN side  Afebrile; tolerated PO intake, good UO  I was not contacted by staff on anything about patient overnight. Review of Systems:  Review of systems not obtained due to patient factors.               Latest lactic acid:   Lactic acid   Date Value Ref Range Status   10/24/2020 0.9 0.4 - 2.0 MMOL/L Final   10/23/2020 2.7 (HH) 0.4 - 2.0 MMOL/L Final     Comment:     CALLED TO AND CORRECTLY REPEATED BY:  LEXIE ROJAS RN ED BY CAM ON 10/23/20 AT 1738.     10/23/2020 3.3 (HH) 0.4 - 2.0 MMOL/L Final     Comment:     CALLED TO AND CORRECTLY REPEATED BY:  Thony Howard RN ED BY CAM ON 10/23/20 AT 1700. Past Medical History:  Past Medical History:   Diagnosis Date    [facility]putation of both lower extremities ([facility] [address]     Amputee, above knee, left ([facility] [address]     At risk for falls     Chronic pain     back and legs    Diabetes ([facility] [address]     Hypercholesterolemia     Hypertension     Polio         Past Surgical History:  Past Surgical History:   Procedure Laterality Date    HX HERNIA REPAIR      HX OTHER SURGICAL      carpal tunnel     HX OTHER SURGICAL      cyst        Medications:  Prior to Admission medications    Medication Sig Start Date End Date Taking? Authorizing Provider   amoxicillin-clavulanate (AUGMENTIN) 875-125 mg per tablet Take 1 Tab by mouth every twelve (12) hours. 10/20/20   Lorenzo Mclaughlin MD   acetaminophen (TYLENOL) 325 mg tablet Take  by mouth every four (4) hours as needed for Pain. Tiffany Saravia MD   amLODIPine (NORVASC) 5 mg tablet Take 5 mg by mouth daily. Tiffany Saravia MD   gabapentin (NEURONTIN) 100 mg capsule Take 100 mg by mouth three (3) times daily. Tiffany Sraavia MD   clonazePAM (KLONOPIN) 0.5 mg tablet Take 0.5 mg by mouth nightly as needed. Tiffany Saravia MD   atorvastatin (LIPITOR) 20 mg tablet Take 20 mg by mouth daily. Tiffany Saravia MD   nitroglycerin (NITROSTAT) 0.4 mg SL tablet 0.4 mg by SubLINGual route every five (5) minutes as needed for Chest Pain. Up to 3 doses. Tiffany Saravia MD   metoprolol succinate (TOPROL XL) 25 mg XL tablet Take 25 mg by mouth daily. Tiffany Saravia MD   oxyCODONE-acetaminophen (PERCOCET) 5-325 mg per tablet Take  by mouth every four (4) hours as needed for Pain. Tiffany Saravia MD   traMADol (ULTRAM) 50 mg tablet Take 1 Tab by mouth every six (6) hours as needed for Pain. Max Daily Amount: 200 mg. 12/4/18   Agus Clayton MD   simvastatin (ZOCOR) 20 mg tablet Take 20 mg by mouth nightly. Provider, Divya   lisinopril-hydrochlorothiazide (PRINZIDE, ZESTORETIC) 20-12.5 mg per tablet Take 1 Tab by mouth daily.     Provider, Historical   isosorbide mononitrate ER (IMDUR) 30 mg tablet Take 30 mg by mouth daily.     Provider, Historical       Current Facility-Administered Medications   Medication Dose Route Frequency    methylPREDNISolone (PF) (SOLU-MEDROL) injection 40 mg  40 mg IntraVENous Q6H    metoprolol (LOPRESSOR) injection 5 mg  5 mg IntraVENous Q8H    amLODIPine (NORVASC) tablet 5 mg  5 mg Oral DAILY    atorvastatin (LIPITOR) tablet 20 mg  20 mg Oral QHS    gabapentin (NEURONTIN) capsule 100 mg  100 mg Oral TID    enoxaparin (LOVENOX) injection 40 mg  40 mg SubCUTAneous DAILY    piperacillin-tazobactam (ZOSYN) 3.375 g in 0.9% sodium chloride (MBP/ADV) 100 mL MBP  3.375 g IntraVENous Q8H    doxycycline (VIBRAMYCIN) 100 mg in 0.9% sodium chloride (MBP/ADV) 100 mL MBP  100 mg IntraVENous Q12H    vancomycin (VANCOCIN) 750 mg in 0.9% sodium chloride 250 mL (VIAL-MATE)  750 mg IntraVENous Q24H    nicotine (NICODERM CQ) 21 mg/24 hr patch 1 Patch  1 Patch TransDERmal DAILY    Vancomycin - Pharmacokinetic Dosing  1 Each Other Rx Dosing/Monitoring    insulin lispro (HUMALOG) injection   SubCUTAneous AC&HS    budesonide (PULMICORT) 250 mcg/2ml nebulizer susp  500 mcg Nebulization BID RT    sodium chloride (NS) flush 5-40 mL  5-40 mL IntraVENous Q8H    albumin human 25% (BUMINATE) solution 12.5 g  12.5 g IntraVENous Q6H    pantoprazole (PROTONIX) 40 mg in 0.9% sodium chloride 10 mL injection  40 mg IntraVENous DAILY       Allergy:  No Known Allergies     Social History:  Social History     Tobacco Use    Smoking status: Current Every Day Smoker     Packs/day: 2.00     Years: 40.00     Pack years: 80.00    Smokeless tobacco: Current User     Types: Snuff   Substance Use Topics    Alcohol use: No    Drug use: No        Family History:  Family History   Problem Relation Age of Onset    Heart Attack Mother     Heart Attack Father     Malignant Hyperthermia Neg Hx     Pseudocholinesterase Deficiency Neg Hx     Delayed Awakening Neg Hx     Post-op Nausea/Vomiting Neg Hx     Emergence Delirium Neg Hx     Post-op Cognitive Dysfunction Neg Hx     Other Neg Hx           Objective:   Vital Signs:    Blood pressure (!) 166/89, pulse 84, temperature 97.8 °F (36.6 °C), resp. rate (!) 32, weight 43.3 kg (95 lb 7.4 oz), SpO2 95 %. Body mass index is 16.39 kg/m². O2 Device: Nasal cannula   O2 Flow Rate (L/min): 2 l/min   Temp (24hrs), Av.9 °F (36.6 °C), Min:97.4 °F (36.3 °C), Max:98.5 °F (36.9 °C)         Intake/Output:   Last shift:      No intake/output data recorded. Last 3 shifts: 10/23 1901 - 10/25 0700  In: 6973.3 [P.O.:1080; I.V.:5893.3]  Out: 4450 [Urine:4450]    Intake/Output Summary (Last 24 hours) at 10/25/2020 0920  Last data filed at 10/25/2020 5366  Gross per 24 hour   Intake 2557.5 ml   Output 3450 ml   Net -892.5 ml       Physical Exam:  General: sedated, confused, off and on agitated, in no respiratory distress, uncooperative, appears older than stated age, on NC O2  HEENT: PERRL, fundi benign, throat normal without erythema or exudate  Neck: No abnormally enlarged lymph nodes or thyroid, supple  Chest: increased AP diameter  Lungs: moderate air entry, few rhonchi scattered RT anterior chest, normal percussion bilaterally, no tenderness/ rash  Heart: Regular rate and rhythm, S1S2 present or without murmur or extra heart sounds  Abdomen: non distended, bowel sounds normoactive, tympanic, soft without significant tenderness, masses, organomegaly or guarding, rigidity, rebound  Extremity: negative edema, cyanosis, clubbing in BL UE; BL AKA  Capillary refill: normal  Neuro: sedated, doesn't follow commands, speaking irrelevant, moves all extremities well, no involuntary movements, exam limitations  Skin: Skin color, texture, turgor fair.  Skin dry, warm, non-diaphoretic    Data:     Recent Results (from the past 24 hour(s))   GLUCOSE, POC    Collection Time: 10/24/20 11:04 AM   Result Value Ref Range    Glucose (POC) 106 70 - 110 mg/dL   MAGNESIUM    Collection Time: 10/24/20 11:30 AM   Result Value Ref Range    Magnesium 2.6 1.6 - 2.6 mg/dL   POTASSIUM    Collection Time: 10/24/20 11:30 AM   Result Value Ref Range    Potassium 3.6 3.5 - 5.5 mmol/L   TSH 3RD GENERATION    Collection Time: 10/24/20 11:30 AM   Result Value Ref Range    TSH 0.78 0.36 - 3.74 uIU/mL   CARDIAC PANEL,(CK, CKMB & TROPONIN)    Collection Time: 10/24/20 11:30 AM   Result Value Ref Range    CK - MB 7.7 (H) <3.6 ng/ml    CK-MB Index 2.7 0.0 - 4.0 %     39 - 308 U/L    Troponin-I, QT 0.04 0.0 - 0.045 NG/ML   GLUCOSE, POC    Collection Time: 10/24/20  4:41 PM   Result Value Ref Range    Glucose (POC) 102 70 - 110 mg/dL   CARDIAC PANEL,(CK, CKMB & TROPONIN)    Collection Time: 10/24/20  4:45 PM   Result Value Ref Range    CK - MB 6.0 (H) <3.6 ng/ml    CK-MB Index 2.4 0.0 - 4.0 %     39 - 308 U/L    Troponin-I, QT 0.03 0.0 - 0.045 NG/ML   POTASSIUM    Collection Time: 10/24/20  4:45 PM   Result Value Ref Range    Potassium 3.5 3.5 - 5.5 mmol/L   CALCIUM, IONIZED    Collection Time: 10/24/20  4:45 PM   Result Value Ref Range    Ionized Calcium 1.14 1.12 - 1.32 MMOL/L   GLUCOSE, POC    Collection Time: 10/24/20  8:55 PM   Result Value Ref Range    Glucose (POC) 126 (H) 70 - 863 mg/dL   METABOLIC PANEL, COMPREHENSIVE    Collection Time: 10/25/20  3:45 AM   Result Value Ref Range    Sodium 141 136 - 145 mmol/L    Potassium 3.9 3.5 - 5.5 mmol/L    Chloride 116 (H) 100 - 111 mmol/L    CO2 22 21 - 32 mmol/L    Anion gap 3 3.0 - 18 mmol/L    Glucose 128 (H) 74 - 99 mg/dL    BUN 15 7.0 - 18 MG/DL    Creatinine 0.96 0.6 - 1.3 MG/DL    BUN/Creatinine ratio 16 12 - 20      GFR est AA >60 >60 ml/min/1.73m2    GFR est non-AA >60 >60 ml/min/1.73m2    Calcium 8.4 (L) 8.5 - 10.1 MG/DL    Bilirubin, total 0.3 0.2 - 1.0 MG/DL    ALT (SGPT) 28 16 - 61 U/L    AST (SGOT) 28 10 - 38 U/L    Alk.  phosphatase 71 45 - 117 U/L    Protein, total 6.2 (L) 6.4 - 8.2 g/dL Albumin 3.1 (L) 3.4 - 5.0 g/dL    Globulin 3.1 2.0 - 4.0 g/dL    A-G Ratio 1.0 0.8 - 1.7     CBC WITH AUTOMATED DIFF    Collection Time: 10/25/20  3:45 AM   Result Value Ref Range    WBC 9.5 4.6 - 13.2 K/uL    RBC 3.12 (L) 4.70 - 5.50 M/uL    HGB 8.8 (L) 13.0 - 16.0 g/dL    HCT 27.0 (L) 36.0 - 48.0 %    MCV 86.5 74.0 - 97.0 FL    MCH 28.2 24.0 - 34.0 PG    MCHC 32.6 31.0 - 37.0 g/dL    RDW 17.3 (H) 11.6 - 14.5 %    PLATELET 908 611 - 484 K/uL    MPV 9.7 9.2 - 11.8 FL    NEUTROPHILS 94 (H) 40 - 73 %    LYMPHOCYTES 4 (L) 21 - 52 %    MONOCYTES 2 (L) 3 - 10 %    EOSINOPHILS 0 0 - 5 %    BASOPHILS 0 0 - 2 %    ABS. NEUTROPHILS 9.0 (H) 1.8 - 8.0 K/UL    ABS. LYMPHOCYTES 0.4 (L) 0.9 - 3.6 K/UL    ABS. MONOCYTES 0.1 0.05 - 1.2 K/UL    ABS. EOSINOPHILS 0.0 0.0 - 0.4 K/UL    ABS. BASOPHILS 0.0 0.0 - 0.1 K/UL    DF AUTOMATED     MAGNESIUM    Collection Time: 10/25/20  3:45 AM   Result Value Ref Range    Magnesium 2.3 1.6 - 2.6 mg/dL   CALCIUM, IONIZED    Collection Time: 10/25/20  3:45 AM   Result Value Ref Range    Ionized Calcium 1.22 1.12 - 1.32 MMOL/L   PHOSPHORUS    Collection Time: 10/25/20  3:45 AM   Result Value Ref Range    Phosphorus 2.2 (L) 2.5 - 4.9 MG/DL   GLUCOSE, POC    Collection Time: 10/25/20  5:17 AM   Result Value Ref Range    Glucose (POC) 120 (H) 70 - 110 mg/dL           No results for input(s): FIO2I, IFO2, HCO3I, IHCO3, HCOPOC, PCO2I, PCOPOC, IPHI, PHI, PHPOC, PO2I, PO2POC in the last 72 hours.     No lab exists for component: IPOC2    All Micro Results     Procedure Component Value Units Date/Time    CULTURE, URINE [[id number]] Collected:  10/23/20 1630    Order Status:  Completed Specimen:  Urine from Clean catch Updated:  10/24/20 2026     Special Requests: NO SPECIAL REQUESTS        Culture result: No growth (<1,000 CFU/ML)       CULTURE, BLOOD [[id number]] Collected:  10/23/20 1611    Order Status:  Completed Specimen:  Blood Updated:  10/24/20 0711     Special Requests: NO SPECIAL REQUESTS Culture result: NO GROWTH AFTER 14 HOURS       CULTURE, BLOOD [[id number]] Collected:  10/23/20 1615    Order Status:  Completed Specimen:  Blood Updated:  10/24/20 0711     Special Requests: NO SPECIAL REQUESTS        Culture result: NO GROWTH AFTER 14 HOURS             Telemetry: normal sinus rhythm      Imaging:  [x]I have personally reviewed the patients chest radiographs images and report   Results from Hospital Encounter encounter on 10/23/20   XR CHEST PORT    Narrative EXAM: XR CHEST PORT. CLINICAL INDICATION/HISTORY: meets SIRS criteria.  -Additional: None. TECHNIQUE: A portable erect AP radiographic view of the chest is compared with  several other chest radiographs performed the same month.  _______________    FINDINGS:    See impression. No pneumothorax or appreciable significant pleural effusion. The  cardiac silhouette, vanessa, and mediastinal contours are normal for age. No acute  osseous abnormality is revealed. _______________      Impression IMPRESSION:    Slight improvement in multifocal airspace disease most in keeping with  pneumonia, again most pronounced at the right lung apex with no new or worsening  findings. Recommend continued radiographic follow-up to resolution. _______________         Results from Hospital Encounter encounter on 10/23/20   CT HEAD WO CONT    Narrative EXAM: CT head    INDICATION: Altered mental status. COMPARISON: October 15, 2020. TECHNIQUE: Axial CT imaging of the head was performed without intravenous  contrast. Dose reduction techniques used: automated exposure control, adjustment  of the mAs and/or kVp according to patient size, and iterative reconstruction  techniques.  Digital imaging and communications in Medicine (DICOM) format image  data are available to nonaffiliated external healthcare facilities or entities  on a secure, media free, reciprocally searchable basis with patient  authorization for at least 12 months after this study.    _______________    FINDINGS:    BRAIN AND POSTERIOR FOSSA: There is cerebral volume loss with prominence of the  lateral and the third ventricles. The cortical sulci are widened appropriately. The fourth ventricle and basal cisterns are normally outlined. There is mild  bilateral periventricular and central white matter diminished attenuation. There  is no acute territorial defect, hemorrhage or midline shift. EXTRA-AXIAL SPACES AND MENINGES: There are no abnormal extra-axial fluid  collections. CALVARIUM: Intact. SINUSES: Clear. OTHER: Partial right mastoid opacification is again seen. _______________      Impression IMPRESSION:    Cerebral volume loss and mild bilateral periventricular and central white matter  diminished attenuation which is nonspecific but likely to represent  microvascular disease. There is no acute intracranial abnormality. No significant interval change.            [x]See my orders for details    My assessment, plan of care, findings, medications, side effects etc were discussed with:  [x]nursing []PT/OT    []respiratory therapy []Dr. Lianna Ayala [x]Patient       Gloria Higuera MD

## 2020-10-26 ENCOUNTER — APPOINTMENT (OUTPATIENT)
Dept: GENERAL RADIOLOGY | Age: 65
DRG: 720 | End: 2020-10-26
Attending: INTERNAL MEDICINE
Payer: MEDICAID

## 2020-10-26 PROBLEM — J96.01 ACUTE RESPIRATORY FAILURE WITH HYPOXIA (HCC): Status: ACTIVE | Noted: 2020-10-26

## 2020-10-26 LAB
ALBUMIN SERPL-MCNC: 3.6 G/DL (ref 3.4–5)
ALBUMIN/GLOB SERPL: 1.2 {RATIO} (ref 0.8–1.7)
ALP SERPL-CCNC: 65 U/L (ref 45–117)
ALT SERPL-CCNC: 26 U/L (ref 16–61)
AMMONIA PLAS-SCNC: <10 UMOL/L (ref 11–32)
ANION GAP SERPL CALC-SCNC: 8 MMOL/L (ref 3–18)
ARTERIAL PATENCY WRIST A: YES
AST SERPL-CCNC: 23 U/L (ref 10–38)
ATRIAL RATE: 79 BPM
BASE DEFICIT BLD-SCNC: 3 MMOL/L
BASOPHILS # BLD: 0 K/UL (ref 0–0.1)
BASOPHILS NFR BLD: 0 % (ref 0–2)
BDY SITE: ABNORMAL
BILIRUB SERPL-MCNC: 0.3 MG/DL (ref 0.2–1)
BODY TEMPERATURE: 98.6
BUN SERPL-MCNC: 16 MG/DL (ref 7–18)
BUN/CREAT SERPL: 20 (ref 12–20)
CA-I SERPL-SCNC: 1.18 MMOL/L (ref 1.12–1.32)
CALCIUM SERPL-MCNC: 9.1 MG/DL (ref 8.5–10.1)
CALCULATED P AXIS, ECG09: 86 DEGREES
CALCULATED R AXIS, ECG10: 61 DEGREES
CALCULATED T AXIS, ECG11: 72 DEGREES
CHLORIDE SERPL-SCNC: 111 MMOL/L (ref 100–111)
CK MB CFR SERPL CALC: 1.3 % (ref 0–4)
CK MB CFR SERPL CALC: 2.9 % (ref 0–4)
CK MB CFR SERPL CALC: 3.4 % (ref 0–4)
CK MB SERPL-MCNC: 2.3 NG/ML (ref 5–25)
CK MB SERPL-MCNC: 2.4 NG/ML (ref 5–25)
CK MB SERPL-MCNC: 2.5 NG/ML (ref 5–25)
CK SERPL-CCNC: 188 U/L (ref 39–308)
CK SERPL-CCNC: 71 U/L (ref 39–308)
CK SERPL-CCNC: 78 U/L (ref 39–308)
CO2 SERPL-SCNC: 23 MMOL/L (ref 21–32)
CREAT SERPL-MCNC: 0.79 MG/DL (ref 0.6–1.3)
DATE LAST DOSE: NORMAL
DIAGNOSIS, 93000: NORMAL
DIFFERENTIAL METHOD BLD: ABNORMAL
EOSINOPHIL # BLD: 0 K/UL (ref 0–0.4)
EOSINOPHIL NFR BLD: 0 % (ref 0–5)
ERYTHROCYTE [DISTWIDTH] IN BLOOD BY AUTOMATED COUNT: 17.2 % (ref 11.6–14.5)
GAS FLOW.O2 O2 DELIVERY SYS: ABNORMAL L/MIN
GAS FLOW.O2 SETTING OXYMISER: 15 L/M
GLOBULIN SER CALC-MCNC: 2.9 G/DL (ref 2–4)
GLUCOSE BLD STRIP.AUTO-MCNC: 104 MG/DL (ref 70–110)
GLUCOSE BLD STRIP.AUTO-MCNC: 106 MG/DL (ref 70–110)
GLUCOSE BLD STRIP.AUTO-MCNC: 114 MG/DL (ref 70–110)
GLUCOSE BLD STRIP.AUTO-MCNC: 121 MG/DL (ref 70–110)
GLUCOSE SERPL-MCNC: 103 MG/DL (ref 74–99)
HCO3 BLD-SCNC: 19.5 MMOL/L (ref 22–26)
HCT VFR BLD AUTO: 28 % (ref 36–48)
HGB BLD-MCNC: 9.3 G/DL (ref 13–16)
LYMPHOCYTES # BLD: 0.4 K/UL (ref 0.9–3.6)
LYMPHOCYTES NFR BLD: 4 % (ref 21–52)
MAGNESIUM SERPL-MCNC: 1.9 MG/DL (ref 1.6–2.6)
MCH RBC QN AUTO: 28.4 PG (ref 24–34)
MCHC RBC AUTO-ENTMCNC: 33.2 G/DL (ref 31–37)
MCV RBC AUTO: 85.6 FL (ref 74–97)
MONOCYTES # BLD: 0.2 K/UL (ref 0.05–1.2)
MONOCYTES NFR BLD: 2 % (ref 3–10)
NEUTS SEG # BLD: 8.6 K/UL (ref 1.8–8)
NEUTS SEG NFR BLD: 94 % (ref 40–73)
O2/TOTAL GAS SETTING VFR VENT: 1 %
P-R INTERVAL, ECG05: 150 MS
PCO2 BLD: 24.1 MMHG (ref 35–45)
PH BLD: 7.52 [PH] (ref 7.35–7.45)
PHOSPHATE SERPL-MCNC: 2.5 MG/DL (ref 2.5–4.9)
PHOSPHATE SERPL-MCNC: 2.5 MG/DL (ref 2.5–4.9)
PLATELET # BLD AUTO: 297 K/UL (ref 135–420)
PMV BLD AUTO: 10.2 FL (ref 9.2–11.8)
PO2 BLD: 50 MMHG (ref 80–100)
POTASSIUM SERPL-SCNC: 3.2 MMOL/L (ref 3.5–5.5)
POTASSIUM SERPL-SCNC: 3.5 MMOL/L (ref 3.5–5.5)
POTASSIUM SERPL-SCNC: 4.6 MMOL/L (ref 3.5–5.5)
PROT SERPL-MCNC: 6.5 G/DL (ref 6.4–8.2)
Q-T INTERVAL, ECG07: 420 MS
QRS DURATION, ECG06: 96 MS
QTC CALCULATION (BEZET), ECG08: 481 MS
RBC # BLD AUTO: 3.27 M/UL (ref 4.7–5.5)
REPORTED DOSE,DOSE: NORMAL UNITS
REPORTED DOSE/TIME,TMG: 608
SAO2 % BLD: 90 % (ref 92–97)
SERVICE CMNT-IMP: ABNORMAL
SODIUM SERPL-SCNC: 142 MMOL/L (ref 136–145)
SPECIMEN TYPE: ABNORMAL
TOTAL RESP. RATE, ITRR: 30
TROPONIN I SERPL-MCNC: <0.02 NG/ML (ref 0–0.04)
VANCOMYCIN TROUGH SERPL-MCNC: 12.4 UG/ML (ref 10–20)
VENTRICULAR RATE, ECG03: 79 BPM
WBC # BLD AUTO: 9.2 K/UL (ref 4.6–13.2)

## 2020-10-26 PROCEDURE — 82330 ASSAY OF CALCIUM: CPT

## 2020-10-26 PROCEDURE — 74011250636 HC RX REV CODE- 250/636: Performed by: INTERNAL MEDICINE

## 2020-10-26 PROCEDURE — 82803 BLOOD GASES ANY COMBINATION: CPT

## 2020-10-26 PROCEDURE — 74011250637 HC RX REV CODE- 250/637: Performed by: FAMILY MEDICINE

## 2020-10-26 PROCEDURE — 74011000250 HC RX REV CODE- 250: Performed by: INTERNAL MEDICINE

## 2020-10-26 PROCEDURE — 74011250637 HC RX REV CODE- 250/637: Performed by: INTERNAL MEDICINE

## 2020-10-26 PROCEDURE — 77010033711 HC HIGH FLOW OXYGEN

## 2020-10-26 PROCEDURE — 84132 ASSAY OF SERUM POTASSIUM: CPT

## 2020-10-26 PROCEDURE — 74011250636 HC RX REV CODE- 250/636: Performed by: EMERGENCY MEDICINE

## 2020-10-26 PROCEDURE — C9113 INJ PANTOPRAZOLE SODIUM, VIA: HCPCS | Performed by: INTERNAL MEDICINE

## 2020-10-26 PROCEDURE — 71045 X-RAY EXAM CHEST 1 VIEW: CPT

## 2020-10-26 PROCEDURE — 82550 ASSAY OF CK (CPK): CPT

## 2020-10-26 PROCEDURE — 36600 WITHDRAWAL OF ARTERIAL BLOOD: CPT

## 2020-10-26 PROCEDURE — 74011250636 HC RX REV CODE- 250/636: Performed by: FAMILY MEDICINE

## 2020-10-26 PROCEDURE — 74011000258 HC RX REV CODE- 258: Performed by: EMERGENCY MEDICINE

## 2020-10-26 PROCEDURE — 94640 AIRWAY INHALATION TREATMENT: CPT

## 2020-10-26 PROCEDURE — 80202 ASSAY OF VANCOMYCIN: CPT

## 2020-10-26 PROCEDURE — 76450000000

## 2020-10-26 PROCEDURE — 84100 ASSAY OF PHOSPHORUS: CPT

## 2020-10-26 PROCEDURE — 74011000258 HC RX REV CODE- 258: Performed by: INTERNAL MEDICINE

## 2020-10-26 PROCEDURE — 85025 COMPLETE CBC W/AUTO DIFF WBC: CPT

## 2020-10-26 PROCEDURE — 74011000250 HC RX REV CODE- 250: Performed by: FAMILY MEDICINE

## 2020-10-26 PROCEDURE — P9047 ALBUMIN (HUMAN), 25%, 50ML: HCPCS | Performed by: INTERNAL MEDICINE

## 2020-10-26 PROCEDURE — 82962 GLUCOSE BLOOD TEST: CPT

## 2020-10-26 PROCEDURE — 82140 ASSAY OF AMMONIA: CPT

## 2020-10-26 PROCEDURE — 93005 ELECTROCARDIOGRAM TRACING: CPT

## 2020-10-26 PROCEDURE — 65610000006 HC RM INTENSIVE CARE

## 2020-10-26 PROCEDURE — 83735 ASSAY OF MAGNESIUM: CPT

## 2020-10-26 PROCEDURE — 74011250636 HC RX REV CODE- 250/636: Performed by: HOSPITALIST

## 2020-10-26 RX ORDER — AMLODIPINE BESYLATE 5 MG/1
10 TABLET ORAL DAILY
Status: DISCONTINUED | OUTPATIENT
Start: 2020-10-26 | End: 2020-10-26

## 2020-10-26 RX ORDER — AMLODIPINE BESYLATE 5 MG/1
10 TABLET ORAL DAILY
Status: DISCONTINUED | OUTPATIENT
Start: 2020-10-26 | End: 2020-11-20 | Stop reason: HOSPADM

## 2020-10-26 RX ORDER — QUETIAPINE FUMARATE 25 MG/1
25 TABLET, FILM COATED ORAL
Status: DISCONTINUED | OUTPATIENT
Start: 2020-10-26 | End: 2020-10-27

## 2020-10-26 RX ORDER — POTASSIUM CHLORIDE 7.45 MG/ML
10 INJECTION INTRAVENOUS
Status: DISCONTINUED | OUTPATIENT
Start: 2020-10-26 | End: 2020-10-26

## 2020-10-26 RX ORDER — METOPROLOL TARTRATE 25 MG/1
12.5 TABLET, FILM COATED ORAL EVERY 12 HOURS
Status: DISCONTINUED | OUTPATIENT
Start: 2020-10-26 | End: 2020-10-26

## 2020-10-26 RX ORDER — METOPROLOL TARTRATE 25 MG/1
12.5 TABLET, FILM COATED ORAL EVERY 12 HOURS
Status: DISCONTINUED | OUTPATIENT
Start: 2020-10-26 | End: 2020-11-06 | Stop reason: ALTCHOICE

## 2020-10-26 RX ORDER — POTASSIUM CHLORIDE 7.45 MG/ML
10 INJECTION INTRAVENOUS
Status: COMPLETED | OUTPATIENT
Start: 2020-10-26 | End: 2020-10-26

## 2020-10-26 RX ORDER — ENOXAPARIN SODIUM 100 MG/ML
1 INJECTION SUBCUTANEOUS EVERY 12 HOURS
Status: DISCONTINUED | OUTPATIENT
Start: 2020-10-26 | End: 2020-11-05

## 2020-10-26 RX ORDER — HYDRALAZINE HYDROCHLORIDE 20 MG/ML
20 INJECTION INTRAMUSCULAR; INTRAVENOUS
Status: DISCONTINUED | OUTPATIENT
Start: 2020-10-26 | End: 2020-11-05

## 2020-10-26 RX ORDER — IPRATROPIUM BROMIDE 0.5 MG/2.5ML
0.5 SOLUTION RESPIRATORY (INHALATION)
Status: DISCONTINUED | OUTPATIENT
Start: 2020-10-26 | End: 2020-11-05

## 2020-10-26 RX ADMIN — POTASSIUM CHLORIDE 10 MEQ: 7.46 INJECTION, SOLUTION INTRAVENOUS at 12:09

## 2020-10-26 RX ADMIN — POTASSIUM CHLORIDE 10 MEQ: 7.46 INJECTION, SOLUTION INTRAVENOUS at 13:40

## 2020-10-26 RX ADMIN — HYDRALAZINE HYDROCHLORIDE 20 MG: 20 INJECTION INTRAMUSCULAR; INTRAVENOUS at 05:33

## 2020-10-26 RX ADMIN — DOXYCYCLINE 100 MG: 100 INJECTION, POWDER, LYOPHILIZED, FOR SOLUTION INTRAVENOUS at 09:50

## 2020-10-26 RX ADMIN — CLONAZEPAM 0.5 MG: 0.5 TABLET ORAL at 06:56

## 2020-10-26 RX ADMIN — SODIUM CHLORIDE 40 MG: 9 INJECTION, SOLUTION INTRAMUSCULAR; INTRAVENOUS; SUBCUTANEOUS at 08:22

## 2020-10-26 RX ADMIN — SODIUM CHLORIDE 10 ML: 9 INJECTION, SOLUTION INTRAMUSCULAR; INTRAVENOUS; SUBCUTANEOUS at 06:01

## 2020-10-26 RX ADMIN — ATORVASTATIN CALCIUM 20 MG: 20 TABLET, FILM COATED ORAL at 21:00

## 2020-10-26 RX ADMIN — METHYLPREDNISOLONE SODIUM SUCCINATE 40 MG: 40 INJECTION, POWDER, FOR SOLUTION INTRAMUSCULAR; INTRAVENOUS at 05:33

## 2020-10-26 RX ADMIN — IPRATROPIUM BROMIDE AND ALBUTEROL SULFATE 3 ML: .5; 3 SOLUTION RESPIRATORY (INHALATION) at 05:53

## 2020-10-26 RX ADMIN — GABAPENTIN 100 MG: 100 CAPSULE ORAL at 16:24

## 2020-10-26 RX ADMIN — POTASSIUM CHLORIDE 10 MEQ: 7.46 INJECTION, SOLUTION INTRAVENOUS at 08:28

## 2020-10-26 RX ADMIN — QUETIAPINE FUMARATE 25 MG: 25 TABLET ORAL at 21:03

## 2020-10-26 RX ADMIN — GABAPENTIN 100 MG: 100 CAPSULE ORAL at 08:21

## 2020-10-26 RX ADMIN — SODIUM CHLORIDE 10 ML: 9 INJECTION, SOLUTION INTRAMUSCULAR; INTRAVENOUS; SUBCUTANEOUS at 21:01

## 2020-10-26 RX ADMIN — HYDRALAZINE HYDROCHLORIDE 20 MG: 20 INJECTION INTRAMUSCULAR; INTRAVENOUS at 21:05

## 2020-10-26 RX ADMIN — HALOPERIDOL LACTATE 5 MG: 5 INJECTION, SOLUTION INTRAMUSCULAR at 23:36

## 2020-10-26 RX ADMIN — HALOPERIDOL LACTATE 5 MG: 5 INJECTION, SOLUTION INTRAMUSCULAR at 03:33

## 2020-10-26 RX ADMIN — METHYLPREDNISOLONE SODIUM SUCCINATE 40 MG: 40 INJECTION, POWDER, FOR SOLUTION INTRAMUSCULAR; INTRAVENOUS at 00:07

## 2020-10-26 RX ADMIN — ALBUMIN (HUMAN) 12.5 G: 0.25 INJECTION, SOLUTION INTRAVENOUS at 04:08

## 2020-10-26 RX ADMIN — LORAZEPAM 1 MG: 2 INJECTION INTRAMUSCULAR at 01:22

## 2020-10-26 RX ADMIN — METHYLPREDNISOLONE SODIUM SUCCINATE 40 MG: 40 INJECTION, POWDER, FOR SOLUTION INTRAMUSCULAR; INTRAVENOUS at 23:36

## 2020-10-26 RX ADMIN — PIPERACILLIN AND TAZOBACTAM 3.38 G: 3; .375 INJECTION, POWDER, LYOPHILIZED, FOR SOLUTION INTRAVENOUS at 11:06

## 2020-10-26 RX ADMIN — METOPROLOL TARTRATE 12.5 MG: 25 TABLET, FILM COATED ORAL at 08:17

## 2020-10-26 RX ADMIN — IPRATROPIUM BROMIDE 0.5 MG: 0.5 SOLUTION RESPIRATORY (INHALATION) at 10:33

## 2020-10-26 RX ADMIN — METHYLPREDNISOLONE SODIUM SUCCINATE 40 MG: 40 INJECTION, POWDER, FOR SOLUTION INTRAMUSCULAR; INTRAVENOUS at 11:05

## 2020-10-26 RX ADMIN — POTASSIUM CHLORIDE 10 MEQ: 7.46 INJECTION, SOLUTION INTRAVENOUS at 11:04

## 2020-10-26 RX ADMIN — BUDESONIDE 500 MCG: 0.25 INHALANT RESPIRATORY (INHALATION) at 21:07

## 2020-10-26 RX ADMIN — METOPROLOL TARTRATE 12.5 MG: 25 TABLET, FILM COATED ORAL at 20:04

## 2020-10-26 RX ADMIN — LORAZEPAM 1 MG: 2 INJECTION INTRAMUSCULAR at 20:13

## 2020-10-26 RX ADMIN — POTASSIUM CHLORIDE 10 MEQ: 7.46 INJECTION, SOLUTION INTRAVENOUS at 06:50

## 2020-10-26 RX ADMIN — PIPERACILLIN AND TAZOBACTAM 3.38 G: 3; .375 INJECTION, POWDER, LYOPHILIZED, FOR SOLUTION INTRAVENOUS at 19:46

## 2020-10-26 RX ADMIN — VANCOMYCIN HYDROCHLORIDE 750 MG: 750 INJECTION, POWDER, LYOPHILIZED, FOR SOLUTION INTRAVENOUS at 07:46

## 2020-10-26 RX ADMIN — IPRATROPIUM BROMIDE 0.5 MG: 0.5 SOLUTION RESPIRATORY (INHALATION) at 15:52

## 2020-10-26 RX ADMIN — PIPERACILLIN AND TAZOBACTAM 3.38 G: 3; .375 INJECTION, POWDER, LYOPHILIZED, FOR SOLUTION INTRAVENOUS at 03:34

## 2020-10-26 RX ADMIN — AMLODIPINE BESYLATE 10 MG: 5 TABLET ORAL at 08:17

## 2020-10-26 RX ADMIN — ENOXAPARIN SODIUM 40 MG: 40 INJECTION SUBCUTANEOUS at 20:05

## 2020-10-26 RX ADMIN — IPRATROPIUM BROMIDE 0.5 MG: 0.5 SOLUTION RESPIRATORY (INHALATION) at 21:07

## 2020-10-26 RX ADMIN — BUDESONIDE 500 MCG: 0.25 INHALANT RESPIRATORY (INHALATION) at 07:48

## 2020-10-26 RX ADMIN — DOXYCYCLINE 100 MG: 100 INJECTION, POWDER, LYOPHILIZED, FOR SOLUTION INTRAVENOUS at 21:00

## 2020-10-26 RX ADMIN — VANCOMYCIN HYDROCHLORIDE 500 MG: 500 INJECTION, POWDER, LYOPHILIZED, FOR SOLUTION INTRAVENOUS at 19:41

## 2020-10-26 RX ADMIN — SODIUM CHLORIDE 10 ML: 9 INJECTION, SOLUTION INTRAMUSCULAR; INTRAVENOUS; SUBCUTANEOUS at 16:24

## 2020-10-26 RX ADMIN — HALOPERIDOL LACTATE 5 MG: 5 INJECTION, SOLUTION INTRAMUSCULAR at 11:37

## 2020-10-26 RX ADMIN — ENOXAPARIN SODIUM 40 MG: 40 INJECTION SUBCUTANEOUS at 08:20

## 2020-10-26 RX ADMIN — GABAPENTIN 100 MG: 100 CAPSULE ORAL at 21:00

## 2020-10-26 RX ADMIN — CLONAZEPAM 0.5 MG: 0.5 TABLET ORAL at 14:54

## 2020-10-26 RX ADMIN — LORAZEPAM 1 MG: 2 INJECTION INTRAMUSCULAR at 10:04

## 2020-10-26 RX ADMIN — POTASSIUM CHLORIDE 10 MEQ: 7.46 INJECTION, SOLUTION INTRAVENOUS at 09:51

## 2020-10-26 RX ADMIN — LORAZEPAM 1 MG: 2 INJECTION INTRAMUSCULAR at 14:06

## 2020-10-26 RX ADMIN — METOPROLOL TARTRATE 5 MG: 5 INJECTION INTRAVENOUS at 05:00

## 2020-10-26 RX ADMIN — METHYLPREDNISOLONE SODIUM SUCCINATE 40 MG: 40 INJECTION, POWDER, FOR SOLUTION INTRAMUSCULAR; INTRAVENOUS at 17:45

## 2020-10-26 NOTE — PROGRESS NOTES
Bedside and Verbal shift change report given to [address] Kelsey Celeste RN  (oncoming nurse) by Ben Jasmien RN (offgoing nurse). Report included the following information SBAR, Kardex and MAR.

## 2020-10-26 NOTE — PROGRESS NOTES
[facility]  Pulmonary, Critical Care, and Sleep Medicine    Name: Angie Danielson MRN: [id number]   : 1955 Hospital: [facility] [place]   Date: 10/26/2020        Pulmonary Critical Care Note    IMPRESSION:   Patient Active Problem List   Diagnosis Code    Sepsis with organ dysfunction, resolved shock ([facility] [address] A41.9, R65.20    Acute encephalopathy G93.40    CAP (community acquired pneumonia) J18.9    Emphysema lung (HCC) J44.9    Abnormal troponin, improved R77.8    Anemia, mild, no active bleeding D64.9    Hypophosphatemia E83.42    Lactic acidosis, improved E87.2    Bursitis of elbow M70.30    Medication overdose T50.901A    Polio A80.9    Depressive disorder, not elsewhere classified F32.9    Type II or unspecified type diabetes mellitus without mention of complication, uncontrolled [id number]    Amputation of both lower extremities ([facility] [address] S23.360T, D02.445C    ALEXANDER (acute kidney injury) ([facility] [address] N17.9    Marijuana abuse F12.10    Hard of hearing H91.90    Legal blindness H54.8      RECOMMENDATIONS:   Respiratory: Hx smoking. Hypoxemia since today early morning requiring HFNC 40 L 60%. CXR with LLL consolidation/atelectasis and persistent stable RUL opacity. No excessive coughing or respiratory distress noted. Urine output 2750, without any diuretics. Monitor closely. No fever or leukocytosis. Remains confused and had aspiration risk. Strict aspiration precautions and HOB more than 30 degrees all the time. Repeat CXR intermittently. Titrate FiO2 to keep SPO2 > 91%. Steroids: Continue Solu-Medrol 40 mg IV every 6 hours; taper per clinical course and response. Bronchodilators: Continue Pulmicort twice daily. Start Atrovent 4 times daily. Continue DuoNeb as needed. Continue pulmonary hygiene and toileting. CTA chest when patient is able to perform and cooperate. ID: Sepsis bundle per hospital protocol. No fever or leukocytosis. Urine culture: <100 K.   Blood culture: NGTD.  Urine antigen panel negative. COVID-19 10/14/2020: Negative. Antibiotic choice: Continue Zosyn, Doxy, Vancomycin  Follow cultures. Deescalate antibiotic when appropriate. DC femoral central line today, discussed with nurse. CVS: Hypotension improved. Monitor hemodynamics. Echo 10/25/2020: LVEF 50 to 55%. Grade 1 DD. Estimated PASP 74 mmHg. Continue Norvasc and metoprolol, home dose. Continue Lipitor. Estimated RVSP 74 mmHg on echo, could be due to hypoxic vasoconstriction. Recommend repeat echo as outpatient. Due to hypoxemia out of proportion to CXR findings, empiric Lovenox 40 mg every 12 hours (full dose) started by hospitalist 10/26/2020. CTA ordered, pending, to be performed when patient can perform and cooperate for proper CTA. Renal: Monitor renal functions, lytes  Hypokalemia being replaced. Urine output 2750 without any diuretics, closely monitor. Heme: Monitor CBC daily- H/H possible dilutional drop, plt stable  No evidence of active bleeding  Endo: Glycemic control  TSH normal  GI: diet as tolerated  Neurology: remained confused, restless in bed. Urine drug screen positive for THC on admission. ??  Baseline mild dementia. CT head on admission chronic microvascular disease, no acute intracranial abnormality. Ammonia normal.  Receiving haldol and Ativan with control of agitations. Start Seroquel 25 mg p.o. nightly. Will defer respective systems problem management to primary and other consultant and follow patient in ICU with primary and other medical team  Further recommendations will be based on the patient's response to recommended treatment and results of the investigation ordered. Quality Care: PPI, DVT prophylaxis, HOB elevated, Infection control all reviewed and addressed. PAIN AND SEDATION: none  Skin/Wound: skin of LT face  Prophylaxis: DVT and GI Prophylaxis reviewed.    Restraints: Wrist soft restraints for patient interfering with medical therapy/management and patient safety. PT/OT eval and treat: as needed when stable   Lines/Tubes: Central Line and Lindo Bundles Followed   Central line: Right femoral 10/23/20 to be discontinued, ordered and discussed with RN Santo Singleton (site examined, no erythema, induration, discharge or sign of infection. Dressing intact. Medically necessary, will remove it when not needed. Central line bundle followed). Lindo: 10/23/20 (Medically necessary for strict input/output monitoring in critically ill patient, will remove it when not needed. Lindo bundle followed). ADVANCE DIRECTIVE: Full code  DISCUSSION: Events and notes from last 24 hours reviewed. Care plan discussed with nursing, hospitalist, ICU staff, MDR.  D/w patient (answered all questions to satisfaction). Quality Care: PPI, DVT prophylaxis, HOB elevated, Infection control all reviewed and addressed. High complexity decision making was performed during the evaluation of this patient at high risk for decompensation with multiple organ involvement. Total critical care time spent rendering care exclusive of procedures/family discussion/coordination of care: 33 minutes. Subjective/History:   Mr. Pauline Flowers. has been seen and evaluated as Dr. Higinio Borrero requested for assisting with ICU care of septic shock. Patient unable to provide history. Patient is a 63 y.o. male COPD, bilateral above-knee amputation, recently discharged from [facility] after treated for pneumonia. Per chart, since discharge about a week ago patient has been in the ER few times at other facilities with complaints of cough, shortness of breath and hypoxemia; had a CT scan done at [facility] which showed persistent pneumonia. Per chart, patient had not picked up his antibiotics that he was discharged on until recently. He was brought to ER with c/o of chest pain between shoulders by EMS. In the ER he was found to be confused and combative with hypothermia, hypotension.  He was treated with IVF boluses and Levophed. CT head on admission nil acute. CXR showed improving pneumonia. He has remained confused at the time of this evaluation at bedside in rm 102 in ICU.      10/26/20   Remained in ICU in room 102. Was on NC 2 LPM overnight, early in the morning hypoxemia required to be on HFNC. CXR with LLL consolidation/atelectasis and stable RUL opacity. No fever or leukocytosis. Still confused and keeps shouting/mumbling, not cooperative. As he is not cooperative, CTA may be not helpful in ruling out small PE. Started on full dose Lovenox today morning by hospitalist; monitor for any bleeding. Potassium low, being replaced. Urine output 2750, without diuretics. All cultures NGTD so far. Echo okay except pulmonary hypertension. Blood pressure stable. No other overnight issues reported. Review of Systems:  Review of systems not obtained due to patient factors. Latest lactic acid:   Lactic acid   Date Value Ref Range Status   10/24/2020 0.9 0.4 - 2.0 MMOL/L Final   10/23/2020 2.7 (HH) 0.4 - 2.0 MMOL/L Final     Comment:     CALLED TO AND CORRECTLY REPEATED BY:  LEXIE ROJAS RN ED BY CAM ON 10/23/20 AT 1738.     10/23/2020 3.3 (HH) 0.4 - 2.0 MMOL/L Final     Comment:     CALLED TO AND CORRECTLY REPEATED BY:  Michelle Pinedo RN ED BY CAM ON 10/23/20 AT 1700. Past Medical History:  Past Medical History:   Diagnosis Date    Amputation of both lower extremities ([address]     Amputee, above knee, left ([address]     At risk for falls     Chronic pain     back and legs    Diabetes ([address]     Hypercholesterolemia     Hypertension     Polio         Past Surgical History:  Past Surgical History:   Procedure Laterality Date    HX HERNIA REPAIR      HX OTHER SURGICAL      carpal tunnel     HX OTHER SURGICAL      cyst        Medications:  Prior to Admission medications    Medication Sig Start Date End Date Taking?  Authorizing Provider   amoxicillin-clavulanate (AUGMENTIN) 875-125 mg per tablet Take 1 Tab by mouth every twelve (12) hours. 10/20/20   Ron Land MD   acetaminophen (TYLENOL) 325 mg tablet Take  by mouth every four (4) hours as needed for Pain. Tiffany Saravia MD   amLODIPine (NORVASC) 5 mg tablet Take 5 mg by mouth daily. Tiffany Saravia MD   gabapentin (NEURONTIN) 100 mg capsule Take 100 mg by mouth three (3) times daily. Tiffany Saravia MD   clonazePAM (KLONOPIN) 0.5 mg tablet Take 0.5 mg by mouth nightly as needed. Tiffany Saravia MD   atorvastatin (LIPITOR) 20 mg tablet Take 20 mg by mouth daily. Tiffany Saravia MD   nitroglycerin (NITROSTAT) 0.4 mg SL tablet 0.4 mg by SubLINGual route every five (5) minutes as needed for Chest Pain. Up to 3 doses. Tiffany Saravia MD   metoprolol succinate (TOPROL XL) 25 mg XL tablet Take 25 mg by mouth daily. Tiffany Saravia MD   oxyCODONE-acetaminophen (PERCOCET) 5-325 mg per tablet Take  by mouth every four (4) hours as needed for Pain. Tiffany Saravia MD   traMADol (ULTRAM) 50 mg tablet Take 1 Tab by mouth every six (6) hours as needed for Pain. Max Daily Amount: 200 mg. 12/4/18   Iraida Clayton MD   simvastatin (ZOCOR) 20 mg tablet Take 20 mg by mouth nightly. Provider, Historical   lisinopril-hydrochlorothiazide (PRINZIDE, ZESTORETIC) 20-12.5 mg per tablet Take 1 Tab by mouth daily. Provider, Historical   isosorbide mononitrate ER (IMDUR) 30 mg tablet Take 30 mg by mouth daily.     Provider, Historical       Current Facility-Administered Medications   Medication Dose Route Frequency    amLODIPine (NORVASC) tablet 10 mg  10 mg Oral DAILY    metoprolol tartrate (LOPRESSOR) tablet 12.5 mg  12.5 mg Oral Q12H    potassium chloride 10 mEq in 100 ml IVPB  10 mEq IntraVENous Q1H    enoxaparin (LOVENOX) injection 40 mg  1 mg/kg SubCUTAneous Q12H    methylPREDNISolone (PF) (SOLU-MEDROL) injection 40 mg  40 mg IntraVENous Q6H    Vancomycin Trough Level - 30 minutes prior to 07:00 dose 10/26/20  1 Each Other ONCE    atorvastatin (LIPITOR) tablet 20 mg  20 mg Oral QHS    gabapentin (NEURONTIN) capsule 100 mg  100 mg Oral TID    piperacillin-tazobactam (ZOSYN) 3.375 g in 0.9% sodium chloride (MBP/ADV) 100 mL MBP  3.375 g IntraVENous Q8H    doxycycline (VIBRAMYCIN) 100 mg in 0.9% sodium chloride (MBP/ADV) 100 mL MBP  100 mg IntraVENous Q12H    vancomycin (VANCOCIN) 750 mg in 0.9% sodium chloride 250 mL (VIAL-MATE)  750 mg IntraVENous Q24H    nicotine (NICODERM CQ) 21 mg/24 hr patch 1 Patch  1 Patch TransDERmal DAILY    Vancomycin - Pharmacokinetic Dosing  1 Each Other Rx Dosing/Monitoring    insulin lispro (HUMALOG) injection   SubCUTAneous AC&HS    budesonide (PULMICORT) 250 mcg/2ml nebulizer susp  500 mcg Nebulization BID RT    sodium chloride (NS) flush 5-40 mL  5-40 mL IntraVENous Q8H    pantoprazole (PROTONIX) 40 mg in 0.9% sodium chloride 10 mL injection  40 mg IntraVENous DAILY       Allergy:  No Known Allergies     Social History:  Social History     Tobacco Use    Smoking status: Current Every Day Smoker     Packs/day: 2.00     Years: 40.00     Pack years: 80.00    Smokeless tobacco: Current User     Types: Snuff   Substance Use Topics    Alcohol use: No    Drug use: No        Family History:  Family History   Problem Relation Age of Onset    Heart Attack Mother     Heart Attack Father     Malignant Hyperthermia Neg Hx     Pseudocholinesterase Deficiency Neg Hx     Delayed Awakening Neg Hx     Post-op Nausea/Vomiting Neg Hx     Emergence Delirium Neg Hx     Post-op Cognitive Dysfunction Neg Hx     Other Neg Hx           Objective:   Vital Signs:    Blood pressure 139/72, pulse 75, temperature 98 °F (36.7 °C), resp. rate 22, height 5' 4\" (1.626 m), weight 43.1 kg (95 lb), SpO2 97 %. Body mass index is 16.31 kg/m².    O2 Device: Heated, Hi flow nasal cannula   O2 Flow Rate (L/min): 40 l/min   Temp (24hrs), Av.9 °F (36.6 °C), Min:97.8 °F (36.6 °C), Max:98 °F (36.7 °C)         Intake/Output:   Last shift:      No intake/output data recorded. Last 3 shifts: 10/24 1901 - 10/26 0700  In: 800 [I.V.:800]  Out: 5552 [Urine:5550]    Intake/Output Summary (Last 24 hours) at 10/26/2020 0815  Last data filed at 10/26/2020 0600  Gross per 24 hour   Intake 250 ml   Output 3902 ml   Net -3652 ml       Physical Exam:  General/Neurology: Alert, awake but confused and off-and-on agitated. Not in respiratory distress. Not cooperative. Head:   NCAT. Eye:   EOM intact, no icterus/pallor/cyanosis. Nose:   No nasal drainage/discharge. Throat:  Moist mucosa. No oral thrush. Neck:   Trachea midline. No palpable cervical lymphadenopathy. Lung:   Decreased air entry at the bases. Mild Rales heard mid to lower lungs posteriorly. No wheezing or stridor. No prolonged expiration or accessory muscle use. Heart:   S1 S2 present. No murmur or JVD. Abdomen:  Soft. NT. ND. No palpable masses. Extremities:  Bilateral AKA. Pulses: 2+ and symmetric in DP.       Data:     Recent Results (from the past 24 hour(s))   EKG, 12 LEAD, SUBSEQUENT    Collection Time: 10/25/20  9:51 AM   Result Value Ref Range    Ventricular Rate 81 BPM    Atrial Rate 81 BPM    P-R Interval 162 ms    QRS Duration 94 ms    Q-T Interval 406 ms    QTC Calculation (Bezet) 471 ms    Calculated P Axis 76 degrees    Calculated R Axis 71 degrees    Calculated T Axis 79 degrees    Diagnosis       Normal sinus rhythm  Moderate voltage criteria for LVH, may be normal variant  Borderline ECG  Confirmed by Josselin Kwok MD, [facility] ([id number]) on 10/25/2020 3:04:42 PM     ECHO ADULT COMPLETE    Collection Time: 10/25/20  9:51 AM   Result Value Ref Range    IVSd 0.57 (A) 0.6 - 1.0 cm    LVIDd 5.13 4.2 - 5.9 cm    LVIDs 4.12 cm    LVOT d 1.97 cm    LVPWd 0.76 0.6 - 1.0 cm    BP EF 57.3 55 - 100 %    LV Ejection Fraction MOD 2C 57 %    LV Ejection Fraction MOD 4C 58 %    LV ED Vol A2C 84.3 mL    LV ED Vol A4C 104.1 mL    LV ED Vol BP 94.6 67 - 155 mL    LV ES Vol A2C 36.2 mL    LV ES Vol A4C 43.9 mL LV ES Vol BP 40.4 22 - 58 mL    LVOT Cardiac Output 3.7 L/min    LVOT Peak Gradient 2.1 mmHg    Left Ventricular Outflow Tract Mean Gradient 1.423925 mmHg    LVOT SV 47.8 ml    LVOT Peak Velocity 72.11 cm/s    LVOT VTI 15.76 cm    RVIDd 4.90 cm    LA Volume 42.21 18 - 58 ml    LA Vol 2C 44.82 18 - 58 mL    LA Vol 4C 32.30 18 - 58 mL    Right Atrial Area 4C 15.88 cm2    AV Annulus 3.72 cm    Aortic Valve Area by Continuity of Peak Velocity 2.8 cm2    Aortic Valve Area by Continuity of VTI 3.2 cm2    AoV PG 2.4 mmHg    Aortic Valve Systolic Mean Gradient 1.1 mmHg    Aortic Valve Systolic Peak Velocity 34.11 cm/s    AoV VTI 15.09 cm    MV A Luis 74.70 cm/s    Mitral Valve E Wave Deceleration Time 209.9 ms    MV E Luis 60.15 cm/s    Mitral Valve Pressure Half-time 60.9 ms    MVA (PHT) 3.6 cm2    Triscuspid Valve Regurgitation Peak Gradient 63.9 mmHg    TR Max Velocity 399.74 cm/s    MV E/A 0.81     LV Mass .6 88 - 224 g    LV Mass AL Index 79.0 49 - 115 g/m2    HARVINDER/BSA Pk Luis 2.0 cm2/m2    HARVINDER/BSA VTI 2.2 cm2/m2    LA Area 2C 16.87 cm2    LA Area 4C 14.1 cm2    LVES Vol Index BP 28.3 mL/m2    LVED Vol Index BP 66.4 mL/m2    Left Atrium Minor Axis 2.88 cm    Left Atrium Major Axis 4.11 cm    Right Atrial Volume 40.4 ml    LA Vol Index 31.44 16 - 28 ml/m2    LA Vol Index 22.66 16 - 28 ml/m2    LVED Vol Index A4C 73.0 mL/m2    LVED Vol Index A2C 59.1 mL/m2    LVES Vol Index A4C 30.8 mL/m2    LVES Vol Index A2C 25.4 mL/m2    Left Ventricular Outflow Tract Mean Velocity 7.0380574 cm/s    Mitral Valve Deceleration [place] 2.58745     AV Velocity Ratio 0.93     AV VTI Ratio 1.0     Aortic valve mean velocity 3.0723768 m/s    Left Ventricular End Diastolic Volume by Teichholz Method 8.30547994987 mL    Left Ventricular End Systolic Volume by Teichholz Method 5.84108496385 mL    Left Ventricular Stroke Volume by 2-D Biplane-MOD 33.213352161 mL    Left Ventricular Stroke Volume by 2-D Single Plane- MOD 78.304469558 mL Left Ventricular Stroke Volume by 2-D Single Plane- MOD 00.074247274 mL    LV E' Septal Velocity 5.00 cm/s    LV E' Lateral Velocity 7.00 cm/s    E/E' lateral 8.59     E/E' septal 12.03     E/E' ratio (averaged) 10.31    GLUCOSE, POC    Collection Time: 10/25/20 11:19 AM   Result Value Ref Range    Glucose (POC) 108 70 - 110 mg/dL   GLUCOSE, POC    Collection Time: 10/25/20  4:24 PM   Result Value Ref Range    Glucose (POC) 146 (H) 70 - 110 mg/dL   GLUCOSE, POC    Collection Time: 10/25/20  9:17 PM   Result Value Ref Range    Glucose (POC) 101 70 - 110 mg/dL   CBC WITH AUTOMATED DIFF    Collection Time: 10/26/20  3:45 AM   Result Value Ref Range    WBC 9.2 4.6 - 13.2 K/uL    RBC 3.27 (L) 4.70 - 5.50 M/uL    HGB 9.3 (L) 13.0 - 16.0 g/dL    HCT 28.0 (L) 36.0 - 48.0 %    MCV 85.6 74.0 - 97.0 FL    MCH 28.4 24.0 - 34.0 PG    MCHC 33.2 31.0 - 37.0 g/dL    RDW 17.2 (H) 11.6 - 14.5 %    PLATELET 159 908 - 221 K/uL    MPV 10.2 9.2 - 11.8 FL    NEUTROPHILS 94 (H) 40 - 73 %    LYMPHOCYTES 4 (L) 21 - 52 %    MONOCYTES 2 (L) 3 - 10 %    EOSINOPHILS 0 0 - 5 %    BASOPHILS 0 0 - 2 %    ABS. NEUTROPHILS 8.6 (H) 1.8 - 8.0 K/UL    ABS. LYMPHOCYTES 0.4 (L) 0.9 - 3.6 K/UL    ABS. MONOCYTES 0.2 0.05 - 1.2 K/UL    ABS. EOSINOPHILS 0.0 0.0 - 0.4 K/UL    ABS. BASOPHILS 0.0 0.0 - 0.1 K/UL    DF AUTOMATED     METABOLIC PANEL, COMPREHENSIVE    Collection Time: 10/26/20  3:45 AM   Result Value Ref Range    Sodium 142 136 - 145 mmol/L    Potassium 3.2 (L) 3.5 - 5.5 mmol/L    Chloride 111 100 - 111 mmol/L    CO2 23 21 - 32 mmol/L    Anion gap 8 3.0 - 18 mmol/L    Glucose 103 (H) 74 - 99 mg/dL    BUN 16 7.0 - 18 MG/DL    Creatinine 0.79 0.6 - 1.3 MG/DL    BUN/Creatinine ratio 20 12 - 20      GFR est AA >60 >60 ml/min/1.73m2    GFR est non-AA >60 >60 ml/min/1.73m2    Calcium 9.1 8.5 - 10.1 MG/DL    Bilirubin, total 0.3 0.2 - 1.0 MG/DL    ALT (SGPT) 26 16 - 61 U/L    AST (SGOT) 23 10 - 38 U/L    Alk.  phosphatase 65 45 - 117 U/L    Protein, total 6.5 6.4 - 8.2 g/dL    Albumin 3.6 3.4 - 5.0 g/dL    Globulin 2.9 2.0 - 4.0 g/dL    A-G Ratio 1.2 0.8 - 1.7     AMMONIA    Collection Time: 10/26/20  3:45 AM   Result Value Ref Range    Ammonia <10 (L) 11 - 32 UMOL/L   CALCIUM, IONIZED    Collection Time: 10/26/20  3:45 AM   Result Value Ref Range    Ionized Calcium 1.18 1.12 - 1.32 MMOL/L   MAGNESIUM    Collection Time: 10/26/20  3:45 AM   Result Value Ref Range    Magnesium 1.9 1.6 - 2.6 mg/dL   PHOSPHORUS    Collection Time: 10/26/20  3:45 AM   Result Value Ref Range    Phosphorus 2.5 2.5 - 4.9 MG/DL   EKG, 12 LEAD, SUBSEQUENT    Collection Time: 10/26/20  6:17 AM   Result Value Ref Range    Ventricular Rate 79 BPM    Atrial Rate 79 BPM    P-R Interval 150 ms    QRS Duration 96 ms    Q-T Interval 420 ms    QTC Calculation (Bezet) 481 ms    Calculated P Axis 86 degrees    Calculated R Axis 61 degrees    Calculated T Axis 72 degrees    Diagnosis       Normal sinus rhythm  Minimal voltage criteria for LVH, may be normal variant  Prolonged QT  Abnormal ECG  When compared with ECG of 25-OCT-2020 09:51,  No significant change was found     VANCOMYCIN, TROUGH    Collection Time: 10/26/20  6:22 AM   Result Value Ref Range    Vancomycin,trough 12.4 10.0 - 20.0 ug/mL    Reported dose date 20201025      Reported dose time: 0608      Reported dose: 750 MG UNITS   CARDIAC PANEL,(CK, CKMB & TROPONIN)    Collection Time: 10/26/20  6:22 AM   Result Value Ref Range    CK - MB 2.4 <3.6 ng/ml    CK-MB Index 3.4 0.0 - 4.0 %    CK 71 39 - 308 U/L    Troponin-I, QT <0.02 0.0 - 0.045 NG/ML   GLUCOSE, POC    Collection Time: 10/26/20  6:34 AM   Result Value Ref Range    Glucose (POC) 114 (H) 70 - 110 mg/dL   POC G3    Collection Time: 10/26/20  6:47 AM   Result Value Ref Range    Device: Non rebreather      Flow rate (POC) 15 L/M    FIO2 (POC) 1.0 %    pH (POC) 7.52 (H) 7.35 - 7.45      pCO2 (POC) 24.1 (L) 35.0 - 45.0 MMHG    pO2 (POC) 50 (L) 80 - 100 MMHG    HCO3 (POC) 19.5 (L) 22 - 26 MMOL/L    sO2 (POC) 90 (L) 92 - 97 %    Base deficit (POC) 3 mmol/L    Allens test (POC) YES      Total resp. rate 30      Site LEFT RADIAL      Patient temp. 98.6      Specimen type (POC) ARTERIAL      Performed by Helio Neil            Recent Labs     10/26/20  0647   FIO2I 1.0   HCO3I 19.5*   PCO2I 24.1*   PHI 7.52*   PO2I 50*       All Micro Results     Procedure Component Value Units Date/Time    CULTURE, BLOOD [[id number]] Collected:  10/23/20 1611    Order Status:  Completed Specimen:  Blood Updated:  10/26/20 0749     Special Requests: NO SPECIAL REQUESTS        Culture result: NO GROWTH 3 DAYS       CULTURE, BLOOD [[id number]] Collected:  10/23/20 1615    Order Status:  Completed Specimen:  Blood Updated:  10/26/20 0749     Special Requests: NO SPECIAL REQUESTS        Culture result: NO GROWTH 3 DAYS       CULTURE, URINE [[id number]] Collected:  10/23/20 1630    Order Status:  Completed Specimen:  Urine from Clean catch Updated:  10/24/20 2026     Special Requests: NO SPECIAL REQUESTS        Culture result: No growth (<1,000 CFU/ML)             Telemetry: normal sinus rhythm      Imaging:  [x]I have personally reviewed the patients chest radiographs images and report   Results from Hospital Encounter encounter on 10/23/20   XR CHEST PORT    Narrative EXAM: CHEST RADIOGRAPH    CLINICAL INDICATION/HISTORY: Pneumonia    > Additional: None    COMPARISON: 10/23/2020    TECHNIQUE: Portable frontal view of the chest    _______________    FINDINGS:    SUPPORT DEVICES: None. HEART AND MEDIASTINUM: Heart size is stable. Atherosclerotic calcification seen  in the aorta. LUNGS AND PLEURAL SPACES: Increased hazy density at the left base. Slight  blunting of the right costophrenic angle. No pneumothorax. Patchy right upper  lobe opacity is similar to slightly improved. BONES AND SOFT TISSUES: Unremarkable.    _______________      Impression IMPRESSION:    1.  Increased hazy opacity at the left base compared to the prior exam, possibly  developing left lower lobe atelectasis and/or consolidation. Questionable small  right effusion. 2. Patchy right upper lobe patchy opacity which is similar to slightly improved. [facility]   CT HEAD WO CONT    Narrative EXAM: CT head    INDICATION: Altered mental status. COMPARISON: October 15, 2020. TECHNIQUE: Axial CT imaging of the head was performed without intravenous  contrast. Dose reduction techniques used: automated exposure control, adjustment  of the mAs and/or kVp according to patient size, and iterative reconstruction  techniques. Digital imaging and communications in Medicine (DICOM) format image  data are available to nonaffiliated external healthcare facilities or entities  on a secure, media free, reciprocally searchable basis with patient  authorization for at least 12 months after this study. _______________    FINDINGS:    BRAIN AND POSTERIOR FOSSA: There is cerebral volume loss with prominence of the  lateral and the third ventricles. The cortical sulci are widened appropriately. The fourth ventricle and basal cisterns are normally outlined. There is mild  bilateral periventricular and central white matter diminished attenuation. There  is no acute territorial defect, hemorrhage or midline shift. EXTRA-AXIAL SPACES AND MENINGES: There are no abnormal extra-axial fluid  collections. CALVARIUM: Intact. SINUSES: Clear. OTHER: Partial right mastoid opacification is again seen. _______________      Impression IMPRESSION:    Cerebral volume loss and mild bilateral periventricular and central white matter  diminished attenuation which is nonspecific but likely to represent  microvascular disease. There is no acute intracranial abnormality. No significant interval change. Echo 10/25/2020:  Result status: Final result    · Left Ventricle: Normal cavity size and wall thickness.  The estimated EF is 50 - 55%. Low normal systolic function. There is mild (grade 1) left ventricular diastolic dysfunction E/E' ratio is 10.31.  · Pulmonary Artery: Pulmonary arteries not well visualized. Pulmonary arterial systolic pressure (PASP) is 74 mmHg. Pulmonary hypertension found to be severe.             Mis Gimenez MD  10/26/2020

## 2020-10-26 NOTE — PROGRESS NOTES
Cardiology Progress Note        Patient: Elenita Pitts Sr.        Sex: male          DOA: 10/23/2020  YOB: 1955      Age:  63 y.o.        LOS:  LOS: 3 days    Patient seen and examined, chart reviewed. Assessment/Plan     Patient Active Problem List   Diagnosis Code    Bursitis of elbow M70.30    Medication overdose T50.901A    Polio A80.9    Depressive disorder, not elsewhere classified F32.9    Type II or unspecified type diabetes mellitus without mention of complication, uncontrolled [id number]    Hypotension, unspecified I95.9    Amputation of both lower extremities ([facility] S88.911A, K92.904D    PNA (pneumonia) J18.9    ALEXANDER (acute kidney injury) ([facility] N17.9    Hyponatremia E87.1    Marijuana abuse F12.10    Emphysema lung ([facility] J43.9    CAP (community acquired pneumonia) J18.9    Sepsis [facility] A41.9    Hard of hearing H91.90    Legal blindness H54.8    Acute encephalopathy G93.40    Septic shock (HCC) A41.9, R65.21    Chronic obstructive pulmonary disease with acute exacerbation ([facility]) J44.1    Severe protein-calorie malnutrition ([facility] V56    Metabolic encephalopathy E09.66    Acute respiratory failure with hypoxia ([facility]) J96.01     Pulmonary hypertension  Borderline troponin elevation of unknown significance could be from pulmonary hypertension   Hypokalemia     Echocardiogram revealed     · Left Ventricle: Normal cavity size and wall thickness. The estimated EF is 50 - 55%. Low normal systolic function. There is mild (grade 1) left ventricular diastolic dysfunction E/E' ratio is 10.31.  · Pulmonary Artery: Pulmonary arteries not well visualized. Pulmonary arterial systolic pressure (PASP) is 74 mmHg. Pulmonary hypertension found to be severe. Plan:    Continue amlodipine and atorvastatin.   Plan discussed with patient's nurse                 Subjective:    cc:  Denies any chest pain       REVIEW OF SYSTEMS:     General: No fevers or chills. Cardiovascular: No chest pain,No palpitations, No orthopnea, No PND, No leg swelling, No claudication, Positive shortness of breath on exertion   Pulmonary: No dyspnea. Gastrointestinal: No nausea, vomiting, bleeding  Neurology: No Dizziness      Objective:      Visit Vitals  /88   Pulse 75   Temp 97.6 °F (36.4 °C)   Resp 26   Ht 5' 4\" (1.626 m)   Wt 43.1 kg (95 lb)   SpO2 (!) 89%   BMI 16.31 kg/m²     Body mass index is 16.31 kg/m². Physical Exam:  General Appearance: Comfortable, not using accessory muscles of respiration. HEENT: HANNAH. HEAD: Atraumatic  NECK: No JVD, no thyroidomeglay. CAROTIDS: No bruit  LUNGS: Clear bilaterally. HEART: S1+S2 audible, no murmur, no pericardial rub.      ABD: Non-tender, BS Audible    NEUROLOGICAL: Alert, follows verbal commands    Medication:  Current Facility-Administered Medications   Medication Dose Route Frequency    hydrALAZINE (APRESOLINE) 20 mg/mL injection 20 mg  20 mg IntraVENous Q6H PRN    amLODIPine (NORVASC) tablet 10 mg  10 mg Oral DAILY    metoprolol tartrate (LOPRESSOR) tablet 12.5 mg  12.5 mg Oral Q12H    enoxaparin (LOVENOX) injection 40 mg  1 mg/kg SubCUTAneous Q12H    ipratropium (ATROVENT) 0.02 % nebulizer solution 0.5 mg  0.5 mg Nebulization QID RT    QUEtiapine (SEROquel) tablet 25 mg  25 mg Oral QHS    vancomycin (VANCOCIN) 500 mg in 0.9% sodium chloride (MBP/ADV) 100 mL MBP  500 mg IntraVENous Q12H    methylPREDNISolone (PF) (SOLU-MEDROL) injection 40 mg  40 mg IntraVENous Q6H    atorvastatin (LIPITOR) tablet 20 mg  20 mg Oral QHS    clonazePAM (KlonoPIN) tablet 0.5 mg  0.5 mg Oral TID PRN    gabapentin (NEURONTIN) capsule 100 mg  100 mg Oral TID    oxyCODONE-acetaminophen (PERCOCET) 5-325 mg per tablet 1 Tab  1 Tab Oral Q4H PRN    LORazepam (ATIVAN) injection 1 mg  1 mg IntraVENous Q4H PRN    piperacillin-tazobactam (ZOSYN) 3.375 g in 0.9% sodium chloride (MBP/ADV) 100 mL MBP  3.375 g IntraVENous Q8H    doxycycline (VIBRAMYCIN) 100 mg in 0.9% sodium chloride (MBP/ADV) 100 mL MBP  100 mg IntraVENous Q12H    haloperidol lactate (HALDOL) injection 5 mg  5 mg IntraVENous Q8H PRN    nicotine (NICODERM CQ) 21 mg/24 hr patch 1 Patch  1 Patch TransDERmal DAILY    albuterol-ipratropium (DUO-NEB) 2.5 MG-0.5 MG/3 ML  3 mL Nebulization Q4H PRN    sodium chloride (NS) flush 5-10 mL  5-10 mL IntraVENous PRN    Vancomycin - Pharmacokinetic Dosing  1 Each Other Rx Dosing/Monitoring    insulin lispro (HUMALOG) injection   SubCUTAneous AC&HS    glucose chewable tablet 16 g  4 Tab Oral PRN    glucagon (GLUCAGEN) injection 1 mg  1 mg IntraMUSCular PRN    dextrose 10% infusion 125-250 mL  125-250 mL IntraVENous PRN    budesonide (PULMICORT) 250 mcg/2ml nebulizer susp  500 mcg Nebulization BID RT    ELECTROLYTE REPLACEMENT PROTOCOL - Potassium Standard Dosing   1 Each Other PRN    ELECTROLYTE REPLACEMENT PROTOCOL - Potassium Standard  Dosing Details for Fluid Restricted Patients  1 Each Other PRN    ELECTROLYTE REPLACEMENT PROTOCOL  - Phosphorus Renal Dosing  1 Each Other PRN    sodium chloride (NS) flush 5-40 mL  5-40 mL IntraVENous Q8H    sodium chloride (NS) flush 5-40 mL  5-40 mL IntraVENous PRN    acetaminophen (TYLENOL) tablet 650 mg  650 mg Oral Q6H PRN    Or    acetaminophen (TYLENOL) suppository 650 mg  650 mg Rectal Q6H PRN    polyethylene glycol (MIRALAX) packet 17 g  17 g Oral DAILY PRN    promethazine (PHENERGAN) tablet 12.5 mg  12.5 mg Oral Q6H PRN    Or    ondansetron (ZOFRAN) injection 4 mg  4 mg IntraVENous Q6H PRN    pantoprazole (PROTONIX) 40 mg in 0.9% sodium chloride 10 mL injection  40 mg IntraVENous DAILY               Lab/Data Reviewed:       Recent Labs     10/26/20  0345 10/25/20  0345 10/24/20  0100   WBC 9.2 9.5 11.7   HGB 9.3* 8.8* 9.1*   HCT 28.0* 27.0* 27.9*    255 312     Recent Labs     10/26/20  0345 10/25/20  0345 10/24/20  1645  10/24/20  0100    141  --   --  140   K 3.2* 3.9 3.5   < > 3.8    116*  --   --  110   CO2 23 22  --   --  20*   * 128*  --   --  77   BUN 16 15  --   --  20*   CREA 0.79 0.96  --   --  1.24   CA 9.1 8.4*  --   --  7.4*    < > = values in this interval not displayed.        Signed By: Ceci Portillo MD     October 26, 2020

## 2020-10-26 NOTE — PROGRESS NOTES
Pharmacy Dosing Services: Vancomycin    Consult for Vancomycin Dosing by Pharmacy by Dr. Juan Engel  Indication: HCAP  Day of Therapy 4    Previous Regimen Vancomycin 750 mg   Last Level 12.4 mcg/ml   Other Current Antibiotics Zosyn   Serum Creatinine Lab Results   Component Value Date/Time    Creatinine 0.79 10/26/2020 03:45 AM      Creatinine Clearance Estimated Creatinine Clearance: 58.3 mL/min (based on SCr of 0.79 mg/dL).    BUN Lab Results   Component Value Date/Time    BUN 16 10/26/2020 03:45 AM       WBC Lab Results   Component Value Date/Time    WBC 9.2 10/26/2020 03:45 AM      H/H    Lab Results   Component Value Date/Time    HGB 9.3 (L) 10/26/2020 03:45 AM      Platelets Lab Results   Component Value Date/Time    PLATELET 952 37/39/1527 03:45 AM      Temp 98.1 °F (36.7 °C)   CrCl= 58.3 ml/min  Vancomycin trough =12.4 mcg/ml    Vancomycin 750 mg dose given at 0746 on 10/26/20 and Vancomycin 500 mg dose will be scheduled for 1900  Then Vancomycin 500 mg IV Q 12 hours   Plan for trough after 2-3 doses : Goal therapeutic trough = 15-20 mcg/ml    Plan: Pharmacy will continue to monitor and make any necessary adjustments based on clinical status

## 2020-10-26 NOTE — PROGRESS NOTES
Hospitalist Progress Note-critical care note     Patient: Jordan Rousseau Sr. MRN: [id number]  CSN: [id number]    YOB: 1955  Age: 63 y.o.   Sex: male    DOA: 10/23/2020 LOS:  LOS: 3 days            Chief complaint: Pneumonia, marijuana abuse, legal blindness, amputation bilateral lower extremity, encephalopathy, acute respiratory failure with hypoxia    Assessment/Plan         Hospital Problems  Date Reviewed: 10/23/2020          Codes Class Noted POA    Acute respiratory failure with hypoxia [facility]) ICD-10-CM: J96.01  ICD-9-CM: 518.81  10/26/2020 Unknown        Chronic obstructive pulmonary disease with acute exacerbation ([facility]) ICD-10-CM: J44.1  ICD-9-CM: 491.21  10/24/2020 Unknown        Severe protein-calorie malnutrition ([facility]) ICD-10-CM: E43  ICD-9-CM: 262  10/24/2020 Unknown        Metabolic encephalopathy ZUF-15-UX: G93.41  ICD-9-CM: 348.31  10/24/2020 Unknown        * (Principal) Septic shock ([facility]) ICD-10-CM: A41.9, R65.21  ICD-9-CM: 038.9, 785.52, 995.92  10/23/2020 Unknown        Amputation of both lower extremities ([facility]) ICD-10-CM: B82.119Q, W62.920G  ICD-9-CM: 897.6  Unknown Yes        PNA (pneumonia) ICD-10-CM: J18.9  ICD-9-CM: 486  Unknown Yes        Marijuana abuse ICD-10-CM: F12.10  ICD-9-CM: 305.20  Unknown Yes        Legal blindness ICD-10-CM: H54.8  ICD-9-CM: 369.4  10/14/2020 Yes        Hypotension, unspecified ICD-10-CM: I95.9  ICD-9-CM: 458.9  1/27/2014 Yes                A 71-year-old male with a history of a COPD, bilateral above knee amputation, recently discharged from [facility] after treated for pneumonia who was admitted for septic shock and metabolic encephalopathy.       Acute respiratory failure with hypoxia   Hypoxia last night, high flow O2 and put on   cta chest ordered to r/o PE , will start lovenox for empiric treatment for pe till r/o    COPD with right upper lobe pneumonia questionable mass/emphysema /aspiration pna   Continue breathing tx and iv abx   Will have cta Aspiration precaution   Pulm f/u       Cardiovascular septic shock: resolved.   Off Levophed   Chest pain   Repeated  cardiac enzymes/troponin no change     Prolong Qt   Dr. Winston Guillen on board-continue medical treatment     HTN: on  Norvasc and metoprolol.       Heme:  Monitoring [address] and platelets    ID on iv abx for pneumonia treatment      FEN: Placed on ICU protocol monitor I's and O's      neuro   Acute metabolic encephalopathy   Agitation -got improving, still confusion ct headmicrovascular disease. Legal blindness :fall /aspiration precaution     Psych : marijuana use     Severe malnutrition    Bilateral AKA    RN hypoxia now on high flow oxygen    We will have palliative care on board for care goal decision, cta cheat, speech evaluation ,lovenox increase to twice a day     30 minutes of critical care time spent in the direct evaluation and treatment of this high risk patient. The reason for providing this level of medical care for this critically ill patient was due a critical illness that impaired one or more vital organ systems such that there was a high probability of imminent or life threatening deterioration in the patients condition. This care involved high complexity decision making to assess, manipulate, and support vital system functions, to treat this degreee vital organ system failure and to prevent further life threatening deterioration of the patients condition. Disposition :tbd,   Review of systems:  Unable to obtain due to confusion  Vital signs/Intake and Output:  Visit Vitals  /84   Pulse 77   Temp 98 °F (36.7 °C)   Resp 28   Ht 5' 4\" (1.626 m)   Wt 43.1 kg (95 lb)   SpO2 92%   BMI 16.31 kg/m²     Current Shift:  No intake/output data recorded. Last three shifts:  10/24 1901 - 10/26 0700  In: 800 [I.V.:800]  Out: 5833 [Urine:5550]    Physical Exam:  General: WD, WN. Alert, not cooperative, no acute distress    HEENT: NC, Atraumatic. PERRLA, anicteric sclerae.   Lungs: Coarse BS Heart:  Regular  rhythm,  No murmur, No Rubs, No Gallops  Abdomen: Soft, Non distended, Non tender. +Bowel sounds,   Extremities: No c/c, aka  Psych:   Calm   Neurologic:  Unable to obtain due to not cooperate          Labs: Results:       Chemistry Recent Labs     10/26/20  0345 10/25/20  0345 10/24/20  1645  10/24/20  0100   * 128*  --   --  77    141  --   --  140   K 3.2* 3.9 3.5   < > 3.8    116*  --   --  110   CO2 23 22  --   --  20*   BUN 16 15  --   --  20*   CREA 0.79 0.96  --   --  1.24   CA 9.1 8.4*  --   --  7.4*   AGAP 8 3  --   --  10   BUCR 20 16  --   --  16   AP 65 71  --   --  79   TP 6.5 6.2*  --   --  5.2*   ALB 3.6 3.1*  --   --  2.2*   GLOB 2.9 3.1  --   --  3.0   AGRAT 1.2 1.0  --   --  0.7*    < > = values in this interval not displayed. CBC w/Diff Recent Labs     10/26/20  0345 10/25/20  0345 10/24/20  0100   WBC 9.2 9.5 11.7   RBC 3.27* 3.12* 3.22*   HGB 9.3* 8.8* 9.1*   HCT 28.0* 27.0* 27.9*    255 312   GRANS 94* 94* 80*   LYMPH 4* 4* 11*   EOS 0 0 1      Cardiac Enzymes Recent Labs     10/26/20  0622 10/24/20  1645   CPK 71 246   CKND1 3.4 2.4      Coagulation No results for input(s): PTP, INR, APTT, INREXT, INREXT in the last 72 hours. Lipid Panel No results found for: CHOL, CHOLPOCT, CHOLX, CHLST, CHOLV, 330446, HDL, HDLP, LDL, LDLC, DLDLP, 872485, VLDLC, VLDL, TGLX, TRIGL, TRIGP, TGLPOCT, CHHD, CHHDX   BNP No results for input(s): BNPP in the last 72 hours. Liver Enzymes Recent Labs     10/26/20  0345   TP 6.5   ALB 3.6   AP 65      Thyroid Studies Lab Results   Component Value Date/Time    TSH 0.78 10/24/2020 11:30 AM        Procedures/imaging: see electronic medical records for all procedures/Xrays and details which were not copied into this note but were reviewed prior to creation of Plan    Ct Head Wo Cont    Result Date: 10/24/2020  EXAM: CT head INDICATION: Altered mental status. COMPARISON: October 15, 2020.  TECHNIQUE: Axial CT imaging of the head was performed without intravenous contrast. Dose reduction techniques used: automated exposure control, adjustment of the mAs and/or kVp according to patient size, and iterative reconstruction techniques. Digital imaging and communications in Medicine (DICOM) format image data are available to nonaffiliated external healthcare facilities or entities on a secure, media free, reciprocally searchable basis with patient authorization for at least 12 months after this study. _______________ FINDINGS: BRAIN AND POSTERIOR FOSSA: There is cerebral volume loss with prominence of the lateral and the third ventricles. The cortical sulci are widened appropriately. The fourth ventricle and basal cisterns are normally outlined. There is mild bilateral periventricular and central white matter diminished attenuation. There is no acute territorial defect, hemorrhage or midline shift. EXTRA-AXIAL SPACES AND MENINGES: There are no abnormal extra-axial fluid collections. CALVARIUM: Intact. SINUSES: Clear. OTHER: Partial right mastoid opacification is again seen. _______________     IMPRESSION: Cerebral volume loss and mild bilateral periventricular and central white matter diminished attenuation which is nonspecific but likely to represent microvascular disease. There is no acute intracranial abnormality. No significant interval change. Ct Head Wo Cont    Result Date: 10/15/2020  EXAM: CT head INDICATION: Dental status change COMPARISON: None. TECHNIQUE: Axial CT imaging of the head was performed without intravenous contrast. One or more dose reduction techniques were used on this CT: automated exposure control, adjustment of the mAs and/or kVp according to patient size, and iterative reconstruction techniques. The specific techniques used on this CT exam have been documented in the patient's electronic medical record.  Digital Imaging and Communications in Medicine (DICOM) format image data are available to nonaffiliated external healthcare facilities or entities on a secure, media free, reciprocally searchable basis with patient authorization for at least a 12 month period after this study. _______________ FINDINGS: BRAIN AND POSTERIOR FOSSA: The sulci, folia, ventricles and basal cisterns are within normal limits for the patient's with age concordant atrophy There is no intracranial hemorrhage, mass effect, or midline shift. Mild periventricular low-attenuation. Although nonspecific this is frequently seen with chronic small vessel ischemic change. Otherwise, there are no areas of abnormal parenchymal attenuation. EXTRA-AXIAL SPACES AND MENINGES: There are no abnormal extra-axial fluid collections. CALVARIUM: Intact. SINUSES: Clear. OTHER: None. _______________     IMPRESSION: No acute intracranial abnormalities. Xr Chest Port    Result Date: 10/26/2020  EXAM: CHEST RADIOGRAPH CLINICAL INDICATION/HISTORY: Pneumonia   > Additional: None COMPARISON: 10/23/2020 TECHNIQUE: Portable frontal view of the chest _______________ FINDINGS: SUPPORT DEVICES: None. HEART AND MEDIASTINUM: Heart size is stable. Atherosclerotic calcification seen in the aorta. LUNGS AND PLEURAL SPACES: Increased hazy density at the left base. Slight blunting of the right costophrenic angle. No pneumothorax. Patchy right upper lobe opacity is similar to slightly improved. BONES AND SOFT TISSUES: Unremarkable. _______________     IMPRESSION: 1. Increased hazy opacity at the left base compared to the prior exam, possibly developing left lower lobe atelectasis and/or consolidation. Questionable small right effusion. 2. Patchy right upper lobe patchy opacity which is similar to slightly improved. Xr Chest Port    Result Date: 10/24/2020  EXAM: XR CHEST PORT. CLINICAL INDICATION/HISTORY: meets SIRS criteria. -Additional: None. TECHNIQUE: A portable erect AP radiographic view of the chest is compared with several other chest radiographs performed the same month. _______________ FINDINGS: See impression. No pneumothorax or appreciable significant pleural effusion. The cardiac silhouette, vanessa, and mediastinal contours are normal for age. No acute osseous abnormality is revealed. _______________     IMPRESSION: Slight improvement in multifocal airspace disease most in keeping with pneumonia, again most pronounced at the right lung apex with no new or worsening findings. Recommend continued radiographic follow-up to resolution. _______________     Jaun Peed Chest Port    Result Date: 10/18/2020  EXAM: CHEST RADIOGRAPH CLINICAL INDICATION/HISTORY: SBO   > Additional: None COMPARISON: 10/17/2020. TECHNIQUE: Portable frontal view of the chest _______________ FINDINGS: SUPPORT DEVICES: None. HEART AND MEDIASTINUM: No appreciable cardiomegaly. Remaining mediastinal contours within normal limits. LUNGS AND PLEURAL SPACES: No significant change of bilateral upper lobe parenchymal opacities. Hyperexpansion of the lungs. No evidence for pneumothorax. BONY THORAX AND SOFT TISSUES: Unremarkable. _______________     IMPRESSION: 1. No significant change of bilateral lung pneumonia. Follow-up to resolution is recommended. 2. Emphysema. Xr Chest Port    Result Date: 10/18/2020  EXAM: CHEST RADIOGRAPH CLINICAL INDICATION/HISTORY: shortness of breath   > Additional: None COMPARISON: October 14, 2020. TECHNIQUE: Portable frontal view of the chest _______________ FINDINGS: SUPPORT DEVICES: None. HEART AND MEDIASTINUM: No appreciable cardiomegaly. Remaining mediastinal contours within normal limits. LUNGS AND PLEURAL SPACES: No significant change of bilateral upper lobe parenchymal opacities. Hyperexpansion of the lungs. No evidence for pneumothorax or pleural effusion. BONY THORAX AND SOFT TISSUES: Unremarkable. _______________     IMPRESSION: 1. No significant change of bilateral lung pneumonia. Follow-up to resolution is recommended to exclude underlying mass. 2. Emphysema.     Xr Chest Port    Result Date: 10/14/2020  EXAM: XR CHEST PORT CLINICAL INDICATION/HISTORY: Shortness of breath with cough -Additional: None COMPARISON: Several prior studies, most recently 2/19/2019 TECHNIQUE: Frontal view of the chest _______________ FINDINGS: HEART AND MEDIASTINUM: Normal cardiac size and mediastinal contours. LUNGS AND PLEURAL SPACES: Patchy multifocal opacities noted throughout bilateral upper lobes, right greater than left as well as throughout the periphery of the right lower lobe. No evidence of pneumothorax. BONY THORAX AND SOFT TISSUES: No acute osseous abnormality _______________     IMPRESSION: Patchy multifocal airspace disease compatible with pneumonia. Recommend radiographic follow-up to document expected clinical resolution in 4-6 weeks' time.        Denisse Villalobos MD

## 2020-10-26 NOTE — PROGRESS NOTES
Comprehensive Nutrition Assessment    Type and Reason for Visit: Initial, Positive nutrition screen    Nutrition Recommendations/Plan: Diet: Increase DM kcal to DM 2400  Supplement: Add Magic Cup BID    Nutrition Assessment:  pt admitted w/ pulmonary HTN, chest pain-echo done cardiology following, septic shock, hypotension, PNA, COPD, metabolic encephalopathy    Malnutrition Assessment:  Malnutrition Status:  (P) Severe malnutrition    Context:  (P) Chronic illness     Findings of the 6 clinical characteristics of malnutrition:   Energy Intake:  (P) 7 - 75% or less est energy requirements for 1 month or longer  Weight Loss:  (P) No significant weight loss     Body Fat Loss:  (P) 7 - Severe body fat loss, (P) Orbital, Triceps   Muscle Mass Loss:  (P) 7 - Severe muscle mass loss, (P) Temples (temporalis), Clavicles (pectoralis &deltoids)  Fluid Accumulation:  (P) Unable to assess,     Strength:  (P) Not performed       Estimated Daily Nutrient Needs:  Energy (kcal):  2456  Protein (g):  64-86       Fluid (ml/day):  2456    Nutrition Related Findings:  Labs: K-3.2  +BM 10/26/20 Meds: humalog, methylpredisolone, lopressor, zofran, protonix, miralax, KCl      Wounds:    None       Current Nutrition Therapies:  DIET DIABETIC CONSISTENT CARB Regular    Anthropometric Measures:  Height:  5' 4\" (162.6 cm)  Current Body Wt:  43.1 kg (95 lb)   Admission Body Wt:       Usual Body Wt:  80 lb(10/15/20)     Ideal Body Wt:  130 lbs:  73.1 %   Adjusted Body Weight:  106.2;  Weight Adjustment for: Amputation   Adjusted BMI:  18.2    BMI Category:  Underweight (BMI less than 18.5)       Nutrition Diagnosis:   Severe malnutrition related to impaired respiratory function as evidenced by intake 0-25%, severe loss of subcutaneous fat, severe muscle loss    Nutrition Interventions:   Food and/or Nutrient Delivery: Modify current diet, Start oral nutrition supplement  Nutrition Education and Counseling: No recommendations at this time  Coordination of Nutrition Care: Continued inpatient monitoring, Interdisciplinary rounds    Goals:  Encourage PO intake >50% at most meals in the next 2-3days       Nutrition Monitoring and Evaluation:   Behavioral-Environmental Outcomes:    Food/Nutrient Intake Outcomes: Diet advancement/tolerance, Food and nutrient intake, Supplement intake  Physical Signs/Symptoms Outcomes: Biochemical data, Weight, Skin, GI status    Discharge Planning:     Too soon to determine     Electronically signed by Ninette Primrose on 10/26/2020 at 12:44 PM

## 2020-10-27 ENCOUNTER — APPOINTMENT (OUTPATIENT)
Dept: VASCULAR SURGERY | Age: 65
DRG: 720 | End: 2020-10-27
Attending: INTERNAL MEDICINE
Payer: MEDICAID

## 2020-10-27 ENCOUNTER — APPOINTMENT (OUTPATIENT)
Dept: CT IMAGING | Age: 65
DRG: 720 | End: 2020-10-27
Attending: HOSPITALIST
Payer: MEDICAID

## 2020-10-27 LAB
ALBUMIN SERPL-MCNC: 3.5 G/DL (ref 3.4–5)
ALBUMIN/GLOB SERPL: 1.2 {RATIO} (ref 0.8–1.7)
ALP SERPL-CCNC: 69 U/L (ref 45–117)
ALT SERPL-CCNC: 24 U/L (ref 16–61)
ANION GAP SERPL CALC-SCNC: 11 MMOL/L (ref 3–18)
AST SERPL-CCNC: 18 U/L (ref 10–38)
BASOPHILS # BLD: 0 K/UL (ref 0–0.1)
BASOPHILS NFR BLD: 0 % (ref 0–2)
BILIRUB SERPL-MCNC: 0.4 MG/DL (ref 0.2–1)
BUN SERPL-MCNC: 21 MG/DL (ref 7–18)
BUN/CREAT SERPL: 25 (ref 12–20)
CA-I SERPL-SCNC: 1.2 MMOL/L (ref 1.12–1.32)
CALCIUM SERPL-MCNC: 9 MG/DL (ref 8.5–10.1)
CHLORIDE SERPL-SCNC: 110 MMOL/L (ref 100–111)
CO2 SERPL-SCNC: 22 MMOL/L (ref 21–32)
CREAT SERPL-MCNC: 0.84 MG/DL (ref 0.6–1.3)
DIFFERENTIAL METHOD BLD: ABNORMAL
EOSINOPHIL # BLD: 0 K/UL (ref 0–0.4)
EOSINOPHIL NFR BLD: 0 % (ref 0–5)
ERYTHROCYTE [DISTWIDTH] IN BLOOD BY AUTOMATED COUNT: 17.4 % (ref 11.6–14.5)
GLOBULIN SER CALC-MCNC: 2.9 G/DL (ref 2–4)
GLUCOSE BLD STRIP.AUTO-MCNC: 105 MG/DL (ref 70–110)
GLUCOSE BLD STRIP.AUTO-MCNC: 110 MG/DL (ref 70–110)
GLUCOSE BLD STRIP.AUTO-MCNC: 111 MG/DL (ref 70–110)
GLUCOSE BLD STRIP.AUTO-MCNC: 91 MG/DL (ref 70–110)
GLUCOSE BLD STRIP.AUTO-MCNC: 98 MG/DL (ref 70–110)
GLUCOSE SERPL-MCNC: 104 MG/DL (ref 74–99)
HCT VFR BLD AUTO: 31.9 % (ref 36–48)
HGB BLD-MCNC: 10.7 G/DL (ref 13–16)
LYMPHOCYTES # BLD: 0.4 K/UL (ref 0.9–3.6)
LYMPHOCYTES NFR BLD: 5 % (ref 21–52)
MAGNESIUM SERPL-MCNC: 1.8 MG/DL (ref 1.6–2.6)
MCH RBC QN AUTO: 28.8 PG (ref 24–34)
MCHC RBC AUTO-ENTMCNC: 33.5 G/DL (ref 31–37)
MCV RBC AUTO: 85.8 FL (ref 74–97)
MONOCYTES # BLD: 0.2 K/UL (ref 0.05–1.2)
MONOCYTES NFR BLD: 2 % (ref 3–10)
NEUTS SEG # BLD: 8 K/UL (ref 1.8–8)
NEUTS SEG NFR BLD: 93 % (ref 40–73)
PHOSPHATE SERPL-MCNC: 2.8 MG/DL (ref 2.5–4.9)
PLATELET # BLD AUTO: 304 K/UL (ref 135–420)
PMV BLD AUTO: 10 FL (ref 9.2–11.8)
POTASSIUM SERPL-SCNC: 3.3 MMOL/L (ref 3.5–5.5)
POTASSIUM SERPL-SCNC: 3.6 MMOL/L (ref 3.5–5.5)
PROT SERPL-MCNC: 6.4 G/DL (ref 6.4–8.2)
RBC # BLD AUTO: 3.72 M/UL (ref 4.7–5.5)
SODIUM SERPL-SCNC: 143 MMOL/L (ref 136–145)
WBC # BLD AUTO: 8.6 K/UL (ref 4.6–13.2)

## 2020-10-27 PROCEDURE — 74011250637 HC RX REV CODE- 250/637: Performed by: INTERNAL MEDICINE

## 2020-10-27 PROCEDURE — 85025 COMPLETE CBC W/AUTO DIFF WBC: CPT

## 2020-10-27 PROCEDURE — 74011000258 HC RX REV CODE- 258: Performed by: INTERNAL MEDICINE

## 2020-10-27 PROCEDURE — 74011250636 HC RX REV CODE- 250/636: Performed by: FAMILY MEDICINE

## 2020-10-27 PROCEDURE — 83735 ASSAY OF MAGNESIUM: CPT

## 2020-10-27 PROCEDURE — 74011000636 HC RX REV CODE- 636: Performed by: INTERNAL MEDICINE

## 2020-10-27 PROCEDURE — 74011000258 HC RX REV CODE- 258: Performed by: EMERGENCY MEDICINE

## 2020-10-27 PROCEDURE — 84100 ASSAY OF PHOSPHORUS: CPT

## 2020-10-27 PROCEDURE — 82330 ASSAY OF CALCIUM: CPT

## 2020-10-27 PROCEDURE — 99221 1ST HOSP IP/OBS SF/LOW 40: CPT | Performed by: NURSE PRACTITIONER

## 2020-10-27 PROCEDURE — 65610000006 HC RM INTENSIVE CARE

## 2020-10-27 PROCEDURE — 84132 ASSAY OF SERUM POTASSIUM: CPT

## 2020-10-27 PROCEDURE — 36415 COLL VENOUS BLD VENIPUNCTURE: CPT

## 2020-10-27 PROCEDURE — 74011250636 HC RX REV CODE- 250/636: Performed by: HOSPITALIST

## 2020-10-27 PROCEDURE — 74011000250 HC RX REV CODE- 250: Performed by: INTERNAL MEDICINE

## 2020-10-27 PROCEDURE — 74011250636 HC RX REV CODE- 250/636: Performed by: EMERGENCY MEDICINE

## 2020-10-27 PROCEDURE — 74011250637 HC RX REV CODE- 250/637: Performed by: FAMILY MEDICINE

## 2020-10-27 PROCEDURE — 71275 CT ANGIOGRAPHY CHEST: CPT

## 2020-10-27 PROCEDURE — 77010033711 HC HIGH FLOW OXYGEN

## 2020-10-27 PROCEDURE — 93970 EXTREMITY STUDY: CPT

## 2020-10-27 PROCEDURE — 74011250636 HC RX REV CODE- 250/636: Performed by: INTERNAL MEDICINE

## 2020-10-27 PROCEDURE — 94640 AIRWAY INHALATION TREATMENT: CPT

## 2020-10-27 PROCEDURE — 82962 GLUCOSE BLOOD TEST: CPT

## 2020-10-27 RX ORDER — POTASSIUM CHLORIDE 7.45 MG/ML
10 INJECTION INTRAVENOUS
Status: DISPENSED | OUTPATIENT
Start: 2020-10-27 | End: 2020-10-27

## 2020-10-27 RX ORDER — DOXYCYCLINE 100 MG/1
100 CAPSULE ORAL EVERY 12 HOURS
Status: DISCONTINUED | OUTPATIENT
Start: 2020-10-27 | End: 2020-10-28

## 2020-10-27 RX ORDER — QUETIAPINE FUMARATE 25 MG/1
50 TABLET, FILM COATED ORAL
Status: DISCONTINUED | OUTPATIENT
Start: 2020-10-27 | End: 2020-10-30

## 2020-10-27 RX ORDER — PANTOPRAZOLE SODIUM 40 MG/1
40 TABLET, DELAYED RELEASE ORAL
Status: DISCONTINUED | OUTPATIENT
Start: 2020-10-28 | End: 2020-10-29

## 2020-10-27 RX ADMIN — IPRATROPIUM BROMIDE 0.5 MG: 0.5 SOLUTION RESPIRATORY (INHALATION) at 07:35

## 2020-10-27 RX ADMIN — GABAPENTIN 100 MG: 100 CAPSULE ORAL at 21:58

## 2020-10-27 RX ADMIN — IOPAMIDOL 100 ML: 755 INJECTION, SOLUTION INTRAVENOUS at 11:40

## 2020-10-27 RX ADMIN — POTASSIUM BICARBONATE 40 MEQ: 782 TABLET, EFFERVESCENT ORAL at 22:00

## 2020-10-27 RX ADMIN — ONDANSETRON 4 MG: 2 INJECTION INTRAMUSCULAR; INTRAVENOUS at 23:09

## 2020-10-27 RX ADMIN — IPRATROPIUM BROMIDE 0.5 MG: 0.5 SOLUTION RESPIRATORY (INHALATION) at 15:35

## 2020-10-27 RX ADMIN — ENOXAPARIN SODIUM 40 MG: 40 INJECTION SUBCUTANEOUS at 19:18

## 2020-10-27 RX ADMIN — IPRATROPIUM BROMIDE 0.5 MG: 0.5 SOLUTION RESPIRATORY (INHALATION) at 20:45

## 2020-10-27 RX ADMIN — ATORVASTATIN CALCIUM 20 MG: 20 TABLET, FILM COATED ORAL at 21:58

## 2020-10-27 RX ADMIN — PIPERACILLIN AND TAZOBACTAM 3.38 G: 3; .375 INJECTION, POWDER, LYOPHILIZED, FOR SOLUTION INTRAVENOUS at 03:00

## 2020-10-27 RX ADMIN — SODIUM CHLORIDE 10 ML: 9 INJECTION, SOLUTION INTRAMUSCULAR; INTRAVENOUS; SUBCUTANEOUS at 14:00

## 2020-10-27 RX ADMIN — POTASSIUM CHLORIDE 10 MEQ: 10 INJECTION, SOLUTION INTRAVENOUS at 07:03

## 2020-10-27 RX ADMIN — LORAZEPAM 1 MG: 2 INJECTION INTRAMUSCULAR at 03:08

## 2020-10-27 RX ADMIN — BUDESONIDE 500 MCG: 0.25 INHALANT RESPIRATORY (INHALATION) at 07:35

## 2020-10-27 RX ADMIN — POTASSIUM CHLORIDE 10 MEQ: 10 INJECTION, SOLUTION INTRAVENOUS at 10:03

## 2020-10-27 RX ADMIN — GABAPENTIN 100 MG: 100 CAPSULE ORAL at 15:39

## 2020-10-27 RX ADMIN — GABAPENTIN 100 MG: 100 CAPSULE ORAL at 09:05

## 2020-10-27 RX ADMIN — DOXYCYCLINE 100 MG: 100 CAPSULE ORAL at 22:00

## 2020-10-27 RX ADMIN — DOXYCYCLINE 100 MG: 100 INJECTION, POWDER, LYOPHILIZED, FOR SOLUTION INTRAVENOUS at 10:10

## 2020-10-27 RX ADMIN — QUETIAPINE FUMARATE 50 MG: 25 TABLET ORAL at 22:00

## 2020-10-27 RX ADMIN — SODIUM CHLORIDE 10 ML: 9 INJECTION, SOLUTION INTRAMUSCULAR; INTRAVENOUS; SUBCUTANEOUS at 05:55

## 2020-10-27 RX ADMIN — IPRATROPIUM BROMIDE 0.5 MG: 0.5 SOLUTION RESPIRATORY (INHALATION) at 12:06

## 2020-10-27 RX ADMIN — VANCOMYCIN HYDROCHLORIDE 500 MG: 500 INJECTION, POWDER, LYOPHILIZED, FOR SOLUTION INTRAVENOUS at 06:21

## 2020-10-27 RX ADMIN — OXYCODONE HYDROCHLORIDE AND ACETAMINOPHEN 1 TABLET: 5; 325 TABLET ORAL at 15:39

## 2020-10-27 RX ADMIN — PIPERACILLIN AND TAZOBACTAM 3.38 G: 3; .375 INJECTION, POWDER, LYOPHILIZED, FOR SOLUTION INTRAVENOUS at 19:17

## 2020-10-27 RX ADMIN — METHYLPREDNISOLONE SODIUM SUCCINATE 40 MG: 40 INJECTION, POWDER, FOR SOLUTION INTRAMUSCULAR; INTRAVENOUS at 22:01

## 2020-10-27 RX ADMIN — PIPERACILLIN AND TAZOBACTAM 3.38 G: 3; .375 INJECTION, POWDER, LYOPHILIZED, FOR SOLUTION INTRAVENOUS at 13:47

## 2020-10-27 RX ADMIN — HALOPERIDOL LACTATE 5 MG: 5 INJECTION, SOLUTION INTRAMUSCULAR at 17:02

## 2020-10-27 RX ADMIN — HYDRALAZINE HYDROCHLORIDE 20 MG: 20 INJECTION INTRAMUSCULAR; INTRAVENOUS at 10:02

## 2020-10-27 RX ADMIN — ENOXAPARIN SODIUM 40 MG: 40 INJECTION SUBCUTANEOUS at 09:03

## 2020-10-27 RX ADMIN — SODIUM CHLORIDE 10 ML: 9 INJECTION, SOLUTION INTRAMUSCULAR; INTRAVENOUS; SUBCUTANEOUS at 22:01

## 2020-10-27 RX ADMIN — BUDESONIDE 500 MCG: 0.25 INHALANT RESPIRATORY (INHALATION) at 20:45

## 2020-10-27 RX ADMIN — LORAZEPAM 1 MG: 2 INJECTION INTRAMUSCULAR at 23:06

## 2020-10-27 RX ADMIN — METOPROLOL TARTRATE 12.5 MG: 25 TABLET, FILM COATED ORAL at 21:56

## 2020-10-27 RX ADMIN — POTASSIUM CHLORIDE 10 MEQ: 10 INJECTION, SOLUTION INTRAVENOUS at 09:03

## 2020-10-27 RX ADMIN — AMLODIPINE BESYLATE 10 MG: 5 TABLET ORAL at 09:04

## 2020-10-27 RX ADMIN — POTASSIUM CHLORIDE 10 MEQ: 10 INJECTION, SOLUTION INTRAVENOUS at 11:14

## 2020-10-27 RX ADMIN — HALOPERIDOL LACTATE 5 MG: 5 INJECTION, SOLUTION INTRAMUSCULAR at 08:06

## 2020-10-27 RX ADMIN — METHYLPREDNISOLONE SODIUM SUCCINATE 40 MG: 40 INJECTION, POWDER, FOR SOLUTION INTRAMUSCULAR; INTRAVENOUS at 06:18

## 2020-10-27 RX ADMIN — METOPROLOL TARTRATE 12.5 MG: 25 TABLET, FILM COATED ORAL at 09:04

## 2020-10-27 RX ADMIN — CLONAZEPAM 0.5 MG: 0.5 TABLET ORAL at 09:51

## 2020-10-27 RX ADMIN — VANCOMYCIN HYDROCHLORIDE 500 MG: 500 INJECTION, POWDER, LYOPHILIZED, FOR SOLUTION INTRAVENOUS at 19:16

## 2020-10-27 NOTE — PROGRESS NOTES
HFNC changed to 5 LPM NC. Maintain SPO2 > 91%. Discussed with RT.     Tyrone Villatoro MD 10/27/2020 9:07 AM

## 2020-10-27 NOTE — PROGRESS NOTES
Pharmacy Services:  Doxycycline    The pharmacist has determined that this patient meets P & T approved criteria for conversion from IV to oral therapy for the following medication:  Doxycycline    The pharmacist has initiated the following order: Doxycycline 100 mg PO q12hrs x 3 more doses (5 day total)  The pharmacist will continue to monitor the patient's status for appropriateness    of oral therapy. If the patient no longer meets all criteria for oral therapy, the pharmacist will switch back     to IV therapy.     Jeff Orellana, [address] [place] [address], Pharmacist [phone]  10/27/2020 1:03 PM

## 2020-10-27 NOTE — PROGRESS NOTES
Cardiology Progress Note        Patient: Ramonita Lefort Sr.        Sex: male          DOA: 10/23/2020  YOB: 1955      Age:  63 y.o.        LOS:  LOS: 4 days    Patient seen and examined, chart reviewed. Assessment/Plan     Patient Active Problem List   Diagnosis Code    Bursitis of elbow M70.30    Medication overdose T50.901A    Polio A80.9    Depressive disorder, not elsewhere classified F32.9    Type II or unspecified type diabetes mellitus without mention of complication, uncontrolled [id number]    Hypotension, unspecified I95.9    Amputation of both lower extremities ([facility] S88.911A, N06.821R    PNA (pneumonia) J18.9    ALEXANDER (acute kidney injury) ([facility] N17.9    Hyponatremia E87.1    Marijuana abuse F12.10    Em[facility] lung ([facility] J43.9    CAP (community acquired pneumonia) J18.9    Sepsis ([facility] A41.9    Hard of hearing H91.90    Legal blindness H54.8    Acute encephalopathy G93.40    Septic shock (HCC) A41.9, R65.21    Chronic obstructive pulmonary disease with acute exacerbation (HCC) J44.1    Severe protein-calorie malnutrition ([facility] T35    Metabolic encephalopathy J44.33    Acute respiratory failure with hypoxia ([facility]) J96.01     Pulmonary hypertension  Borderline troponin elevation of unknown significance could be from pulmonary hypertension   Hypokalemia     Echocardiogram revealed     · Left Ventricle: Normal cavity size and wall thickness. The estimated EF is 50 - 55%. Low normal systolic function. There is mild (grade 1) left ventricular diastolic dysfunction E/E' ratio is 10.31.  · Pulmonary Artery: Pulmonary arteries not well visualized. Pulmonary arterial systolic pressure (PASP) is 74 mmHg. Pulmonary hypertension found to be severe. CT chest reported    1.   No evidence of pulmonary embolism.     2.  Relatively diffuse bronchial wall thickening and several areas of  endobronchial impaction/occlusion.     3. Left lower lobe pneumonia with additional area of peribronchial alveolar  consolidation posterior right upper lobe.     > Recommend radiographic follow-up to document expected clinical resolution  following appropriate treatment in 4-6 weeks.     4. Moderately severe upper lobe predominant centrilobular emphysema.     5. Small left and trace right pleural effusions.     6. Extensive atherosclerotic vascular disease both above and below the  diaphragm. Plan:    Continue Metoprolol, amlodipine and atorvastatin. Will follow                 Subjective:    cc:  Denies any chest pain       REVIEW OF SYSTEMS:     General: No fevers or chills. Cardiovascular: No chest pain,No palpitations, No orthopnea, No PND, No leg swelling, No claudication, Positive shortness of breath on exertion   Pulmonary: No dyspnea. Gastrointestinal: No nausea, vomiting, bleeding  Neurology: No Dizziness      Objective:      Visit Vitals  BP (!) 158/86   Pulse 81   Temp 98.1 °F (36.7 °C)   Resp 24   Ht 5' 4\" (1.626 m)   Wt 43.1 kg (95 lb)   SpO2 92%   BMI 16.31 kg/m²     Body mass index is 16.31 kg/m². Physical Exam:  General Appearance: Comfortable, not using accessory muscles of respiration. HEENT: HANNAH. HEAD: Atraumatic  NECK: No JVD, no thyroidomeglay. CAROTIDS: No bruit  LUNGS: Clear bilaterally. HEART: S1+S2 audible, no murmur, no pericardial rub.      ABD: Non-tender, BS Audible    NEUROLOGICAL: Responds to simple verbal commands     Medication:  Current Facility-Administered Medications   Medication Dose Route Frequency    methylPREDNISolone (PF) (SOLU-MEDROL) injection 40 mg  40 mg IntraVENous Q12H    [START ON 10/28/2020] pantoprazole (PROTONIX) tablet 40 mg  40 mg Oral ACB    QUEtiapine (SEROquel) tablet 50 mg  50 mg Oral QHS    doxycycline (MONODOX) capsule 100 mg  100 mg Oral Q12H    hydrALAZINE (APRESOLINE) 20 mg/mL injection 20 mg  20 mg IntraVENous Q6H PRN    amLODIPine (NORVASC) tablet 10 mg  10 mg Oral DAILY    metoprolol tartrate (LOPRESSOR) tablet 12.5 mg  12.5 mg Oral Q12H    enoxaparin (LOVENOX) injection 40 mg  1 mg/kg SubCUTAneous Q12H    ipratropium (ATROVENT) 0.02 % nebulizer solution 0.5 mg  0.5 mg Nebulization QID RT    vancomycin (VANCOCIN) 500 mg in 0.9% sodium chloride (MBP/ADV) 100 mL MBP  500 mg IntraVENous Q12H    atorvastatin (LIPITOR) tablet 20 mg  20 mg Oral QHS    clonazePAM (KlonoPIN) tablet 0.5 mg  0.5 mg Oral TID PRN    gabapentin (NEURONTIN) capsule 100 mg  100 mg Oral TID    oxyCODONE-acetaminophen (PERCOCET) 5-325 mg per tablet 1 Tab  1 Tab Oral Q4H PRN    LORazepam (ATIVAN) injection 1 mg  1 mg IntraVENous Q4H PRN    piperacillin-tazobactam (ZOSYN) 3.375 g in 0.9% sodium chloride (MBP/ADV) 100 mL MBP  3.375 g IntraVENous Q8H    haloperidol lactate (HALDOL) injection 5 mg  5 mg IntraVENous Q8H PRN    nicotine (NICODERM CQ) 21 mg/24 hr patch 1 Patch  1 Patch TransDERmal DAILY    albuterol-ipratropium (DUO-NEB) 2.5 MG-0.5 MG/3 ML  3 mL Nebulization Q4H PRN    sodium chloride (NS) flush 5-10 mL  5-10 mL IntraVENous PRN    Vancomycin - Pharmacokinetic Dosing  1 Each Other Rx Dosing/Monitoring    insulin lispro (HUMALOG) injection   SubCUTAneous AC&HS    glucose chewable tablet 16 g  4 Tab Oral PRN    glucagon (GLUCAGEN) injection 1 mg  1 mg IntraMUSCular PRN    dextrose 10% infusion 125-250 mL  125-250 mL IntraVENous PRN    budesonide (PULMICORT) 250 mcg/2ml nebulizer susp  500 mcg Nebulization BID RT    ELECTROLYTE REPLACEMENT PROTOCOL - Potassium Standard Dosing   1 Each Other PRN    ELECTROLYTE REPLACEMENT PROTOCOL - Potassium Standard  Dosing Details for Fluid Restricted Patients  1 Each Other PRN    ELECTROLYTE REPLACEMENT PROTOCOL  - Phosphorus Renal Dosing  1 Each Other PRN    sodium chloride (NS) flush 5-40 mL  5-40 mL IntraVENous Q8H    sodium chloride (NS) flush 5-40 mL  5-40 mL IntraVENous PRN    acetaminophen (TYLENOL) tablet 650 mg  650 mg Oral Q6H PRN    Or    acetaminophen (TYLENOL) suppository 650 mg  650 mg Rectal Q6H PRN    polyethylene glycol (MIRALAX) packet 17 g  17 g Oral DAILY PRN    promethazine (PHENERGAN) tablet 12.5 mg  12.5 mg Oral Q6H PRN    Or    ondansetron (ZOFRAN) injection 4 mg  4 mg IntraVENous Q6H PRN               Lab/Data Reviewed:       Recent Labs     10/27/20  0539 10/26/20  0345 10/25/20  0345   WBC 8.6 9.2 9.5   HGB 10.7* 9.3* 8.8*   HCT 31.9* 28.0* 27.0*    297 255     Recent Labs     10/27/20  0539 10/26/20  1845 10/26/20  0345  10/25/20  0345     --  142  --  141   K 3.3* 4.6 3.2*   < > 3.9     --  111  --  116*   CO2 22  --  23  --  22   *  --  103*  --  128*   BUN 21*  --  16  --  15   CREA 0.84  --  0.79  --  0.96   CA 9.0  --  9.1  --  8.4*    < > = values in this interval not displayed.        Signed By: Mary Moss MD     October 27, 2020

## 2020-10-27 NOTE — PROGRESS NOTES
1600  Bedside, Verbal and Written shift change report given to NAT Worrell (oncoming nurse) by Barb Singletary (offgoing nurse). Report included the following information SBAR, Kardex, Intake/Output and Recent Results. 1700  Pt co back pain, See MAR. Pt vss.      1900  Bedside, Verbal and Written shift change report given to ROB Vines (oncoming nurse) by NAT Worrell (offgoing nurse). Report included the following information SBAR, Kardex, Intake/Output and Recent Results.

## 2020-10-27 NOTE — PROGRESS NOTES
Hospitalist Progress Note-critical care note     Patient: Brunilda Potts Sr. MRN: [id number]  [facility]: [id number]    YOB: 1955  Age: 63 y.o.   Sex: male    DOA: 10/23/2020 LOS:  LOS: 4 days            Chief complaint: Pneumonia, marijuana abuse, legal blindness, amputation bilateral lower extremity, encephalopathy, acute respiratory failure with hypoxia    Assessment/Plan         Hospital Problems  Date Reviewed: 10/23/2020          Codes Class Noted POA    Acute respiratory failure with hypoxia [facility]) ICD-10-CM: J96.01  ICD-9-CM: 518.81  10/26/2020 Unknown        Chronic obstructive pulmonary disease with acute exacerbation ([facility] ICD-10-CM: J44.1  ICD-9-CM: 491.21  10/24/2020 Unknown        Severe protein-calorie malnutrition ([facility] ICD-10-CM: E43  ICD-9-CM: 262  10/24/2020 Unknown        Metabolic encephalopathy QBE-85-SX: G93.41  ICD-9-CM: 348.31  10/24/2020 Unknown        * (Principal) Septic shock ([facility] ICD-10-CM: A41.9, R65.21  ICD-9-CM: 038.9, 785.52, 995.92  10/23/2020 Unknown        Amputation of both lower extremities ([facility] ICD-10-CM: Q65.894N, D86.055O  ICD-9-CM: 897.6  Unknown Yes        PNA (pneumonia) ICD-10-CM: J18.9  ICD-9-CM: 486  Unknown Yes        Marijuana abuse ICD-10-CM: F12.10  ICD-9-CM: 305.20  Unknown Yes        Legal blindness ICD-10-CM: H54.8  ICD-9-CM: 369.4  10/14/2020 Yes        Hypotension, unspecified ICD-10-CM: I95.9  ICD-9-CM: 458.9  1/27/2014 Yes                A 65-year-old male with a history of a COPD, bilateral above knee amputation, recently discharged from [facility] after treated for pneumonia who was admitted for septic shock and metabolic encephalopathy.       Acute respiratory failure with hypoxia   Improving- now down to nc O2   Continue iv abx   Cta chest no pe, but pna and Relatively diffuse bronchial wall thickening and several areas of  endobronchial impaction/occlusion/emphesema     COPD with right upper lobe pneumonia questionable mass/emphysema /aspiration pna Continue breathing tx and iv abx   Aspiration precaution   Pulm f/u       Cardiovascular septic shock: resolved.   Off Levophed   Chest pain   Repeated  cardiac enzymes/troponin no change     Prolong Qt   Dr. Terri Jimenez on board-continue medical treatment     HTN: on  Norvasc and metoprolol.       Heme:  Monitoring [address] and platelets    ID on iv abx for pneumonia treatment      FEN: Placed on ICU protocol monitor I's and O's      neuro   Acute metabolic encephalopathy   Agitation -improving today , oriented to place and himself   Legal blindness :fall /aspiration precaution     Psych : marijuana use     Severe malnutrition    Bilateral AKA    Subjective: I want to eat         Disposition :tbd,   Review of systems:  Unable to obtain due to confusion, but denies chest pain   Vital signs/Intake and Output:  Visit Vitals  BP (!) 145/85   Pulse 75   Temp 98.1 °F (36.7 °C)   Resp 18   Ht 5' 4\" (1.626 m)   Wt 43.1 kg (95 lb)   SpO2 96%   BMI 16.31 kg/m²     Current Shift:  10/27 0701 - 10/27 1900  In: 100 [I.V.:100]  Out: 426 [Urine:425]  Last three shifts:  10/25 1901 - 10/27 0700  In: 7267 [P.O.:360; I.V.:1850]  Out: 9578 [Urine:4125]    Physical Exam:  General: WD, WN. Alert, some cooperative, no acute distress    HEENT: NC, Atraumatic. PERRLA, anicteric sclerae. Lungs: Coarse BS   Heart:  Regular  rhythm,  No murmur, No Rubs, No Gallops  Abdomen: Soft, Non distended, Non tender.   +Bowel sounds,   Extremities: No c/c, aka  Psych:   Calm   Neurologic:  oriented to himself and place           Labs: Results:       Chemistry Recent Labs     10/27/20  0539 10/26/20  1845 10/26/20  0345  10/25/20  0345   *  --  103*  --  128*     --  142  --  141   K 3.3* 4.6 3.2*   < > 3.9     --  111  --  116*   CO2 22  --  23  --  22   BUN 21*  --  16  --  15   CREA 0.84  --  0.79  --  0.96   CA 9.0  --  9.1  --  8.4*   AGAP 11  --  8  --  3   BUCR 25*  --  20  --  16   AP 69  --  65  --  71   TP 6.4  --  6.5  --  6.2* ALB 3.5  --  3.6  --  3.1*   GLOB 2.9  --  2.9  --  3.1   AGRAT 1.2  --  1.2  --  1.0    < > = values in this interval not displayed. CBC w/Diff Recent Labs     10/27/20  0539 10/26/20  0345 10/25/20  0345   WBC 8.6 9.2 9.5   RBC 3.72* 3.27* 3.12*   HGB 10.7* 9.3* 8.8*   HCT 31.9* 28.0* 27.0*    297 255   GRANS 93* 94* 94*   LYMPH 5* 4* 4*   EOS 0 0 0      Cardiac Enzymes Recent Labs     10/26/20  1845 10/26/20  1445    78   CKND1 1.3 2.9      Coagulation No results for input(s): PTP, INR, APTT, INREXT, INREXT in the last 72 hours. Lipid Panel No results found for: CHOL, CHOLPOCT, CHOLX, CHLST, CHOLV, 016587, HDL, HDLP, LDL, LDLC, DLDLP, 090292, VLDLC, VLDL, TGLX, TRIGL, TRIGP, TGLPOCT, CHHD, CHHDX   BNP No results for input(s): BNPP in the last 72 hours. Liver Enzymes Recent Labs     10/27/20  0539   TP 6.4   ALB 3.5   AP 69      Thyroid Studies Lab Results   Component Value Date/Time    TSH 0.78 10/24/2020 11:30 AM        Procedures/imaging: see electronic medical records for all procedures/Xrays and details which were not copied into this note but were reviewed prior to creation of Plan    Ct Head Wo Cont    Result Date: 10/24/2020  EXAM: CT head INDICATION: Altered mental status. COMPARISON: October 15, 2020. TECHNIQUE: Axial CT imaging of the head was performed without intravenous contrast. Dose reduction techniques used: automated exposure control, adjustment of the mAs and/or kVp according to patient size, and iterative reconstruction techniques. Digital imaging and communications in Medicine (DICOM) format image data are available to nonaffiliated external healthcare facilities or entities on a secure, media free, reciprocally searchable basis with patient authorization for at least 12 months after this study. _______________ FINDINGS: BRAIN AND POSTERIOR FOSSA: There is cerebral volume loss with prominence of the lateral and the third ventricles.  The cortical sulci are widened appropriately. The fourth ventricle and basal cisterns are normally outlined. There is mild bilateral periventricular and central white matter diminished attenuation. There is no acute territorial defect, hemorrhage or midline shift. EXTRA-AXIAL SPACES AND MENINGES: There are no abnormal extra-axial fluid collections. CALVARIUM: Intact. SINUSES: Clear. OTHER: Partial right mastoid opacification is again seen. _______________     IMPRESSION: Cerebral volume loss and mild bilateral periventricular and central white matter diminished attenuation which is nonspecific but likely to represent microvascular disease. There is no acute intracranial abnormality. No significant interval change. Ct Head Wo Cont    Result Date: 10/15/2020  EXAM: CT head INDICATION: Dental status change COMPARISON: None. TECHNIQUE: Axial CT imaging of the head was performed without intravenous contrast. One or more dose reduction techniques were used on this CT: automated exposure control, adjustment of the mAs and/or kVp according to patient size, and iterative reconstruction techniques. The specific techniques used on this CT exam have been documented in the patient's electronic medical record. Digital Imaging and Communications in Medicine (DICOM) format image data are available to nonaffiliated external healthcare facilities or entities on a secure, media free, reciprocally searchable basis with patient authorization for at least a 12 month period after this study. _______________ FINDINGS: BRAIN AND POSTERIOR FOSSA: The sulci, folia, ventricles and basal cisterns are within normal limits for the patient's with age concordant atrophy There is no intracranial hemorrhage, mass effect, or midline shift. Mild periventricular low-attenuation. Although nonspecific this is frequently seen with chronic small vessel ischemic change. Otherwise, there are no areas of abnormal parenchymal attenuation.  EXTRA-AXIAL SPACES AND MENINGES: There are no abnormal extra-axial fluid collections. CALVARIUM: Intact. SINUSES: Clear. OTHER: None. _______________     IMPRESSION: No acute intracranial abnormalities. Xr Chest Port    Result Date: 10/26/2020  EXAM: CHEST RADIOGRAPH CLINICAL INDICATION/HISTORY: Pneumonia   > Additional: None COMPARISON: 10/23/2020 TECHNIQUE: Portable frontal view of the chest _______________ FINDINGS: SUPPORT DEVICES: None. HEART AND MEDIASTINUM: Heart size is stable. Atherosclerotic calcification seen in the aorta. LUNGS AND PLEURAL SPACES: Increased hazy density at the left base. Slight blunting of the right costophrenic angle. No pneumothorax. Patchy right upper lobe opacity is similar to slightly improved. BONES AND SOFT TISSUES: Unremarkable. _______________     IMPRESSION: 1. Increased hazy opacity at the left base compared to the prior exam, possibly developing left lower lobe atelectasis and/or consolidation. Questionable small right effusion. 2. Patchy right upper lobe patchy opacity which is similar to slightly improved. Xr Chest Port    Result Date: 10/24/2020  EXAM: XR CHEST PORT. CLINICAL INDICATION/HISTORY: meets SIRS criteria. -Additional: None. TECHNIQUE: A portable erect AP radiographic view of the chest is compared with several other chest radiographs performed the same month. _______________ FINDINGS: See impression. No pneumothorax or appreciable significant pleural effusion. The cardiac silhouette, vanessa, and mediastinal contours are normal for age. No acute osseous abnormality is revealed. _______________     IMPRESSION: Slight improvement in multifocal airspace disease most in keeping with pneumonia, again most pronounced at the right lung apex with no new or worsening findings. Recommend continued radiographic follow-up to resolution. _______________     [place] Chest Port    Result Date: 10/18/2020  EXAM: CHEST RADIOGRAPH CLINICAL INDICATION/HISTORY: SBO   > Additional: None COMPARISON: 10/17/2020.  TECHNIQUE: Portable frontal view of the chest _______________ FINDINGS: SUPPORT DEVICES: None. HEART AND MEDIASTINUM: No appreciable cardiomegaly. Remaining mediastinal contours within normal limits. LUNGS AND PLEURAL SPACES: No significant change of bilateral upper lobe parenchymal opacities. Hyperexpansion of the lungs. No evidence for pneumothorax. BONY THORAX AND SOFT TISSUES: Unremarkable. _______________     IMPRESSION: 1. No significant change of bilateral lung pneumonia. Follow-up to resolution is recommended. 2. Emphysema. Xr Chest Port    Result Date: 10/18/2020  EXAM: CHEST RADIOGRAPH CLINICAL INDICATION/HISTORY: shortness of breath   > Additional: None COMPARISON: October 14, 2020. TECHNIQUE: Portable frontal view of the chest _______________ FINDINGS: SUPPORT DEVICES: None. HEART AND MEDIASTINUM: No appreciable cardiomegaly. Remaining mediastinal contours within normal limits. LUNGS AND PLEURAL SPACES: No significant change of bilateral upper lobe parenchymal opacities. Hyperexpansion of the lungs. No evidence for pneumothorax or pleural effusion. BONY THORAX AND SOFT TISSUES: Unremarkable. _______________     IMPRESSION: 1. No significant change of bilateral lung pneumonia. Follow-up to resolution is recommended to exclude underlying mass. 2. Emphysema. Xr Chest Port    Result Date: 10/14/2020  EXAM: XR CHEST PORT CLINICAL INDICATION/HISTORY: Shortness of breath with cough -Additional: None COMPARISON: Several prior studies, most recently 2/19/2019 TECHNIQUE: Frontal view of the chest _______________ FINDINGS: HEART AND MEDIASTINUM: Normal cardiac size and mediastinal contours. LUNGS AND PLEURAL SPACES: Patchy multifocal opacities noted throughout bilateral upper lobes, right greater than left as well as throughout the periphery of the right lower lobe. No evidence of pneumothorax.  BONY THORAX AND SOFT TISSUES: No acute osseous abnormality _______________     IMPRESSION: Patchy multifocal airspace disease compatible with pneumonia. Recommend radiographic follow-up to document expected clinical resolution in 4-6 weeks' time.        Ree Dotson MD

## 2020-10-27 NOTE — PROGRESS NOTES
Chart reviewed pt remains in ICU level of care, plan for today includes weaning down Hi-Flow 02, cm will cont to review and remain available.

## 2020-10-27 NOTE — PROGRESS NOTES
Bedside and Verbal shift change report given to Koby Alba RN (oncoming nurse) by Mitchell Bucio RN (offgoing nurse). Report included the following information SBAR, Kardex, Intake/Output and Recent Results. 0800 Shift assessment completed. Patient AOx3 with confusion. Restraints in place for patient safety and to protect line integrity. Follows commands. Continues to yell out repeatedly, despite all needs and requests being met, haldol given. 0820 Lindo d/c'd, condom cath placed. 1000 PRN hydralazine administered for HTN.     1200 Reassessment completed, no changed. 1225 Accompanied to CT.    1500 Handoff given to Laura Arndt RN.

## 2020-10-27 NOTE — PROGRESS NOTES
@1915 pt taken over awake and restless in bed. Denies pain presently. Remains on 3L/ nc ,soft restraints to bilateral wrist. condom cath remains in situ draining clear yellow urine. Assessment done and charted in appropriate flow sheets. Nursing management continues. @2203 pt SPO2 dropped into the 80's and 02 increased to 4 L/nc , pt responded well SP02  increased  To 95% observation continues      @2313 pt reassessed no new changes care continues. @2339 pt spo2 dropped into 80's  02 increased  to 6L/nc  with good effect spo2 is now 95%. @0235 pt spo2 dropped RT came to bedside NT suctioned pt spo2 95% care continues.    Parish@[employer] SPO2 dropped Dr Kaylene Gomez  and RT was called , pt placed on high-flow  Nasal cannula and chest PT was done care continues. @3921 Bedside and Verbal shift change report given to Horacio Mora (oncoming nurse) by Velta Dakin (offgoing nurse). Report included the following information SBAR, Kardex, Intake/Output, MAR, Recent Results, Med Rec Status and Alarm Parameters .

## 2020-10-27 NOTE — CONSULTS
[facility]: [phone]  [facility]: [phone]   [facility]: [phone]    Patient Name: León Antony. YOB: 1955    Date of Initial Consult: 10/27/2020  Reason for Consult: care decisions  Requesting Provider: Leyla Lorenzo MD  Primary Care Physician: Sendy Millan MD      SUMMARY:   León Duran is a 63 y.o. male with a past history of legally blind, bilateral AKA, emphysema, HTN, chronic pain, HLD, T2DM, polio, CAD, PVD who was admitted on 10/23/2020 from home with a diagnosis of pneumonia/sepsis. Current medical issues leading to Palliative Medicine involvement include: recurrent admissions with multiple co-morbidities and goals of care. PALLIATIVE DIAGNOSES:   1. Advanced care decisions  2. HCAP/sepsis  3. Encephalopathy  4. COPD  5. Bilateral AKA     PLAN:   1. Advanced care decisions: Palliative care team including SIMI Nunez and this NP met with patient at bedside in the ICU. Patient is known to the palliative care team from his previous admission. Per notes \"patient is a  and had four children of which one is . Karina Allen claims he is the caregiver and lives with his father. ..his half siblings, Damion Perry and Leonardo Hernandez, do not come around and he does not know where they are.\" Patient currently delirious and continues to yell \"wake me up\" or \"Lawrence\" over and over; although, responded yes to having pain and nausea. Attempted to contact patient's son, Karina Allen, via his cell phone (voicemail box is full) and the home phone number (no answer after multiple rings). Need to discuss goals of care for this chronically ill gentleman. GOALS OF CARE: FULL CODE with FULL INTERVENTIONS. 2. HCAP/sepsis: Patient recently admitted to [facility] with diagnosis of pneumonia 10/14 to 10/20/2020. He was [facility] ER on 10/21 and 10/22 and 10/23/2020. He then presented here with c/o chest pain.  Conflicting reports of whether he took antibiotics s/p his discharge on 10/20. Found to be hypothermic and hypotensive in ED. Primary team managing. 3. Encephalopathy: Presented with confusion and is combative. ED provider notes that patient likely does not have capacity to make medical decisions for himself on day of admit. Also per notes son reports patient has not been acting appropriately since leaving [facility] ED on 10/23/2020.  4. COPD: CT completed at outside facility reveals question of neoplasm. Will need repeat CT. Primary team managing. 5. Bilateral AKA: per history. 6. Initial consult note routed to primary continuity provider  7. Communicated plan of care with: Palliative IDT    GOALS OF CARE:  Patient/Health Care Proxy Stated Goals: Rehabilitation      TREATMENT PREFERENCES:   Code Status: Full Code    Advance Care Planning:  Advance Care Planning 10/14/2020   Confirm Advance Directive None   Patient Would Like to Complete Advance Directive No   Does the patient have other document types Other (comment)       Medical Interventions: Full interventions         Other: As far as possible, the palliative care team has discussed with patient/health care proxy about goals of care/treatment preferences for patient.      HISTORY:     History obtained from: chart    CHIEF COMPLAINT: \"wake me up\"    HPI/SUBJECTIVE:    The patient is:   [] Verbal and participatory  [x] Non-participatory due to:   delirium    Clinical Pain Assessment (nonverbal scale for nonverbal patients): Clinical Pain Assessment  Severity: 3          Duration: for how long has pt been experiencing pain (e.g., 2 days, 1 month, years)  Frequency: how often pain is an issue (e.g., several times per day, once every few days, constant)     FUNCTIONAL ASSESSMENT:     Palliative Performance Scale (PPS):  PPS: 30    ECOG  ECOG Status : Limited self-care     PSYCHOSOCIAL/SPIRITUAL SCREENING:      Any spiritual / Confucianist concerns:  [] Yes /  [x] No    Caregiver Burnout:  [] Yes /  [] No /  [x] No Caregiver Present      Anticipatory grief assessment:   [x] Normal  / [] Maladaptive        REVIEW OF SYSTEMS:     Positive and pertinent negative findings in ROS are noted above in HPI. The following systems were [] reviewed / [x] unable to be reviewed as noted in HPI  Other findings are noted below. Systems: constitutional, ears/nose/mouth/throat, respiratory, gastrointestinal, genitourinary, musculoskeletal, integumentary, neurologic, psychiatric, endocrine. Positive findings noted below. Modified ESAS Completed by: provider           Pain: 3(chest)     Nausea: 3                 Stool Occurrence(s): 1        PHYSICAL EXAM:     Wt Readings from Last 3 Encounters:   10/25/20 43.1 kg (95 lb)   10/20/20 34.7 kg (76 lb 9.6 oz)   02/18/19 45 kg (99 lb 3.3 oz)     Blood pressure 112/67, pulse (!) 59, temperature 98.1 °F (36.7 °C), resp. rate 23, height 5' 4\" (1.626 m), weight 43.1 kg (95 lb), SpO2 97 %.   Pain:  Pain Scale 1: Numeric (0 - 10)  Pain Intensity 1: 0  Pain Onset 1: gradual  Pain Location 1: Other (comment)(stumps)  Pain Orientation 1: Upper  Pain Description 1: Aching  Pain Intervention(s) 1: Medication (see MAR), Repositioned       Constitutional: Elderly male, frail, lying in bed, yelling   Eyes: blind, anicteric  ENMT: no nasal discharge, moist mucous membranes  Respiratory: breathing not labored   Musculoskeletal: no deformity, no tenderness to palpation  Skin: warm, dry  Neurologic: following commands, moving all extremities, oriented X 1     HISTORY:     Principal Problem:    Septic shock (HCC) (10/23/2020)    Active Problems:    Hypotension, unspecified (1/27/2014)      Amputation of both lower extremities (HCC) ()      PNA (pneumonia) ()      Marijuana abuse ()      Legal blindness (10/14/2020)      Chronic obstructive pulmonary disease with acute exacerbation (HCC) (10/24/2020)      Severe protein-calorie malnutrition ([facility] [address] (58/35/7364)      Metabolic encephalopathy (10/24/2020)      Acute respiratory failure with hypoxia ([facility] [address] (10/26/2020)      Past Medical History:   Diagnosis Date    Amputation of both lower extremities ([facility] [address]     Amputee, above knee, left ([facility] [address]     At risk for falls     Chronic pain     back and legs    Diabetes ([facility] [address]     Hypercholesterolemia     Hypertension     Polio       Past Surgical History:   Procedure Laterality Date    HX HERNIA REPAIR      HX OTHER SURGICAL      carpal tunnel     HX OTHER SURGICAL      cyst      Family History   Problem Relation Age of Onset    Heart Attack Mother     Heart Attack Father     Malignant Hyperthermia Neg Hx     Pseudocholinesterase Deficiency Neg Hx     Delayed Awakening Neg Hx     Post-op Nausea/Vomiting Neg Hx     Emergence Delirium Neg Hx     Post-op Cognitive Dysfunction Neg Hx     Other Neg Hx      History reviewed, no pertinent family history.   Social History     Tobacco Use    Smoking status: Current Every Day Smoker     Packs/day: 2.00     Years: 40.00     Pack years: 80.00    Smokeless tobacco: Current User     Types: Snuff   Substance Use Topics    Alcohol use: No     No Known Allergies   Current Facility-Administered Medications   Medication Dose Route Frequency    methylPREDNISolone (PF) (SOLU-MEDROL) injection 40 mg  40 mg IntraVENous Q12H    [START ON 10/28/2020] pantoprazole (PROTONIX) tablet 40 mg  40 mg Oral ACB    QUEtiapine (SEROquel) tablet 50 mg  50 mg Oral QHS    doxycycline (MONODOX) capsule 100 mg  100 mg Oral Q12H    hydrALAZINE (APRESOLINE) 20 mg/mL injection 20 mg  20 mg IntraVENous Q6H PRN    amLODIPine (NORVASC) tablet 10 mg  10 mg Oral DAILY    metoprolol tartrate (LOPRESSOR) tablet 12.5 mg  12.5 mg Oral Q12H    enoxaparin (LOVENOX) injection 40 mg  1 mg/kg SubCUTAneous Q12H    ipratropium (ATROVENT) 0.02 % nebulizer solution 0.5 mg  0.5 mg Nebulization QID RT    vancomycin (VANCOCIN) 500 mg in 0.9% sodium chloride (MBP/ADV) 100 mL MBP  500 mg IntraVENous Q12H    atorvastatin (LIPITOR) tablet 20 mg  20 mg Oral QHS    clonazePAM (KlonoPIN) tablet 0.5 mg  0.5 mg Oral TID PRN    gabapentin (NEURONTIN) capsule 100 mg  100 mg Oral TID    oxyCODONE-acetaminophen (PERCOCET) 5-325 mg per tablet 1 Tab  1 Tab Oral Q4H PRN    LORazepam (ATIVAN) injection 1 mg  1 mg IntraVENous Q4H PRN    piperacillin-tazobactam (ZOSYN) 3.375 g in 0.9% sodium chloride (MBP/ADV) 100 mL MBP  3.375 g IntraVENous Q8H    haloperidol lactate (HALDOL) injection 5 mg  5 mg IntraVENous Q8H PRN    nicotine (NICODERM CQ) 21 mg/24 hr patch 1 Patch  1 Patch TransDERmal DAILY    albuterol-ipratropium (DUO-NEB) 2.5 MG-0.5 MG/3 ML  3 mL Nebulization Q4H PRN    sodium chloride (NS) flush 5-10 mL  5-10 mL IntraVENous PRN    Vancomycin - Pharmacokinetic Dosing  1 Each Other Rx Dosing/Monitoring    insulin lispro (HUMALOG) injection   SubCUTAneous AC&HS    glucose chewable tablet 16 g  4 Tab Oral PRN    glucagon (GLUCAGEN) injection 1 mg  1 mg IntraMUSCular PRN    dextrose 10% infusion 125-250 mL  125-250 mL IntraVENous PRN    budesonide (PULMICORT) 250 mcg/2ml nebulizer susp  500 mcg Nebulization BID RT    ELECTROLYTE REPLACEMENT PROTOCOL - Potassium Standard Dosing   1 Each Other PRN    ELECTROLYTE REPLACEMENT PROTOCOL - Potassium Standard  Dosing Details for Fluid Restricted Patients  1 Each Other PRN    ELECTROLYTE REPLACEMENT PROTOCOL  - Phosphorus Renal Dosing  1 Each Other PRN    sodium chloride (NS) flush 5-40 mL  5-40 mL IntraVENous Q8H    sodium chloride (NS) flush 5-40 mL  5-40 mL IntraVENous PRN    acetaminophen (TYLENOL) tablet 650 mg  650 mg Oral Q6H PRN    Or    acetaminophen (TYLENOL) suppository 650 mg  650 mg Rectal Q6H PRN    polyethylene glycol (MIRALAX) packet 17 g  17 g Oral DAILY PRN    promethazine (PHENERGAN) tablet 12.5 mg  12.5 mg Oral Q6H PRN    Or    ondansetron (ZOFRAN) injection 4 mg  4 mg IntraVENous Q6H PRN        LAB AND IMAGING FINDINGS: Lab Results   Component Value Date/Time    WBC 8.6 10/27/2020 05:39 AM    HGB 10.7 (L) 10/27/2020 05:39 AM    PLATELET 052 42/65/3655 05:39 AM     Lab Results   Component Value Date/Time    Sodium 143 10/27/2020 05:39 AM    Potassium 3.3 (L) 10/27/2020 05:39 AM    Chloride 110 10/27/2020 05:39 AM    CO2 22 10/27/2020 05:39 AM    BUN 21 (H) 10/27/2020 05:39 AM    Creatinine 0.84 10/27/2020 05:39 AM    Calcium 9.0 10/27/2020 05:39 AM    Magnesium 1.8 10/27/2020 05:39 AM    Phosphorus 2.8 10/27/2020 05:39 AM      Lab Results   Component Value Date/Time    Alk. phosphatase 69 10/27/2020 05:39 AM    Protein, total 6.4 10/27/2020 05:39 AM    Albumin 3.5 10/27/2020 05:39 AM    Globulin 2.9 10/27/2020 05:39 AM     Lab Results   Component Value Date/Time    INR 1.0 01/27/2014 04:08 PM    Prothrombin time 12.7 01/27/2014 04:08 PM    aPTT 33.7 01/27/2014 04:08 PM      No results found for: IRON, FE, TIBC, IBCT, PSAT, FERR   No results found for: PH, PCO2, PO2  No components found for: [place]   Lab Results   Component Value Date/Time     10/26/2020 06:45 PM    CK - MB 2.5 10/26/2020 06:45 PM              Total time: 30 minutes  Counseling / coordination time, spent as noted above > 50% counseling / coordination: 20 minutes with patient    Prolonged service was provided for  []30 min   []75 min in face to face time in the presence of the patient, spent as noted above. Time Start:   Time End:   Note: this can only be billed with 22268 (initial) or 62923 (follow up).   If multiple start / stop times, list each separately.    '

## 2020-10-27 NOTE — PROGRESS NOTES
@7854 pt taken over awake and restless in bed. Denies pain presently. Remains on high-flow nasal cannula soft restraints to bilateral wrist. Lindo remains in situ draining clear yellow urine. Assessment done and charted in appropriate flow sheets. Nursing management continues. @2330 upon reassessment no new developments pt continues to be monitored.       @0300 pt reassessed no changes . @5857 Bedside and Verbal shift change report given to Celio Marroquin (oncoming nurse) by Martha Donaldson (offgoing nurse). Report included the following information SBAR, Kardex, Intake/Output, MAR, Recent Results, Med Rec Status and Alarm Parameters .

## 2020-10-27 NOTE — PROGRESS NOTES
Problem: Falls - Risk of  Goal: *Absence of Falls  Description: Document Misael Elva Fall Risk and appropriate interventions in the flowsheet. Outcome: Progressing Towards Goal  Note: Fall Risk Interventions:  Mobility Interventions: Bed/chair exit alarm, Strengthening exercises (ROM-active/passive)    Mentation Interventions: Adequate sleep, hydration, pain control, Door open when patient unattended, Evaluate medications/consider consulting pharmacy, Reorient patient, Toileting rounds    Medication Interventions: Evaluate medications/consider consulting pharmacy    Elimination Interventions: Call light in reach, Toileting schedule/hourly rounds              Problem: Patient Education: Go to Patient Education Activity  Goal: Patient/Family Education  Outcome: Progressing Towards Goal     Problem: Non-Violent Restraints  Goal: *Removal from restraints as soon as assessed to be safe  Outcome: Progressing Towards Goal  Goal: *No harm/injury to patient while restraints in use  Outcome: Progressing Towards Goal  Goal: *Patient's dignity will be maintained  Outcome: Progressing Towards Goal  Goal: *Patient Specific Goal (EDIT GOAL, INSERT TEXT)  Outcome: Progressing Towards Goal  Goal: Non-violent Restaints:Standard Interventions  Outcome: Progressing Towards Goal  Goal: Non-violent Restraints:Patient Interventions  Outcome: Progressing Towards Goal  Goal: Patient/Family Education  Outcome: Progressing Towards Goal     Problem: Pressure Injury - Risk of  Goal: *Prevention of pressure injury  Description: Document Shelton Scale and appropriate interventions in the flowsheet.   Outcome: Progressing Towards Goal  Note: Pressure Injury Interventions:  Sensory Interventions: Assess changes in LOC, Avoid rigorous massage over bony prominences, Discuss PT/OT consult with provider, Float heels, Keep linens dry and wrinkle-free, Maintain/enhance activity level, Minimize linen layers, Monitor skin under medical devices, Pad between skin to skin, Pressure redistribution bed/mattress (bed type), Turn and reposition approx. every two hours (pillows and wedges if needed)    Moisture Interventions: Absorbent underpads, Apply protective barrier, creams and emollients, Assess need for specialty bed, Check for incontinence Q2 hours and as needed, Maintain skin hydration (lotion/cream), Minimize layers, Moisture barrier, Offer toileting Q_hr, Limit adult briefs    Activity Interventions: PT/OT evaluation, Pressure redistribution bed/mattress(bed type)    Mobility Interventions: Float heels, HOB 30 degrees or less, Pressure redistribution bed/mattress (bed type), PT/OT evaluation, Turn and reposition approx.  every two hours(pillow and wedges)    Nutrition Interventions: Document food/fluid/supplement intake    Friction and Shear Interventions: Apply protective barrier, creams and emollients, Foam dressings/transparent film/skin sealants, HOB 30 degrees or less, Lift sheet, Lift team/patient mobility team, Minimize layers, Transferring/repositioning devices                Problem: Patient Education: Go to Patient Education Activity  Goal: Patient/Family Education  Outcome: Progressing Towards Goal

## 2020-10-28 PROBLEM — J69.0 ASPIRATION PNEUMONIA (HCC): Status: ACTIVE | Noted: 2020-10-28

## 2020-10-28 PROBLEM — I82.409 DEEP VEIN THROMBOSIS (DVT) OF LOWER EXTREMITY (HCC): Status: ACTIVE | Noted: 2020-10-28

## 2020-10-28 LAB
ALBUMIN SERPL-MCNC: 3.3 G/DL (ref 3.4–5)
ALBUMIN/GLOB SERPL: 1.1 {RATIO} (ref 0.8–1.7)
ALP SERPL-CCNC: 71 U/L (ref 45–117)
ALT SERPL-CCNC: 22 U/L (ref 16–61)
ANION GAP SERPL CALC-SCNC: 9 MMOL/L (ref 3–18)
AST SERPL-CCNC: 16 U/L (ref 10–38)
BASOPHILS # BLD: 0 K/UL (ref 0–0.1)
BASOPHILS NFR BLD: 0 % (ref 0–2)
BILIRUB SERPL-MCNC: 0.4 MG/DL (ref 0.2–1)
BUN SERPL-MCNC: 20 MG/DL (ref 7–18)
BUN/CREAT SERPL: 26 (ref 12–20)
CA-I SERPL-SCNC: 1.23 MMOL/L (ref 1.12–1.32)
CALCIUM SERPL-MCNC: 9.3 MG/DL (ref 8.5–10.1)
CHLORIDE SERPL-SCNC: 110 MMOL/L (ref 100–111)
CO2 SERPL-SCNC: 23 MMOL/L (ref 21–32)
CREAT SERPL-MCNC: 0.78 MG/DL (ref 0.6–1.3)
DIFFERENTIAL METHOD BLD: ABNORMAL
EOSINOPHIL # BLD: 0 K/UL (ref 0–0.4)
EOSINOPHIL NFR BLD: 0 % (ref 0–5)
ERYTHROCYTE [DISTWIDTH] IN BLOOD BY AUTOMATED COUNT: 17.7 % (ref 11.6–14.5)
GLOBULIN SER CALC-MCNC: 2.9 G/DL (ref 2–4)
GLUCOSE BLD STRIP.AUTO-MCNC: 87 MG/DL (ref 70–110)
GLUCOSE BLD STRIP.AUTO-MCNC: 91 MG/DL (ref 70–110)
GLUCOSE BLD STRIP.AUTO-MCNC: 92 MG/DL (ref 70–110)
GLUCOSE BLD STRIP.AUTO-MCNC: 98 MG/DL (ref 70–110)
GLUCOSE SERPL-MCNC: 92 MG/DL (ref 74–99)
HCT VFR BLD AUTO: 33.4 % (ref 36–48)
HGB BLD-MCNC: 10.9 G/DL (ref 13–16)
LYMPHOCYTES # BLD: 0.4 K/UL (ref 0.9–3.6)
LYMPHOCYTES NFR BLD: 3 % (ref 21–52)
MAGNESIUM SERPL-MCNC: 2 MG/DL (ref 1.6–2.6)
MCH RBC QN AUTO: 27.9 PG (ref 24–34)
MCHC RBC AUTO-ENTMCNC: 32.6 G/DL (ref 31–37)
MCV RBC AUTO: 85.4 FL (ref 74–97)
MONOCYTES # BLD: 0.4 K/UL (ref 0.05–1.2)
MONOCYTES NFR BLD: 3 % (ref 3–10)
NEUTS SEG # BLD: 13.4 K/UL (ref 1.8–8)
NEUTS SEG NFR BLD: 94 % (ref 40–73)
PHOSPHATE SERPL-MCNC: 2.6 MG/DL (ref 2.5–4.9)
PHOSPHATE SERPL-MCNC: 3.1 MG/DL (ref 2.5–4.9)
PLATELET # BLD AUTO: 334 K/UL (ref 135–420)
PMV BLD AUTO: 10 FL (ref 9.2–11.8)
POTASSIUM SERPL-SCNC: 4.2 MMOL/L (ref 3.5–5.5)
PROT SERPL-MCNC: 6.2 G/DL (ref 6.4–8.2)
RBC # BLD AUTO: 3.91 M/UL (ref 4.7–5.5)
SODIUM SERPL-SCNC: 142 MMOL/L (ref 136–145)
WBC # BLD AUTO: 14.2 K/UL (ref 4.6–13.2)

## 2020-10-28 PROCEDURE — 83735 ASSAY OF MAGNESIUM: CPT

## 2020-10-28 PROCEDURE — 92610 EVALUATE SWALLOWING FUNCTION: CPT

## 2020-10-28 PROCEDURE — 82962 GLUCOSE BLOOD TEST: CPT

## 2020-10-28 PROCEDURE — 74011000250 HC RX REV CODE- 250: Performed by: INTERNAL MEDICINE

## 2020-10-28 PROCEDURE — 74011250636 HC RX REV CODE- 250/636: Performed by: FAMILY MEDICINE

## 2020-10-28 PROCEDURE — 77030018846 HC SOL IRR STRL H20 ICUM -A

## 2020-10-28 PROCEDURE — 74011250636 HC RX REV CODE- 250/636: Performed by: HOSPITALIST

## 2020-10-28 PROCEDURE — 65610000006 HC RM INTENSIVE CARE

## 2020-10-28 PROCEDURE — 74011000258 HC RX REV CODE- 258: Performed by: EMERGENCY MEDICINE

## 2020-10-28 PROCEDURE — 82330 ASSAY OF CALCIUM: CPT

## 2020-10-28 PROCEDURE — 36415 COLL VENOUS BLD VENIPUNCTURE: CPT

## 2020-10-28 PROCEDURE — 84100 ASSAY OF PHOSPHORUS: CPT

## 2020-10-28 PROCEDURE — 74011250636 HC RX REV CODE- 250/636: Performed by: EMERGENCY MEDICINE

## 2020-10-28 PROCEDURE — 77010033711 HC HIGH FLOW OXYGEN

## 2020-10-28 PROCEDURE — 74011250636 HC RX REV CODE- 250/636: Performed by: INTERNAL MEDICINE

## 2020-10-28 PROCEDURE — 74011250637 HC RX REV CODE- 250/637: Performed by: INTERNAL MEDICINE

## 2020-10-28 PROCEDURE — 99232 SBSQ HOSP IP/OBS MODERATE 35: CPT | Performed by: NURSE PRACTITIONER

## 2020-10-28 PROCEDURE — 94640 AIRWAY INHALATION TREATMENT: CPT

## 2020-10-28 PROCEDURE — 74011250637 HC RX REV CODE- 250/637: Performed by: FAMILY MEDICINE

## 2020-10-28 PROCEDURE — 85025 COMPLETE CBC W/AUTO DIFF WBC: CPT

## 2020-10-28 PROCEDURE — 80053 COMPREHEN METABOLIC PANEL: CPT

## 2020-10-28 RX ORDER — SODIUM,POTASSIUM PHOSPHATES 280-250MG
1 POWDER IN PACKET (EA) ORAL
Status: COMPLETED | OUTPATIENT
Start: 2020-10-28 | End: 2020-10-28

## 2020-10-28 RX ADMIN — IPRATROPIUM BROMIDE 0.5 MG: 0.5 SOLUTION RESPIRATORY (INHALATION) at 07:55

## 2020-10-28 RX ADMIN — PANTOPRAZOLE SODIUM 40 MG: 40 TABLET, DELAYED RELEASE ORAL at 06:32

## 2020-10-28 RX ADMIN — HYDRALAZINE HYDROCHLORIDE 20 MG: 20 INJECTION INTRAMUSCULAR; INTRAVENOUS at 16:56

## 2020-10-28 RX ADMIN — LORAZEPAM 1 MG: 2 INJECTION INTRAMUSCULAR at 15:39

## 2020-10-28 RX ADMIN — GABAPENTIN 100 MG: 100 CAPSULE ORAL at 09:44

## 2020-10-28 RX ADMIN — PIPERACILLIN AND TAZOBACTAM 3.38 G: 3; .375 INJECTION, POWDER, LYOPHILIZED, FOR SOLUTION INTRAVENOUS at 12:46

## 2020-10-28 RX ADMIN — OXYCODONE HYDROCHLORIDE AND ACETAMINOPHEN 1 TABLET: 5; 325 TABLET ORAL at 15:40

## 2020-10-28 RX ADMIN — ATORVASTATIN CALCIUM 20 MG: 20 TABLET, FILM COATED ORAL at 22:23

## 2020-10-28 RX ADMIN — GABAPENTIN 100 MG: 100 CAPSULE ORAL at 15:40

## 2020-10-28 RX ADMIN — ENOXAPARIN SODIUM 40 MG: 40 INJECTION SUBCUTANEOUS at 20:16

## 2020-10-28 RX ADMIN — AMLODIPINE BESYLATE 10 MG: 5 TABLET ORAL at 09:45

## 2020-10-28 RX ADMIN — SODIUM CHLORIDE 10 ML: 9 INJECTION, SOLUTION INTRAMUSCULAR; INTRAVENOUS; SUBCUTANEOUS at 15:00

## 2020-10-28 RX ADMIN — GABAPENTIN 100 MG: 100 CAPSULE ORAL at 22:23

## 2020-10-28 RX ADMIN — BUDESONIDE 500 MCG: 0.25 INHALANT RESPIRATORY (INHALATION) at 20:38

## 2020-10-28 RX ADMIN — PIPERACILLIN AND TAZOBACTAM 3.38 G: 3; .375 INJECTION, POWDER, LYOPHILIZED, FOR SOLUTION INTRAVENOUS at 20:16

## 2020-10-28 RX ADMIN — IPRATROPIUM BROMIDE 0.5 MG: 0.5 SOLUTION RESPIRATORY (INHALATION) at 20:38

## 2020-10-28 RX ADMIN — VANCOMYCIN HYDROCHLORIDE 500 MG: 500 INJECTION, POWDER, LYOPHILIZED, FOR SOLUTION INTRAVENOUS at 06:30

## 2020-10-28 RX ADMIN — ENOXAPARIN SODIUM 40 MG: 40 INJECTION SUBCUTANEOUS at 09:43

## 2020-10-28 RX ADMIN — METOPROLOL TARTRATE 12.5 MG: 25 TABLET, FILM COATED ORAL at 20:16

## 2020-10-28 RX ADMIN — PIPERACILLIN AND TAZOBACTAM 3.38 G: 3; .375 INJECTION, POWDER, LYOPHILIZED, FOR SOLUTION INTRAVENOUS at 03:44

## 2020-10-28 RX ADMIN — SODIUM CHLORIDE 10 ML: 9 INJECTION, SOLUTION INTRAMUSCULAR; INTRAVENOUS; SUBCUTANEOUS at 22:23

## 2020-10-28 RX ADMIN — IPRATROPIUM BROMIDE 0.5 MG: 0.5 SOLUTION RESPIRATORY (INHALATION) at 11:10

## 2020-10-28 RX ADMIN — METOPROLOL TARTRATE 12.5 MG: 25 TABLET, FILM COATED ORAL at 09:46

## 2020-10-28 RX ADMIN — HALOPERIDOL LACTATE 5 MG: 5 INJECTION, SOLUTION INTRAMUSCULAR at 14:59

## 2020-10-28 RX ADMIN — VANCOMYCIN HYDROCHLORIDE 500 MG: 500 INJECTION, POWDER, LYOPHILIZED, FOR SOLUTION INTRAVENOUS at 18:32

## 2020-10-28 RX ADMIN — IPRATROPIUM BROMIDE 0.5 MG: 0.5 SOLUTION RESPIRATORY (INHALATION) at 15:26

## 2020-10-28 RX ADMIN — POTASSIUM & SODIUM PHOSPHATES POWDER PACK 280-160-250 MG 1 PACKET: 280-160-250 PACK at 06:32

## 2020-10-28 RX ADMIN — SODIUM CHLORIDE 10 ML: 9 INJECTION, SOLUTION INTRAMUSCULAR; INTRAVENOUS; SUBCUTANEOUS at 05:00

## 2020-10-28 RX ADMIN — BUDESONIDE 500 MCG: 0.25 INHALANT RESPIRATORY (INHALATION) at 07:55

## 2020-10-28 RX ADMIN — QUETIAPINE FUMARATE 50 MG: 25 TABLET ORAL at 22:23

## 2020-10-28 NOTE — PROGRESS NOTES
[address] [facility] [phone]  [facility] [phone]    Palliative team, NP Elson Osler and this MSW, saw Mr. Alissa Coulter at bedside. He was AAOX3 to person, place \"[facility]\", and situation \"pneumonia\". He had HFNC in place and was in wrist restriants. He was fixated on having us remove the restraints. He's isn't able to make complex decisions at this time but will attempt to get a hold of family. Called both numbers listed for son Keyur Simmons ([phone] and [phone]). No answer and unable to leave vm on either number. Palliative will continue to f/u to assist with goals of care. Thank you for the opportunity to assist in the care of Mr. Alissa Coulter.   Rendell Meckel, MSW, APHSW-C  Palliative Medicine

## 2020-10-28 NOTE — PROGRESS NOTES
9271- Bedside and Verbal shift change report given to Osmany Bowman RN (oncoming nurse) by Pavel Ann RN (offgoing nurse). Report included the following information SBAR, Kardex, MAR and Recent Results. 1705- Assisting patient to eat dinner. Patient pocketing food in mouth. Decrease in patient's oxygen saturation. Increased FiO2 to 70%. Patient suctioned and made NPO. Will have SLP re-evaluate tomorrow. 1905- Bedside and Verbal shift change report given to Yocasta Pimentel (oncoming nurse) by Osmany Bowman RN (offgoing nurse). Report included the following information SBAR, Kardex, MAR and Recent Results.

## 2020-10-28 NOTE — PROGRESS NOTES
Problem: Dysphagia (Adult)  Goal: *Acute Goals and Plan of Care (Insert Text)  Description: Patient will:  1. Tolerate regular diet with thin liquids without overt s/sx of aspiration in 4/5 trials under SLP supervision. 2. Utilize compensatory swallow strategies of small bite/sip, alternate liquid/solid with min cues in 4/5 trials. 3. Complete an objective swallow study (i.e., MBSS) to assess swallow integrity, r/o aspiration, and determine of safest LRD, min A.      Rec:   regular diet, thin liquids  Aspiration precautions  HOB >45 during po intake, remain >30 for 30-45 minutes after po   Small bites/sips; alternate liquid/solid, slow feeding rate   Oral care TID  Meds with water    Outcome: Progressing Towards Goal     SPEECH LANGUAGE PATHOLOGY BEDSIDE SWALLOW EVALUATION    Patient: Rossy Shepard Sr. (66 y.o. male)  Date: 10/28/2020  Primary Diagnosis: Septic shock ([facility] [address] [A41.9, R65.21]        Precautions: aspiration       PLOF: regular/thin    ASSESSMENT :  Based on the objective data described below, the patient presents with orophayngeal swallow that is [facility]. Pt seen for swallow eval. Pt AOx4, accepting of eval. Pt denies difficulty swallowing, reporting regular diet prior to current admission. RN reports suspicion of aspiration, coughing with meds at times, and desatting. Oral motor structures, strength, and ROM WFL; grossly intact for mastication and deglutition. Functional communication. Intelligibility >90%. Cognitive-linguistic function appears intact. Pt given PO trials of thin liquid via cup/straw and tandem drinking, puree, mixed, and regular textures. No s/sx of aspiration appreciated across consistencies. Oropharyngeal swallow appears WFL. Swallow appears timely, adequate laryngeal elevation noted via palpation, no change in vocal/resp quality appreciated. Recommend regular texture diet with thin liquids, meds per pt preference, aspiration precautions.  Pt at risk for aspiration d/t respiratory status. Pt may benefit from MBSS per MD discretion for further assessment of swallow function. Pt educated on and verbalized understanding of findings, recs, and POC; d/w RN. SLP will FU x1-2 visits. Patient will benefit from skilled intervention to address the above impairments. Patient's rehabilitation potential is considered to be Good  Factors which may influence rehabilitation potential include:   []            None noted  []            Mental ability/status  []            Medical condition  [x]            Home/family situation and support systems  []            Safety awareness  []            Pain tolerance/management  []            Other:      PLAN :  Recommendations and Planned Interventions:  See above. Frequency/Duration: Patient will be followed by speech-language pathology 1-2 times to address goals. Discharge Recommendations: To Be Determined     SUBJECTIVE:   Patient stated I just want something to eat. Do you have a hamburger? .     OBJECTIVE:     Past Medical History:   Diagnosis Date    Amputation of both lower extremities ([facility] [address]     Amputee, above knee, left ([facility] [address]     At risk for falls     Chronic pain     back and legs    Diabetes ([facility] [address]     Hypercholesterolemia     Hypertension     Polio      Past Surgical History:   Procedure Laterality Date    HX HERNIA REPAIR      HX OTHER SURGICAL      carpal tunnel     HX OTHER SURGICAL      cyst     Home Situation:        Diet prior to admission: regular  Current Diet:  regular/thin     Cognitive and Communication Status:  Neurologic State: Alert, Eyes open spontaneously  Orientation Level: Oriented to person, Oriented to place  Cognition: Follows commands  Perception: Appears intact  Perseveration: No perseveration noted  Safety/Judgement: Fall prevention  Oral Assessment:  Oral Assessment  Labial: No impairment  Dentition: Natural;Limited  Oral Hygiene: fair  Lingual: No impairment  Velum: No impairment  Mandible: No impairment  P.O. Trials:  Patient Position: [facility] 75*  Vocal quality prior to P.O.: No impairment  Consistency Presented: Thin liquid;Puree; Solid  How Presented: SLP-fed/presented;Cup/sip;Spoon;Straw;Successive swallows     Bolus Acceptance: No impairment  Bolus Formation/Control: No impairment     Propulsion: No impairment  Oral Residue: None  Initiation of Swallow: No impairment  Laryngeal Elevation: Functional  Aspiration Signs/Symptoms: None  Pharyngeal Phase Characteristics: No impairment, issues, or problems   Effective Modifications: Alternate liquids/solids;Small sips and bites  Cues for Modifications: Moderate       Oral Phase Severity: No impairment  Pharyngeal Phase Severity : No impairment    PAIN:  Pain level pre-treatment: 0/10   Pain level post-treatment: 0/10     After treatment:   []            Patient left in no apparent distress sitting up in chair  [x]            Patient left in no apparent distress in bed  []            Call bell left within reach  [x]            Nursing notified  []            Family present  []            Caregiver present  []            Bed alarm activated    COMMUNICATION/EDUCATION:   [x]            Aspiration precautions; swallow safety; compensatory techniques. [x]            Patient/family have participated as able in goal setting and plan of care. []            Patient/family agree to work toward stated goals and plan of care. []            Patient understands intent and goals of therapy; neutral about participation. []            Patient unable to participate in goal setting/plan of care; educ ongoing with interdisciplinary staff  []         Posted safety precautions in patient's room.     Thank you for this referral.    Sergo Tejeda M.S., CCC-SLP  Speech-Language Pathologist    Time Calculation: 12 mins

## 2020-10-28 NOTE — DIABETES MGMT
GLYCEMIC CONTROL PROGRESS NOTE:    - known h/o T2DM, HbA1C within recommended range for age + comorbids  - Humalog Normal Insulin Sensitivity Corrective Coverage orders in place recommend continue    Recent Glucose Results:   Lab Results   Component Value Date/Time    GLU 92 10/28/2020 05:00 AM    GLUCPOC 98 10/28/2020 06:21 AM    GLUCPOC 91 10/27/2020 10:20 PM    GLUCPOC 98 10/27/2020 04:56 PM     Herminia العلي MS, RN, CDE  Glycemic Control Team  [phone]  Pager [phone] (M-TH 8:00-4:30P)  *After Hours pager [phone]

## 2020-10-28 NOTE — PROGRESS NOTES
Hospitalist Progress Note-critical care note     Patient: Jaime Silverman Sr. MRN: [id number]  CSN: [id number]    YOB: 1955  Age: 63 y.o.   Sex: male    DOA: 10/23/2020 LOS:  LOS: 5 days            Chief complaint: Pneumonia, marijuana abuse, legal blindness, amputation bilateral lower extremity, encephalopathy, acute respiratory failure with hypoxia    Assessment/Plan         Hospital Problems  Date Reviewed: 10/23/2020          Codes Class Noted POA    Aspiration pneumonia ([facility] ICD-10-CM: J69.0  ICD-9-CM: 507.0  10/28/2020 Unknown        Deep vein thrombosis (DVT) of lower extremity ([facility] ICD-10-CM: I82.409  ICD-9-CM: 453.40  10/28/2020 Unknown        Acute respiratory failure with hypoxia ([facility] ICD-10-CM: J96.01  ICD-9-CM: 518.81  10/26/2020 Unknown        Chronic obstructive pulmonary disease with acute exacerbation ([facility] ICD-10-CM: J44.1  ICD-9-CM: 491.21  10/24/2020 Unknown        Severe protein-calorie malnutrition ([facility] ICD-10-CM: E43  ICD-9-CM: 262  10/24/2020 Unknown        Metabolic encephalopathy ETJ-06-QP: G93.41  ICD-9-CM: 348.31  10/24/2020 Unknown        * (Principal) Septic shock ([facility] ICD-10-CM: A41.9, R65.21  ICD-9-CM: 038.9, 785.52, 995.92  10/23/2020 Unknown        Amputation of both lower extremities ([facility] ICD-10-CM: E74.512X, J77.001Z  ICD-9-CM: 897.6  Unknown Yes        PNA (pneumonia) ICD-10-CM: J18.9  ICD-9-CM: 486  Unknown Yes        Marijuana abuse ICD-10-CM: F12.10  ICD-9-CM: 305.20  Unknown Yes        Centrilobular emphysema ([facility] ICD-10-CM: J43.2  ICD-9-CM: 492.8  Unknown Unknown        Legal blindness ICD-10-CM: H54.8  ICD-9-CM: 369.4  10/14/2020 Yes        Hypotension, unspecified ICD-10-CM: I95.9  ICD-9-CM: 458.9  1/27/2014 Yes                A 42-year-old male with a history of a COPD, bilateral above knee amputation, recently discharged from [facility] after treated for pneumonia who was admitted for septic shock and metabolic encephalopathy.       Acute respiratory failure with hypoxia   Need high flow O2 increase O2 requirement overnight- continue chest  PT and suction , aspiration precaution   Continue iv abx   Cta chest no pe, but pna and Relatively diffuse bronchial wall thickening and several areas of  endobronchial impaction/occlusion/emphesema     COPD with right upper lobe pneumonia questionable mass/emphysema /aspiration pna   Continue breathing tx and iv abx   Aspiration precaution    Pulm f/u       Cardiovascular septic shock: resolved.   Off Cecelia Claude Dr. Marella Dunning on board-continue medical treatment     HTN: on  Norvasc and metoprolol.       Heme:  Monitoring  CBC and platelets  Chronic dvt noted from pvl   On lovenox     ID on iv abx for pneumonia treatment      FEN: Placed on ICU protocol monitor I's and O's      neuro   Acute metabolic encephalopathy   Agitation - oriented to place and himself   Legal blindness :fall /aspiration precaution     Psych : marijuana use     Severe malnutrition    Bilateral AKA    Subjective: I am at the high risk for fall         Disposition :tbd,   Review of systems:  Ros is limited due to agitation   Vital signs/Intake and Output:  Visit Vitals  BP (!) 145/97   Pulse 83   Temp 98 °F (36.7 °C)   Resp 26   Ht 5' 4\" (1.626 m)   Wt 43.1 kg (95 lb)   SpO2 93%   BMI 16.31 kg/m²     Current Shift:  No intake/output data recorded. Last three shifts:  10/26 1901 - 10/28 0700  In: 8902 [P.O.:480; I.V.:1100]  Out: 1651 [Urine:1650]    Physical Exam:  General: WD, WN. Alert, some cooperative, no acute distress    HEENT: NC, Atraumatic. PERRLA, anicteric sclerae. Lungs: Coarse BS   Heart:  Regular  rhythm,  No murmur, No Rubs, No Gallops  Abdomen: Soft, Non distended, Non tender.   +Bowel sounds,   Extremities: No c/c, aka  Psych:   Agitation   Neurologic:  oriented to himself and place           Labs: Results:       Chemistry Recent Labs     10/28/20  0500 10/27/20  1758 10/27/20  0539  10/26/20  0345   GLU 92  --  104*  -- 103*     --  143  --  142   K 4.2 3.6 3.3*   < > 3.2*     --  110  --  111   CO2 23  --  22  --  23   BUN 20*  --  21*  --  16   CREA 0.78  --  0.84  --  0.79   CA 9.3  --  9.0  --  9.1   AGAP 9  --  11  --  8   BUCR 26*  --  25*  --  20   AP 71  --  69  --  65   TP 6.2*  --  6.4  --  6.5   ALB 3.3*  --  3.5  --  3.6   GLOB 2.9  --  2.9  --  2.9   AGRAT 1.1  --  1.2  --  1.2    < > = values in this interval not displayed. CBC w/Diff Recent Labs     10/28/20  0500 10/27/20  0539 10/26/20  0345   WBC 14.2* 8.6 9.2   RBC 3.91* 3.72* 3.27*   HGB 10.9* 10.7* 9.3*   HCT 33.4* 31.9* 28.0*    304 297   GRANS 94* 93* 94*   LYMPH 3* 5* 4*   EOS 0 0 0      Cardiac Enzymes Recent Labs     10/26/20  1845 10/26/20  1445    78   CKND1 1.3 2.9      Coagulation No results for input(s): PTP, INR, APTT, INREXT, INREXT in the last 72 hours. Lipid Panel No results found for: CHOL, CHOLPOCT, CHOLX, CHLST, CHOLV, 655763, HDL, HDLP, LDL, LDLC, DLDLP, 703542, VLDLC, VLDL, TGLX, TRIGL, TRIGP, TGLPOCT, CHHD, CHHDX   BNP No results for input(s): BNPP in the last 72 hours. Liver Enzymes Recent Labs     10/28/20  0500   TP 6.2*   ALB 3.3*   AP 71      Thyroid Studies Lab Results   Component Value Date/Time    TSH 0.78 10/24/2020 11:30 AM        Procedures/imaging: see electronic medical records for all procedures/Xrays and details which were not copied into this note but were reviewed prior to creation of Plan    Ct Head Wo Cont    Result Date: 10/24/2020  EXAM: CT head INDICATION: Altered mental status. COMPARISON: October 15, 2020. TECHNIQUE: Axial CT imaging of the head was performed without intravenous contrast. Dose reduction techniques used: automated exposure control, adjustment of the mAs and/or kVp according to patient size, and iterative reconstruction techniques.  Digital imaging and communications in Medicine (DICOM) format image data are available to nonaffiliated external healthcare facilities or entities on a secure, media free, reciprocally searchable basis with patient authorization for at least 12 months after this study. _______________ FINDINGS: BRAIN AND POSTERIOR FOSSA: There is cerebral volume loss with prominence of the lateral and the third ventricles. The cortical sulci are widened appropriately. The fourth ventricle and basal cisterns are normally outlined. There is mild bilateral periventricular and central white matter diminished attenuation. There is no acute territorial defect, hemorrhage or midline shift. EXTRA-AXIAL SPACES AND MENINGES: There are no abnormal extra-axial fluid collections. CALVARIUM: Intact. SINUSES: Clear. OTHER: Partial right mastoid opacification is again seen. _______________     IMPRESSION: Cerebral volume loss and mild bilateral periventricular and central white matter diminished attenuation which is nonspecific but likely to represent microvascular disease. There is no acute intracranial abnormality. No significant interval change. Ct Head Wo Cont    Result Date: 10/15/2020  EXAM: CT head INDICATION: Dental status change COMPARISON: None. TECHNIQUE: Axial CT imaging of the head was performed without intravenous contrast. One or more dose reduction techniques were used on this CT: automated exposure control, adjustment of the mAs and/or kVp according to patient size, and iterative reconstruction techniques. The specific techniques used on this CT exam have been documented in the patient's electronic medical record. Digital Imaging and Communications in Medicine (DICOM) format image data are available to nonaffiliated external healthcare facilities or entities on a secure, media free, reciprocally searchable basis with patient authorization for at least a 12 month period after this study.  _______________ FINDINGS: BRAIN AND POSTERIOR FOSSA: The sulci, folia, ventricles and basal cisterns are within normal limits for the patient's with age concordant atrophy There is no intracranial hemorrhage, mass effect, or midline shift. Mild periventricular low-attenuation. Although nonspecific this is frequently seen with chronic small vessel ischemic change. Otherwise, there are no areas of abnormal parenchymal attenuation. EXTRA-AXIAL SPACES AND MENINGES: There are no abnormal extra-axial fluid collections. CALVARIUM: Intact. SINUSES: Clear. OTHER: None. _______________     IMPRESSION: No acute intracranial abnormalities. Xr Chest Port    Result Date: 10/26/2020  EXAM: CHEST RADIOGRAPH CLINICAL INDICATION/HISTORY: Pneumonia   > Additional: None COMPARISON: 10/23/2020 TECHNIQUE: Portable frontal view of the chest _______________ FINDINGS: SUPPORT DEVICES: None. HEART AND MEDIASTINUM: Heart size is stable. Atherosclerotic calcification seen in the aorta. LUNGS AND PLEURAL SPACES: Increased hazy density at the left base. Slight blunting of the right costophrenic angle. No pneumothorax. Patchy right upper lobe opacity is similar to slightly improved. BONES AND SOFT TISSUES: Unremarkable. _______________     IMPRESSION: 1. Increased hazy opacity at the left base compared to the prior exam, possibly developing left lower lobe atelectasis and/or consolidation. Questionable small right effusion. 2. Patchy right upper lobe patchy opacity which is similar to slightly improved. Xr Chest Port    Result Date: 10/24/2020  EXAM: XR CHEST PORT. CLINICAL INDICATION/HISTORY: meets SIRS criteria. -Additional: None. TECHNIQUE: A portable erect AP radiographic view of the chest is compared with several other chest radiographs performed the same month. _______________ FINDINGS: See impression. No pneumothorax or appreciable significant pleural effusion. The cardiac silhouette, vanessa, and mediastinal contours are normal for age. No acute osseous abnormality is revealed.  _______________     IMPRESSION: Slight improvement in multifocal airspace disease most in keeping with pneumonia, again most pronounced at the right lung apex with no new or worsening findings. Recommend continued radiographic follow-up to resolution. _______________     Navarro Gusman Chest Port    Result Date: 10/18/2020  EXAM: CHEST RADIOGRAPH CLINICAL INDICATION/HISTORY: SBO   > Additional: None COMPARISON: 10/17/2020. TECHNIQUE: Portable frontal view of the chest _______________ FINDINGS: SUPPORT DEVICES: None. HEART AND MEDIASTINUM: No appreciable cardiomegaly. Remaining mediastinal contours within normal limits. LUNGS AND PLEURAL SPACES: No significant change of bilateral upper lobe parenchymal opacities. Hyperexpansion of the lungs. No evidence for pneumothorax. BONY THORAX AND SOFT TISSUES: Unremarkable. _______________     IMPRESSION: 1. No significant change of bilateral lung pneumonia. Follow-up to resolution is recommended. 2. Emphysema. Xr Chest Port    Result Date: 10/18/2020  EXAM: CHEST RADIOGRAPH CLINICAL INDICATION/HISTORY: shortness of breath   > Additional: None COMPARISON: October 14, 2020. TECHNIQUE: Portable frontal view of the chest _______________ FINDINGS: SUPPORT DEVICES: None. HEART AND MEDIASTINUM: No appreciable cardiomegaly. Remaining mediastinal contours within normal limits. LUNGS AND PLEURAL SPACES: No significant change of bilateral upper lobe parenchymal opacities. Hyperexpansion of the lungs. No evidence for pneumothorax or pleural effusion. BONY THORAX AND SOFT TISSUES: Unremarkable. _______________     IMPRESSION: 1. No significant change of bilateral lung pneumonia. Follow-up to resolution is recommended to exclude underlying mass. 2. Emphysema. Xr Chest Port    Result Date: 10/14/2020  EXAM: XR CHEST PORT CLINICAL INDICATION/HISTORY: Shortness of breath with cough -Additional: None COMPARISON: Several prior studies, most recently 2/19/2019 TECHNIQUE: Frontal view of the chest _______________ FINDINGS: HEART AND MEDIASTINUM: Normal cardiac size and mediastinal contours.  LUNGS AND PLEURAL SPACES: Patchy multifocal opacities noted throughout bilateral upper lobes, right greater than left as well as throughout the periphery of the right lower lobe. No evidence of pneumothorax. BONY THORAX AND SOFT TISSUES: No acute osseous abnormality _______________     IMPRESSION: Patchy multifocal airspace disease compatible with pneumonia. Recommend radiographic follow-up to document expected clinical resolution in 4-6 weeks' time.        Toyin Jon MD

## 2020-10-28 NOTE — PROGRESS NOTES
CM received a call from [facility], Ema Adams ([phone]). Ms. Laura Sierra has indicated she is available to assist if needed.   CM to continue to follow and assist.

## 2020-10-28 NOTE — CONSULTS
[facility]: [phone]  [facility]: [phone]   [facility]: [phone]    Patient Name: Munira Whitfield YOB: 1955    Date of Initial Consult: 10/27/2020, follow up 10/28/2020   Reason for Consult: care decisions  Requesting Provider: Rhea Blanco MD  Primary Care Physician: Mary Julian MD      SUMMARY:   Munira Whitfield is a 63 y.o. male with a past history of legally blind, bilateral AKA, emphysema, HTN, chronic pain, HLD, T2DM, polio, CAD, PVD who was admitted on 10/23/2020 from home with a diagnosis of pneumonia/sepsis. Current medical issues leading to Palliative Medicine involvement include: recurrent admissions with multiple co-morbidities and goals of care. 10/28/2020 Mr Estrada Montero is alert, oriented to place and why he is in the hospital. Denies pain. He is softly restrained for his safety. Just says \" cut me loose, give a knife\" ( referring to his restraints). PALLIATIVE DIAGNOSES:   1. Advanced care decisions  2. HCAP/sepsis  3. Encephalopathy  4. COPD  5. Bilateral AKA     PLAN:   1. 10/28/2020 Seen along with Ms SIMI Merchant. Remains in ICU on high flow oxygen. He is alert, oriented x 3 but do not believe at this time he can participate in his medical decisions or complex decisions. There is no AMD on file. Son Mookie Kauffman is reported care giver. Very chronically ill gentleman with recent admission whose overall prognosis is poor. We have attempted x 2 today and yesterday to reach Mookie Kauffman to discuss his father's care and goals of care, no answer at listed home phone and cell goes to  with no ability to leave a message. We will continue to follow with you to try and reach family and assess if patient can participate can participate in his care decisions. Goals of care full code with full interventions. ( please see below for previous conversations)     2.  Advanced care decisions: Palliative care team including SIMI Calixto and this NP met with patient at bedside in the ICU. Patient is known to the palliative care team from his previous admission. Per notes \"patient is a  and had four children of which one is . Kranthi Nath claims he is the caregiver and lives with his father. ..his half siblings, Kacy Rodriguez and Lori Carrasquillo, do not come around and he does not know where they are.\" Patient currently delirious and continues to yell \"wake me up\" or \"Lawrence\" over and over; although, responded yes to having pain and nausea. Attempted to contact patient's son, Kranthi Nath, via his cell phone (voicemail box is full) and the home phone number (no answer after multiple rings). Need to discuss goals of care for this chronically ill gentleman. GOALS OF CARE: FULL CODE with FULL INTERVENTIONS. 3. HCAP/sepsis: Patient recently admitted to [facility] with diagnosis of pneumonia 10/14 to 10/20/2020. He was seen at [facility] ER on 10/21 and 10/22 and 10/23/2020. He then presented here with c/o chest pain. Conflicting reports of whether he took antibiotics s/p his discharge on 10/20. Found to be hypothermic and hypotensive in ED. Primary team managing. 4. Encephalopathy: Presented with confusion and is combative. ED provider notes that patient likely does not have capacity to make medical decisions for himself on day of admit. Also per notes son reports patient has not been acting appropriately since leaving [facility] ED on 10/23/2020.  5. COPD: CT completed at outside facility reveals question of neoplasm. Will need repeat CT. Primary team managing. 6. Bilateral AKA: per history. 7. Initial consult note routed to primary continuity provider  8.  Communicated plan of care with: Palliative IDT    GOALS OF CARE:  Patient/Health Care Proxy Stated Goals: Prolong life      TREATMENT PREFERENCES:   Code Status: Full Code    Advance Care Planning:  Advance Care Planning 10/14/2020   Confirm Advance Directive None   Patient Would Like to Complete Advance Directive No   Does the patient have other document types Other (comment)       Medical Interventions: Full interventions         Other: As far as possible, the palliative care team has discussed with patient/health care proxy about goals of care/treatment preferences for patient. HISTORY:     History obtained from: chart    CHIEF COMPLAINT: \"cut me loose\"     HPI/SUBJECTIVE:    The patient is:   [] Verbal and participatory  [x] Non-participatory due to:   delirium  10/28/2020 awake alert x 3 but still not able to make complex decisions. Denies pain on high flow. Clinical Pain Assessment (nonverbal scale for nonverbal patients): Clinical Pain Assessment  Severity: 0          Duration: for how long has pt been experiencing pain (e.g., 2 days, 1 month, years)  Frequency: how often pain is an issue (e.g., several times per day, once every few days, constant)     FUNCTIONAL ASSESSMENT:     Palliative Performance Scale (PPS):  PPS: 30    ECOG  ECOG Status : Limited self-care     PSYCHOSOCIAL/SPIRITUAL SCREENING:      Any spiritual / Yarsanism concerns:  [] Yes /  [x] No    Caregiver Burnout:  [] Yes /  [] No /  [x] No Caregiver Present      Anticipatory grief assessment:   [x] Normal  / [] Maladaptive        REVIEW OF SYSTEMS:     Positive and pertinent negative findings in ROS are noted above in HPI. The following systems were [] reviewed / [x] unable to be reviewed as noted in HPI  Other findings are noted below. Systems: constitutional, ears/nose/mouth/throat, respiratory, gastrointestinal, genitourinary, musculoskeletal, integumentary, neurologic, psychiatric, endocrine. Positive findings noted below.   Modified ESAS Completed by: provider           Pain: 0     Nausea: 3     Dyspnea: 0           Stool Occurrence(s): 1        PHYSICAL EXAM:     Wt Readings from Last 3 Encounters:   10/25/20 43.1 kg (95 lb)   10/20/20 34.7 kg (76 lb 9.6 oz)   02/18/19 45 kg (99 lb 3.3 oz)     Blood pressure (!) 148/89, pulse 85, temperature 98 °F (36.7 °C), resp. rate 24, height 5' 4\" (1.626 m), weight 43.1 kg (95 lb), SpO2 95 %.   Pain:  Pain Scale 1: Numeric (0 - 10)  Pain Intensity 1: 0  Pain Onset 1: gradual  Pain Location 1: Other (comment)(stumps)  Pain Orientation 1: Upper  Pain Description 1: Aching  Pain Intervention(s) 1: Medication (see MAR), Repositioned       Constitutional: 63year old gentleman who appears older then stated age, lying in bed in NAD yelling to cut him loose from restraints  Eyes: blind, anicteric  Respiratory: breathing not labored, few wet upper airway sounds on high flow   Musculoskeletal: Bilateral AKA   Skin: warm, dry  Neurologic: following commands, moving all extremities, oriented X 3        HISTORY:     Principal Problem:    Septic shock (HCC) (10/23/2020)    Active Problems:    Hypotension, unspecified (1/27/2014)      Amputation of both lower extremities (HCC) ()      PNA (pneumonia) ()      Marijuana abuse ()      Centrilobular emphysema (HCC) ()      Legal blindness (10/14/2020)      Chronic obstructive pulmonary disease with acute exacerbation (HCC) (10/24/2020)      Severe protein-calorie malnutrition (HCC) (23/04/2957)      Metabolic encephalopathy (38/49/0694)      Acute respiratory failure with hypoxia (HCC) (10/26/2020)      Aspiration pneumonia (HCC) (10/28/2020)      Deep vein thrombosis (DVT) of lower extremity ([address] (10/28/2020)      Past Medical History:   Diagnosis Date    Amputation of both lower extremities ([address]     Amputee, above knee, left ([address]     At risk for falls     Chronic pain     back and legs    Diabetes ([address]     Hypercholesterolemia     Hypertension     Polio       Past Surgical History:   Procedure Laterality Date    HX HERNIA REPAIR      HX OTHER SURGICAL      carpal tunnel     HX OTHER SURGICAL      cyst      Family History   Problem Relation Age of Onset    Heart Attack Mother     Heart Attack Father     Malignant Hyperthermia Neg Hx  Pseudocholinesterase Deficiency Neg Hx     Delayed Awakening Neg Hx     Post-op Nausea/Vomiting Neg Hx     Emergence Delirium Neg Hx     Post-op Cognitive Dysfunction Neg Hx     Other Neg Hx      History reviewed, no pertinent family history.   Social History     Tobacco Use    Smoking status: Current Every Day Smoker     Packs/day: 2.00     Years: 40.00     Pack years: 80.00    Smokeless tobacco: Current User     Types: Snuff   Substance Use Topics    Alcohol use: No     No Known Allergies   Current Facility-Administered Medications   Medication Dose Route Frequency    [START ON 10/29/2020] methylPREDNISolone (PF) (SOLU-MEDROL) injection 40 mg  40 mg IntraVENous Q24H    pantoprazole (PROTONIX) tablet 40 mg  40 mg Oral ACB    QUEtiapine (SEROquel) tablet 50 mg  50 mg Oral QHS    hydrALAZINE (APRESOLINE) 20 mg/mL injection 20 mg  20 mg IntraVENous Q6H PRN    amLODIPine (NORVASC) tablet 10 mg  10 mg Oral DAILY    metoprolol tartrate (LOPRESSOR) tablet 12.5 mg  12.5 mg Oral Q12H    enoxaparin (LOVENOX) injection 40 mg  1 mg/kg SubCUTAneous Q12H    ipratropium (ATROVENT) 0.02 % nebulizer solution 0.5 mg  0.5 mg Nebulization QID RT    vancomycin (VANCOCIN) 500 mg in 0.9% sodium chloride (MBP/ADV) 100 mL MBP  500 mg IntraVENous Q12H    atorvastatin (LIPITOR) tablet 20 mg  20 mg Oral QHS    clonazePAM (KlonoPIN) tablet 0.5 mg  0.5 mg Oral TID PRN    gabapentin (NEURONTIN) capsule 100 mg  100 mg Oral TID    oxyCODONE-acetaminophen (PERCOCET) 5-325 mg per tablet 1 Tab  1 Tab Oral Q4H PRN    LORazepam (ATIVAN) injection 1 mg  1 mg IntraVENous Q4H PRN    piperacillin-tazobactam (ZOSYN) 3.375 g in 0.9% sodium chloride (MBP/ADV) 100 mL MBP  3.375 g IntraVENous Q8H    haloperidol lactate (HALDOL) injection 5 mg  5 mg IntraVENous Q8H PRN    nicotine (NICODERM CQ) 21 mg/24 hr patch 1 Patch  1 Patch TransDERmal DAILY    albuterol-ipratropium (DUO-NEB) 2.5 MG-0.5 MG/3 ML  3 mL Nebulization Q4H PRN    sodium chloride (NS) flush 5-10 mL  5-10 mL IntraVENous PRN    Vancomycin - Pharmacokinetic Dosing  1 Each Other Rx Dosing/Monitoring    insulin lispro (HUMALOG) injection   SubCUTAneous AC&HS    glucose chewable tablet 16 g  4 Tab Oral PRN    glucagon (GLUCAGEN) injection 1 mg  1 mg IntraMUSCular PRN    dextrose 10% infusion 125-250 mL  125-250 mL IntraVENous PRN    budesonide (PULMICORT) 250 mcg/2ml nebulizer susp  500 mcg Nebulization BID RT    ELECTROLYTE REPLACEMENT PROTOCOL - Potassium Standard Dosing   1 Each Other PRN    ELECTROLYTE REPLACEMENT PROTOCOL - Potassium Standard  Dosing Details for Fluid Restricted Patients  1 Each Other PRN    ELECTROLYTE REPLACEMENT PROTOCOL  - Phosphorus Renal Dosing  1 Each Other PRN    sodium chloride (NS) flush 5-40 mL  5-40 mL IntraVENous Q8H    sodium chloride (NS) flush 5-40 mL  5-40 mL IntraVENous PRN    acetaminophen (TYLENOL) tablet 650 mg  650 mg Oral Q6H PRN    Or    acetaminophen (TYLENOL) suppository 650 mg  650 mg Rectal Q6H PRN    polyethylene glycol (MIRALAX) packet 17 g  17 g Oral DAILY PRN    promethazine (PHENERGAN) tablet 12.5 mg  12.5 mg Oral Q6H PRN    Or    ondansetron (ZOFRAN) injection 4 mg  4 mg IntraVENous Q6H PRN        LAB AND IMAGING FINDINGS:     Lab Results   Component Value Date/Time    WBC 14.2 (H) 10/28/2020 05:00 AM    HGB 10.9 (L) 10/28/2020 05:00 AM    PLATELET 106 20/51/6524 05:00 AM     Lab Results   Component Value Date/Time    Sodium 142 10/28/2020 05:00 AM    Potassium 4.2 10/28/2020 05:00 AM    Chloride 110 10/28/2020 05:00 AM    CO2 23 10/28/2020 05:00 AM    BUN 20 (H) 10/28/2020 05:00 AM    Creatinine 0.78 10/28/2020 05:00 AM    Calcium 9.3 10/28/2020 05:00 AM    Magnesium 2.0 10/28/2020 05:00 AM    Phosphorus 2.6 10/28/2020 05:00 AM      Lab Results   Component Value Date/Time    Alk.  phosphatase 71 10/28/2020 05:00 AM    Protein, total 6.2 (L) 10/28/2020 05:00 AM    Albumin 3.3 (L) 10/28/2020 05:00 AM    Globulin 2.9 10/28/2020 05:00 AM     Lab Results   Component Value Date/Time    INR 1.0 01/27/2014 04:08 PM    Prothrombin time 12.7 01/27/2014 04:08 PM    aPTT 33.7 01/27/2014 04:08 PM      No results found for: IRON, FE, TIBC, IBCT, PSAT, FERR   No results found for: PH, PCO2, PO2  No components found for: [place]   Lab Results   Component Value Date/Time     10/26/2020 06:45 PM    CK - MB 2.5 10/26/2020 06:45 PM              Total time: 25 minutes  Counseling / coordination time, spent as noted above > 50% counseling / coordination: 20 minutes with patient    Prolonged service was provided for  []30 min   []75 min in face to face time in the presence of the patient, spent as noted above. Time Start:   Time End:   Note: this can only be billed with 03182 (initial) or 83197 (follow up).   If multiple start / stop times, list each separately.    '

## 2020-10-28 NOTE — PROGRESS NOTES
10/28/20 0253   Airway Clearance   Suction Nasotracheal   Sputum Method Obtained Nasal tracheal   Sputum Amount Moderate   Sputum Color/Odor Tan   Sputum Consistency Thick     NT suction via left nare due to ineffective patient cough. RN assisted. SPO2 93% on 6 liter nasal cannula.

## 2020-10-28 NOTE — PROGRESS NOTES
10/28/20 1739   Airway Clearance   Suction Nasotracheal   Sputum Method Obtained Nasal tracheal   Sputum Amount Copious   Sputum Color/Odor Tan; White   Sputum Consistency Thick     Ineffective cough requiring NT suctioning. SPO2 95%on HiFlow nasal cannula 35 L/ 70% FIO2.

## 2020-10-28 NOTE — PROGRESS NOTES
10/28/20 0438   Oxygen Therapy   O2 Sat (%) 99 %   Pulse via Oximetry 73 beats per minute   O2 Device Heated; Hi flow nasal cannula  (device change)   O2 Flow Rate (L/min) 35 l/min   O2 Temperature 89.6 °F (32 °C)   FIO2 (%) 60 %  (increased)     Pt desatting into the 80's frequently overnight. Pt placed back on HFNC at this time and CPT via bed initiated per Dr. Alvarez Linker order.

## 2020-10-29 ENCOUNTER — APPOINTMENT (OUTPATIENT)
Dept: GENERAL RADIOLOGY | Age: 65
DRG: 720 | End: 2020-10-29
Attending: INTERNAL MEDICINE
Payer: MEDICAID

## 2020-10-29 LAB
ALBUMIN SERPL-MCNC: 3.6 G/DL (ref 3.4–5)
ALBUMIN/GLOB SERPL: 1.1 {RATIO} (ref 0.8–1.7)
ALP SERPL-CCNC: 85 U/L (ref 45–117)
ALT SERPL-CCNC: 20 U/L (ref 16–61)
ANION GAP SERPL CALC-SCNC: 9 MMOL/L (ref 3–18)
AST SERPL-CCNC: 15 U/L (ref 10–38)
BACTERIA SPEC CULT: NORMAL
BACTERIA SPEC CULT: NORMAL
BASOPHILS # BLD: 0 K/UL (ref 0–0.1)
BASOPHILS NFR BLD: 0 % (ref 0–2)
BILIRUB SERPL-MCNC: 0.6 MG/DL (ref 0.2–1)
BUN SERPL-MCNC: 20 MG/DL (ref 7–18)
BUN/CREAT SERPL: 22 (ref 12–20)
CA-I SERPL-SCNC: 1.18 MMOL/L (ref 1.12–1.32)
CALCIUM SERPL-MCNC: 9.2 MG/DL (ref 8.5–10.1)
CHLORIDE SERPL-SCNC: 106 MMOL/L (ref 100–111)
CO2 SERPL-SCNC: 26 MMOL/L (ref 21–32)
CREAT SERPL-MCNC: 0.92 MG/DL (ref 0.6–1.3)
DIFFERENTIAL METHOD BLD: ABNORMAL
EOSINOPHIL # BLD: 0 K/UL (ref 0–0.4)
EOSINOPHIL NFR BLD: 0 % (ref 0–5)
ERYTHROCYTE [DISTWIDTH] IN BLOOD BY AUTOMATED COUNT: 17.6 % (ref 11.6–14.5)
GLOBULIN SER CALC-MCNC: 3.3 G/DL (ref 2–4)
GLUCOSE BLD STRIP.AUTO-MCNC: 80 MG/DL (ref 70–110)
GLUCOSE BLD STRIP.AUTO-MCNC: 84 MG/DL (ref 70–110)
GLUCOSE BLD STRIP.AUTO-MCNC: 85 MG/DL (ref 70–110)
GLUCOSE BLD STRIP.AUTO-MCNC: 86 MG/DL (ref 70–110)
GLUCOSE SERPL-MCNC: 80 MG/DL (ref 74–99)
HCT VFR BLD AUTO: 37.3 % (ref 36–48)
HGB BLD-MCNC: 12.3 G/DL (ref 13–16)
LYMPHOCYTES # BLD: 0.7 K/UL (ref 0.9–3.6)
LYMPHOCYTES NFR BLD: 5 % (ref 21–52)
MAGNESIUM SERPL-MCNC: 1.9 MG/DL (ref 1.6–2.6)
MCH RBC QN AUTO: 28.4 PG (ref 24–34)
MCHC RBC AUTO-ENTMCNC: 33 G/DL (ref 31–37)
MCV RBC AUTO: 86.1 FL (ref 74–97)
MONOCYTES # BLD: 0.8 K/UL (ref 0.05–1.2)
MONOCYTES NFR BLD: 6 % (ref 3–10)
NEUTS SEG # BLD: 12.5 K/UL (ref 1.8–8)
NEUTS SEG NFR BLD: 89 % (ref 40–73)
PHOSPHATE SERPL-MCNC: 3.3 MG/DL (ref 2.5–4.9)
PLATELET # BLD AUTO: 312 K/UL (ref 135–420)
PMV BLD AUTO: 9.7 FL (ref 9.2–11.8)
POTASSIUM SERPL-SCNC: 3.3 MMOL/L (ref 3.5–5.5)
POTASSIUM SERPL-SCNC: 4.1 MMOL/L (ref 3.5–5.5)
PROT SERPL-MCNC: 6.9 G/DL (ref 6.4–8.2)
RBC # BLD AUTO: 4.33 M/UL (ref 4.7–5.5)
SERVICE CMNT-IMP: NORMAL
SERVICE CMNT-IMP: NORMAL
SODIUM SERPL-SCNC: 141 MMOL/L (ref 136–145)
WBC # BLD AUTO: 14.1 K/UL (ref 4.6–13.2)

## 2020-10-29 PROCEDURE — 74011250636 HC RX REV CODE- 250/636: Performed by: EMERGENCY MEDICINE

## 2020-10-29 PROCEDURE — 74011000258 HC RX REV CODE- 258: Performed by: EMERGENCY MEDICINE

## 2020-10-29 PROCEDURE — 74011250636 HC RX REV CODE- 250/636: Performed by: INTERNAL MEDICINE

## 2020-10-29 PROCEDURE — 36415 COLL VENOUS BLD VENIPUNCTURE: CPT

## 2020-10-29 PROCEDURE — 94640 AIRWAY INHALATION TREATMENT: CPT

## 2020-10-29 PROCEDURE — 74011250636 HC RX REV CODE- 250/636: Performed by: HOSPITALIST

## 2020-10-29 PROCEDURE — 74011250637 HC RX REV CODE- 250/637: Performed by: FAMILY MEDICINE

## 2020-10-29 PROCEDURE — 92526 ORAL FUNCTION THERAPY: CPT

## 2020-10-29 PROCEDURE — 74011250637 HC RX REV CODE- 250/637: Performed by: INTERNAL MEDICINE

## 2020-10-29 PROCEDURE — 77010033711 HC HIGH FLOW OXYGEN

## 2020-10-29 PROCEDURE — 65610000006 HC RM INTENSIVE CARE

## 2020-10-29 PROCEDURE — 84132 ASSAY OF SERUM POTASSIUM: CPT

## 2020-10-29 PROCEDURE — C9113 INJ PANTOPRAZOLE SODIUM, VIA: HCPCS | Performed by: INTERNAL MEDICINE

## 2020-10-29 PROCEDURE — 84100 ASSAY OF PHOSPHORUS: CPT

## 2020-10-29 PROCEDURE — 74011000250 HC RX REV CODE- 250: Performed by: INTERNAL MEDICINE

## 2020-10-29 PROCEDURE — 82962 GLUCOSE BLOOD TEST: CPT

## 2020-10-29 PROCEDURE — 74011250636 HC RX REV CODE- 250/636: Performed by: FAMILY MEDICINE

## 2020-10-29 PROCEDURE — 83735 ASSAY OF MAGNESIUM: CPT

## 2020-10-29 PROCEDURE — 71045 X-RAY EXAM CHEST 1 VIEW: CPT

## 2020-10-29 PROCEDURE — 85025 COMPLETE CBC W/AUTO DIFF WBC: CPT

## 2020-10-29 PROCEDURE — 82330 ASSAY OF CALCIUM: CPT

## 2020-10-29 RX ORDER — METOPROLOL TARTRATE 5 MG/5ML
5 INJECTION INTRAVENOUS EVERY 6 HOURS
Status: DISCONTINUED | OUTPATIENT
Start: 2020-10-29 | End: 2020-10-29

## 2020-10-29 RX ORDER — METOPROLOL TARTRATE 5 MG/5ML
5 INJECTION INTRAVENOUS EVERY 6 HOURS
Status: DISCONTINUED | OUTPATIENT
Start: 2020-10-29 | End: 2020-11-02

## 2020-10-29 RX ORDER — POTASSIUM CHLORIDE 7.45 MG/ML
10 INJECTION INTRAVENOUS
Status: COMPLETED | OUTPATIENT
Start: 2020-10-29 | End: 2020-10-29

## 2020-10-29 RX ADMIN — POTASSIUM CHLORIDE 10 MEQ: 7.46 INJECTION, SOLUTION INTRAVENOUS at 09:54

## 2020-10-29 RX ADMIN — IPRATROPIUM BROMIDE 0.5 MG: 0.5 SOLUTION RESPIRATORY (INHALATION) at 15:12

## 2020-10-29 RX ADMIN — METOPROLOL TARTRATE 5 MG: 5 INJECTION INTRAVENOUS at 15:17

## 2020-10-29 RX ADMIN — PIPERACILLIN AND TAZOBACTAM 3.38 G: 3; .375 INJECTION, POWDER, LYOPHILIZED, FOR SOLUTION INTRAVENOUS at 11:08

## 2020-10-29 RX ADMIN — AMLODIPINE BESYLATE 10 MG: 5 TABLET ORAL at 08:44

## 2020-10-29 RX ADMIN — METOPROLOL TARTRATE 12.5 MG: 25 TABLET, FILM COATED ORAL at 08:44

## 2020-10-29 RX ADMIN — PIPERACILLIN AND TAZOBACTAM 3.38 G: 3; .375 INJECTION, POWDER, LYOPHILIZED, FOR SOLUTION INTRAVENOUS at 22:45

## 2020-10-29 RX ADMIN — ENOXAPARIN SODIUM 40 MG: 40 INJECTION SUBCUTANEOUS at 22:46

## 2020-10-29 RX ADMIN — SODIUM CHLORIDE 10 ML: 9 INJECTION, SOLUTION INTRAMUSCULAR; INTRAVENOUS; SUBCUTANEOUS at 06:07

## 2020-10-29 RX ADMIN — LORAZEPAM 1 MG: 2 INJECTION INTRAMUSCULAR at 13:26

## 2020-10-29 RX ADMIN — BUDESONIDE 500 MCG: 0.25 INHALANT RESPIRATORY (INHALATION) at 19:09

## 2020-10-29 RX ADMIN — ENOXAPARIN SODIUM 40 MG: 40 INJECTION SUBCUTANEOUS at 07:26

## 2020-10-29 RX ADMIN — BUDESONIDE 500 MCG: 0.25 INHALANT RESPIRATORY (INHALATION) at 07:33

## 2020-10-29 RX ADMIN — POTASSIUM CHLORIDE 10 MEQ: 7.46 INJECTION, SOLUTION INTRAVENOUS at 08:44

## 2020-10-29 RX ADMIN — IPRATROPIUM BROMIDE 0.5 MG: 0.5 SOLUTION RESPIRATORY (INHALATION) at 19:09

## 2020-10-29 RX ADMIN — HALOPERIDOL LACTATE 5 MG: 5 INJECTION, SOLUTION INTRAMUSCULAR at 09:24

## 2020-10-29 RX ADMIN — ATORVASTATIN CALCIUM 20 MG: 20 TABLET, FILM COATED ORAL at 22:46

## 2020-10-29 RX ADMIN — SODIUM CHLORIDE 10 ML: 9 INJECTION, SOLUTION INTRAMUSCULAR; INTRAVENOUS; SUBCUTANEOUS at 22:47

## 2020-10-29 RX ADMIN — QUETIAPINE FUMARATE 50 MG: 25 TABLET ORAL at 22:45

## 2020-10-29 RX ADMIN — IPRATROPIUM BROMIDE 0.5 MG: 0.5 SOLUTION RESPIRATORY (INHALATION) at 07:33

## 2020-10-29 RX ADMIN — IPRATROPIUM BROMIDE 0.5 MG: 0.5 SOLUTION RESPIRATORY (INHALATION) at 11:41

## 2020-10-29 RX ADMIN — SODIUM CHLORIDE 40 MG: 9 INJECTION INTRAMUSCULAR; INTRAVENOUS; SUBCUTANEOUS at 11:06

## 2020-10-29 RX ADMIN — LORAZEPAM 1 MG: 2 INJECTION INTRAMUSCULAR at 09:24

## 2020-10-29 RX ADMIN — GABAPENTIN 100 MG: 100 CAPSULE ORAL at 22:46

## 2020-10-29 RX ADMIN — PANTOPRAZOLE SODIUM 40 MG: 40 TABLET, DELAYED RELEASE ORAL at 06:36

## 2020-10-29 RX ADMIN — SODIUM CHLORIDE 10 ML: 9 INJECTION, SOLUTION INTRAMUSCULAR; INTRAVENOUS; SUBCUTANEOUS at 13:26

## 2020-10-29 RX ADMIN — POTASSIUM CHLORIDE 10 MEQ: 7.46 INJECTION, SOLUTION INTRAVENOUS at 10:59

## 2020-10-29 RX ADMIN — POTASSIUM CHLORIDE 10 MEQ: 7.46 INJECTION, SOLUTION INTRAVENOUS at 07:22

## 2020-10-29 RX ADMIN — HYDRALAZINE HYDROCHLORIDE 20 MG: 20 INJECTION INTRAMUSCULAR; INTRAVENOUS at 15:08

## 2020-10-29 RX ADMIN — POTASSIUM CHLORIDE 10 MEQ: 7.46 INJECTION, SOLUTION INTRAVENOUS at 12:24

## 2020-10-29 RX ADMIN — PIPERACILLIN AND TAZOBACTAM 3.38 G: 3; .375 INJECTION, POWDER, LYOPHILIZED, FOR SOLUTION INTRAVENOUS at 04:18

## 2020-10-29 RX ADMIN — VANCOMYCIN HYDROCHLORIDE 500 MG: 500 INJECTION, POWDER, LYOPHILIZED, FOR SOLUTION INTRAVENOUS at 06:29

## 2020-10-29 RX ADMIN — GABAPENTIN 100 MG: 100 CAPSULE ORAL at 08:44

## 2020-10-29 RX ADMIN — METHYLPREDNISOLONE SODIUM SUCCINATE 40 MG: 40 INJECTION, POWDER, FOR SOLUTION INTRAMUSCULAR; INTRAVENOUS at 08:44

## 2020-10-29 NOTE — PROGRESS NOTES
Problem: Dysphagia (Adult)  Goal: *Acute Goals and Plan of Care (Insert Text)  Description: Patient will:  1. Tolerate regular diet with thin liquids without overt s/sx of aspiration in 4/5 trials under SLP supervision. 2. Utilize compensatory swallow strategies of small bite/sip, alternate liquid/solid with min cues in 4/5 trials. 3. Complete an objective swallow study (i.e., MBSS) to assess swallow integrity, r/o aspiration, and determine of safest LRD, min A.      Rec:   NPO with meds in puree  Aspiration precautions  HOB >45 during po intake, remain >30 for 30-45 minutes after po   Small bites/sips; alternate liquid/solid, slow feeding rate   Oral care TID  MBS next business date to further assess oropharyngeal swallow fxn and r/o aspiration        Outcome: Not Progressing Towards Goal    SPEECH LANGUAGE PATHOLOGY DYSPHAGIA TREATMENT    Patient: Brea Blanchard Sr. (66 y.o. male)  Date: 10/29/2020  Diagnosis: Septic shock ([facility] [address] [A41.9, R65.21]   Septic shock ([facility] [address]       Precautions: aspiration    PLOF: As per H&P      ASSESSMENT:  Pt was seen at bedside for follow up dysphagia management per MD request. Per ICU staff: pt has been demo sx of aspiration post meals requiring increase in O2 requirements. They also reported that post med pass last night, he required HFNC and has not been able to be weaned off the optiflow. This AM, pt with delayed increase in RR and drop in O2 with ~ 3 oz of puree. Laryngeal elevation weak to palpation with multiple swallows per bolus presentation. Suspect he may have poor endurance during meals at this time given respiratory status? Question silent aspiration. Recommend NPO with meds in puree, aspiration precautions, and oral care TID. Rec MBS next business date to further assess oropharyngeal swallow fxn and r/o aspiration. D/w RN. ST will continue to follow.      Progression toward goals:  []         Improving appropriately and progressing toward goals  []         Improving slowly and progressing toward goals  [x]         Not making progress toward goals and plan of care will be adjusted     PLAN:  Recommendations and Planned Interventions: See above  Patient continues to benefit from skilled intervention to address the above impairments. Continue treatment per established plan of care. Discharge Recommendations:  Ricardo Carr and To Be Determined     SUBJECTIVE:   Patient stated Helena matthews to the bathroom. OBJECTIVE:   Cognitive and Communication Status:  Neurologic State: Restless, Eyes open spontaneously, Confused  Orientation Level: Oriented to person, Oriented to place  Cognition: Decreased command following  Perception: Appears intact  Perseveration: No perseveration noted  Safety/Judgement: Fall prevention  Dysphagia Treatment:  Oral Assessment:  Oral Assessment  Labial: No impairment  Dentition: Natural, Limited  Oral Hygiene: fair  Lingual: No impairment  Velum: No impairment  Mandible: No impairment  P.O. Trials:   Patient Position: HOB 75*   Vocal quality prior to P.O.: No impairment   Consistency Presented: dejuan   How Presented: SLP-fed/presented, Spoon   Bolus Acceptance: No impairment   Bolus Formation/Control: No impairment   Propulsion: No impairment   Oral Residue: None   Initiation of Swallow: No impairment   Laryngeal Elevation: weak   Aspiration Signs/Symptoms: decrease in O2, increase in RR   Pharyngeal Phase Characteristics: multiple swallows   Effective Modifications: Alternate liquids/solids, Small sips and bites   Cues for Modifications:  Moderate       Oral Phase Severity: No impairment   Pharyngeal Phase Severity : mod    PAIN:  Start of Tx: 0  End of Tx: 0     After treatment:   []              Patient left in no apparent distress sitting up in chair  [x]              Patient left in no apparent distress in bed  [x]              Call bell left within reach  [x]              Nursing notified  []              Family present  [] Caregiver present  []              Bed alarm activated      COMMUNICATION/EDUCATION:   [x] Aspiration precautions; swallow safety; compensatory techniques  []        Patient/family able to participate in training and education   [x]  Patient unable to participate in training and education, education ongoing with staff   [] Patient understands goals and intent of therapy; neutral about participation     Thank you for this referral.    Karol Orozco M.S. CCC-SLP/L  Speech-Language Pathologist

## 2020-10-29 NOTE — PROGRESS NOTES
Verbal shift change report given to Tamika Waddell RN (oncoming nurse) by Renetta Skiff, RN (offgoing nurse). Report included the following information SBAR, Kardex and MAR.

## 2020-10-29 NOTE — PALLIATIVE CARE
Attempted to reach patient's son, Jarod Meng to discuss goals of care. No answer on home phone (continuous ringing) or cell phone (voice mailbox is full). Attempted to reach patient's daughter, Esteban Arango at number listed in chart. The telephone number is for an insurance agency. Left voicemail on mailbox belonging to [facility] that I was attempted to reach Bonney [employer]ans and if she still works there to have her contact me\". [phone]: Attempted to call Jarod Caller. Cell phone went directly to voice mail and mailbox full.  Telephone call placed to case management, Eb Vaughan, and left message for her to call the police department to locate patient's son, Jarod Meng and have him contact the hospital.

## 2020-10-29 NOTE — PROGRESS NOTES
[facility]  Pulmonary, Critical Care, and Sleep Medicine    Name: Ezekiel Saenz. MRN: [id number]   : 1955 Hospital: [facility] [place]   Date: 10/29/2020        Pulmonary Critical Care Note    IMPRESSION:     Acute respiratory failure with hypoxia (HCC) J96.01     Sepsis with organ dysfunction, resolved shock ([address] A41.9, R65.20     Aspiration pneumonia ([address] J69.0     Deep vein thrombosis (DVT) of lower extremity ([address] I82.409       Patient Active Problem List   Diagnosis Code    Bursitis of elbow M70.30    Medication overdose T50.901A    Polio A80.9    Depressive disorder, not elsewhere classified F32.9    Type II or unspecified type diabetes mellitus without mention of complication, uncontrolled [id number]    Hypotension, unspecified I95.9    Amputation of both lower extremities ([address] S88.911A, P74.642L    PNA (pneumonia) J18.9    ALEXANDER (acute kidney injury) ([address] N17.9    Hyponatremia E87.1    Marijuana abuse F12.10    Centrilobular emphysema ([address] J43.2    CAP (community acquired pneumonia) J18.9    Sepsis ([address] A41.9    Hard of hearing H91.90    Legal blindness H54.8    Acute encephalopathy G93.40    Septic shock (HCC) A41.9, R65.21    Chronic obstructive pulmonary disease with acute exacerbation (HCC) J44.1    Severe protein-calorie malnutrition ([address] L81    Metabolic encephalopathy G59.11    Acute respiratory failure with hypoxia (HCC) J96.01    Aspiration pneumonia ([address] J69.0    Deep vein thrombosis (DVT) of lower extremity ([address] I82.409          RECOMMENDATIONS:   Respiratory: Hx smoking. CTA chest 10/27/2020: No PE. Diffuse peribronchial wall thickening and several areas of endobronchial impaction/occlusion. LLL pneumonia with peribronchial consolidation posterior RUL. Moderately severe upper lobe predominant centrilobular emphysema. Small left and trace right pleural effusion. Recommend repeat CT in 4 to 6 weeks as outpatient.   Repeat CXR today showed similar findings. Intermittently patient goes on 4-6 LPM NC, but last night again required HFNC after aspiration of oral secretions. Seen by speech therapist who recommended MBS tomorrow. Patient made NPO. Continue chest percussion frequently. Patient is at high risk for aspiration and choking leading to respiratory failure or ventilator use. Cannot get in touch with family. Patient is full code. Palliative care consult placed. Repeat CXR intermittently. Titrate FiO2 to keep SPO2 > 91%. Steroids: DC Solu-Medrol. Bronchodilators: Continue Pulmicort twice daily, Atrovent 4 times daily. Continue DuoNeb as needed. Continue pulmonary hygiene and toileting. Continue aspiration precautions. HOB more than 30 degrees all the time. ID:   No fever but mild leukocytosis, likely due to aspiration pneumonia and steroid use. DC steroids. Urine culture: <100 K. Blood culture: NGTD. Urine antigen panel negative. COVID-19 10/14/2020: Negative. Antibiotic choice: Doxycycline discontinued 10/28/2020. DC vancomycin 10/29/2020. Continue Zosyn. Follow cultures. Deescalate antibiotic when appropriate. CVS:   Echo 10/25/2020: LVEF 50 to 55%. Grade 1 DD. Estimated PASP 74 mmHg. Continue Norvasc and metoprolol, home dose. Continue Lipitor. Estimated RVSP 74 mmHg on echo, could be due to hypoxic vasoconstriction. Recommend repeat echo as outpatient. PVL LE 10/27/2020: Chronic nonocclusive thrombus left proximal femoral vein. Age indeterminate nonocclusive thrombus right femoral vein. Due to DVT, will continue Lovenox 40 mg subcutaneously every 12 hours. May transition to oral anticoagulants after speech therapy evaluation completed and MBS completed. Elevated blood pressure but not able to take p.o. antihypertensive medication due to being n.p.o. Change Lopressor to IV. Norvasc and Lipitor on hold due to being n.p.o. Can be restarted when patient starts taking p.o.     Renal: Monitor renal functions, lytes  Normal creatinine. Hypokalemia being replaced. Monitor urine output, creatinine and electrolytes. Heme: Mild anemia. Normal platelets and coags. No active external bleeding. Monitor. Endo: Glycemic control  TSH normal    GI: Seen by speech therapist.  Recommended MBS to be done tomorrow. Keep n.p.o. due to high risk for aspiration and patient had aspiration spells last night requiring HFNC and frequent suctioning. Neurology:   Alert awake oriented x3. Continues to moan all day long. Urine drug screen positive for THC on admission. ??  Baseline mild dementia. CT head on admission chronic microvascular disease, no acute intracranial abnormality. Ammonia normal.  Continue Seroquel to 50 mg p.o. nightly. PAIN AND SEDATION: none    Prophylaxis:  DVT treatment Lovenox. GI prophylaxis Protonix. Restraints: Wrist soft restraints as needed for patient interfering with medical therapy/management and patient safety. Lines/Tubes:   Central line: Right femoral 10/23/20discontinued 10/26/2020. [place]: 10/23/20discontinued 10/27/2020. Condom catheter: 10/27/2020. Overall poor prognosis. ADVANCE DIRECTIVE: Full code    Will defer respective systems problem management to primary and other consultant and follow patient in ICU with primary and other medical team  Further recommendations will be based on the patient's response to recommended treatment and results of the investigation ordered. Quality Care: PPI, DVT prophylaxis, HOB elevated, Infection control all reviewed and addressed. DISCUSSION: Events and notes from last 24 hours reviewed. Care plan discussed with nursing, hospitalist, ICU staff, RT, MDR.  D/w patient (answered all questions to satisfaction). Family discussion: I called Adolfo Neville [phone] and [phone], who did not  the phone; the mailbox is full and I could not leave a message on his cell phone.     High complexity decision making was performed during the evaluation of this patient at high risk for decompensation with multiple organ involvement. Total critical care time spent rendering care exclusive of procedures/family discussion/coordination of care: 32 minutes. Subjective/History:   Mr. Maria T Roberts has been seen and evaluated as Dr. Judy Robison requested for assisting with ICU care of septic shock. Patient unable to provide history. Patient is a 63 y.o. male COPD, bilateral above-knee amputation, recently discharged from [facility] after treated for pneumonia. Per chart, since discharge about a week ago patient has been in the ER few times at other facilities with complaints of cough, shortness of breath and hypoxemia; had a CT scan done at [facility] which showed persistent pneumonia. Per chart, patient had not picked up his antibiotics that he was discharged on until recently. He was brought to ER with c/o of chest pain between shoulders by EMS. In the ER he was found to be confused and combative with hypothermia, hypotension. He was treated with IVF boluses and Levophed. CT head on admission nil acute. CXR showed improving pneumonia. He has remained confused at the time of this evaluation at bedside in rm 102 in ICU.      10/29/20   Patient remained in ICU room 102. Patient was on 4-6 LPM NC but aspirated oral secretions yesterday and so required to be back on HFNC. Currently on HFNC 50 L. Requiring frequent orotracheal nasotracheal suctioning and chest percussion vest.  Seen by speech therapist who recommended MBS to be done tomorrow. CXR unchanged. Blood pressure elevated as patient is not able to take p.o. antihypertensive medication. Lopressor changed to IV. No fever. Mild leukocytosis persistent/stable. Tolerating Lovenox without any external bleeding. Patient continues to mumble and moan but denies any pain. He is alert awake oriented x3.   He knows who is the president, what year and what hospital he has had.  [facility] was not called for any issues overnight. No other overnight issues reported. Review of Systems:  Review of systems not obtained due to patient factors. Intake/Output Summary (Last 24 hours) at 10/29/2020 1022  Last data filed at 10/29/2020 0418  Gross per 24 hour   Intake 680 ml   Output    Net 680 ml                 Latest lactic acid:   Lactic acid   Date Value Ref Range Status   10/24/2020 0.9 0.4 - 2.0 MMOL/L Final   10/23/2020 2.7 (HH) 0.4 - 2.0 MMOL/L Final     Comment:     CALLED TO AND CORRECTLY REPEATED BY:  LEXIE ROJAS RN ED BY CAM ON 10/23/20 AT 1738.     10/23/2020 3.3 (HH) 0.4 - 2.0 MMOL/L Final     Comment:     CALLED TO AND CORRECTLY REPEATED BY:  Kate Ellison RN ED BY CAM ON 10/23/20 AT 1700. Past Medical History:  Past Medical History:   Diagnosis Date    Amputation of both lower extremities [facility] [address] [facility]     Amputee, above knee, left ([facility]     At risk for falls     Chronic pain     back and legs    Diabetes ([facility]     Hypercholesterolemia     Hypertension     Polio         Past Surgical History:  Past Surgical History:   Procedure Laterality Date    HX HERNIA REPAIR      HX OTHER SURGICAL      carpal tunnel     HX OTHER SURGICAL      cyst        Medications:  Prior to Admission medications    Medication Sig Start Date End Date Taking? Authorizing Provider   amoxicillin-clavulanate (AUGMENTIN) 875-125 mg per tablet Take 1 Tab by mouth every twelve (12) hours. 10/20/20   Andy Cogan, MD   acetaminophen (TYLENOL) 325 mg tablet Take  by mouth every four (4) hours as needed for Pain. Tiffany Saravia MD   amLODIPine (NORVASC) 5 mg tablet Take 5 mg by mouth daily. Tiffany Saravia MD   gabapentin (NEURONTIN) 100 mg capsule Take 100 mg by mouth three (3) times daily. Tiffany Saravia MD   clonazePAM (KLONOPIN) 0.5 mg tablet Take 0.5 mg by mouth nightly as needed. Tiffany Saravia MD   atorvastatin (LIPITOR) 20 mg tablet Take 20 mg by mouth daily.     Tiffany Saravia MD   nitroglycerin (NITROSTAT) 0.4 mg SL tablet 0.4 mg by SubLINGual route every five (5) minutes as needed for Chest Pain. Up to 3 doses. Tiffany Saravia MD   metoprolol succinate (TOPROL XL) 25 mg XL tablet Take 25 mg by mouth daily. Tiffany Saravia MD   oxyCODONE-acetaminophen (PERCOCET) 5-325 mg per tablet Take  by mouth every four (4) hours as needed for Pain. Tiffany Saravia MD   traMADol (ULTRAM) 50 mg tablet Take 1 Tab by mouth every six (6) hours as needed for Pain. Max Daily Amount: 200 mg. 12/4/18   Iraida Clayton MD   simvastatin (ZOCOR) 20 mg tablet Take 20 mg by mouth nightly. Provider, Historical   lisinopril-hydrochlorothiazide (PRINZIDE, ZESTORETIC) 20-12.5 mg per tablet Take 1 Tab by mouth daily. Provider, Historical   isosorbide mononitrate ER (IMDUR) 30 mg tablet Take 30 mg by mouth daily.     Provider, Historical       Current Facility-Administered Medications   Medication Dose Route Frequency    potassium chloride 10 mEq in 100 ml IVPB  10 mEq IntraVENous Q1H    metoprolol (LOPRESSOR) injection 5 mg  5 mg IntraVENous Q6H    Vancomycin Trough Level - 30 minutes prior to 19:00 dose 10/29/20  1 Each Other ONCE    methylPREDNISolone (PF) (SOLU-MEDROL) injection 40 mg  40 mg IntraVENous Q24H    pantoprazole (PROTONIX) tablet 40 mg  40 mg Oral ACB    QUEtiapine (SEROquel) tablet 50 mg  50 mg Oral QHS    amLODIPine (NORVASC) tablet 10 mg  10 mg Oral DAILY    metoprolol tartrate (LOPRESSOR) tablet 12.5 mg  12.5 mg Oral Q12H    enoxaparin (LOVENOX) injection 40 mg  1 mg/kg SubCUTAneous Q12H    ipratropium (ATROVENT) 0.02 % nebulizer solution 0.5 mg  0.5 mg Nebulization QID RT    vancomycin (VANCOCIN) 500 mg in 0.9% sodium chloride (MBP/ADV) 100 mL MBP  500 mg IntraVENous Q12H    atorvastatin (LIPITOR) tablet 20 mg  20 mg Oral QHS    gabapentin (NEURONTIN) capsule 100 mg  100 mg Oral TID    piperacillin-tazobactam (ZOSYN) 3.375 g in 0.9% sodium chloride (MBP/ADV) 100 mL MBP  3.375 g IntraVENous Q8H    nicotine (NICODERM CQ) 21 mg/24 hr patch 1 Patch  1 Patch TransDERmal DAILY    Vancomycin - Pharmacokinetic Dosing  1 Each Other Rx Dosing/Monitoring    insulin lispro (HUMALOG) injection   SubCUTAneous AC&HS    budesonide (PULMICORT) 250 mcg/2ml nebulizer susp  500 mcg Nebulization BID RT    sodium chloride (NS) flush 5-40 mL  5-40 mL IntraVENous Q8H       Allergy:  No Known Allergies     Social History:  Social History     Tobacco Use    Smoking status: Current Every Day Smoker     Packs/day: 2.00     Years: 40.00     Pack years: 80.00    Smokeless tobacco: Current User     Types: Snuff   Substance Use Topics    Alcohol use: No    Drug use: No        Family History:  Family History   Problem Relation Age of Onset    Heart Attack Mother     Heart Attack Father     Malignant Hyperthermia Neg Hx     Pseudocholinesterase Deficiency Neg Hx     Delayed Awakening Neg Hx     Post-op Nausea/Vomiting Neg Hx     Emergence Delirium Neg Hx     Post-op Cognitive Dysfunction Neg Hx     Other Neg Hx           Objective:   Vital Signs:    Blood pressure (!) 157/118, pulse (!) 112, temperature 98 °F (36.7 °C), resp. rate 22, height 5' 4\" (1.626 m), weight 43.1 kg (95 lb), SpO2 95 %. Body mass index is 16.31 kg/m². O2 Device: Heated, Hi flow nasal cannula   O2 Flow Rate (L/min): 50 l/min   Temp (24hrs), Av °F (36.7 °C), Min:98 °F (36.7 °C), Max:98.1 °F (36.7 °C)         Intake/Output:   Last shift:      No intake/output data recorded. Last 3 shifts: 10/27 1901 - 10/29 0700  In: 1080 [P.O.:480; I.V.:600]  Out: -     Intake/Output Summary (Last 24 hours) at 10/29/2020 1022  Last data filed at 10/29/2020 0418  Gross per 24 hour   Intake 680 ml   Output    Net 680 ml       Physical Exam:  General/Neurology: Alert, awake and oriented. NAD, but moans all day long. Thin. Temporal muscle wasting. Patient knows that he is at [facility], president is Richie Rosemary and this is . Head:   NCAT.   Eye:   EOM intact, no icterus/pallor/cyanosis. Nose:   No nasal drainage/discharge. Throat:  Moist mucosa. No oral thrush. Neck:   Trachea midline. No palpable cervical lymphadenopathy. Lung: Moderate air entry bilateral equal.  No rales, rhonchi. No wheezing or stridor. No prolonged expiration or accessory muscle use. Heart:   S1 S2 present. No murmur or JVD. Abdomen:  Soft. NT. ND. No palpable masses. Extremities:  Bilateral AKA. No peripheral edema. Pulses: 2+ and symmetric in DP. Data:     Recent Results (from the past 24 hour(s))   GLUCOSE, POC    Collection Time: 10/28/20 10:59 AM   Result Value Ref Range    Glucose (POC) 87 70 - 110 mg/dL   PHOSPHORUS    Collection Time: 10/28/20 11:10 AM   Result Value Ref Range    Phosphorus 3.1 2.5 - 4.9 MG/DL   GLUCOSE, POC    Collection Time: 10/28/20  4:00 PM   Result Value Ref Range    Glucose (POC) 91 70 - 110 mg/dL   GLUCOSE, POC    Collection Time: 10/28/20 11:14 PM   Result Value Ref Range    Glucose (POC) 92 70 - 110 mg/dL   CALCIUM, IONIZED    Collection Time: 10/29/20  5:36 AM   Result Value Ref Range    Ionized Calcium 1.18 1.12 - 1.32 MMOL/L   MAGNESIUM    Collection Time: 10/29/20  5:44 AM   Result Value Ref Range    Magnesium 1.9 1.6 - 2.6 mg/dL   CBC WITH AUTOMATED DIFF    Collection Time: 10/29/20  5:44 AM   Result Value Ref Range    WBC 14.1 (H) 4.6 - 13.2 K/uL    RBC 4.33 (L) 4.70 - 5.50 M/uL    HGB 12.3 (L) 13.0 - 16.0 g/dL    HCT 37.3 36.0 - 48.0 %    MCV 86.1 74.0 - 97.0 FL    MCH 28.4 24.0 - 34.0 PG    MCHC 33.0 31.0 - 37.0 g/dL    RDW 17.6 (H) 11.6 - 14.5 %    PLATELET 445 746 - 327 K/uL    MPV 9.7 9.2 - 11.8 FL    NEUTROPHILS 89 (H) 40 - 73 %    LYMPHOCYTES 5 (L) 21 - 52 %    MONOCYTES 6 3 - 10 %    EOSINOPHILS 0 0 - 5 %    BASOPHILS 0 0 - 2 %    ABS. NEUTROPHILS 12.5 (H) 1.8 - 8.0 K/UL    ABS. LYMPHOCYTES 0.7 (L) 0.9 - 3.6 K/UL    ABS. MONOCYTES 0.8 0.05 - 1.2 K/UL    ABS. EOSINOPHILS 0.0 0.0 - 0.4 K/UL    ABS.  BASOPHILS 0.0 0.0 - 0.1 K/UL    DF AUTOMATED     METABOLIC PANEL, COMPREHENSIVE    Collection Time: 10/29/20  5:44 AM   Result Value Ref Range    Sodium 141 136 - 145 mmol/L    Potassium 3.3 (L) 3.5 - 5.5 mmol/L    Chloride 106 100 - 111 mmol/L    CO2 26 21 - 32 mmol/L    Anion gap 9 3.0 - 18 mmol/L    Glucose 80 74 - 99 mg/dL    BUN 20 (H) 7.0 - 18 MG/DL    Creatinine 0.92 0.6 - 1.3 MG/DL    BUN/Creatinine ratio 22 (H) 12 - 20      GFR est AA >60 >60 ml/min/1.73m2    GFR est non-AA >60 >60 ml/min/1.73m2    Calcium 9.2 8.5 - 10.1 MG/DL    Bilirubin, total 0.6 0.2 - 1.0 MG/DL    ALT (SGPT) 20 16 - 61 U/L    AST (SGOT) 15 10 - 38 U/L    Alk. phosphatase 85 45 - 117 U/L    Protein, total 6.9 6.4 - 8.2 g/dL    Albumin 3.6 3.4 - 5.0 g/dL    Globulin 3.3 2.0 - 4.0 g/dL    A-G Ratio 1.1 0.8 - 1.7     PHOSPHORUS    Collection Time: 10/29/20  5:44 AM   Result Value Ref Range    Phosphorus 3.3 2.5 - 4.9 MG/DL   GLUCOSE, POC    Collection Time: 10/29/20  6:42 AM   Result Value Ref Range    Glucose (POC) 80 70 - 110 mg/dL           No results for input(s): FIO2I, IFO2, HCO3I, IHCO3, HCOPOC, PCO2I, PCOPOC, IPHI, PHI, PHPOC, PO2I, PO2POC in the last 72 hours.     No lab exists for component: IPOC2    All Micro Results     Procedure Component Value Units Date/Time    CULTURE, BLOOD [[id number]] Collected:  10/23/20 1611    Order Status:  Completed Specimen:  Blood Updated:  10/29/20 0856     Special Requests: NO SPECIAL REQUESTS        Culture result: NO GROWTH 6 DAYS       CULTURE, BLOOD [[id number]] Collected:  10/23/20 1615    Order Status:  Completed Specimen:  Blood Updated:  10/29/20 0856     Special Requests: NO SPECIAL REQUESTS        Culture result: NO GROWTH 6 DAYS       CULTURE, URINE [[id number]] Collected:  10/23/20 1630    Order Status:  Completed Specimen:  Urine from Clean catch Updated:  10/24/20 2026     Special Requests: NO SPECIAL REQUESTS        Culture result: No growth (<1,000 CFU/ML)             Echo 10/25/2020:  Result status: Final result    · Left Ventricle: Normal cavity size and wall thickness. The estimated EF is 50 - 55%. Low normal systolic function. There is mild (grade 1) left ventricular diastolic dysfunction E/E' ratio is 10.31.  · Pulmonary Artery: Pulmonary arteries not well visualized. Pulmonary arterial systolic pressure (PASP) is 74 mmHg. Pulmonary hypertension found to be severe. PVL LE 10/27/2020:   · Chronic non-occlusive thrombus present in the left proximal femoral vein. · Age indeterminate non-occlusive thrombus present in the right common femoral vein. Limited study secondary to bilateral AKA       CTA chest 10/27/2020:  1. No evidence of pulmonary embolism. 2.  Relatively diffuse bronchial wall thickening and several areas of  endobronchial impaction/occlusion. 3. Left lower lobe pneumonia with additional area of peribronchial alveolar  consolidation posterior right upper lobe.     > Recommend radiographic follow-up to document expected clinical resolution  following appropriate treatment in 4-6 weeks. 4. Moderately severe upper lobe predominant centrilobular emphysema. 5. Small left and trace right pleural effusions. 6. Extensive atherosclerotic vascular disease both above and below the  diaphragm. Imaging:  [x]I have personally reviewed the patients chest radiographs images and report   Results from Hospital Encounter encounter on 10/23/20   XR CHEST PORT    Narrative EXAM: XR CHEST PORT    CLINICAL INDICATION/HISTORY: Shortness of breath with possible aspiration event  -Additional: None    COMPARISON: Radiographs 10/6/2020    TECHNIQUE: Frontal view of the chest    _______________    FINDINGS:    HEART AND MEDIASTINUM: Stable appearing cardiac size and mediastinal contours. LUNGS AND PLEURAL SPACES: Faint peripheral right upper lung opacity noted with  additional left basilar density and accompanying pleural effusion. Right lung  bases clear. Lungs are hyperinflated.  No pneumothorax. BONY THORAX AND SOFT TISSUES: No acute osseous abnormality    _______________      Impression IMPRESSION:    Hyperinflated lungs without radiographic stigmata paralleling those seen on  recent prior chest CT, to include right upper and left basilar infiltrates with  small left pleural effusion. Results from [place] encounter on 10/23/20   CTA CHEST W OR W WO CONT    Narrative EXAM: CTA Chest    INDICATION: 63year-old patient presently admitted with pneumonia, worsening of  hypoxia. Evaluation for pulmonary embolism. COMPARISON: CT chest 9/7/2012; prior chest radiographs, as recently 10/26/2020. TECHNIQUE: Axial CT imaging from the thoracic inlet through the diaphragm with  intravenous contrast utilizing CTA study for pulmonary artery evaluation. Coronal and sagittal MIP reformations were generated at a separate workstation. One or more dose reduction techniques were used on this CT: automated exposure  control, adjustment of the mAs and/or kVp according to patient size, and  iterative reconstruction techniques. The specific techniques used on this CT  exam have been documented in the patient's electronic medical record. Digital  Imaging and Communications in Medicine (DICOM) format image data are available  to nonaffiliated external healthcare facilities or entities on a secure, media  free, reciprocally searchable basis with patient authorization for at least a  12-month period after this study. _______________    FINDINGS:    EXAM QUALITY: Overall exam quality is adequate. Pulmonary arterial enhancement  is adequate. The breath hold is satisfactory. PULMONARY ARTERIES: No convincing evidence of pulmonary embolism. MEDIASTINUM: Included gland is unremarkable. Cardiac size is normal. There is no  pericardial effusion. Multivessel coronary arterial atherosclerotic vascular  calcification is present.  Extensive atherosclerotic vascular calcification of  the thoracic aorta noted without evidence of aneurysmal dilatation. LYMPH NODES: No enlarged mediastinal or hilar nodes by size criteria. AIRWAY: Diffuse bronchial wall thickening with endobronchial debris present  within the bronchus intermedius and right lower lobe segmental bronchi. Additional left lower lobe and segmental bronchial impaction. LUNGS: Focal area of low attenuating consolidation within the medial portion  left lower lobe with adjacent areas of atelectasis. Patchy peribronchial  consolidation within the posterior aspect right upper lobe. Moderately severe  upper lobe predominant centrilobular emphysema. No significant groundglass  abnormality or abnormal septal line thickening. PLEURA: Trace right and small left pleural effusions. No evidence of  pneumothorax. UPPER ABDOMEN: Marked atherosclerotic vascular calcifications present throughout  the suprarenal and infrarenal abdominal aorta. Likely layering biliary sludge  within the gallbladder. OTHER: No acute or aggressive osseous abnormalities identified. Multilevel  degenerative wedging of the thoracic spine noted. Prominent Schmorl's node  superior endplate E74.    _______________      Impression IMPRESSION:    1. No evidence of pulmonary embolism. 2.  Relatively diffuse bronchial wall thickening and several areas of  endobronchial impaction/occlusion. 3. Left lower lobe pneumonia with additional area of peribronchial alveolar  consolidation posterior right upper lobe.     > Recommend radiographic follow-up to document expected clinical resolution  following appropriate treatment in 4-6 weeks. 4. Moderately severe upper lobe predominant centrilobular emphysema. 5. Small left and trace right pleural effusions. 6. Extensive atherosclerotic vascular disease both above and below the  diaphragm.            Brian Rose MD  10/29/2020

## 2020-10-29 NOTE — PROGRESS NOTES
@1915 pt taken over awake and restless in bed. Denies pain presently. Remains on high flow nasal cannula ,soft restraints to bilateral wrist. incontinent  Brief remains in situ ,clear yellow urine observed. Assessment done and charted in appropriate flow sheets. Nursing management continues. @9859 reassessment completed, pt respiratory rate increased, RT at bedside and made changes to High-flow nasal cannula settings. SPO2 improved observation continues. @0400 pt reassessed no changes. @Bedside and Verbal shift change report given to Sole Cunningham (oncoming nurse) by Anna Romero (offgoing nurse). Report included the following information SBAR, Kardex, Intake/Output, MAR, Recent Results, Med Rec Status and Alarm Parameters .

## 2020-10-29 NOTE — PROGRESS NOTES
Comprehensive Nutrition Assessment    Type and Reason for Visit: (P) Reassess, Positive nutrition screen    Nutrition Recommendations/Plan: Diet: Adv diet per MD; Avoid prolonged NPO diet order as it does not meet estimated needs    Nutrition Assessment:  (P) pt admitted w/ COPD with right upper lobe pneumonia questionable mass/emphysema /aspiration pna, acute resp failure, septic shock, chest pain-echo done cardiology following, metabolic encepahlopathy, pocketed food and aspirated yesterday now NPO awaiting SLP eval    Malnutrition Assessment:  Malnutrition Status:  Severe malnutrition    Context:  Chronic illness     Findings of the 6 clinical characteristics of malnutrition:   Energy Intake:  7 - 75% or less est energy requirements for 1 month or longer  Weight Loss:  No significant weight loss     Body Fat Loss:  7 - Severe body fat loss, Orbital, Triceps   Muscle Mass Loss:  7 - Severe muscle mass loss, Temples (temporalis), Clavicles (pectoralis &deltoids)  Fluid Accumulation:  Unable to assess,     Strength:  Not performed         Estimated Daily Nutrient Needs:  Energy (kcal): (P) 2456; Weight Used for Energy Requirements: (P) Current  Protein (g): (P) 64-86; Weight Used for Protein Requirements: (P) Current  Fluid (ml/day): (P) 2456; Weight Used for Fluid Requirements: (P) Current      Nutrition Related Findings:  (P) K-3.3 Meds: methylprednisolone, protonix, KCl, zofran, miralax, lopressor,zofran +BM 10/29/20; pocketing food and aspiration event yesterday per nursing note now NPO awaiting SLP eval      Wounds:    (P) None       Current Nutrition Therapies:  DIET SNACKS Lunch, Dinner; [employer]  DIET NPO Except Meds    Anthropometric Measures:  · Height:  (P) 5' 4\" (162.6 cm)  · Current Body Wt:  (P) 43.1 kg (95 lb)   · Admission Body Wt:  (P) 95 lb    · Usual Body Wt:        · Ideal Body Wt:  (P) 130 lbs:  (P) 73.1 %   · Adjusted Body Weight:  (P) 106.2;  Weight Adjustment for: (P) Amputation · Adjusted BMI:  (P) 18.2    · BMI Category:  (P) Underweight (BMI less than 18.5)       Nutrition Diagnosis:   · Severe malnutrition related to impaired respiratory function as evidenced by severe muscle loss, severe loss of subcutaneous fat, NPO or clear liquid status due to medical condition      Nutrition Interventions:   Food and/or Nutrient Delivery: (P) Start oral diet  Nutrition Education and Counseling: (P) No recommendations at this time  Coordination of Nutrition Care: (P) Continue to monitor while inpatient, Interdisciplinary rounds    Goals:  (P) Adv diet per MD in the next 1-4days       Nutrition Monitoring and Evaluation:   Behavioral-Environmental Outcomes:    Food/Nutrient Intake Outcomes: (P) Diet advancement/tolerance, Food and nutrient intake  Physical Signs/Symptoms Outcomes: (P) Meal time behavior, Nutrition focused physical findings, Weight, Skin, GI status, Biochemical data    Discharge Planning:    (P) Too soon to determine     Electronically signed by Gary Stratton on 10/29/2020 at 8:00 AM

## 2020-10-29 NOTE — PROGRESS NOTES
10/29/20 1910   Vitals   Pulse (Heart Rate) 71   Resp Rate (!) 32   O2 Sat (%) 100 %   O2 Device Heated; Hi flow nasal cannula   O2 Flow Rate (L/min) 50 l/min   FIO2 (%) 60 %  (sats 100%)

## 2020-10-29 NOTE — PROGRESS NOTES
RESPIRATORY NOTE:  Attempted to titrate FIO2 to 40% via, did not tolerate long, back up to 50% 50 LPM at this time.

## 2020-10-29 NOTE — PROGRESS NOTES
Bedside shift change report received from [facility]. Report included the following information SBAR, Kardex, Intake/Output, MAR, Recent Results, Med Rec Status and Cardiac Rhythm NSR and plan of care. Pt having s/s of aspiration yesterday dayshift and last night requiring additional respiratory support via hfnc. Pt has been kept NPO. New speech eval ordered today.

## 2020-10-29 NOTE — PROGRESS NOTES
Bedside and Verbal shift change report given to Vanita Davidson RN (oncoming nurse) by Cammie Gardner RN (offgoing nurse). Report included the following information SBAR, Kardex and MAR.

## 2020-10-29 NOTE — PROGRESS NOTES
RESPIRATORY NOTE:  Pt found on HFNC 50 LPM 50% FIO2 at this time, SPO2 94% at this time, will continue to monitor.

## 2020-10-29 NOTE — PROGRESS NOTES
Hospitalist Progress Note    Patient: Carolyn Pinedo Sr. MRN: [id number]  CSN: [id number]    YOB: 1955  Age: 63 y.o. Sex: male    DOA: 10/23/2020 LOS:  LOS: 6 days                Assessment/Plan     Patient Active Problem List   Diagnosis Code    Bursitis of elbow M70.30    Medication overdose T50.901A    Polio A80.9    Depressive disorder, not elsewhere classified F32.9    Type II or unspecified type diabetes mellitus without mention of complication, uncontrolled [id number]    Hypotension, unspecified I95.9    Amputation of both lower extremities ([address] S88.911A, J74.359F    PNA (pneumonia) J18.9    ALEXANDER (acute kidney injury) ([address] N17.9    Hyponatremia E87.1    Marijuana abuse F12.10    Centrilobular emphysema ([address] J43.2    CAP (community acquired pneumonia) J18.9    Sepsis ([address] A41.9    Hard of hearing H91.90    Legal blindness H54.8    Acute encephalopathy G93.40    Septic shock ([address] A41.9, R65.21    Chronic obstructive pulmonary disease with acute exacerbation ([address] J44.1    Severe protein-calorie malnutrition ([address] G07    Metabolic encephalopathy W63.93    Acute respiratory failure with hypoxia ([address] J96.01    Aspiration pneumonia ([address] J69.0    Deep vein thrombosis (DVT) of lower extremity ([address] I80.56            82-year-old male with a history of a COPD, bilateral above knee amputation, who is admitted for SOB, septic shock and metabolic encephalopathy. Patient moaning, awake    CRITICAL CARE PLAN    Resp -   Acute respiratory failure with hypoxia -  On high flow Oxygen, Cta chest no pe, but pna and Relatively diffuse bronchial wall thickening and several areas of endobronchial impaction/occlusion/emphesema      COPD with right upper lobe pneumonia questionable mass/emphysema /aspiration pna   continue chest  PT and suction , aspiration precaution   Continue iv abx      ID -  blood and urine cx no growth. ANTIBIOTICS zosyn. pneumonia    CVS - Monitor HD. Septic shock, resolved, off pressors  Prolonged QT, cardiology following  HTN - on amlodipine and metoprolol. meds on hold due to NPO. IV lopressor until then    Heme/onc - Follow H&H, plts. Chronic DVT on LE dopplers  On full dose anticoagulation. Renal - Trend BUN, Cr, follow I/O. Check and replace Mg, K, phos. Endocrine -  Follow FSG    Neuro/ Pain/ Sedation -  Metabolic encephalopathy  UDS positive for THC  CT head with no acute findings  On seroquel  Legally blind    GI - NPO for now. Seen by SLP, for MBS tomorrow. Prophylaxis - DVT: heparin, GI: protonix      Disposition : TBD    Physical Exam:  General: As above    HEENT: NC, Atraumatic. PERRLA, anicteric sclerae. Lungs: CTA Bilaterally. No Wheezing/Rhonchi/Rales.   Heart:  S1 S2,  No murmur, No Rubs, No Gallops  Abdomen: Soft, Non distended, Non tender.  +Bowel sounds,   Extremities: Bilateral AKA          Vital signs/Intake and Output:  Visit Vitals  BP (!) 143/90   Pulse (!) 112   Temp 98.2 °F (36.8 °C)   Resp (!) 32   Ht 5' 4\" (1.626 m)   Wt 43.1 kg (95 lb)   SpO2 92%   BMI 16.31 kg/m²     Current Shift:  10/29 0701 - 10/29 1900  In: 300 [I.V.:300]  Out: -   Last three shifts:  10/27 1901 - 10/29 0700  In: 1080 [P.O.:480; I.V.:600]  Out: -             Labs: Results:       Chemistry Recent Labs     10/29/20  0544 10/28/20  0500 10/27/20  1758 10/27/20  0539   GLU 80 92  --  104*    142  --  143   K 3.3* 4.2 3.6 3.3*    110  --  110   CO2 26 23  --  22   BUN 20* 20*  --  21*   CREA 0.92 0.78  --  0.84   CA 9.2 9.3  --  9.0   AGAP 9 9  --  11   BUCR 22* 26*  --  25*   AP 85 71  --  69   TP 6.9 6.2*  --  6.4   ALB 3.6 3.3*  --  3.5   GLOB 3.3 2.9  --  2.9   AGRAT 1.1 1.1  --  1.2      CBC w/Diff Recent Labs     10/29/20  0544 10/28/20  0500 10/27/20  0539   WBC 14.1* 14.2* 8.6   RBC 4.33* 3.91* 3.72*   HGB 12.3* 10.9* 10.7*   HCT 37.3 33.4* 31.9*    334 304   GRANS 89* 94* 93*   LYMPH 5* 3* 5*   EOS 0 0 0      Cardiac Enzymes Recent Labs     10/26/20  1845      CKND1 1.3      Coagulation No results for input(s): PTP, INR, APTT, INREXT in the last 72 hours. Lipid Panel No results found for: CHOL, CHOLPOCT, CHOLX, CHLST, CHOLV, 748712, HDL, HDLP, LDL, LDLC, DLDLP, 641478, VLDLC, VLDL, TGLX, TRIGL, TRIGP, TGLPOCT, CHHD, CHHDX   BNP No results for input(s): BNPP in the last 72 hours.    Liver Enzymes Recent Labs     10/29/20  0544   TP 6.9   ALB 3.6   AP 85      Thyroid Studies Lab Results   Component Value Date/Time    TSH 0.78 10/24/2020 11:30 AM        Procedures/imaging: see electronic medical records for all procedures/Xrays and details which were not copied into this note but were reviewed prior to creation of Plan

## 2020-10-29 NOTE — PROGRESS NOTES
10/29/20 0021   Oxygen Therapy   O2 Sat (%) 100 %   Pulse via Oximetry 82 beats per minute   O2 Device Heated; Hi flow nasal cannula   O2 Flow Rate (L/min) 50 l/min  (increased due to work of breathing)   O2 Temperature 93.2 °F (34 °C)   FIO2 (%) 40 %  (decreased based on SPO2 100%)     Respiratory rate 32-34, increased flow, patient appears more comfortable( respiratory rate 24-28), FIO2 reduced to 40% with SPo2 98% RN notified of changes.

## 2020-10-29 NOTE — DIABETES MGMT
GLYCEMIC CONTROL PROGRESS NOTE:    - known h/o T2DM, HbA1C within recommended range for age + comorbids  - Humalog Normal Insulin Sensitivity Corrective Coverage orders in place recommend continue    Recent Glucose Results:   Lab Results   Component Value Date/Time    GLU 80 10/29/2020 05:44 AM    GLUCPOC 80 10/29/2020 06:42 AM    GLUCPOC 92 10/28/2020 11:14 PM    GLUCPOC 91 10/28/2020 04:00 PM     Marcos Stanford MS, RN, CDE  Glycemic Control Team  [phone]  Pager [phone] (-TH 8:00-4:30P)  *After Hours pager [phone]

## 2020-10-29 NOTE — PROGRESS NOTES
Chart reviewed noted palliative and icu physician unable to get in touch with family to discuss goals of care, cm will cont to review and remain available for d/c planner.

## 2020-10-30 LAB
ALBUMIN SERPL-MCNC: 3.4 G/DL (ref 3.4–5)
ALBUMIN/GLOB SERPL: 1.1 {RATIO} (ref 0.8–1.7)
ALP SERPL-CCNC: 89 U/L (ref 45–117)
ALT SERPL-CCNC: 19 U/L (ref 16–61)
ANION GAP SERPL CALC-SCNC: 11 MMOL/L (ref 3–18)
AST SERPL-CCNC: 12 U/L (ref 10–38)
BASOPHILS # BLD: 0 K/UL (ref 0–0.1)
BASOPHILS NFR BLD: 0 % (ref 0–2)
BILIRUB SERPL-MCNC: 0.8 MG/DL (ref 0.2–1)
BUN SERPL-MCNC: 22 MG/DL (ref 7–18)
BUN/CREAT SERPL: 26 (ref 12–20)
CALCIUM SERPL-MCNC: 9.4 MG/DL (ref 8.5–10.1)
CHLORIDE SERPL-SCNC: 106 MMOL/L (ref 100–111)
CO2 SERPL-SCNC: 21 MMOL/L (ref 21–32)
CREAT SERPL-MCNC: 0.84 MG/DL (ref 0.6–1.3)
DIFFERENTIAL METHOD BLD: ABNORMAL
EOSINOPHIL # BLD: 0 K/UL (ref 0–0.4)
EOSINOPHIL NFR BLD: 0 % (ref 0–5)
ERYTHROCYTE [DISTWIDTH] IN BLOOD BY AUTOMATED COUNT: 17.2 % (ref 11.6–14.5)
GLOBULIN SER CALC-MCNC: 3.2 G/DL (ref 2–4)
GLUCOSE BLD STRIP.AUTO-MCNC: 78 MG/DL (ref 70–110)
GLUCOSE BLD STRIP.AUTO-MCNC: 81 MG/DL (ref 70–110)
GLUCOSE BLD STRIP.AUTO-MCNC: 85 MG/DL (ref 70–110)
GLUCOSE SERPL-MCNC: 81 MG/DL (ref 74–99)
HCT VFR BLD AUTO: 36.5 % (ref 36–48)
HGB BLD-MCNC: 12.1 G/DL (ref 13–16)
LYMPHOCYTES # BLD: 0.5 K/UL (ref 0.9–3.6)
LYMPHOCYTES NFR BLD: 3 % (ref 21–52)
MAGNESIUM SERPL-MCNC: 2 MG/DL (ref 1.6–2.6)
MCH RBC QN AUTO: 28.3 PG (ref 24–34)
MCHC RBC AUTO-ENTMCNC: 33.2 G/DL (ref 31–37)
MCV RBC AUTO: 85.3 FL (ref 74–97)
MONOCYTES # BLD: 1.2 K/UL (ref 0.05–1.2)
MONOCYTES NFR BLD: 6 % (ref 3–10)
NEUTS SEG # BLD: 18.5 K/UL (ref 1.8–8)
NEUTS SEG NFR BLD: 91 % (ref 40–73)
PLATELET # BLD AUTO: 363 K/UL (ref 135–420)
PMV BLD AUTO: 10.3 FL (ref 9.2–11.8)
POTASSIUM SERPL-SCNC: 3.7 MMOL/L (ref 3.5–5.5)
POTASSIUM SERPL-SCNC: 4 MMOL/L (ref 3.5–5.5)
PROT SERPL-MCNC: 6.6 G/DL (ref 6.4–8.2)
RBC # BLD AUTO: 4.28 M/UL (ref 4.7–5.5)
SODIUM SERPL-SCNC: 138 MMOL/L (ref 136–145)
WBC # BLD AUTO: 20.2 K/UL (ref 4.6–13.2)

## 2020-10-30 PROCEDURE — 74011000250 HC RX REV CODE- 250: Performed by: INTERNAL MEDICINE

## 2020-10-30 PROCEDURE — 36415 COLL VENOUS BLD VENIPUNCTURE: CPT

## 2020-10-30 PROCEDURE — 77010033711 HC HIGH FLOW OXYGEN

## 2020-10-30 PROCEDURE — 85025 COMPLETE CBC W/AUTO DIFF WBC: CPT

## 2020-10-30 PROCEDURE — 74011250636 HC RX REV CODE- 250/636: Performed by: INTERNAL MEDICINE

## 2020-10-30 PROCEDURE — 94640 AIRWAY INHALATION TREATMENT: CPT

## 2020-10-30 PROCEDURE — C9113 INJ PANTOPRAZOLE SODIUM, VIA: HCPCS | Performed by: INTERNAL MEDICINE

## 2020-10-30 PROCEDURE — 2709999900 HC NON-CHARGEABLE SUPPLY

## 2020-10-30 PROCEDURE — 65610000006 HC RM INTENSIVE CARE

## 2020-10-30 PROCEDURE — 74011250636 HC RX REV CODE- 250/636: Performed by: FAMILY MEDICINE

## 2020-10-30 PROCEDURE — 83735 ASSAY OF MAGNESIUM: CPT

## 2020-10-30 PROCEDURE — 74011000258 HC RX REV CODE- 258: Performed by: EMERGENCY MEDICINE

## 2020-10-30 PROCEDURE — 99233 SBSQ HOSP IP/OBS HIGH 50: CPT | Performed by: NURSE PRACTITIONER

## 2020-10-30 PROCEDURE — 82962 GLUCOSE BLOOD TEST: CPT

## 2020-10-30 PROCEDURE — 74011250636 HC RX REV CODE- 250/636: Performed by: HOSPITALIST

## 2020-10-30 PROCEDURE — 74011250637 HC RX REV CODE- 250/637: Performed by: FAMILY MEDICINE

## 2020-10-30 PROCEDURE — 74011250636 HC RX REV CODE- 250/636: Performed by: EMERGENCY MEDICINE

## 2020-10-30 PROCEDURE — 84132 ASSAY OF SERUM POTASSIUM: CPT

## 2020-10-30 RX ORDER — QUETIAPINE FUMARATE 25 MG/1
100 TABLET, FILM COATED ORAL
Status: DISCONTINUED | OUTPATIENT
Start: 2020-10-30 | End: 2020-10-31

## 2020-10-30 RX ORDER — POTASSIUM CHLORIDE 7.45 MG/ML
10 INJECTION INTRAVENOUS
Status: COMPLETED | OUTPATIENT
Start: 2020-10-30 | End: 2020-10-30

## 2020-10-30 RX ORDER — INSULIN LISPRO 100 [IU]/ML
INJECTION, SOLUTION INTRAVENOUS; SUBCUTANEOUS EVERY 6 HOURS
Status: DISCONTINUED | OUTPATIENT
Start: 2020-10-31 | End: 2020-11-05

## 2020-10-30 RX ADMIN — PIPERACILLIN AND TAZOBACTAM 3.38 G: 3; .375 INJECTION, POWDER, LYOPHILIZED, FOR SOLUTION INTRAVENOUS at 21:46

## 2020-10-30 RX ADMIN — BUDESONIDE 500 MCG: 0.25 INHALANT RESPIRATORY (INHALATION) at 07:28

## 2020-10-30 RX ADMIN — SODIUM CHLORIDE 10 ML: 9 INJECTION, SOLUTION INTRAMUSCULAR; INTRAVENOUS; SUBCUTANEOUS at 21:47

## 2020-10-30 RX ADMIN — HYDRALAZINE HYDROCHLORIDE 20 MG: 20 INJECTION INTRAMUSCULAR; INTRAVENOUS at 10:58

## 2020-10-30 RX ADMIN — POTASSIUM CHLORIDE 10 MEQ: 7.46 INJECTION, SOLUTION INTRAVENOUS at 09:24

## 2020-10-30 RX ADMIN — POTASSIUM CHLORIDE 10 MEQ: 7.46 INJECTION, SOLUTION INTRAVENOUS at 10:26

## 2020-10-30 RX ADMIN — IPRATROPIUM BROMIDE 0.5 MG: 0.5 SOLUTION RESPIRATORY (INHALATION) at 15:27

## 2020-10-30 RX ADMIN — PIPERACILLIN AND TAZOBACTAM 3.38 G: 3; .375 INJECTION, POWDER, LYOPHILIZED, FOR SOLUTION INTRAVENOUS at 11:16

## 2020-10-30 RX ADMIN — METOPROLOL TARTRATE 5 MG: 5 INJECTION INTRAVENOUS at 07:00

## 2020-10-30 RX ADMIN — BUDESONIDE 500 MCG: 0.25 INHALANT RESPIRATORY (INHALATION) at 19:57

## 2020-10-30 RX ADMIN — ENOXAPARIN SODIUM 40 MG: 40 INJECTION SUBCUTANEOUS at 21:46

## 2020-10-30 RX ADMIN — IPRATROPIUM BROMIDE 0.5 MG: 0.5 SOLUTION RESPIRATORY (INHALATION) at 11:30

## 2020-10-30 RX ADMIN — SODIUM CHLORIDE 10 ML: 9 INJECTION, SOLUTION INTRAMUSCULAR; INTRAVENOUS; SUBCUTANEOUS at 13:14

## 2020-10-30 RX ADMIN — HALOPERIDOL LACTATE 5 MG: 5 INJECTION, SOLUTION INTRAMUSCULAR at 21:46

## 2020-10-30 RX ADMIN — ENOXAPARIN SODIUM 40 MG: 40 INJECTION SUBCUTANEOUS at 08:02

## 2020-10-30 RX ADMIN — IPRATROPIUM BROMIDE 0.5 MG: 0.5 SOLUTION RESPIRATORY (INHALATION) at 19:57

## 2020-10-30 RX ADMIN — LORAZEPAM 1 MG: 2 INJECTION INTRAMUSCULAR at 11:14

## 2020-10-30 RX ADMIN — HALOPERIDOL LACTATE 5 MG: 5 INJECTION, SOLUTION INTRAMUSCULAR at 11:54

## 2020-10-30 RX ADMIN — PIPERACILLIN AND TAZOBACTAM 3.38 G: 3; .375 INJECTION, POWDER, LYOPHILIZED, FOR SOLUTION INTRAVENOUS at 05:17

## 2020-10-30 RX ADMIN — METOPROLOL TARTRATE 5 MG: 5 INJECTION INTRAVENOUS at 11:15

## 2020-10-30 RX ADMIN — IPRATROPIUM BROMIDE 0.5 MG: 0.5 SOLUTION RESPIRATORY (INHALATION) at 07:28

## 2020-10-30 RX ADMIN — METOPROLOL TARTRATE 5 MG: 5 INJECTION INTRAVENOUS at 17:17

## 2020-10-30 RX ADMIN — SODIUM CHLORIDE 10 ML: 9 INJECTION, SOLUTION INTRAMUSCULAR; INTRAVENOUS; SUBCUTANEOUS at 06:00

## 2020-10-30 RX ADMIN — LORAZEPAM 1 MG: 2 INJECTION INTRAMUSCULAR at 17:17

## 2020-10-30 RX ADMIN — SODIUM CHLORIDE 40 MG: 9 INJECTION INTRAMUSCULAR; INTRAVENOUS; SUBCUTANEOUS at 10:58

## 2020-10-30 NOTE — PROGRESS NOTES
[facility]  Pulmonary, Critical Care, and Sleep Medicine    Name: Maria T Roberts MRN: [id number]   : 1955 Hospital: [facility] [place]   Date: 10/30/2020        Pulmonary Critical Care Note    IMPRESSION:     Acute respiratory failure with hypoxia (HCC) J96.01     Sepsis with organ dysfunction, resolved shock ([address] A41.9, R65.20     Aspiration pneumonia ([address] J69.0     Deep vein thrombosis (DVT) of lower extremity ([address] I82.409       Patient Active Problem List   Diagnosis Code    Bursitis of elbow M70.30    Medication overdose T50.901A    Polio A80.9    Depressive disorder, not elsewhere classified F32.9    Type II or unspecified type diabetes mellitus without mention of complication, uncontrolled [id number]    Hypotension, unspecified I95.9    Amputation of both lower extremities ([address] S88.911A, V68.896H    PNA (pneumonia) J18.9    ALEXANDER (acute kidney injury) ([address] N17.9    Hyponatremia E87.1    Marijuana abuse F12.10    Centrilobular emphysema ([address] J43.2    CAP (community acquired pneumonia) J18.9    Sepsis ([address] A41.9    Hard of hearing H91.90    Legal blindness H54.8    Acute encephalopathy G93.40    Septic shock (HCC) A41.9, R65.21    Chronic obstructive pulmonary disease with acute exacerbation (HCC) J44.1    Severe protein-calorie malnutrition ([address] G04    Metabolic encephalopathy V16.57    Acute respiratory failure with hypoxia (HCC) J96.01    Aspiration pneumonia ([address] J69.0    Deep vein thrombosis (DVT) of lower extremity ([address] I82.409          RECOMMENDATIONS:   Respiratory: Hx smoking. CTA chest 10/27/2020: No PE. Diffuse peribronchial wall thickening and several areas of endobronchial impaction/occlusion. LLL pneumonia with peribronchial consolidation posterior RUL. Moderately severe upper lobe predominant centrilobular emphysema. Small left and trace right pleural effusion. Recommend repeat CT in 4 to 6 weeks as outpatient.   Repeat CXR today showed similar findings. Still requiring HFNC. Intermittent aspiration of oral secretions reported by nurses. Seen by speech therapist, MBS pending today. Continue n.p.o. status and frequent orotracheal/nasotracheal suctioning as required and chest percussion. Patient is at high risk for aspiration and choking leading to respiratory failure on ventilator need. Palliative care and I was not able to reach patient's son yesterday. I just spoke with Anitra Villeda on the phone who is planning to come to hospital in couple hours to discuss further goal of care. Patient remains full code until then. Discussed with RT to titrate FiO2 to nasal cannula. Repeat CXR intermittently. Titrate FiO2 to keep SPO2 > 91%. Steroids: Off Solu-Medrol since 10/29/2020. Bronchodilators: Continue Pulmicort twice daily, Atrovent 4 times daily. Continue DuoNeb as needed. Continue pulmonary hygiene and toileting. Continue aspiration precautions. HOB more than 30 degrees all the time. ID:   No fever. Worsening leukocytosis could be due to aspiration pneumonia. Urine culture: <100 K. Blood culture: NGTD. Urine antigen panel negative. COVID-19 10/14/2020: Negative. Antibiotic choice: Doxycycline discontinued 10/28/2020. DC vancomycin 10/29/2020. Continue Zosyn. Follow cultures. Deescalate antibiotic when appropriate. CVS:   Echo 10/25/2020: LVEF 50 to 55%. Grade 1 DD. Estimated PASP 74 mmHg. Continue Norvasc and metoprolol, home dose. Continue Lipitor. Estimated RVSP 74 mmHg on echo, could be due to hypoxic vasoconstriction. Recommend repeat echo as outpatient. PVL LE 10/27/2020: Chronic nonocclusive thrombus left proximal femoral vein. Age indeterminate nonocclusive thrombus right femoral vein. Due to DVT, will continue Lovenox 40 mg subcutaneously every 12 hours. May transition to oral anticoagulants after speech therapy evaluation completed and MBS completed.   Elevated blood pressure intermittently likely due to agitation. Continue IV Lopressor. When able to take p.o., change Lopressor to p.o. and then restart Norvasc. Lipitor on hold due to being n.p.o. Renal: Monitor renal functions, lytes  Normal creatinine and potassium. Monitor urine output, creatinine and electrolytes. Heme: Mild anemia. Normal platelets and coags. On Lovenox for DVT. No active external bleeding. Monitor closely. Endo: Glycemic control. Monitor for any hypoglycemia. TSH normal    GI: No acute GI issues. Neurology:   Alert awake oriented x3. Continues to moan all day long. Urine drug screen positive for THC on admission. ??  Baseline mild dementia. CT head on admission chronic microvascular disease, no acute intracranial abnormality. Ammonia normal.  Increase Seroquel to 100 mg p.o. nightly. PAIN AND SEDATION: none    Prophylaxis:  DVT treatment Lovenox. GI prophylaxis Protonix. Restraints: Wrist soft restraints as needed for patient interfering with medical therapy/management and patient safety. Lines/Tubes:   Central line: Right femoral 10/23/20discontinued 10/26/2020. Lindo: 10/23/20discontinued 10/27/2020. Condom catheter: 10/27/2020. Currently patient does not have any Lindo catheter or central line. Overall poor prognosis. ADVANCE DIRECTIVE: Full code  Palliative care consult team on board. Will defer respective systems problem management to primary and other consultant and follow patient in ICU with primary and other medical team  Further recommendations will be based on the patient's response to recommended treatment and results of the investigation ordered. Quality Care: PPI, DVT prophylaxis, HOB elevated, Infection control all reviewed and addressed. DISCUSSION: Events and notes from last 24 hours reviewed. Care plan discussed with nursing, hospitalist, ICU staff, RT, MDR.  D/w patient (answered all questions to satisfaction).    Family discussion: I called Jeff Smyth [phone] and I was able to reach him today. He picked up the phone today. [place] signal coverage and so he gave his girlfriend Chantel's cell phone number as a backup [phone]. I explained him the critical condition of patient including aspiration which can lead to respiratory failure and need for ventilator etc.  I educated him with all above medical problems. Son is planning to come to hospital in next couple hours to further discuss the goal of care. High complexity decision making was performed during the evaluation of this patient at high risk for decompensation with multiple organ involvement. Total critical care time spent rendering care exclusive of procedures/family discussion/coordination of care: 34 minutes. Subjective/History:   Mr. Lakeisha Baron. has been seen and evaluated as Dr. Christine Keenan requested for assisting with ICU care of septic shock. Patient unable to provide history. Patient is a 63 y.o. male COPD, bilateral above-knee amputation, recently discharged from [facility] after treated for pneumonia. Per chart, since discharge about a week ago patient has been in the ER few times at other facilities with complaints of cough, shortness of breath and hypoxemia; had a CT scan done at [facility] which showed persistent pneumonia. Per chart, patient had not picked up his antibiotics that he was discharged on until recently. He was brought to ER with c/o of chest pain between shoulders by EMS. In the ER he was found to be confused and combative with hypothermia, hypotension. He was treated with IVF boluses and Levophed. CT head on admission nil acute. CXR showed improving pneumonia. He has remained confused at the time of this evaluation at bedside in rm 102 in ICU.      10/30/20   Patient remains in ICU room 102. Still requiring HFNC 50% 50 LPM.  Intermittently confused.   No excessive cough or sputum production but nurses has noticed intermittent coughing. No dyspnea. I was not able to reach son yesterday. Palliative care team was also not able to reach the son or leave a message. Palliative care has arranged for  to find the son. No fever. Worsening leukocytosis. Blood pressure stable but mildly intermittently elevated, likely due to agitation. On Lovenox, without any external noticeable bleeding. Patient continued to mumble and moan all day long. No pain. He knows the president is Lilliana Jimenez, ER is 2020 and he is at [facility].  PCCM was not called for any issues overnight. No other overnight issues reported. Review of Systems:  Review of systems not obtained due to patient factors. Intake/Output Summary (Last 24 hours) at 10/30/2020 1025  Last data filed at 10/30/2020 0659  Gross per 24 hour   Intake 300 ml   Output 575 ml   Net -275 ml                 Latest lactic acid:   Lactic acid   Date Value Ref Range Status   10/24/2020 0.9 0.4 - 2.0 MMOL/L Final   10/23/2020 2.7 (HH) 0.4 - 2.0 MMOL/L Final     Comment:     CALLED TO AND CORRECTLY REPEATED BY:  LEXIE ROJAS RN ED BY CAM ON 10/23/20 AT 1738.     10/23/2020 3.3 (HH) 0.4 - 2.0 MMOL/L Final     Comment:     CALLED TO AND CORRECTLY REPEATED BY:  Josseline Ross RN ED BY CAM ON 10/23/20 AT 1700. Past Medical History:  Past Medical History:   Diagnosis Date    Amputation of both lower extremities ([address]     Amputee, above knee, left ([address]     At risk for falls     Chronic pain     back and legs    Diabetes ([address]     Hypercholesterolemia     Hypertension     Polio         Past Surgical History:  Past Surgical History:   Procedure Laterality Date    HX HERNIA REPAIR      HX OTHER SURGICAL      carpal tunnel     HX OTHER SURGICAL      cyst        Medications:  Prior to Admission medications    Medication Sig Start Date End Date Taking?  Authorizing Provider   amoxicillin-clavulanate (AUGMENTIN) 875-125 mg per tablet Take 1 Tab by mouth every twelve (12) hours. 10/20/20   Mason Chua MD   acetaminophen (TYLENOL) 325 mg tablet Take  by mouth every four (4) hours as needed for Pain. Tiffany Saravia MD   amLODIPine (NORVASC) 5 mg tablet Take 5 mg by mouth daily. Tiffany Saravia MD   gabapentin (NEURONTIN) 100 mg capsule Take 100 mg by mouth three (3) times daily. Tiffany Saravia MD   clonazePAM (KLONOPIN) 0.5 mg tablet Take 0.5 mg by mouth nightly as needed. Tiffany Saravia MD   atorvastatin (LIPITOR) 20 mg tablet Take 20 mg by mouth daily. Tiffany Saravia MD   nitroglycerin (NITROSTAT) 0.4 mg SL tablet 0.4 mg by SubLINGual route every five (5) minutes as needed for Chest Pain. Up to 3 doses. Tiffany Saravia MD   metoprolol succinate (TOPROL XL) 25 mg XL tablet Take 25 mg by mouth daily. Tiffany Saravia MD   oxyCODONE-acetaminophen (PERCOCET) 5-325 mg per tablet Take  by mouth every four (4) hours as needed for Pain. Tiffany Saravia MD   traMADol (ULTRAM) 50 mg tablet Take 1 Tab by mouth every six (6) hours as needed for Pain. Max Daily Amount: 200 mg. 12/4/18   Fara Clayton MD   simvastatin (ZOCOR) 20 mg tablet Take 20 mg by mouth nightly. Provider, Historical   lisinopril-hydrochlorothiazide (PRINZIDE, ZESTORETIC) 20-12.5 mg per tablet Take 1 Tab by mouth daily. Provider, Historical   isosorbide mononitrate ER (IMDUR) 30 mg tablet Take 30 mg by mouth daily.     Provider, Historical       Current Facility-Administered Medications   Medication Dose Route Frequency    potassium chloride 10 mEq in 100 ml IVPB  10 mEq IntraVENous Q1H    metoprolol (LOPRESSOR) injection 5 mg  5 mg IntraVENous Q6H    pantoprazole (PROTONIX) 40 mg in 0.9% sodium chloride 10 mL injection  40 mg IntraVENous Q24H    QUEtiapine (SEROquel) tablet 50 mg  50 mg Oral QHS    amLODIPine (NORVASC) tablet 10 mg  10 mg Oral DAILY    [Held by provider] metoprolol tartrate (LOPRESSOR) tablet 12.5 mg  12.5 mg Oral Q12H    enoxaparin (LOVENOX) injection 40 mg  1 mg/kg SubCUTAneous Q12H    ipratropium (ATROVENT) 0.02 % nebulizer solution 0.5 mg  0.5 mg Nebulization QID RT    atorvastatin (LIPITOR) tablet 20 mg  20 mg Oral QHS    gabapentin (NEURONTIN) capsule 100 mg  100 mg Oral TID    piperacillin-tazobactam (ZOSYN) 3.375 g in 0.9% sodium chloride (MBP/ADV) 100 mL MBP  3.375 g IntraVENous Q8H    nicotine (NICODERM CQ) 21 mg/24 hr patch 1 Patch  1 Patch TransDERmal DAILY    insulin lispro (HUMALOG) injection   SubCUTAneous AC&HS    budesonide (PULMICORT) 250 mcg/2ml nebulizer susp  500 mcg Nebulization BID RT    sodium chloride (NS) flush 5-40 mL  5-40 mL IntraVENous Q8H       Allergy:  No Known Allergies     Social History:  Social History     Tobacco Use    Smoking status: Current Every Day Smoker     Packs/day: 2.00     Years: 40.00     Pack years: 80.00    Smokeless tobacco: Current User     Types: Snuff   Substance Use Topics    Alcohol use: No    Drug use: No        Family History:  Family History   Problem Relation Age of Onset    Heart Attack Mother     Heart Attack Father     Malignant Hyperthermia Neg Hx     Pseudocholinesterase Deficiency Neg Hx     Delayed Awakening Neg Hx     Post-op Nausea/Vomiting Neg Hx     Emergence Delirium Neg Hx     Post-op Cognitive Dysfunction Neg Hx     Other Neg Hx           Objective:   Vital Signs:    Blood pressure (!) 147/100, pulse (!) 111, temperature 99 °F (37.2 °C), resp. rate 21, height 5' 4\" (1.626 m), weight 43.1 kg (95 lb), SpO2 95 %. Body mass index is 16.31 kg/m². O2 Device: Heated, Hi flow nasal cannula   O2 Flow Rate (L/min): 50 l/min   Temp (24hrs), Av.5 °F (36.9 °C), Min:98.2 °F (36.8 °C), Max:99 °F (37.2 °C)         Intake/Output:   Last shift:      No intake/output data recorded.   Last 3 shifts: 10/28 1901 - 10/30 0700  In: 800 [I.V.:800]  Out: 575 [Urine:225]    Intake/Output Summary (Last 24 hours) at 10/30/2020 1025  Last data filed at 10/30/2020 0659  Gross per 24 hour   Intake 300 ml Output 575 ml   Net -275 ml       Physical Exam:  General/Neurology: Alert, awake. Patient oriented to place THE [facility], time 2020-year, person. He knows who is the president. Head:   NCAT. Eye:   EOM intact, no icterus/pallor/cyanosis. Nose:   No nasal drainage/discharge. Throat:  Moist mucosa. No oral thrush. Neck:   Trachea midline. No palpable cervical lymphadenopathy. Lung: Moderate air entry bilateral equal.  No rales, rhonchi. No wheezing or stridor. No prolonged expiration or accessory muscle use. Heart:   S1 S2 present. No murmur or JVD. Abdomen:  Soft. NT. ND. No palpable masses. Extremities:  Bilateral AKA. No peripheral edema. Pulses: 2+ and symmetric in DP. Lymphatic:  No cervical or supraclavicular palpable lymphadenopathy. Data:     Recent Results (from the past 24 hour(s))   GLUCOSE, POC    Collection Time: 10/29/20 11:47 AM   Result Value Ref Range    Glucose (POC) 86 70 - 110 mg/dL   GLUCOSE, POC    Collection Time: 10/29/20  4:44 PM   Result Value Ref Range    Glucose (POC) 85 70 - 110 mg/dL   POTASSIUM    Collection Time: 10/29/20  5:23 PM   Result Value Ref Range    Potassium 4.1 3.5 - 5.5 mmol/L   GLUCOSE, POC    Collection Time: 10/29/20 11:58 PM   Result Value Ref Range    Glucose (POC) 84 70 - 110 mg/dL   MAGNESIUM    Collection Time: 10/30/20  5:13 AM   Result Value Ref Range    Magnesium 2.0 1.6 - 2.6 mg/dL   CBC WITH AUTOMATED DIFF    Collection Time: 10/30/20  5:13 AM   Result Value Ref Range    WBC 20.2 (H) 4.6 - 13.2 K/uL    RBC 4.28 (L) 4.70 - 5.50 M/uL    HGB 12.1 (L) 13.0 - 16.0 g/dL    HCT 36.5 36.0 - 48.0 %    MCV 85.3 74.0 - 97.0 FL    MCH 28.3 24.0 - 34.0 PG    MCHC 33.2 31.0 - 37.0 g/dL    RDW 17.2 (H) 11.6 - 14.5 %    PLATELET 335 628 - 877 K/uL    MPV 10.3 9.2 - 11.8 FL    NEUTROPHILS 91 (H) 40 - 73 %    LYMPHOCYTES 3 (L) 21 - 52 %    MONOCYTES 6 3 - 10 %    EOSINOPHILS 0 0 - 5 %    BASOPHILS 0 0 - 2 %    ABS.  NEUTROPHILS 18.5 (H) 1.8 - 8.0 K/UL ABS. LYMPHOCYTES 0.5 (L) 0.9 - 3.6 K/UL    ABS. MONOCYTES 1.2 0.05 - 1.2 K/UL    ABS. EOSINOPHILS 0.0 0.0 - 0.4 K/UL    ABS. BASOPHILS 0.0 0.0 - 0.1 K/UL    DF AUTOMATED     METABOLIC PANEL, COMPREHENSIVE    Collection Time: 10/30/20  5:13 AM   Result Value Ref Range    Sodium 138 136 - 145 mmol/L    Potassium 3.7 3.5 - 5.5 mmol/L    Chloride 106 100 - 111 mmol/L    CO2 21 21 - 32 mmol/L    Anion gap 11 3.0 - 18 mmol/L    Glucose 81 74 - 99 mg/dL    BUN 22 (H) 7.0 - 18 MG/DL    Creatinine 0.84 0.6 - 1.3 MG/DL    BUN/Creatinine ratio 26 (H) 12 - 20      GFR est AA >60 >60 ml/min/1.73m2    GFR est non-AA >60 >60 ml/min/1.73m2    Calcium 9.4 8.5 - 10.1 MG/DL    Bilirubin, total 0.8 0.2 - 1.0 MG/DL    ALT (SGPT) 19 16 - 61 U/L    AST (SGOT) 12 10 - 38 U/L    Alk. phosphatase 89 45 - 117 U/L    Protein, total 6.6 6.4 - 8.2 g/dL    Albumin 3.4 3.4 - 5.0 g/dL    Globulin 3.2 2.0 - 4.0 g/dL    A-G Ratio 1.1 0.8 - 1.7     GLUCOSE, POC    Collection Time: 10/30/20  6:00 AM   Result Value Ref Range    Glucose (POC) 78 70 - 110 mg/dL           No results for input(s): FIO2I, IFO2, HCO3I, IHCO3, HCOPOC, PCO2I, PCOPOC, IPHI, PHI, PHPOC, PO2I, PO2POC in the last 72 hours.     No lab exists for component: IPOC2    All Micro Results     Procedure Component Value Units Date/Time    CULTURE, BLOOD [[id number]] Collected:  10/23/20 1611    Order Status:  Completed Specimen:  Blood Updated:  10/29/20 0856     Special Requests: NO SPECIAL REQUESTS        Culture result: NO GROWTH 6 DAYS       CULTURE, BLOOD [[id number]] Collected:  10/23/20 1615    Order Status:  Completed Specimen:  Blood Updated:  10/29/20 0856     Special Requests: NO SPECIAL REQUESTS        Culture result: NO GROWTH 6 DAYS       CULTURE, URINE [[id number]] Collected:  10/23/20 1630    Order Status:  Completed Specimen:  Urine from Clean catch Updated:  10/24/20 2026     Special Requests: NO SPECIAL REQUESTS        Culture result: No growth (<1,000 CFU/ML) Echo 10/25/2020:  Result status: Final result    · Left Ventricle: Normal cavity size and wall thickness. The estimated EF is 50 - 55%. Low normal systolic function. There is mild (grade 1) left ventricular diastolic dysfunction E/E' ratio is 10.31.  · Pulmonary Artery: Pulmonary arteries not well visualized. Pulmonary arterial systolic pressure (PASP) is 74 mmHg. Pulmonary hypertension found to be severe. PVL LE 10/27/2020:   · Chronic non-occlusive thrombus present in the left proximal femoral vein. · Age indeterminate non-occlusive thrombus present in the right common femoral vein. Limited study secondary to bilateral AKA       CTA chest 10/27/2020:  1. No evidence of pulmonary embolism. 2.  Relatively diffuse bronchial wall thickening and several areas of  endobronchial impaction/occlusion. 3. Left lower lobe pneumonia with additional area of peribronchial alveolar  consolidation posterior right upper lobe.     > Recommend radiographic follow-up to document expected clinical resolution  following appropriate treatment in 4-6 weeks. 4. Moderately severe upper lobe predominant centrilobular emphysema. 5. Small left and trace right pleural effusions. 6. Extensive atherosclerotic vascular disease both above and below the  diaphragm. Imaging:  [x]I have personally reviewed the patients chest radiographs images and report   Results from Hospital Encounter encounter on 10/23/20   XR CHEST PORT    Narrative EXAM: XR CHEST PORT    CLINICAL INDICATION/HISTORY: Shortness of breath with possible aspiration event  -Additional: None    COMPARISON: Radiographs 10/6/2020    TECHNIQUE: Frontal view of the chest    _______________    FINDINGS:    HEART AND MEDIASTINUM: Stable appearing cardiac size and mediastinal contours. LUNGS AND PLEURAL SPACES: Faint peripheral right upper lung opacity noted with  additional left basilar density and accompanying pleural effusion. Right lung  bases clear.  Lungs are hyperinflated. No pneumothorax. BONY THORAX AND SOFT TISSUES: No acute osseous abnormality    _______________      Impression IMPRESSION:    Hyperinflated lungs without radiographic stigmata paralleling those seen on  recent prior chest CT, to include right upper and left basilar infiltrates with  small left pleural effusion. Results from [place] encounter on 10/23/20   CTA CHEST W OR W WO CONT    Narrative EXAM: CTA Chest    INDICATION: 63year-old patient presently admitted with pneumonia, worsening of  hypoxia. Evaluation for pulmonary embolism. COMPARISON: CT chest 9/7/2012; prior chest radiographs, as recently 10/26/2020. TECHNIQUE: Axial CT imaging from the thoracic inlet through the diaphragm with  intravenous contrast utilizing CTA study for pulmonary artery evaluation. Coronal and sagittal MIP reformations were generated at a separate workstation. One or more dose reduction techniques were used on this CT: automated exposure  control, adjustment of the mAs and/or kVp according to patient size, and  iterative reconstruction techniques. The specific techniques used on this CT  exam have been documented in the patient's electronic medical record. Digital  Imaging and Communications in Medicine (DICOM) format image data are available  to nonaffiliated external healthcare facilities or entities on a secure, media  free, reciprocally searchable basis with patient authorization for at least a  12-month period after this study. _______________    FINDINGS:    EXAM QUALITY: Overall exam quality is adequate. Pulmonary arterial enhancement  is adequate. The breath hold is satisfactory. PULMONARY ARTERIES: No convincing evidence of pulmonary embolism. MEDIASTINUM: Included gland is unremarkable. Cardiac size is normal. There is no  pericardial effusion. Multivessel coronary arterial atherosclerotic vascular  calcification is present.  Extensive atherosclerotic vascular calcification of  the thoracic aorta noted without evidence of aneurysmal dilatation. LYMPH NODES: No enlarged mediastinal or hilar nodes by size criteria. AIRWAY: Diffuse bronchial wall thickening with endobronchial debris present  within the bronchus intermedius and right lower lobe segmental bronchi. Additional left lower lobe and segmental bronchial impaction. LUNGS: Focal area of low attenuating consolidation within the medial portion  left lower lobe with adjacent areas of atelectasis. Patchy peribronchial  consolidation within the posterior aspect right upper lobe. Moderately severe  upper lobe predominant centrilobular emphysema. No significant groundglass  abnormality or abnormal septal line thickening. PLEURA: Trace right and small left pleural effusions. No evidence of  pneumothorax. UPPER ABDOMEN: Marked atherosclerotic vascular calcifications present throughout  the suprarenal and infrarenal abdominal aorta. Likely layering biliary sludge  within the gallbladder. OTHER: No acute or aggressive osseous abnormalities identified. Multilevel  degenerative wedging of the thoracic spine noted. Prominent Schmorl's node  superior endplate D40.    _______________      Impression IMPRESSION:    1. No evidence of pulmonary embolism. 2.  Relatively diffuse bronchial wall thickening and several areas of  endobronchial impaction/occlusion. 3. Left lower lobe pneumonia with additional area of peribronchial alveolar  consolidation posterior right upper lobe.     > Recommend radiographic follow-up to document expected clinical resolution  following appropriate treatment in 4-6 weeks. 4. Moderately severe upper lobe predominant centrilobular emphysema. 5. Small left and trace right pleural effusions. 6. Extensive atherosclerotic vascular disease both above and below the  diaphragm.            Shaniqua Malin MD  10/30/2020

## 2020-10-30 NOTE — PROGRESS NOTES
RESPIRATORY NOTE:  Pt back on HFNC some anxiety, calling out, increased HR and RR with decreasing SPO2, will monitor.

## 2020-10-30 NOTE — PROGRESS NOTES
Bedside and Verbal shift change report given to ROB Paredes (oncoming nurse) by Hamzah Linda RN (offgoing nurse). Report included the following information SBAR, Kardex and MAR.

## 2020-10-30 NOTE — PROGRESS NOTES
Problem: Falls - Risk of  Goal: *Absence of Falls  Description: Document Christen Sinhatz Fall Risk and appropriate interventions in the flowsheet. Outcome: Progressing Towards Goal  Note: Fall Risk Interventions:  Mobility Interventions: Bed/chair exit alarm    Mentation Interventions: Adequate sleep, hydration, pain control, Bed/chair exit alarm    Medication Interventions: Bed/chair exit alarm    Elimination Interventions: Bed/chair exit alarm              Problem: Patient Education: Go to Patient Education Activity  Goal: Patient/Family Education  Outcome: Progressing Towards Goal     Problem: Non-Violent Restraints  Goal: *Removal from restraints as soon as assessed to be safe  Outcome: Progressing Towards Goal  Goal: *No harm/injury to patient while restraints in use  Outcome: Progressing Towards Goal  Goal: *Patient's dignity will be maintained  Outcome: Progressing Towards Goal  Goal: *Patient Specific Goal (EDIT GOAL, INSERT TEXT)  Outcome: Progressing Towards Goal  Goal: Non-violent Restaints:Standard Interventions  Outcome: Progressing Towards Goal  Goal: Non-violent Restraints:Patient Interventions  Outcome: Progressing Towards Goal  Goal: Patient/Family Education  Outcome: Progressing Towards Goal     Problem: Pressure Injury - Risk of  Goal: *Prevention of pressure injury  Description: Document Shelton Scale and appropriate interventions in the flowsheet.   Outcome: Progressing Towards Goal  Note: Pressure Injury Interventions:  Sensory Interventions: Assess changes in LOC, Avoid rigorous massage over bony prominences    Moisture Interventions: Absorbent underpads    Activity Interventions: Pressure redistribution bed/mattress(bed type)    Mobility Interventions: HOB 30 degrees or less    Nutrition Interventions: Document food/fluid/supplement intake    Friction and Shear Interventions: Apply protective barrier, creams and emollients, HOB 30 degrees or less, Lift sheet, Minimize layers                Problem: Patient Education: Go to Patient Education Activity  Goal: Patient/Family Education  Outcome: Progressing Towards Goal     Problem: Patient Education: Go to Patient Education Activity  Goal: Patient/Family Education  Outcome: Progressing Towards Goal

## 2020-10-30 NOTE — PROGRESS NOTES
10/29/20 2328   Vitals   Pulse (Heart Rate) (!) 107   Resp Rate 29   O2 Sat (%) 98 %   O2 Device Heated; Hi flow nasal cannula   O2 Flow Rate (L/min) 50 l/min   FIO2 (%) 80 %     Patient's SATs dropped into the 80's. Patient NT suctioned and placed on 80%. I will continue to monitor patient and titrate as tolerated.

## 2020-10-30 NOTE — PROGRESS NOTES
SLP NOTE -    1350: Pt originally scheduled for MBS this date 5. However, per d/w RN, pt desatting on nasal cannula yesterday, back on HFNC, sats only remaining in 70s at this time. Physician and Palliative Care involved and d/w son this afternoon. Pt not appropriate for MBS this date. Will follow up Monday as appropriate per MD discretion.     Thank you for this referral.    Darline Back M.S., [id number] [facility]  Speech-Language Pathologist

## 2020-10-30 NOTE — PALLIATIVE CARE
Received telephone message from a female regarding Santana Vazquez at [phone] on 10/29/2020. No name but requested a return call to [phone]. Attempted to return call and got through to an unnamed voicemail. Left message to return my call.

## 2020-10-30 NOTE — ACP (ADVANCE CARE PLANNING)
Patient doesn't have AMD or Living Will on file. At this time he's unable to complete AMD. He's  and . He has three biological adult children who would act as legal next of kin. Decision making is the majority of the three children, daughter Jude Johnson, son Isacc Coe., and son Keyur Simmons, without advance directive.     Code status: FULL code    NOK: Isacc Coe (son)  [phone]    Jude Johnson (daughter)  Number unknown    Keyur Simmons (son)  [phone]

## 2020-10-30 NOTE — PROGRESS NOTES
At this time no return call from patients son Arin Richard, cm notified [facility] office [phone], report given to officer Ford Diaz for home safety check, cm provided ICU's contact number. 1500- noted patients son contacted and spoke with palliative care team to discuss goals of care, weekend cm will be available for immediate needs thru [facility]  if needed.

## 2020-10-30 NOTE — PROGRESS NOTES
Hospitalist Progress Note-critical care note     Patient: Carrington La Sr. MRN: [id number]  CSN: [id number]    YOB: 1955  Age: 63 y.o.   Sex: male    DOA: 10/23/2020 LOS:  LOS: 7 days            Chief complaint: Pneumonia, marijuana abuse, legal blindness, amputation bilateral lower extremity, encephalopathy, acute respiratory failure with hypoxia    Assessment/Plan         Hospital Problems  Date Reviewed: 10/23/2020          Codes Class Noted POA    Aspiration pneumonia ([facility] ICD-10-CM: J69.0  ICD-9-CM: 507.0  10/28/2020 Unknown        Deep vein thrombosis (DVT) of lower extremity ([facility] ICD-10-CM: I82.409  ICD-9-CM: 453.40  10/28/2020 Unknown        Acute respiratory failure with hypoxia ([facility] ICD-10-CM: J96.01  ICD-9-CM: 518.81  10/26/2020 Unknown        Chronic obstructive pulmonary disease with acute exacerbation ([facility] ICD-10-CM: J44.1  ICD-9-CM: 491.21  10/24/2020 Unknown        Severe protein-calorie malnutrition ([facility] ICD-10-CM: E43  ICD-9-CM: 262  10/24/2020 Unknown        Metabolic encephalopathy U-09-TT: G93.41  ICD-9-CM: 348.31  10/24/2020 Unknown        * (Principal) Septic shock ([facility] ICD-10-CM: A41.9, R65.21  ICD-9-CM: 038.9, 785.52, 995.92  10/23/2020 Unknown        Amputation of both lower extremities ([facility] ICD-10-CM: S94.983B, J20.725L  ICD-9-CM: 897.6  Unknown Yes        PNA (pneumonia) ICD-10-CM: J18.9  ICD-9-CM: 486  Unknown Yes        Marijuana abuse ICD-10-CM: F12.10  ICD-9-CM: 305.20  Unknown Yes        Centrilobular emphysema ([facility] ICD-10-CM: J43.2  ICD-9-CM: 492.8  Unknown Unknown        Legal blindness ICD-10-CM: H54.8  ICD-9-CM: 369.4  10/14/2020 Yes        Hypotension, unspecified ICD-10-CM: I95.9  ICD-9-CM: 458.9  1/27/2014 Yes                A 74-year-old male with a history of a COPD, bilateral above knee amputation, recently discharged from [facility] after treated for pneumonia who was admitted for septic shock and metabolic encephalopathy.       Acute respiratory failure with hypoxia   Still on high flow O2 -will continue weaning as we can, continue aspiration precaution   Continue iv abx   Cta chest no pe, but pna and Relatively diffuse bronchial wall thickening and several areas of  endobronchial impaction/occlusion/emphesema     COPD with right upper lobe pneumonia questionable mass/emphysema /aspiration pna   Continue breathing tx and iv abx   Aspiration precaution        Cardiovascular septic shock: resolved.   Off [place] [address]   Dr. Laly Hooks on board-continue medical treatment     HTN: on  Norvasc and metoprolol.       Heme:  Monitoring  CBC and platelets  Chronic dvt noted from pvl   On lovenox     ID on iv abx for pneumonia treatment , will continue, he continues aspirated      FEN: Placed on ICU protocol monitor I's and O's      neuro   Acute metabolic encephalopathy   Agitation -no change   Legal blindness :fall /aspiration precaution     Psych : marijuana use     Severe malnutrition    Bilateral AKA    Subjective: I need water    rn : no change-still agitation  and unable to reach son           Disposition :tbd,   Review of systems:  Ros is limited due to agitation  And confusion   Vital signs/Intake and Output:  Visit Vitals  BP (!) 147/100   Pulse (!) 111   Temp 99 °F (37.2 °C)   Resp 21   Ht 5' 4\" (1.626 m)   Wt 43.1 kg (95 lb)   SpO2 95%   BMI 16.31 kg/m²     Current Shift:  No intake/output data recorded. Last three shifts:  10/28 1901 - 10/30 0700  In: 800 [I.V.:800]  Out: 225 [Urine:225]    Physical Exam:  General: WD, WN. Alert, , no acute distress    HEENT: NC, Atraumatic. PERRLA, anicteric sclerae. High flow O2 noted   Lungs: Coarse BS   Heart:  Regular  rhythm,  No murmur, No Rubs, No Gallops  Abdomen: Soft, Non distended, Non tender.   +Bowel sounds,   Extremities: No c/c, aka  Psych:   Agitation   Neurologic:  oriented to himself, not cooperative           Labs: Results:       Chemistry Recent Labs     10/30/20  0513 10/29/20  3101 10/29/20  0544 10/28/20  0500   GLU 81  --  80 92     --  141 142   K 3.7 4.1 3.3* 4.2     --  106 110   CO2 21  --  26 23   BUN 22*  --  20* 20*   CREA 0.84  --  0.92 0.78   CA 9.4  --  9.2 9.3   AGAP 11  --  9 9   BUCR 26*  --  22* 26*   AP 89  --  85 71   TP 6.6  --  6.9 6.2*   ALB 3.4  --  3.6 3.3*   GLOB 3.2  --  3.3 2.9   AGRAT 1.1  --  1.1 1.1      CBC w/Diff Recent Labs     10/30/20  0513 10/29/20  0544 10/28/20  0500   WBC 20.2* 14.1* 14.2*   RBC 4.28* 4.33* 3.91*   HGB 12.1* 12.3* 10.9*   HCT 36.5 37.3 33.4*    312 334   GRANS 91* 89* 94*   LYMPH 3* 5* 3*   EOS 0 0 0      Cardiac Enzymes No results for input(s): CPK, CKND1, CHUY in the last 72 hours. No lab exists for component: CKRMB, TROIP   Coagulation No results for input(s): PTP, INR, APTT, INREXT, INREXT in the last 72 hours. Lipid Panel No results found for: CHOL, CHOLPOCT, CHOLX, CHLST, CHOLV, 560475, HDL, HDLP, LDL, LDLC, DLDLP, 366213, VLDLC, VLDL, TGLX, TRIGL, TRIGP, TGLPOCT, CHHD, CHHDX   BNP No results for input(s): BNPP in the last 72 hours. Liver Enzymes Recent Labs     10/30/20  0513   TP 6.6   ALB 3.4   AP 89      Thyroid Studies Lab Results   Component Value Date/Time    TSH 0.78 10/24/2020 11:30 AM        Procedures/imaging: see electronic medical records for all procedures/Xrays and details which were not copied into this note but were reviewed prior to creation of Plan    Ct Head Wo Cont    Result Date: 10/24/2020  EXAM: CT head INDICATION: Altered mental status. COMPARISON: October 15, 2020. TECHNIQUE: Axial CT imaging of the head was performed without intravenous contrast. Dose reduction techniques used: automated exposure control, adjustment of the mAs and/or kVp according to patient size, and iterative reconstruction techniques.  Digital imaging and communications in Medicine (DICOM) format image data are available to nonaffiliated external healthcare facilities or entities on a secure, media free, reciprocally searchable basis with patient authorization for at least 12 months after this study. _______________ FINDINGS: BRAIN AND POSTERIOR FOSSA: There is cerebral volume loss with prominence of the lateral and the third ventricles. The cortical sulci are widened appropriately. The fourth ventricle and basal cisterns are normally outlined. There is mild bilateral periventricular and central white matter diminished attenuation. There is no acute territorial defect, hemorrhage or midline shift. EXTRA-AXIAL SPACES AND MENINGES: There are no abnormal extra-axial fluid collections. CALVARIUM: Intact. SINUSES: Clear. OTHER: Partial right mastoid opacification is again seen. _______________     IMPRESSION: Cerebral volume loss and mild bilateral periventricular and central white matter diminished attenuation which is nonspecific but likely to represent microvascular disease. There is no acute intracranial abnormality. No significant interval change. Ct Head Wo Cont    Result Date: 10/15/2020  EXAM: CT head INDICATION: Dental status change COMPARISON: None. TECHNIQUE: Axial CT imaging of the head was performed without intravenous contrast. One or more dose reduction techniques were used on this CT: automated exposure control, adjustment of the mAs and/or kVp according to patient size, and iterative reconstruction techniques. The specific techniques used on this CT exam have been documented in the patient's electronic medical record. Digital Imaging and Communications in Medicine (DICOM) format image data are available to nonaffiliated external healthcare facilities or entities on a secure, media free, reciprocally searchable basis with patient authorization for at least a 12 month period after this study.  _______________ FINDINGS: BRAIN AND POSTERIOR FOSSA: The sulci, folia, ventricles and basal cisterns are within normal limits for the patient's with age concordant atrophy There is no intracranial hemorrhage, mass effect, or midline shift. Mild periventricular low-attenuation. Although nonspecific this is frequently seen with chronic small vessel ischemic change. Otherwise, there are no areas of abnormal parenchymal attenuation. EXTRA-AXIAL SPACES AND MENINGES: There are no abnormal extra-axial fluid collections. CALVARIUM: Intact. SINUSES: Clear. OTHER: None. _______________     IMPRESSION: No acute intracranial abnormalities. Xr Chest Port    Result Date: 10/26/2020  EXAM: CHEST RADIOGRAPH CLINICAL INDICATION/HISTORY: Pneumonia   > Additional: None COMPARISON: 10/23/2020 TECHNIQUE: Portable frontal view of the chest _______________ FINDINGS: SUPPORT DEVICES: None. HEART AND MEDIASTINUM: Heart size is stable. Atherosclerotic calcification seen in the aorta. LUNGS AND PLEURAL SPACES: Increased hazy density at the left base. Slight blunting of the right costophrenic angle. No pneumothorax. Patchy right upper lobe opacity is similar to slightly improved. BONES AND SOFT TISSUES: Unremarkable. _______________     IMPRESSION: 1. Increased hazy opacity at the left base compared to the prior exam, possibly developing left lower lobe atelectasis and/or consolidation. Questionable small right effusion. 2. Patchy right upper lobe patchy opacity which is similar to slightly improved. Xr Chest Port    Result Date: 10/24/2020  EXAM: XR CHEST PORT. CLINICAL INDICATION/HISTORY: meets SIRS criteria. -Additional: None. TECHNIQUE: A portable erect AP radiographic view of the chest is compared with several other chest radiographs performed the same month. _______________ FINDINGS: See impression. No pneumothorax or appreciable significant pleural effusion. The cardiac silhouette, vanessa, and mediastinal contours are normal for age. No acute osseous abnormality is revealed.  _______________     IMPRESSION: Slight improvement in multifocal airspace disease most in keeping with pneumonia, again most pronounced at the right lung apex with no new or worsening findings. Recommend continued radiographic follow-up to resolution. _______________     Kirt Demetris Chest Port    Result Date: 10/18/2020  EXAM: CHEST RADIOGRAPH CLINICAL INDICATION/HISTORY: SBO   > Additional: None COMPARISON: 10/17/2020. TECHNIQUE: Portable frontal view of the chest _______________ FINDINGS: SUPPORT DEVICES: None. HEART AND MEDIASTINUM: No appreciable cardiomegaly. Remaining mediastinal contours within normal limits. LUNGS AND PLEURAL SPACES: No significant change of bilateral upper lobe parenchymal opacities. Hyperexpansion of the lungs. No evidence for pneumothorax. BONY THORAX AND SOFT TISSUES: Unremarkable. _______________     IMPRESSION: 1. No significant change of bilateral lung pneumonia. Follow-up to resolution is recommended. 2. Emphysema. Xr Chest Port    Result Date: 10/18/2020  EXAM: CHEST RADIOGRAPH CLINICAL INDICATION/HISTORY: shortness of breath   > Additional: None COMPARISON: October 14, 2020. TECHNIQUE: Portable frontal view of the chest _______________ FINDINGS: SUPPORT DEVICES: None. HEART AND MEDIASTINUM: No appreciable cardiomegaly. Remaining mediastinal contours within normal limits. LUNGS AND PLEURAL SPACES: No significant change of bilateral upper lobe parenchymal opacities. Hyperexpansion of the lungs. No evidence for pneumothorax or pleural effusion. BONY THORAX AND SOFT TISSUES: Unremarkable. _______________     IMPRESSION: 1. No significant change of bilateral lung pneumonia. Follow-up to resolution is recommended to exclude underlying mass. 2. Emphysema. Xr Chest Port    Result Date: 10/14/2020  EXAM: XR CHEST PORT CLINICAL INDICATION/HISTORY: Shortness of breath with cough -Additional: None COMPARISON: Several prior studies, most recently 2/19/2019 TECHNIQUE: Frontal view of the chest _______________ FINDINGS: HEART AND MEDIASTINUM: Normal cardiac size and mediastinal contours.  LUNGS AND PLEURAL SPACES: Patchy multifocal opacities noted throughout bilateral upper lobes, right greater than left as well as throughout the periphery of the right lower lobe. No evidence of pneumothorax. BONY THORAX AND SOFT TISSUES: No acute osseous abnormality _______________     IMPRESSION: Patchy multifocal airspace disease compatible with pneumonia. Recommend radiographic follow-up to document expected clinical resolution in 4-6 weeks' time.        Dk Mccallum MD

## 2020-10-30 NOTE — PROGRESS NOTES
conducted a follow up consultation for Iwona Zendejas, who is a 63 y.o.,male. Patient continues to yell out off and on. He is able to stop and have a conversation but gets \"over\" me when I won't/can't do what he wants. Patient's son was finally contacted and states he will be in to visit today. Continued the relationship of care and support. Listened empathically. Offered assurance of continued prayer on patients behalf. Chart reviewed. Chaplains will continue to follow and will provide pastoral care as needed or requested.  recommends bedside caregivers page the  on duty if patient shows signs of acute spiritual or emotional distress. [address], [place].    Board Certified   [phone] - Office

## 2020-10-30 NOTE — PROGRESS NOTES
RESPIRATORY NOTE:  Trial off HFNC, regular nasal cannula at 6 LPM at this time, tolerating well at this time.

## 2020-10-30 NOTE — PROGRESS NOTES
1900: Bedside and Verbal shift change report given to Shayne Gomez RN (oncoming nurse) by Guanakito Carmichael RN (offgoing nurse). Report included the following information SBAR, Kardex, Intake/Output, MAR, Recent Results, Med Rec Status, Cardiac Rhythm NSR and Alarm Parameters . 2000: Shift assessment completed. Pt resting, however intermittently pulling lines and tubing    2320: Contacted RT concerning o2 sats in the 80's. RT at bedside at this time performing nasotracheal suctioning and placed on 80% HiFlow o2.    0000: Reassessment completed. 0400: Reassessment completed. 0715: Bedside and Verbal shift change report given to Guanakito Carmichael RN (oncoming nurse) by Shayne Gomez RN (offgoing nurse). Report included the following information SBAR, Kardex, Intake/Output, MAR, Recent Results, Med Rec Status, Cardiac Rhythm NSR and Alarm Parameters .

## 2020-10-30 NOTE — PROGRESS NOTES
[facility]: [phone]  [facility]: [phone]   [facility]: [phone]    Patient Name: Enedina Bhakta. YOB: 1955    Date of Initial Consult: 10/27/2020, follow up 10/30/2020   Reason for Consult: care decisions  Requesting Provider: Dontrell Gonsalez MD  Primary Care Physician: Clarita Pablo MD      SUMMARY:   Enedina Baron is a 63 y.o. male with a past history of legally blind, bilateral AKA, emphysema, HTN, chronic pain, HLD, T2DM, polio, CAD, PVD who was admitted on 10/23/2020 from home with a diagnosis of pneumonia/sepsis. Current medical issues leading to Palliative Medicine involvement include: recurrent admissions with multiple co-morbidities and goals of care. 10/28/2020 Mr Eryn Clark is alert, oriented to place and why he is in the hospital. Denies pain. He is softly restrained for his safety. Just says \" cut me loose, give a knife\" ( referring to his restraints). 10/30/2020: Palliative care team including SIMI Rodriguez and this NP met with patient in his ICU room. He is quietly laying in bed. When asked how he was his speech was mumbled. Was finally able to understand his c/o being cold. PALLIATIVE DIAGNOSES:   1. Advanced care decisions  2. HCAP/sepsis  3. Encephalopathy  4. COPD  5. Bilateral AKA     PLAN:   ADDENDUM: 1400: Bonita Beverly presented to the ICU to visit with his father. Instructed him on COPD and it being a progressive disease especially without smoking cessation. He admits to his father continuing to smoke 2 ppd. Then discussed the superimposing of an aspiration pneumonia on weakened lungs. Bonita Beverly was instructed on weakening of swallow and aspiration as he was also fixated on getting his dad to eat and drink. Explained that he needs further swallow evaluation when stable to determine safe diet.  Explained that patient is now on large amount of HFNC and next step if he continues to deteriorate would be oral intubation. Explained fears of patient's inability to be weaned from vent and needing trach/PEG and discharge to a facility. Mahnaz Kahn wanted to bring him home on vent if necessary. I explained care for trach with vent is not available in the home. He is aware of what a trach entails as a family friend has a trach for throat cancer. Discussed intubation with patient and he indicated that he wanted to life support on machines if necessary which reinforced his son's determination for aggressive treatment. Discussed risks and benefits of CPR in the event of cardiopulmonary arrest and Mahnaz Kahn was firm that he would want CPR in attempt to prolong patient's life. Lawrence's girlfriend, Bárbara, present for part of conversation and feels that Mahnaz Kahn needs time to process what his father looks like and what he has heard before making decisions. Attempted to get Mahnaz Kahn to think from his dad's standpoint and understand he is suffering; he is sure his dad has pulled through before and can do it again. 1531 Received telephone call from Galileo Machado and [facility]. Was informed by them that Mahnaz Kahn had texted their sister, Arin Esteves, and told her that Laure Patel is in the hospital. Katherin Batista and David Correia (the estranged children) have not been kept in the loop secondary to Mahnaz Kahn non-cooperation. Rich Mcarthur with medical information. He stated that a physician at [facility] in [place] had talked with the family regarding code status after patient had been admitted in cardiac arrest. Patient was discharged after that hospitalization to LTC. However, when Mahnaz Kahn got out of detention, he took his dad out the [place]. This was against the wishes of the other children. GOALS OF CARE: FULL CODE with FULL INTERVENTIONS. GOC remain unchanged and ICU information provided to Galileo Machado to call after he talks with his sister. Katherin Batista and David Correia share a home in [place]. 10/30/2020:  While in the ICU, Mahnaz Kahn returned telephone call. Instructed him that his father was very ill with worsening respiratory status. Explained he may need intubation and medical concerns regarding inability to eventually be able to liberate patient from ventilator. Described to him that it is likely he would require trach, long term ventilator, PEG tube for feeding and placement in long term care. His concern at that point was to have his father brought home \"we have a nurse that comes to the house\". Explained his father is far too sick to be discharged at this time. He stated he wanted us to continue all aggressive measures. Asked Hugo Brunner to come to the hospital to see his father so he could better understand the seriousness of this illness. He said he would be here later today. Hugo Brunner stated he had not received any messages on his cellphone. Explained that his VM is full and he needs to delete all his old voicemails. He admitted he would need someone to show him how.    (please see below for previous conversations)     10/28/2020 Seen along with SIMI Gutierrez. Remains in ICU on high flow oxygen. He is alert, oriented x 3 but do not believe at this time he can participate in his medical decisions or complex decisions. There is no AMD on file. Son Hugo Brunner is reported care giver. Very chronically ill gentleman with recent admission whose overall prognosis is poor. We have attempted x 2 today and yesterday to reach Hugo Brunner to discuss his father's care and goals of care, no answer at listed home phone and cell goes to  with no ability to leave a message. We will continue to follow with you to try and reach family and assess if patient can participate can participate in his care decisions. Goals of care full code with full interventions. 10/27/2020  1. Advanced care decisions: Palliative care team including SIMI An and this NP met with patient at bedside in the ICU.  Patient is known to the palliative care team from his previous admission. Per notes \"patient is a  and had four children of which one is . Karina Allen claims he is the caregiver and lives with his father. ..his half siblings, Damion Perry and Leonardo Hernandez, do not come around and he does not know where they are.\" Patient currently delirious and continues to yell \"wake me up\" or \"Lawrence\" over and over; although, responded yes to having pain and nausea. Attempted to contact patient's son, Karina Allen, via his cell phone (voicemail box is full) and the home phone number (no answer after multiple rings). Need to discuss goals of care for this chronically ill gentleman. GOALS OF CARE: FULL CODE with FULL INTERVENTIONS. 2.  HCAP/sepsis: Patient recently admitted to [facility] with diagnosis of pneumonia 10/14 to 10/20/2020. He was seen at [facility] ER on 10/21 and 10/22 and 10/23/2020. He then presented here with c/o chest pain. Conflicting reports of whether he took antibiotics s/p his discharge on 10/20. Found to be hypothermic and hypotensive in ED. Primary team managing. 3.  Encephalopathy: Presented with confusion and is combative. ED provider notes that patient likely does not have capacity to make medical decisions for himself on day of admit. Also per notes son reports patient has not been acting appropriately since leaving [facility] ED on 10/23/2020.  4. COPD: CT completed at outside facility reveals question of neoplasm. Will need repeat CT. Primary team managing. 5.  Bilateral AKA: per history. 6.  Initial consult note routed to primary continuity provider  7.   Communicated plan of care with: Palliative IDT    GOALS OF CARE:  Patient/Health Care Proxy Stated Goals: Prolong life      TREATMENT PREFERENCES:   Code Status: Full Code    Advance Care Planning:  Advance Care Planning 10/14/2020   Confirm Advance Directive None   Patient Would Like to Complete Advance Directive No   Does the patient have other document types Other (comment)       Medical Interventions: Full interventions Other: As far as possible, the palliative care team has discussed with patient/health care proxy about goals of care/treatment preferences for patient. HISTORY:     History obtained from: chart    CHIEF COMPLAINT: \"cold\"     HPI/SUBJECTIVE:    The patient is:   [] Verbal and participatory  [x] Non-participatory due to: limited by confusion    Clinical Pain Assessment (nonverbal scale for nonverbal patients): Clinical Pain Assessment  Severity: 0          Duration: for how long has pt been experiencing pain (e.g., 2 days, 1 month, years)  Frequency: how often pain is an issue (e.g., several times per day, once every few days, constant)     FUNCTIONAL ASSESSMENT:     Palliative Performance Scale (PPS):  PPS: 40    ECOG  ECOG Status : Limited self-care     PSYCHOSOCIAL/SPIRITUAL SCREENING:      Any spiritual / Jehovah's witness concerns:  [] Yes /  [x] No    Caregiver Burnout:  [] Yes /  [] No /  [x] No Caregiver Present      Anticipatory grief assessment:   [x] Normal  / [] Maladaptive        REVIEW OF SYSTEMS:     Positive and pertinent negative findings in ROS are noted above in HPI. The following systems were [] reviewed / [x] unable to be reviewed as noted in HPI  Other findings are noted below. Systems: constitutional, ears/nose/mouth/throat, respiratory, gastrointestinal, genitourinary, musculoskeletal, integumentary, neurologic, psychiatric, endocrine. Positive findings noted below. Modified ESAS Completed by: provider           Pain: 0     Nausea: 3     Dyspnea: 0           Stool Occurrence(s): 1        PHYSICAL EXAM:     Wt Readings from Last 3 Encounters:   10/25/20 43.1 kg (95 lb)   10/20/20 34.7 kg (76 lb 9.6 oz)   02/18/19 45 kg (99 lb 3.3 oz)     Blood pressure (!) 173/108, pulse (!) 111, temperature 99 °F (37.2 °C), resp. rate 21, height 5' 4\" (1.626 m), weight 43.1 kg (95 lb), SpO2 95 %.   Pain:  Pain Scale 1: Numeric (0 - 10)  Pain Intensity 1: 0  Pain Onset 1: gradual  Pain Location 1: Other (comment)(stumps)  Pain Orientation 1: Upper  Pain Description 1: Aching  Pain Intervention(s) 1: Medication (see MAR), Repositioned       Constitutional: 63year old gentleman who appears older then stated age, lying in bed in NAD   Eyes: blind, anicteric  Respiratory: breathing not labored, on high flow   Musculoskeletal: Bilateral AKA   Skin: warm, dry  Neurologic: following commands, moving all extremities       HISTORY:     Principal Problem:    Septic shock ([address] (10/23/2020)    Active Problems:    Hypotension, unspecified (1/27/2014)      Amputation of both lower extremities (HCC) ()      PNA (pneumonia) ()      Marijuana abuse ()      Centrilobular emphysema (HCC) ()      Legal blindness (10/14/2020)      Chronic obstructive pulmonary disease with acute exacerbation (HCC) (10/24/2020)      Severe protein-calorie malnutrition ([address] (05/48/8800)      Metabolic encephalopathy (76/21/2480)      Acute respiratory failure with hypoxia ([facility]) (10/26/2020)      Aspiration pneumonia ([address] (10/28/2020)      Deep vein thrombosis (DVT) of lower extremity ([address] (10/28/2020)      Past Medical History:   Diagnosis Date    Amputation of both lower extremities ([address]     Amputee, above knee, left ([address]     At risk for falls     Chronic pain     back and legs    Diabetes ([address]     Hypercholesterolemia     Hypertension     Polio       Past Surgical History:   Procedure Laterality Date    HX HERNIA REPAIR      HX OTHER SURGICAL      carpal tunnel     HX OTHER SURGICAL      cyst      Family History   Problem Relation Age of Onset    Heart Attack Mother     Heart Attack Father     Malignant Hyperthermia Neg Hx     Pseudocholinesterase Deficiency Neg Hx     Delayed Awakening Neg Hx     Post-op Nausea/Vomiting Neg Hx     Emergence Delirium Neg Hx     Post-op Cognitive Dysfunction Neg Hx     Other Neg Hx      History reviewed, no pertinent family history.   Social History     Tobacco Use    Smoking status: Current Every Day Smoker     Packs/day: 2.00     Years: 40.00     Pack years: 80.00    Smokeless tobacco: Current User     Types: Snuff   Substance Use Topics    Alcohol use: No     No Known Allergies   Current Facility-Administered Medications   Medication Dose Route Frequency    QUEtiapine (SEROquel) tablet 100 mg  100 mg Oral QHS    metoprolol (LOPRESSOR) injection 5 mg  5 mg IntraVENous Q6H    pantoprazole (PROTONIX) 40 mg in 0.9% sodium chloride 10 mL injection  40 mg IntraVENous Q24H    hydrALAZINE (APRESOLINE) 20 mg/mL injection 20 mg  20 mg IntraVENous Q6H PRN    amLODIPine (NORVASC) tablet 10 mg  10 mg Oral DAILY    [Held by provider] metoprolol tartrate (LOPRESSOR) tablet 12.5 mg  12.5 mg Oral Q12H    enoxaparin (LOVENOX) injection 40 mg  1 mg/kg SubCUTAneous Q12H    ipratropium (ATROVENT) 0.02 % nebulizer solution 0.5 mg  0.5 mg Nebulization QID RT    atorvastatin (LIPITOR) tablet 20 mg  20 mg Oral QHS    clonazePAM (KlonoPIN) tablet 0.5 mg  0.5 mg Oral TID PRN    gabapentin (NEURONTIN) capsule 100 mg  100 mg Oral TID    oxyCODONE-acetaminophen (PERCOCET) 5-325 mg per tablet 1 Tab  1 Tab Oral Q4H PRN    LORazepam (ATIVAN) injection 1 mg  1 mg IntraVENous Q4H PRN    piperacillin-tazobactam (ZOSYN) 3.375 g in 0.9% sodium chloride (MBP/ADV) 100 mL MBP  3.375 g IntraVENous Q8H    haloperidol lactate (HALDOL) injection 5 mg  5 mg IntraVENous Q8H PRN    nicotine (NICODERM CQ) 21 mg/24 hr patch 1 Patch  1 Patch TransDERmal DAILY    albuterol-ipratropium (DUO-NEB) 2.5 MG-0.5 MG/3 ML  3 mL Nebulization Q4H PRN    sodium chloride (NS) flush 5-10 mL  5-10 mL IntraVENous PRN    insulin lispro (HUMALOG) injection   SubCUTAneous AC&HS    glucose chewable tablet 16 g  4 Tab Oral PRN    glucagon (GLUCAGEN) injection 1 mg  1 mg IntraMUSCular PRN    dextrose 10% infusion 125-250 mL  125-250 mL IntraVENous PRN    budesonide (PULMICORT) 250 mcg/2ml nebulizer susp  500 mcg Nebulization BID RT    ELECTROLYTE REPLACEMENT PROTOCOL - Potassium Standard Dosing   1 Each Other PRN    ELECTROLYTE REPLACEMENT PROTOCOL - Potassium Standard  Dosing Details for Fluid Restricted Patients  1 Each Other PRN    ELECTROLYTE REPLACEMENT PROTOCOL  - Phosphorus Renal Dosing  1 Each Other PRN    sodium chloride (NS) flush 5-40 mL  5-40 mL IntraVENous Q8H    sodium chloride (NS) flush 5-40 mL  5-40 mL IntraVENous PRN    acetaminophen (TYLENOL) tablet 650 mg  650 mg Oral Q6H PRN    Or    acetaminophen (TYLENOL) suppository 650 mg  650 mg Rectal Q6H PRN    polyethylene glycol (MIRALAX) packet 17 g  17 g Oral DAILY PRN    promethazine (PHENERGAN) tablet 12.5 mg  12.5 mg Oral Q6H PRN    Or    ondansetron (ZOFRAN) injection 4 mg  4 mg IntraVENous Q6H PRN        LAB AND IMAGING FINDINGS:     Lab Results   Component Value Date/Time    WBC 20.2 (H) 10/30/2020 05:13 AM    HGB 12.1 (L) 10/30/2020 05:13 AM    PLATELET 977 68/23/0080 05:13 AM     Lab Results   Component Value Date/Time    Sodium 138 10/30/2020 05:13 AM    Potassium 3.7 10/30/2020 05:13 AM    Chloride 106 10/30/2020 05:13 AM    CO2 21 10/30/2020 05:13 AM    BUN 22 (H) 10/30/2020 05:13 AM    Creatinine 0.84 10/30/2020 05:13 AM    Calcium 9.4 10/30/2020 05:13 AM    Magnesium 2.0 10/30/2020 05:13 AM    Phosphorus 3.3 10/29/2020 05:44 AM      Lab Results   Component Value Date/Time    Alk.  phosphatase 89 10/30/2020 05:13 AM    Protein, total 6.6 10/30/2020 05:13 AM    Albumin 3.4 10/30/2020 05:13 AM    Globulin 3.2 10/30/2020 05:13 AM     Lab Results   Component Value Date/Time    INR 1.0 01/27/2014 04:08 PM    Prothrombin time 12.7 01/27/2014 04:08 PM    aPTT 33.7 01/27/2014 04:08 PM      No results found for: IRON, FE, TIBC, IBCT, PSAT, FERR   No results found for: PH, PCO2, PO2  No components found for: [place]   Lab Results   Component Value Date/Time     10/26/2020 06:45 PM    CK - MB 2.5 10/26/2020 06:45 PM              Total time: 35 minutes  Counseling / coordination time, spent as noted above > 50% counseling / coordination: 30 minutes with patient and family. Prolonged service was provided for  []30 min   []75 min in face to face time in the presence of the patient, spent as noted above. Time Start:   Time End:   Note: this can only be billed with 80429 (initial) or 95838 (follow up).   If multiple start / stop times, list each separately.    '

## 2020-10-31 ENCOUNTER — APPOINTMENT (OUTPATIENT)
Dept: GENERAL RADIOLOGY | Age: 65
DRG: 720 | End: 2020-10-31
Attending: INTERNAL MEDICINE
Payer: MEDICAID

## 2020-10-31 LAB
ALBUMIN SERPL-MCNC: 3.2 G/DL (ref 3.4–5)
ALBUMIN/GLOB SERPL: 1 {RATIO} (ref 0.8–1.7)
ALP SERPL-CCNC: 90 U/L (ref 45–117)
ALT SERPL-CCNC: 17 U/L (ref 16–61)
ANION GAP SERPL CALC-SCNC: 10 MMOL/L (ref 3–18)
AST SERPL-CCNC: 11 U/L (ref 10–38)
BASOPHILS # BLD: 0 K/UL (ref 0–0.1)
BASOPHILS NFR BLD: 0 % (ref 0–2)
BILIRUB SERPL-MCNC: 0.7 MG/DL (ref 0.2–1)
BUN SERPL-MCNC: 24 MG/DL (ref 7–18)
BUN/CREAT SERPL: 32 (ref 12–20)
CALCIUM SERPL-MCNC: 9.3 MG/DL (ref 8.5–10.1)
CHLORIDE SERPL-SCNC: 109 MMOL/L (ref 100–111)
CO2 SERPL-SCNC: 22 MMOL/L (ref 21–32)
CREAT SERPL-MCNC: 0.74 MG/DL (ref 0.6–1.3)
DIFFERENTIAL METHOD BLD: ABNORMAL
EOSINOPHIL # BLD: 0 K/UL (ref 0–0.4)
EOSINOPHIL NFR BLD: 0 % (ref 0–5)
ERYTHROCYTE [DISTWIDTH] IN BLOOD BY AUTOMATED COUNT: 17.1 % (ref 11.6–14.5)
GLOBULIN SER CALC-MCNC: 3.3 G/DL (ref 2–4)
GLUCOSE BLD STRIP.AUTO-MCNC: 111 MG/DL (ref 70–110)
GLUCOSE BLD STRIP.AUTO-MCNC: 38 MG/DL (ref 70–110)
GLUCOSE BLD STRIP.AUTO-MCNC: 74 MG/DL (ref 70–110)
GLUCOSE BLD STRIP.AUTO-MCNC: 78 MG/DL (ref 70–110)
GLUCOSE SERPL-MCNC: 78 MG/DL (ref 74–99)
HCT VFR BLD AUTO: 34.7 % (ref 36–48)
HGB BLD-MCNC: 11.5 G/DL (ref 13–16)
LYMPHOCYTES # BLD: 0.5 K/UL (ref 0.9–3.6)
LYMPHOCYTES NFR BLD: 4 % (ref 21–52)
MAGNESIUM SERPL-MCNC: 2 MG/DL (ref 1.6–2.6)
MCH RBC QN AUTO: 28.4 PG (ref 24–34)
MCHC RBC AUTO-ENTMCNC: 33.1 G/DL (ref 31–37)
MCV RBC AUTO: 85.7 FL (ref 74–97)
MONOCYTES # BLD: 0.9 K/UL (ref 0.05–1.2)
MONOCYTES NFR BLD: 7 % (ref 3–10)
NEUTS SEG # BLD: 11.3 K/UL (ref 1.8–8)
NEUTS SEG NFR BLD: 89 % (ref 40–73)
PLATELET # BLD AUTO: 306 K/UL (ref 135–420)
PMV BLD AUTO: 10.6 FL (ref 9.2–11.8)
POTASSIUM SERPL-SCNC: 3.5 MMOL/L (ref 3.5–5.5)
PROT SERPL-MCNC: 6.5 G/DL (ref 6.4–8.2)
RBC # BLD AUTO: 4.05 M/UL (ref 4.7–5.5)
SODIUM SERPL-SCNC: 141 MMOL/L (ref 136–145)
WBC # BLD AUTO: 12.7 K/UL (ref 4.6–13.2)

## 2020-10-31 PROCEDURE — 80053 COMPREHEN METABOLIC PANEL: CPT

## 2020-10-31 PROCEDURE — C9113 INJ PANTOPRAZOLE SODIUM, VIA: HCPCS | Performed by: INTERNAL MEDICINE

## 2020-10-31 PROCEDURE — 74011000250 HC RX REV CODE- 250: Performed by: INTERNAL MEDICINE

## 2020-10-31 PROCEDURE — 74011250637 HC RX REV CODE- 250/637: Performed by: INTERNAL MEDICINE

## 2020-10-31 PROCEDURE — 77010033711 HC HIGH FLOW OXYGEN

## 2020-10-31 PROCEDURE — 83735 ASSAY OF MAGNESIUM: CPT

## 2020-10-31 PROCEDURE — 74011250636 HC RX REV CODE- 250/636: Performed by: INTERNAL MEDICINE

## 2020-10-31 PROCEDURE — 74011250636 HC RX REV CODE- 250/636: Performed by: HOSPITALIST

## 2020-10-31 PROCEDURE — 74011250637 HC RX REV CODE- 250/637: Performed by: FAMILY MEDICINE

## 2020-10-31 PROCEDURE — 65610000006 HC RM INTENSIVE CARE

## 2020-10-31 PROCEDURE — 74011250636 HC RX REV CODE- 250/636: Performed by: EMERGENCY MEDICINE

## 2020-10-31 PROCEDURE — 74011250636 HC RX REV CODE- 250/636: Performed by: FAMILY MEDICINE

## 2020-10-31 PROCEDURE — 74018 RADEX ABDOMEN 1 VIEW: CPT

## 2020-10-31 PROCEDURE — 85025 COMPLETE CBC W/AUTO DIFF WBC: CPT

## 2020-10-31 PROCEDURE — 74011000258 HC RX REV CODE- 258: Performed by: EMERGENCY MEDICINE

## 2020-10-31 PROCEDURE — 82962 GLUCOSE BLOOD TEST: CPT

## 2020-10-31 PROCEDURE — 74011250637 HC RX REV CODE- 250/637: Performed by: HOSPITALIST

## 2020-10-31 PROCEDURE — 94640 AIRWAY INHALATION TREATMENT: CPT

## 2020-10-31 PROCEDURE — 74011000258 HC RX REV CODE- 258: Performed by: INTERNAL MEDICINE

## 2020-10-31 PROCEDURE — 36415 COLL VENOUS BLD VENIPUNCTURE: CPT

## 2020-10-31 RX ORDER — POTASSIUM CHLORIDE 20 MEQ/1
40 TABLET, EXTENDED RELEASE ORAL ONCE
Status: COMPLETED | OUTPATIENT
Start: 2020-10-31 | End: 2020-10-31

## 2020-10-31 RX ORDER — DEXTROSE MONOHYDRATE AND SODIUM CHLORIDE 5; .9 G/100ML; G/100ML
100 INJECTION, SOLUTION INTRAVENOUS CONTINUOUS
Status: DISCONTINUED | OUTPATIENT
Start: 2020-10-31 | End: 2020-11-05

## 2020-10-31 RX ORDER — QUETIAPINE FUMARATE 100 MG/1
100 TABLET, FILM COATED ORAL 2 TIMES DAILY
Status: DISCONTINUED | OUTPATIENT
Start: 2020-10-31 | End: 2020-11-08

## 2020-10-31 RX ADMIN — ATORVASTATIN CALCIUM 20 MG: 20 TABLET, FILM COATED ORAL at 23:08

## 2020-10-31 RX ADMIN — IPRATROPIUM BROMIDE 0.5 MG: 0.5 SOLUTION RESPIRATORY (INHALATION) at 07:13

## 2020-10-31 RX ADMIN — DEXTROSE MONOHYDRATE AND SODIUM CHLORIDE 50 ML/HR: 5; .9 INJECTION, SOLUTION INTRAVENOUS at 10:00

## 2020-10-31 RX ADMIN — METOPROLOL TARTRATE 5 MG: 5 INJECTION INTRAVENOUS at 07:14

## 2020-10-31 RX ADMIN — SODIUM CHLORIDE 10 ML: 9 INJECTION, SOLUTION INTRAMUSCULAR; INTRAVENOUS; SUBCUTANEOUS at 07:14

## 2020-10-31 RX ADMIN — SODIUM CHLORIDE 40 MG: 9 INJECTION INTRAMUSCULAR; INTRAVENOUS; SUBCUTANEOUS at 11:00

## 2020-10-31 RX ADMIN — SODIUM CHLORIDE 10 ML: 9 INJECTION, SOLUTION INTRAMUSCULAR; INTRAVENOUS; SUBCUTANEOUS at 23:10

## 2020-10-31 RX ADMIN — QUETIAPINE FUMARATE 100 MG: 100 TABLET ORAL at 17:37

## 2020-10-31 RX ADMIN — BUDESONIDE 500 MCG: 0.25 INHALANT RESPIRATORY (INHALATION) at 07:13

## 2020-10-31 RX ADMIN — POTASSIUM CHLORIDE 40 MEQ: 1500 TABLET, EXTENDED RELEASE ORAL at 23:08

## 2020-10-31 RX ADMIN — CLONAZEPAM 0.5 MG: 0.5 TABLET ORAL at 14:19

## 2020-10-31 RX ADMIN — GABAPENTIN 100 MG: 100 CAPSULE ORAL at 23:08

## 2020-10-31 RX ADMIN — METOPROLOL TARTRATE 5 MG: 5 INJECTION INTRAVENOUS at 01:31

## 2020-10-31 RX ADMIN — METOPROLOL TARTRATE 5 MG: 5 INJECTION INTRAVENOUS at 12:00

## 2020-10-31 RX ADMIN — HALOPERIDOL LACTATE 5 MG: 5 INJECTION, SOLUTION INTRAMUSCULAR at 22:59

## 2020-10-31 RX ADMIN — BUDESONIDE 500 MCG: 0.25 INHALANT RESPIRATORY (INHALATION) at 19:12

## 2020-10-31 RX ADMIN — CLONAZEPAM 0.5 MG: 0.5 TABLET ORAL at 23:08

## 2020-10-31 RX ADMIN — DEXTROSE MONOHYDRATE AND SODIUM CHLORIDE 50 ML/HR: 5; .9 INJECTION, SOLUTION INTRAVENOUS at 23:08

## 2020-10-31 RX ADMIN — PIPERACILLIN AND TAZOBACTAM 3.38 G: 3; .375 INJECTION, POWDER, LYOPHILIZED, FOR SOLUTION INTRAVENOUS at 04:21

## 2020-10-31 RX ADMIN — ENOXAPARIN SODIUM 40 MG: 40 INJECTION SUBCUTANEOUS at 08:46

## 2020-10-31 RX ADMIN — METOPROLOL TARTRATE 5 MG: 5 INJECTION INTRAVENOUS at 17:37

## 2020-10-31 RX ADMIN — PIPERACILLIN AND TAZOBACTAM 3.38 G: 3; .375 INJECTION, POWDER, LYOPHILIZED, FOR SOLUTION INTRAVENOUS at 22:59

## 2020-10-31 RX ADMIN — LORAZEPAM 1 MG: 2 INJECTION INTRAMUSCULAR at 13:06

## 2020-10-31 RX ADMIN — IPRATROPIUM BROMIDE 0.5 MG: 0.5 SOLUTION RESPIRATORY (INHALATION) at 19:14

## 2020-10-31 RX ADMIN — LORAZEPAM 1 MG: 2 INJECTION INTRAMUSCULAR at 23:20

## 2020-10-31 RX ADMIN — GABAPENTIN 100 MG: 100 CAPSULE ORAL at 17:37

## 2020-10-31 RX ADMIN — ENOXAPARIN SODIUM 40 MG: 40 INJECTION SUBCUTANEOUS at 22:59

## 2020-10-31 RX ADMIN — IPRATROPIUM BROMIDE 0.5 MG: 0.5 SOLUTION RESPIRATORY (INHALATION) at 11:11

## 2020-10-31 RX ADMIN — PIPERACILLIN AND TAZOBACTAM 3.38 G: 3; .375 INJECTION, POWDER, LYOPHILIZED, FOR SOLUTION INTRAVENOUS at 12:00

## 2020-10-31 RX ADMIN — IPRATROPIUM BROMIDE 0.5 MG: 0.5 SOLUTION RESPIRATORY (INHALATION) at 15:34

## 2020-10-31 NOTE — PROGRESS NOTES
[facility]  Pulmonary, Critical Care, and Sleep Medicine    Name: Dona Chavez. MRN: [id number]   : 1955 Hospital: [facility] [place]   Date: 10/31/2020        Pulmonary Critical Care Note    IMPRESSION:     Acute respiratory failure with hypoxia (HCC) J96.01     Sepsis with organ dysfunction, resolved shock ([address] A41.9, R65.20     Aspiration pneumonia ([address] J69.0     Deep vein thrombosis (DVT) of lower extremity ([address] I82.409       Patient Active Problem List   Diagnosis Code    Bursitis of elbow M70.30    Medication overdose T50.901A    Polio A80.9    Depressive disorder, not elsewhere classified F32.9    Type II or unspecified type diabetes mellitus without mention of complication, uncontrolled [id number]    Hypotension, unspecified I95.9    Amputation of both lower extremities ([address] S88.911A, I01.553X    PNA (pneumonia) J18.9    ALEXANDER (acute kidney injury) ([address] N17.9    Hyponatremia E87.1    Marijuana abuse F12.10    Centrilobular emphysema ([address] J43.2    CAP (community acquired pneumonia) J18.9    Sepsis ([address] A41.9    Hard of hearing H91.90    Legal blindness H54.8    Acute encephalopathy G93.40    Septic shock (HCC) A41.9, R65.21    Chronic obstructive pulmonary disease with acute exacerbation (HCC) J44.1    Severe protein-calorie malnutrition ([address] W48    Metabolic encephalopathy R85.63    Acute respiratory failure with hypoxia (HCC) J96.01    Aspiration pneumonia ([address] J69.0    Deep vein thrombosis (DVT) of lower extremity ([address] I82.409          RECOMMENDATIONS:   Respiratory: Hx smoking. CTA chest 10/27/2020: No PE. Diffuse peribronchial wall thickening and several areas of endobronchial impaction/occlusion. LLL pneumonia with peribronchial consolidation posterior RUL. Moderately severe upper lobe predominant centrilobular emphysema. Small left and trace right pleural effusion. Recommend repeat CT in 4 to 6 weeks as outpatient.   Repeat CXR today showed similar findings. Now on 2 LPM NC since 1030 1:20 AM.  N.p.o. due to recurrent aspiration. MBS cannot be performed until Monday, per radiology department. Patient still remains at high risk for aspiration. NGT will be placed, discussed with RN. Since patient is n.p.o., will start D5 NS. Patient remains full code after patient's son visited patient on 10/30/2020 and discuss them with palliative care team.    Repeat CXR intermittently. Titrate FiO2 to keep SPO2 > 91%. Steroids: Off Solu-Medrol since 10/29/2020. Bronchodilators: Continue Pulmicort twice daily, Atrovent 4 times daily. Continue DuoNeb as needed. Continue pulmonary hygiene and toileting. Continue aspiration precautions. HOB more than 30 degrees all the time. ID:   No fever. Leukocytosis resolved. Urine culture: <100 K. Blood culture: NGTD. Urine antigen panel negative. COVID-19 10/14/2020: Negative. Antibiotic choice: Doxycycline discontinued 10/28/2020. Vancomycin discontinued 10/29/2020. Continue Zosyn. Follow cultures. Deescalate antibiotic when appropriate. CVS:   Echo 10/25/2020: LVEF 50 to 55%. Grade 1 DD. Estimated PASP 74 mmHg. Continue Norvasc and metoprolol, home dose. Continue Lipitor. Estimated RVSP 74 mmHg on echo, could be due to hypoxic vasoconstriction. Recommend repeat echo as outpatient. PVL LE 10/27/2020: Chronic nonocclusive thrombus left proximal femoral vein. Age indeterminate nonocclusive thrombus right femoral vein. Due to DVT, will continue Lovenox 40 mg subcutaneously every 12 hours. May transition to oral anticoagulants when and if patient can pass MBS and able to take p.o. safely. Continue IV Lopressor for intermittently elevated blood pressure. When past MBS and able to take p.o., can start Norvasc and Lopressor p.o. This can be done after NGT placement as well. Restart Lipitor after NGT placement.     Renal: Monitor renal functions, lytes  Normal renal function/creatinine. Monitor urine output, creatinine and electrolytes. Heme: Mild anemia. Normal platelets and coags. Continue Lovenox full dose for DVT. Can be transitioned to newer oral anticoagulants once able to take p.o. No active external bleeding. Monitor closely. Endo: Glycemic control. Monitor for any hypoglycemia. TSH normal    GI: No acute GI issues. Neurology:   Alert awake oriented x3. Continues to moan all day long. Urine drug screen positive for THC on admission. ??  Baseline mild dementia. CT head on admission chronic microvascular disease, no acute intracranial abnormality. Ammonia normal.  Continue Seroquel 100 mg p.o. nightly. Continue Haldol as needed. PAIN AND SEDATION: none    Prophylaxis:  DVT treatment Lovenox. GI prophylaxis Protonix. Restraints: Wrist soft restraints as needed for patient interfering with medical therapy/management and patient safety. Lines/Tubes:   Central line: Right femoral 10/23/20discontinued 10/26/2020. Lindo: 10/23/20discontinued 10/27/2020. Condom catheter: 10/27/2020. Currently patient does not have a Lindo catheter or central line. Overall prognosis remains guarded. ADVANCE DIRECTIVE: Full code  Palliative care consult team on board. Will defer respective systems problem management to primary and other consultant and follow patient in ICU with primary and other medical team  Further recommendations will be based on the patient's response to recommended treatment and results of the investigation ordered. Quality Care: PPI, DVT prophylaxis, HOB elevated, Infection control all reviewed and addressed. DISCUSSION: Events and notes from last 24 hours reviewed. Care plan discussed with nursing, hospitalist, ICU staff, RT, MDR.  D/w patient (answered all questions to satisfaction).      Family discussion:  Patient annette Cochran visited patient on 10/30/2020, discussed with palliative care team.  Patient remains full code.  Patient's other son is supposed to visit patient 10/31/2020. High complexity decision making was performed during the evaluation of this patient at high risk for decompensation with multiple organ involvement. Total critical care time spent rendering care exclusive of procedures/family discussion/coordination of care: 33 minutes. Subjective/History:   Mr. Avis Gramajo. has been seen and evaluated as Dr. Mohit Crawford requested for assisting with ICU care of septic shock. Patient unable to provide history. Patient is a 63 y.o. male COPD, bilateral above-knee amputation, recently discharged from [facility] after treated for pneumonia. Per chart, since discharge about a week ago patient has been in the ER few times at other facilities with complaints of cough, shortness of breath and hypoxemia; had a CT scan done at [facility] which showed persistent pneumonia. Per chart, patient had not picked up his antibiotics that he was discharged on until recently. He was brought to ER with c/o of chest pain between shoulders by EMS. In the ER he was found to be confused and combative with hypothermia, hypotension. He was treated with IVF boluses and Levophed. CT head on admission nil acute. CXR showed improving pneumonia. He has remained confused at the time of this evaluation at bedside in rm 102 in ICU.      10/31/20   Patient remains in ICU room 102. Required HFNC overnight, now on 2 LPM O2 since today morning. Required Haldol orally in the morning. NPO. Less frequent orotracheal nasotracheal suctioning requirement. MBS was not able to be done yesterday since patient was on HFNC. Now patient is on 2 LPM NC. MBS cannot be performed until Monday, per radiology department. Patient is sleeping but arousable. On Lovenox without any external bleeding noted. Leukocytosis resolved. No fever. Patient son came to bedside yesterday, discussed with palliative care team.  Patient remains on full code.   Another son is supposed to arrive to hospital to visit patient today. PCCM was not called for any issues overnight. No other overnight issues reported. Review of Systems:  Review of systems not obtained due to patient factors. Intake/Output Summary (Last 24 hours) at 10/31/2020 7491  Last data filed at 10/31/2020 2845  Gross per 24 hour   Intake 500 ml   Output 200 ml   Net 300 ml                 Latest lactic acid:   Lactic acid   Date Value Ref Range Status   10/24/2020 0.9 0.4 - 2.0 MMOL/L Final   10/23/2020 2.7 (HH) 0.4 - 2.0 MMOL/L Final     Comment:     CALLED TO AND CORRECTLY REPEATED BY:  LEXIE ROJAS RN ED BY CAM ON 10/23/20 AT 1738.     10/23/2020 3.3 (HH) 0.4 - 2.0 MMOL/L Final     Comment:     CALLED TO AND CORRECTLY REPEATED BY:  Michael Rascon RN ED BY CAM ON 10/23/20 AT 1700. Past Medical History:  Past Medical History:   Diagnosis Date    [facility] of both lower extremities [address]     Amputee, above knee, left ([address]     At risk for falls     Chronic pain     back and legs    Diabetes ([address]     Hypercholesterolemia     Hypertension     Polio         Past Surgical History:  Past Surgical History:   Procedure Laterality Date    HX HERNIA REPAIR      HX OTHER SURGICAL      carpal tunnel     HX OTHER SURGICAL      cyst        Medications:  Prior to Admission medications    Medication Sig Start Date End Date Taking? Authorizing Provider   amoxicillin-clavulanate (AUGMENTIN) 875-125 mg per tablet Take 1 Tab by mouth every twelve (12) hours. 10/20/20   Mohan White MD   acetaminophen (TYLENOL) 325 mg tablet Take  by mouth every four (4) hours as needed for Pain. Tiffany Saravia MD   amLODIPine (NORVASC) 5 mg tablet Take 5 mg by mouth daily. Tiffany Saravia MD   gabapentin (NEURONTIN) 100 mg capsule Take 100 mg by mouth three (3) times daily. Tiffany Saravia MD   clonazePAM (KLONOPIN) 0.5 mg tablet Take 0.5 mg by mouth nightly as needed.     Tiffany Saravia MD   atorvastatin (LIPITOR) 20 mg tablet Take 20 mg by mouth daily. Tiffany Saravia MD   nitroglycerin (NITROSTAT) 0.4 mg SL tablet 0.4 mg by SubLINGual route every five (5) minutes as needed for Chest Pain. Up to 3 doses. Tiffany Saravia MD   metoprolol succinate (TOPROL XL) 25 mg XL tablet Take 25 mg by mouth daily. Tiffany Saravia MD   oxyCODONE-acetaminophen (PERCOCET) 5-325 mg per tablet Take  by mouth every four (4) hours as needed for Pain. Tiffany Saravia MD   traMADol (ULTRAM) 50 mg tablet Take 1 Tab by mouth every six (6) hours as needed for Pain. Max Daily Amount: 200 mg. 12/4/18   Kami Clayton MD   simvastatin (ZOCOR) 20 mg tablet Take 20 mg by mouth nightly. Provider, Historical   lisinopril-hydrochlorothiazide (PRINZIDE, ZESTORETIC) 20-12.5 mg per tablet Take 1 Tab by mouth daily. Provider, Historical   isosorbide mononitrate ER (IMDUR) 30 mg tablet Take 30 mg by mouth daily.     Provider, Historical       Current Facility-Administered Medications   Medication Dose Route Frequency    QUEtiapine (SEROquel) tablet 100 mg  100 mg Oral QHS    insulin lispro (HUMALOG) injection   SubCUTAneous Q6H    metoprolol (LOPRESSOR) injection 5 mg  5 mg IntraVENous Q6H    pantoprazole (PROTONIX) 40 mg in 0.9% sodium chloride 10 mL injection  40 mg IntraVENous Q24H    amLODIPine (NORVASC) tablet 10 mg  10 mg Oral DAILY    [Held by provider] metoprolol tartrate (LOPRESSOR) tablet 12.5 mg  12.5 mg Oral Q12H    enoxaparin (LOVENOX) injection 40 mg  1 mg/kg SubCUTAneous Q12H    ipratropium (ATROVENT) 0.02 % nebulizer solution 0.5 mg  0.5 mg Nebulization QID RT    atorvastatin (LIPITOR) tablet 20 mg  20 mg Oral QHS    gabapentin (NEURONTIN) capsule 100 mg  100 mg Oral TID    piperacillin-tazobactam (ZOSYN) 3.375 g in 0.9% sodium chloride (MBP/ADV) 100 mL MBP  3.375 g IntraVENous Q8H    nicotine (NICODERM CQ) 21 mg/24 hr patch 1 Patch  1 Patch TransDERmal DAILY    budesonide (PULMICORT) 250 mcg/2ml nebulizer susp  500 mcg Nebulization BID RT    sodium chloride (NS) flush 5-40 mL  5-40 mL IntraVENous Q8H       Allergy:  No Known Allergies     Social History:  Social History     Tobacco Use    Smoking status: Current Every Day Smoker     Packs/day: 2.00     Years: 40.00     Pack years: 80.00    Smokeless tobacco: Current User     Types: Snuff   Substance Use Topics    Alcohol use: No    Drug use: No        Family History:  Family History   Problem Relation Age of Onset    Heart Attack Mother     Heart Attack Father     Malignant Hyperthermia Neg Hx     Pseudocholinesterase Deficiency Neg Hx     Delayed Awakening Neg Hx     Post-op Nausea/Vomiting Neg Hx     Emergence Delirium Neg Hx     Post-op Cognitive Dysfunction Neg Hx     Other Neg Hx           Objective:   Vital Signs:    Blood pressure 125/73, pulse 71, temperature 98.8 °F (37.1 °C), resp. rate 28, height 5' 4\" (1.626 m), weight 39.5 kg (87 lb 1.3 oz), SpO2 95 %. Body mass index is 14.95 kg/m². O2 Device: Nasal cannula   O2 Flow Rate (L/min): 2 l/min   Temp (24hrs), Av.3 °F (36.8 °C), Min:97.9 °F (36.6 °C), Max:98.8 °F (37.1 °C)         Intake/Output:   Last shift:      No intake/output data recorded. Last 3 shifts: 10/29 1901 - 10/31 0700  In: 800 [I.V.:800]  Out: 775 [Urine:425]    Intake/Output Summary (Last 24 hours) at 10/31/2020 6380  Last data filed at 10/31/2020 0659  Gross per 24 hour   Intake 500 ml   Output 200 ml   Net 300 ml       Physical Exam:  General/Neurology: Sleeping but arousable. Head:   NCAT. Eye:   EOM intact, no icterus/pallor/cyanosis. Nose:   No nasal drainage/discharge. Neck:   Trachea midline. No palpable cervical lymphadenopathy. Lung:   Barrel chest.  Moderate air entry bilateral equal.  No rales, rhonchi. No wheezing or stridor. No prolonged expiration or accessory muscle use. Heart:   S1 S2 present. No murmur or JVD. Abdomen:  Soft. NT. ND. No palpable masses. Extremities:  Bilateral AKA.   No peripheral edema in the arms or thigh.  Pulses: 2+ and symmetric in DP. Lymphatic:  No cervical or supraclavicular palpable lymphadenopathy. Data:     Recent Results (from the past 24 hour(s))   GLUCOSE, POC    Collection Time: 10/30/20 12:14 PM   Result Value Ref Range    Glucose (POC) 85 70 - 110 mg/dL   POTASSIUM    Collection Time: 10/30/20  4:50 PM   Result Value Ref Range    Potassium 4.0 3.5 - 5.5 mmol/L   GLUCOSE, POC    Collection Time: 10/30/20  6:08 PM   Result Value Ref Range    Glucose (POC) 81 70 - 110 mg/dL   GLUCOSE, POC    Collection Time: 10/31/20 12:48 AM   Result Value Ref Range    Glucose (POC) 38 (LL) 70 - 110 mg/dL   GLUCOSE, POC    Collection Time: 10/31/20 12:50 AM   Result Value Ref Range    Glucose (POC) 74 70 - 110 mg/dL   MAGNESIUM    Collection Time: 10/31/20  3:55 AM   Result Value Ref Range    Magnesium 2.0 1.6 - 2.6 mg/dL   CBC WITH AUTOMATED DIFF    Collection Time: 10/31/20  3:55 AM   Result Value Ref Range    WBC 12.7 4.6 - 13.2 K/uL    RBC 4.05 (L) 4.70 - 5.50 M/uL    HGB 11.5 (L) 13.0 - 16.0 g/dL    HCT 34.7 (L) 36.0 - 48.0 %    MCV 85.7 74.0 - 97.0 FL    MCH 28.4 24.0 - 34.0 PG    MCHC 33.1 31.0 - 37.0 g/dL    RDW 17.1 (H) 11.6 - 14.5 %    PLATELET 872 226 - 985 K/uL    MPV 10.6 9.2 - 11.8 FL    NEUTROPHILS 89 (H) 40 - 73 %    LYMPHOCYTES 4 (L) 21 - 52 %    MONOCYTES 7 3 - 10 %    EOSINOPHILS 0 0 - 5 %    BASOPHILS 0 0 - 2 %    ABS. NEUTROPHILS 11.3 (H) 1.8 - 8.0 K/UL    ABS. LYMPHOCYTES 0.5 (L) 0.9 - 3.6 K/UL    ABS. MONOCYTES 0.9 0.05 - 1.2 K/UL    ABS. EOSINOPHILS 0.0 0.0 - 0.4 K/UL    ABS.  BASOPHILS 0.0 0.0 - 0.1 K/UL    DF AUTOMATED     METABOLIC PANEL, COMPREHENSIVE    Collection Time: 10/31/20  3:55 AM   Result Value Ref Range    Sodium 141 136 - 145 mmol/L    Potassium 3.5 3.5 - 5.5 mmol/L    Chloride 109 100 - 111 mmol/L    CO2 22 21 - 32 mmol/L    Anion gap 10 3.0 - 18 mmol/L    Glucose 78 74 - 99 mg/dL    BUN 24 (H) 7.0 - 18 MG/DL    Creatinine 0.74 0.6 - 1.3 MG/DL    BUN/Creatinine ratio 32 (H) 12 - 20      GFR est AA >60 >60 ml/min/1.73m2    GFR est non-AA >60 >60 ml/min/1.73m2    Calcium 9.3 8.5 - 10.1 MG/DL    Bilirubin, total 0.7 0.2 - 1.0 MG/DL    ALT (SGPT) 17 16 - 61 U/L    AST (SGOT) 11 10 - 38 U/L    Alk. phosphatase 90 45 - 117 U/L    Protein, total 6.5 6.4 - 8.2 g/dL    Albumin 3.2 (L) 3.4 - 5.0 g/dL    Globulin 3.3 2.0 - 4.0 g/dL    A-G Ratio 1.0 0.8 - 1.7             No results for input(s): FIO2I, IFO2, HCO3I, IHCO3, HCOPOC, PCO2I, PCOPOC, IPHI, PHI, PHPOC, PO2I, PO2POC in the last 72 hours. No lab exists for component: IPOC2    All Micro Results     Procedure Component Value Units Date/Time    CULTURE, BLOOD [[id number]] Collected:  10/23/20 1611    Order Status:  Completed Specimen:  Blood Updated:  10/29/20 0856     Special Requests: NO SPECIAL REQUESTS        Culture result: NO GROWTH 6 DAYS       CULTURE, BLOOD [[id number]] Collected:  10/23/20 1615    Order Status:  Completed Specimen:  Blood Updated:  10/29/20 0856     Special Requests: NO SPECIAL REQUESTS        Culture result: NO GROWTH 6 DAYS       CULTURE, URINE [[id number]] Collected:  10/23/20 1630    Order Status:  Completed Specimen:  Urine from Clean catch Updated:  10/24/20 2026     Special Requests: NO SPECIAL REQUESTS        Culture result: No growth (<1,000 CFU/ML)             Echo 10/25/2020:  Result status: Final result    · Left Ventricle: Normal cavity size and wall thickness. The estimated EF is 50 - 55%. Low normal systolic function. There is mild (grade 1) left ventricular diastolic dysfunction E/E' ratio is 10.31.  · Pulmonary Artery: Pulmonary arteries not well visualized. Pulmonary arterial systolic pressure (PASP) is 74 mmHg. Pulmonary hypertension found to be severe. PVL LE 10/27/2020:   · Chronic non-occlusive thrombus present in the left proximal femoral vein. · Age indeterminate non-occlusive thrombus present in the right common femoral vein.    Limited study secondary to bilateral AKA       CTA chest 10/27/2020:  1. No evidence of pulmonary embolism. 2.  Relatively diffuse bronchial wall thickening and several areas of  endobronchial impaction/occlusion. 3. Left lower lobe pneumonia with additional area of peribronchial alveolar  consolidation posterior right upper lobe.     > Recommend radiographic follow-up to document expected clinical resolution  following appropriate treatment in 4-6 weeks. 4. Moderately severe upper lobe predominant centrilobular emphysema. 5. Small left and trace right pleural effusions. 6. Extensive atherosclerotic vascular disease both above and below the  diaphragm. Imaging:  [x]I have personally reviewed the patients chest radiographs images and report   Results from Hospital Encounter encounter on 10/23/20   XR CHEST PORT    Narrative EXAM: XR CHEST PORT    CLINICAL INDICATION/HISTORY: Shortness of breath with possible aspiration event  -Additional: None    COMPARISON: Radiographs 10/6/2020    TECHNIQUE: Frontal view of the chest    _______________    FINDINGS:    HEART AND MEDIASTINUM: Stable appearing cardiac size and mediastinal contours. LUNGS AND PLEURAL SPACES: Faint peripheral right upper lung opacity noted with  additional left basilar density and accompanying pleural effusion. Right lung  bases clear. Lungs are hyperinflated. No pneumothorax. BONY THORAX AND SOFT TISSUES: No acute osseous abnormality    _______________      Impression IMPRESSION:    Hyperinflated lungs without radiographic stigmata paralleling those seen on  recent prior chest CT, to include right upper and left basilar infiltrates with  small left pleural effusion. Results from [place] encounter on 10/23/20   CTA CHEST W OR W WO CONT    Narrative EXAM: CTA Chest    INDICATION: 63year-old patient presently admitted with pneumonia, worsening of  hypoxia. Evaluation for pulmonary embolism.      COMPARISON: CT chest 9/7/2012; prior chest radiographs, as recently 10/26/2020. TECHNIQUE: Axial CT imaging from the thoracic inlet through the diaphragm with  intravenous contrast utilizing CTA study for pulmonary artery evaluation. Coronal and sagittal MIP reformations were generated at a separate workstation. One or more dose reduction techniques were used on this CT: automated exposure  control, adjustment of the mAs and/or kVp according to patient size, and  iterative reconstruction techniques. The specific techniques used on this CT  exam have been documented in the patient's electronic medical record. Digital  Imaging and Communications in Medicine (DICOM) format image data are available  to nonaffiliated external healthcare facilities or entities on a secure, media  free, reciprocally searchable basis with patient authorization for at least a  12-month period after this study. _______________    FINDINGS:    EXAM QUALITY: Overall exam quality is adequate. Pulmonary arterial enhancement  is adequate. The breath hold is satisfactory. PULMONARY ARTERIES: No convincing evidence of pulmonary embolism. MEDIASTINUM: Included gland is unremarkable. Cardiac size is normal. There is no  pericardial effusion. Multivessel coronary arterial atherosclerotic vascular  calcification is present. Extensive atherosclerotic vascular calcification of  the thoracic aorta noted without evidence of aneurysmal dilatation. LYMPH NODES: No enlarged mediastinal or hilar nodes by size criteria. AIRWAY: Diffuse bronchial wall thickening with endobronchial debris present  within the bronchus intermedius and right lower lobe segmental bronchi. Additional left lower lobe and segmental bronchial impaction. LUNGS: Focal area of low attenuating consolidation within the medial portion  left lower lobe with adjacent areas of atelectasis. Patchy peribronchial  consolidation within the posterior aspect right upper lobe.  Moderately severe  upper lobe predominant centrilobular emphysema. No significant groundglass  abnormality or abnormal septal line thickening. PLEURA: Trace right and small left pleural effusions. No evidence of  pneumothorax. UPPER ABDOMEN: Marked atherosclerotic vascular calcifications present throughout  the suprarenal and infrarenal abdominal aorta. Likely layering biliary sludge  within the gallbladder. OTHER: No acute or aggressive osseous abnormalities identified. Multilevel  degenerative wedging of the thoracic spine noted. Prominent Schmorl's node  superior endplate Z17.    _______________      Impression IMPRESSION:    1. No evidence of pulmonary embolism. 2.  Relatively diffuse bronchial wall thickening and several areas of  endobronchial impaction/occlusion. 3. Left lower lobe pneumonia with additional area of peribronchial alveolar  consolidation posterior right upper lobe.     > Recommend radiographic follow-up to document expected clinical resolution  following appropriate treatment in 4-6 weeks. 4. Moderately severe upper lobe predominant centrilobular emphysema. 5. Small left and trace right pleural effusions. 6. Extensive atherosclerotic vascular disease both above and below the  diaphragm.            Tip Lomeli MD  10/31/2020

## 2020-10-31 NOTE — PROGRESS NOTES
Hospitalist Progress Note    Patient: Parag Borges Sr. MRN: [id number]  CSN: [id number]    YOB: 1955  Age: 63 y.o. Sex: male    DOA: 10/23/2020 LOS:  LOS: 8 days                Assessment/Plan     Patient Active Problem List   Diagnosis Code    Bursitis of elbow M70.30    Medication overdose T50.901A    Polio A80.9    Depressive disorder, not elsewhere classified F32.9    Type II or unspecified type diabetes mellitus without mention of complication, uncontrolled [id number]    Hypotension, unspecified I95.9    Amputation of both lower extremities ([address] S88.911A, D78.886V    PNA (pneumonia) J18.9    ALEXANDER (acute kidney injury) ([address] N17.9    Hyponatremia E87.1    Marijuana abuse F12.10    Centrilobular emphysema ([address] J43.2    CAP (community acquired pneumonia) J18.9    Sepsis ([address] A41.9    Hard of hearing H91.90    Legal blindness H54.8    Acute encephalopathy G93.40    Septic shock ([address] A41.9, R65.21    Chronic obstructive pulmonary disease with acute exacerbation ([address] J44.1    Severe protein-calorie malnutrition ([address] M71    Metabolic encephalopathy G63.73    Acute respiratory failure with hypoxia ([address] J96.01    Aspiration pneumonia ([address] J69.0    Deep vein thrombosis (DVT) of lower extremity ([address] I80.56            47-year-old male with a history of a COPD, bilateral above knee amputation, who is admitted for SOB, septic shock and metabolic encephalopathy. Still with some confusion, agitation    CRITICAL CARE PLAN    Resp -   Acute respiratory failure with hypoxia -  On  Oxygen by NC, Cta chest no pe, but pna and Relatively diffuse bronchial wall thickening and several areas of endobronchial impaction/occlusion/emphesema.     COPD with right upper lobe pneumonia questionable mass/emphysema /aspiration pna   continue chest  PT and suction , aspiration precaution   Continue iv abx      ID -  blood and urine cx no growth. ANTIBIOTICS zosyn.    pneumonia    [facility] - Monitor HD. Septic shock, resolved, off pressors  Prolonged QT, cardiology following  HTN - on amlodipine and metoprolol. meds on hold due to NPO. IV lopressor until then    Heme/onc - Follow H&H, plts. Chronic DVT on LE dopplers  On full dose anticoagulation. Renal - Trend BUN, Cr, follow I/O. Check and replace Mg, K, phos. Endocrine -  Follow FSG    Neuro/ Pain/ Sedation -  Metabolic encephalopathy  UDS positive for THC  CT head with no acute findings  On seroquel  Legally blind    GI - NPO for now. Seen by SLP, cannot do MBS secondary to risk of aspiration, will attempt on Monday. Prophylaxis - DVT: heparin, GI: protonix      Disposition : TBD    Physical Exam:  General: As above    HEENT: NC, Atraumatic. PERRLA, anicteric sclerae. Lungs: CTA Bilaterally. No Wheezing/Rhonchi/Rales. Heart:  S1 S2,  No murmur, No Rubs, No Gallops  Abdomen: Soft, Non distended, Non tender.  +Bowel sounds,   Extremities: Bilateral AKA          Vital signs/Intake and Output:  Visit Vitals  BP (!) 179/100   Pulse 90   Temp 97 °F (36.1 °C)   Resp 20   Ht 5' 4\" (1.626 m)   Wt 39.5 kg (87 lb 1.3 oz)   SpO2 94%   BMI 14.95 kg/m²     Current Shift:  No intake/output data recorded. Last three shifts:  10/30 0701 - 10/31 1900  In: 500 [I.V.:500]  Out: 200 [Urine:200]            Labs: Results:       Chemistry Recent Labs     10/31/20  0355 10/30/20  1650 10/30/20  0513  10/29/20  0544   GLU 78  --  81  --  80     --  138  --  141   K 3.5 4.0 3.7   < > 3.3*     --  106  --  106   CO2 22  --  21  --  26   BUN 24*  --  22*  --  20*   CREA 0.74  --  0.84  --  0.92   CA 9.3  --  9.4  --  9.2   AGAP 10  --  11  --  9   BUCR 32*  --  26*  --  22*   AP 90  --  89  --  85   TP 6.5  --  6.6  --  6.9   ALB 3.2*  --  3.4  --  3.6   GLOB 3.3  --  3.2  --  3.3   AGRAT 1.0  --  1.1  --  1.1    < > = values in this interval not displayed.       CBC w/Diff Recent Labs     10/31/20  0355 10/30/20  0513 10/29/20  0544   WBC 12.7 20.2* 14.1*   RBC 4.05* 4.28* 4.33*   HGB 11.5* 12.1* 12.3*   HCT 34.7* 36.5 37.3    363 312   GRANS 89* 91* 89*   LYMPH 4* 3* 5*   EOS 0 0 0      Cardiac Enzymes No results for input(s): CPK, CKND1, CHUY in the last 72 hours. No lab exists for component: CKRMB, TROIP   Coagulation No results for input(s): PTP, INR, APTT, INREXT, INREXT in the last 72 hours. Lipid Panel No results found for: CHOL, CHOLPOCT, CHOLX, CHLST, CHOLV, 330812, HDL, HDLP, LDL, LDLC, DLDLP, 885887, VLDLC, VLDL, TGLX, TRIGL, TRIGP, TGLPOCT, CHHD, CHHDX   BNP No results for input(s): BNPP in the last 72 hours.    Liver Enzymes Recent Labs     10/31/20  0355   TP 6.5   ALB 3.2*   AP 90      Thyroid Studies Lab Results   Component Value Date/Time    TSH 0.78 10/24/2020 11:30 AM        Procedures/imaging: see electronic medical records for all procedures/Xrays and details which were not copied into this note but were reviewed prior to creation of Plan

## 2020-10-31 NOTE — PROGRESS NOTES
1910: Bedside and Verbal shift change report given to Sujit Gauthier RN (oncoming nurse) by Silvana Alonso RN and Vanessa Reed RN (offgoing nurse). Report included the following information SBAR, Kardex, Intake/Output, MAR, Recent Results, Med Rec Status, Cardiac Rhythm NSR and Alarm Parameters . 2000: Shift assessment completed. 0000: Reassessment completed. 0400: Reassessment completed. 0700: Bedside and Verbal shift change report given to Vanessa Reed RN (oncoming nurse) by Sujit Gauthier RN (offgoing nurse). Report included the following information SBAR, Kardex, Intake/Output, MAR, Recent Results, Med Rec Status, Cardiac Rhythm NSR and Alarm Parameters .

## 2020-10-31 NOTE — PROGRESS NOTES
PATIENT HAD REMOVED HFNC. SPO2 95% ON ROOM AIR BUT TACHYPNEIC WITH RR 31-37/SLIGHTLY AGITATED. SCHEDULED NEB TX GIVEN. O2 RESUMED @ 2L NC.  HFNC LEFT ON STANDBY.

## 2020-10-31 NOTE — PROGRESS NOTES
Nutrition Brief:    Pt was made NPO on 10/29 after noted to be pocketing food and aspiration. Pt to be seen for MBS yesterday, however, SLP deemed no appropriate as pt was put back on HFNC. SLP to follow up on Monday. Noted goals of care to be addressed with family. Will continue to follow, pt is currently Day 3 without nutrition. Please see nutrition noted on 10/29 for full assessment.     Norma Llamas, ALECIAN

## 2020-10-31 NOTE — PROGRESS NOTES
Problem: Falls - Risk of  Goal: *Absence of Falls  Description: Document Idalia Bullock Fall Risk and appropriate interventions in the flowsheet. Outcome: Progressing Towards Goal  Note: Fall Risk Interventions:  Mobility Interventions: Bed/chair exit alarm, Communicate number of staff needed for ambulation/transfer    Mentation Interventions: Bed/chair exit alarm, Door open when patient unattended, Evaluate medications/consider consulting pharmacy, More frequent rounding, Reorient patient, Room close to nurse's station, Toileting rounds    Medication Interventions: Bed/chair exit alarm, Evaluate medications/consider consulting pharmacy    Elimination Interventions: Bed/chair exit alarm, Call light in reach, Toileting schedule/hourly rounds              Problem: Patient Education: Go to Patient Education Activity  Goal: Patient/Family Education  Outcome: Progressing Towards Goal     Problem: Non-Violent Restraints  Goal: *Removal from restraints as soon as assessed to be safe  Outcome: Progressing Towards Goal  Goal: *No harm/injury to patient while restraints in use  Outcome: Progressing Towards Goal  Goal: *Patient's dignity will be maintained  Outcome: Progressing Towards Goal  Goal: *Patient Specific Goal (EDIT GOAL, INSERT TEXT)  Outcome: Progressing Towards Goal  Goal: Non-violent Restaints:Standard Interventions  Outcome: Progressing Towards Goal  Goal: Non-violent Restraints:Patient Interventions  Outcome: Progressing Towards Goal  Goal: Patient/Family Education  Outcome: Progressing Towards Goal     Problem: Pressure Injury - Risk of  Goal: *Prevention of pressure injury  Description: Document Shelton Scale and appropriate interventions in the flowsheet.   Outcome: Progressing Towards Goal  Note: Pressure Injury Interventions:  Sensory Interventions: Assess changes in LOC, Assess need for specialty bed, Avoid rigorous massage over bony prominences, Keep linens dry and wrinkle-free, Maintain/enhance activity level, Minimize linen layers, Monitor skin under medical devices, Pressure redistribution bed/mattress (bed type), Turn and reposition approx.  every two hours (pillows and wedges if needed)    Moisture Interventions: Absorbent underpads, Apply protective barrier, creams and emollients, Assess need for specialty bed, Check for incontinence Q2 hours and as needed, Limit adult briefs, Maintain skin hydration (lotion/cream), Minimize layers, Moisture barrier    Activity Interventions: Pressure redistribution bed/mattress(bed type)    Mobility Interventions: HOB 30 degrees or less, Pressure redistribution bed/mattress (bed type), PT/OT evaluation    Nutrition Interventions: Discuss nutritional consult with provider    Friction and Shear Interventions: Apply protective barrier, creams and emollients, Foam dressings/transparent film/skin sealants, HOB 30 degrees or less, Lift team/patient mobility team, Minimize layers, Sit at 90-degree angle, Transfer aides:transfer board/Zackary lift/ceiling lift                Problem: Patient Education: Go to Patient Education Activity  Goal: Patient/Family Education  Outcome: Progressing Towards Goal     Problem: Patient Education: Go to Patient Education Activity  Goal: Patient/Family Education  Outcome: Progressing Towards Goal

## 2020-11-01 LAB
ALBUMIN SERPL-MCNC: 2.8 G/DL (ref 3.4–5)
ALBUMIN/GLOB SERPL: 0.8 {RATIO} (ref 0.8–1.7)
ALP SERPL-CCNC: 83 U/L (ref 45–117)
ALT SERPL-CCNC: 16 U/L (ref 16–61)
ANION GAP SERPL CALC-SCNC: 7 MMOL/L (ref 3–18)
AST SERPL-CCNC: 15 U/L (ref 10–38)
BASOPHILS # BLD: 0 K/UL (ref 0–0.1)
BASOPHILS NFR BLD: 0 % (ref 0–2)
BILIRUB SERPL-MCNC: 0.5 MG/DL (ref 0.2–1)
BUN SERPL-MCNC: 15 MG/DL (ref 7–18)
BUN/CREAT SERPL: 27 (ref 12–20)
CALCIUM SERPL-MCNC: 8.8 MG/DL (ref 8.5–10.1)
CHLORIDE SERPL-SCNC: 114 MMOL/L (ref 100–111)
CO2 SERPL-SCNC: 23 MMOL/L (ref 21–32)
CREAT SERPL-MCNC: 0.56 MG/DL (ref 0.6–1.3)
DIFFERENTIAL METHOD BLD: ABNORMAL
EOSINOPHIL # BLD: 0.2 K/UL (ref 0–0.4)
EOSINOPHIL NFR BLD: 2 % (ref 0–5)
ERYTHROCYTE [DISTWIDTH] IN BLOOD BY AUTOMATED COUNT: 16.7 % (ref 11.6–14.5)
GLOBULIN SER CALC-MCNC: 3.3 G/DL (ref 2–4)
GLUCOSE BLD STRIP.AUTO-MCNC: 102 MG/DL (ref 70–110)
GLUCOSE BLD STRIP.AUTO-MCNC: 76 MG/DL (ref 70–110)
GLUCOSE BLD STRIP.AUTO-MCNC: 80 MG/DL (ref 70–110)
GLUCOSE BLD STRIP.AUTO-MCNC: 81 MG/DL (ref 70–110)
GLUCOSE SERPL-MCNC: 90 MG/DL (ref 74–99)
HCT VFR BLD AUTO: 34.7 % (ref 36–48)
HGB BLD-MCNC: 11.3 G/DL (ref 13–16)
LYMPHOCYTES # BLD: 0.6 K/UL (ref 0.9–3.6)
LYMPHOCYTES NFR BLD: 7 % (ref 21–52)
MAGNESIUM SERPL-MCNC: 1.9 MG/DL (ref 1.6–2.6)
MCH RBC QN AUTO: 28.3 PG (ref 24–34)
MCHC RBC AUTO-ENTMCNC: 32.6 G/DL (ref 31–37)
MCV RBC AUTO: 86.8 FL (ref 74–97)
MONOCYTES # BLD: 0.7 K/UL (ref 0.05–1.2)
MONOCYTES NFR BLD: 8 % (ref 3–10)
NEUTS SEG # BLD: 6.4 K/UL (ref 1.8–8)
NEUTS SEG NFR BLD: 83 % (ref 40–73)
PLATELET # BLD AUTO: 298 K/UL (ref 135–420)
PMV BLD AUTO: 10.3 FL (ref 9.2–11.8)
POTASSIUM SERPL-SCNC: 3.4 MMOL/L (ref 3.5–5.5)
POTASSIUM SERPL-SCNC: 3.8 MMOL/L (ref 3.5–5.5)
PROT SERPL-MCNC: 6.1 G/DL (ref 6.4–8.2)
RBC # BLD AUTO: 4 M/UL (ref 4.7–5.5)
SODIUM SERPL-SCNC: 144 MMOL/L (ref 136–145)
WBC # BLD AUTO: 7.8 K/UL (ref 4.6–13.2)

## 2020-11-01 PROCEDURE — 74011000258 HC RX REV CODE- 258: Performed by: INTERNAL MEDICINE

## 2020-11-01 PROCEDURE — 74011250636 HC RX REV CODE- 250/636: Performed by: FAMILY MEDICINE

## 2020-11-01 PROCEDURE — 74011000250 HC RX REV CODE- 250: Performed by: INTERNAL MEDICINE

## 2020-11-01 PROCEDURE — C9113 INJ PANTOPRAZOLE SODIUM, VIA: HCPCS | Performed by: INTERNAL MEDICINE

## 2020-11-01 PROCEDURE — 74011250636 HC RX REV CODE- 250/636: Performed by: HOSPITALIST

## 2020-11-01 PROCEDURE — 74011000258 HC RX REV CODE- 258: Performed by: EMERGENCY MEDICINE

## 2020-11-01 PROCEDURE — 85025 COMPLETE CBC W/AUTO DIFF WBC: CPT

## 2020-11-01 PROCEDURE — 74011250636 HC RX REV CODE- 250/636: Performed by: EMERGENCY MEDICINE

## 2020-11-01 PROCEDURE — 74011250637 HC RX REV CODE- 250/637: Performed by: INTERNAL MEDICINE

## 2020-11-01 PROCEDURE — 83735 ASSAY OF MAGNESIUM: CPT

## 2020-11-01 PROCEDURE — 84132 ASSAY OF SERUM POTASSIUM: CPT

## 2020-11-01 PROCEDURE — 82962 GLUCOSE BLOOD TEST: CPT

## 2020-11-01 PROCEDURE — 74011250636 HC RX REV CODE- 250/636: Performed by: INTERNAL MEDICINE

## 2020-11-01 PROCEDURE — 36415 COLL VENOUS BLD VENIPUNCTURE: CPT

## 2020-11-01 PROCEDURE — 94640 AIRWAY INHALATION TREATMENT: CPT

## 2020-11-01 PROCEDURE — 74011250637 HC RX REV CODE- 250/637: Performed by: HOSPITALIST

## 2020-11-01 PROCEDURE — 65610000006 HC RM INTENSIVE CARE

## 2020-11-01 PROCEDURE — 74011250637 HC RX REV CODE- 250/637: Performed by: FAMILY MEDICINE

## 2020-11-01 PROCEDURE — 77010033678 HC OXYGEN DAILY

## 2020-11-01 RX ORDER — POTASSIUM CHLORIDE 20 MEQ/1
20 TABLET, EXTENDED RELEASE ORAL ONCE
Status: COMPLETED | OUTPATIENT
Start: 2020-11-01 | End: 2020-11-01

## 2020-11-01 RX ORDER — POTASSIUM CHLORIDE 20 MEQ/1
40 TABLET, EXTENDED RELEASE ORAL
Status: COMPLETED | OUTPATIENT
Start: 2020-11-01 | End: 2020-11-01

## 2020-11-01 RX ADMIN — GABAPENTIN 100 MG: 100 CAPSULE ORAL at 08:41

## 2020-11-01 RX ADMIN — POTASSIUM CHLORIDE 20 MEQ: 1500 TABLET, EXTENDED RELEASE ORAL at 17:28

## 2020-11-01 RX ADMIN — BUDESONIDE 500 MCG: 0.25 INHALANT RESPIRATORY (INHALATION) at 20:14

## 2020-11-01 RX ADMIN — GABAPENTIN 100 MG: 100 CAPSULE ORAL at 16:01

## 2020-11-01 RX ADMIN — BUDESONIDE 500 MCG: 0.25 INHALANT RESPIRATORY (INHALATION) at 07:11

## 2020-11-01 RX ADMIN — METOPROLOL TARTRATE 5 MG: 5 INJECTION INTRAVENOUS at 18:05

## 2020-11-01 RX ADMIN — CLONAZEPAM 0.5 MG: 0.5 TABLET ORAL at 20:13

## 2020-11-01 RX ADMIN — ATORVASTATIN CALCIUM 20 MG: 20 TABLET, FILM COATED ORAL at 20:13

## 2020-11-01 RX ADMIN — QUETIAPINE FUMARATE 100 MG: 100 TABLET ORAL at 20:13

## 2020-11-01 RX ADMIN — ENOXAPARIN SODIUM 40 MG: 40 INJECTION SUBCUTANEOUS at 20:12

## 2020-11-01 RX ADMIN — IPRATROPIUM BROMIDE 0.5 MG: 0.5 SOLUTION RESPIRATORY (INHALATION) at 16:07

## 2020-11-01 RX ADMIN — METOPROLOL TARTRATE 5 MG: 5 INJECTION INTRAVENOUS at 05:57

## 2020-11-01 RX ADMIN — GABAPENTIN 100 MG: 100 CAPSULE ORAL at 20:13

## 2020-11-01 RX ADMIN — IPRATROPIUM BROMIDE 0.5 MG: 0.5 SOLUTION RESPIRATORY (INHALATION) at 07:11

## 2020-11-01 RX ADMIN — POTASSIUM CHLORIDE 40 MEQ: 1500 TABLET, EXTENDED RELEASE ORAL at 05:57

## 2020-11-01 RX ADMIN — PIPERACILLIN AND TAZOBACTAM 3.38 G: 3; .375 INJECTION, POWDER, LYOPHILIZED, FOR SOLUTION INTRAVENOUS at 03:34

## 2020-11-01 RX ADMIN — DEXTROSE MONOHYDRATE AND SODIUM CHLORIDE 50 ML/HR: 5; .9 INJECTION, SOLUTION INTRAVENOUS at 20:11

## 2020-11-01 RX ADMIN — ENOXAPARIN SODIUM 40 MG: 40 INJECTION SUBCUTANEOUS at 08:40

## 2020-11-01 RX ADMIN — IPRATROPIUM BROMIDE 0.5 MG: 0.5 SOLUTION RESPIRATORY (INHALATION) at 20:14

## 2020-11-01 RX ADMIN — METOPROLOL TARTRATE 5 MG: 5 INJECTION INTRAVENOUS at 11:29

## 2020-11-01 RX ADMIN — SODIUM CHLORIDE 10 ML: 9 INJECTION, SOLUTION INTRAMUSCULAR; INTRAVENOUS; SUBCUTANEOUS at 17:29

## 2020-11-01 RX ADMIN — HALOPERIDOL LACTATE 5 MG: 5 INJECTION, SOLUTION INTRAMUSCULAR at 18:04

## 2020-11-01 RX ADMIN — PIPERACILLIN AND TAZOBACTAM 3.38 G: 3; .375 INJECTION, POWDER, LYOPHILIZED, FOR SOLUTION INTRAVENOUS at 20:11

## 2020-11-01 RX ADMIN — PIPERACILLIN AND TAZOBACTAM 3.38 G: 3; .375 INJECTION, POWDER, LYOPHILIZED, FOR SOLUTION INTRAVENOUS at 11:30

## 2020-11-01 RX ADMIN — IPRATROPIUM BROMIDE 0.5 MG: 0.5 SOLUTION RESPIRATORY (INHALATION) at 11:25

## 2020-11-01 RX ADMIN — SODIUM CHLORIDE 40 MG: 9 INJECTION INTRAMUSCULAR; INTRAVENOUS; SUBCUTANEOUS at 11:29

## 2020-11-01 RX ADMIN — QUETIAPINE FUMARATE 100 MG: 100 TABLET ORAL at 09:41

## 2020-11-01 RX ADMIN — AMLODIPINE BESYLATE 10 MG: 5 TABLET ORAL at 08:41

## 2020-11-01 NOTE — PROGRESS NOTES
Bedside shift change report received from [facility]. Report included the following information SBAR, Kardex, Intake/Output, MAR, Recent Results, Med Rec Status and Cardiac Rhythm NSR and plan of care. 0830: Shift assessment complete. See flowsheet. 1200: Reassessment complete. See flowsheet. 1600: Reassessment complete. See flowsheet. Bedside shift change report given ALEX Archer RN. Report included the following information SBAR, Kardex, Intake/Output, MAR, Recent Results, Med Rec Status and Cardiac Rhythm NSR/Sinus Tach and plan of care. Multiple episodes of incontinence. Medicated per [facility] for continuous agitation and verbal outburst. Restraints remained d/t patient constantly pulling at IV lines, telemetry wires and NG tube.

## 2020-11-01 NOTE — PROGRESS NOTES
Hospitalist Progress Note    Patient: Mariela Taylor Sr. MRN: [id number]  CSN: [id number]    YOB: 1955  Age: 63 y.o. Sex: male    DOA: 10/23/2020 LOS:  LOS: 9 days                Assessment/Plan     Patient Active Problem List   Diagnosis Code    Bursitis of elbow M70.30    Medication overdose T50.901A    Polio A80.9    Depressive disorder, not elsewhere classified F32.9    Type II or unspecified type diabetes mellitus without mention of complication, uncontrolled [id number]    Hypotension, unspecified I95.9    Amputation of both lower extremities ([address] S88.911A, E65.118U    PNA (pneumonia) J18.9    ALEXANDER (acute kidney injury) ([address] N17.9    Hyponatremia E87.1    Marijuana abuse F12.10    Centrilobular emphysema ([address] J43.2    CAP (community acquired pneumonia) J18.9    Sepsis ([address] A41.9    Hard of hearing H91.90    Legal blindness H54.8    Acute encephalopathy G93.40    Septic shock ([address] A41.9, R65.21    Chronic obstructive pulmonary disease with acute exacerbation ([address] J44.1    Severe protein-calorie malnutrition ([address] J42    Metabolic encephalopathy V32.84    Acute respiratory failure with hypoxia ([address] J96.01    Aspiration pneumonia ([address] J69.0    Deep vein thrombosis (DVT) of lower extremity ([address] I80.56            82-year-old male with a history of a COPD, bilateral above knee amputation, who is admitted for SOB, septic shock and metabolic encephalopathy. Still with some confusion, agitation. CRITICAL CARE PLAN    Resp -   Acute respiratory failure with hypoxia -  On  Oxygen by NC, Cta chest no pe, but pna and Relatively diffuse bronchial wall thickening and several areas of endobronchial impaction/occlusion/emphesema.     COPD with right upper lobe pneumonia questionable mass/emphysema /aspiration pna   continue chest  PT and suction , aspiration precaution   Continue iv abx      ID -  blood and urine cx no growth. ANTIBIOTICS zosyn.    pneumonia    [facility] - Monitor HD. Septic shock, resolved, off pressors  Prolonged QT, cardiology following  HTN - on amlodipine and metoprolol. meds on hold due to NPO. IV lopressor until then    Heme/onc - Follow H&H, plts. Chronic DVT on LE dopplers  On full dose anticoagulation. Renal - Trend BUN, Cr, follow I/O. Check and replace Mg, K, phos. Endocrine -  Follow FSG    Neuro/ Pain/ Sedation -  Metabolic encephalopathy  UDS positive for THC  CT head with no acute findings  On seroquel  Legally blind    GI - NPO for now. Seen by SLP, cannot do MBS secondary to risk of aspiration, will attempt on Monday. Need to consider tube feeding. Prophylaxis - DVT: heparin, GI: protonix      Disposition : TBD    Physical Exam:  General: As above    HEENT: NC, Atraumatic. PERRLA, anicteric sclerae. Lungs: CTA Bilaterally. No Wheezing/Rhonchi/Rales.   Heart:  S1 S2,  No murmur, No Rubs, No Gallops  Abdomen: Soft, Non distended, Non tender.  +Bowel sounds,   Extremities: Bilateral AKA          Vital signs/Intake and Output:  Visit Vitals  /83   Pulse (!) 48   Temp 97.5 °F (36.4 °C)   Resp 20   Ht 5' 4\" (1.626 m)   Wt 39.5 kg (87 lb 1.3 oz)   SpO2 95%   BMI 14.95 kg/m²     Current Shift:  11/01 0701 - 11/01 1900  In: 210   Out: -   Last three shifts:  10/30 1901 - 11/01 0700  In: 1508.3 [I.V.:1418.3]  Out: -             Labs: Results:       Chemistry Recent Labs     11/01/20  0415 10/31/20  0355 10/30/20  1650 10/30/20  0513   GLU 90 78  --  81    141  --  138   K 3.4* 3.5 4.0 3.7   * 109  --  106   CO2 23 22  --  21   BUN 15 24*  --  22*   CREA 0.56* 0.74  --  0.84   CA 8.8 9.3  --  9.4   AGAP 7 10  --  11   BUCR 27* 32*  --  26*   AP 83 90  --  89   TP 6.1* 6.5  --  6.6   ALB 2.8* 3.2*  --  3.4   GLOB 3.3 3.3  --  3.2   AGRAT 0.8 1.0  --  1.1      CBC w/Diff Recent Labs     11/01/20  0415 10/31/20  0355 10/30/20  0513   WBC 7.8 12.7 20.2*   RBC 4.00* 4.05* 4.28*   HGB 11.3* 11.5* 12.1*   HCT 34.7* 34.7* 36.5  306 363   GRANS 83* 89* 91*   LYMPH 7* 4* 3*   EOS 2 0 0      Cardiac Enzymes No results for input(s): CPK, CKND1, CHUY in the last 72 hours. No lab exists for component: CKRMB, TROIP   Coagulation No results for input(s): PTP, INR, APTT, INREXT, INREXT in the last 72 hours. Lipid Panel No results found for: CHOL, CHOLPOCT, CHOLX, CHLST, CHOLV, 130175, HDL, HDLP, LDL, LDLC, DLDLP, 279824, VLDLC, VLDL, TGLX, TRIGL, TRIGP, TGLPOCT, CHHD, CHHDX   BNP No results for input(s): BNPP in the last 72 hours.    Liver Enzymes Recent Labs     11/01/20  0415   TP 6.1*   ALB 2.8*   AP 83      Thyroid Studies Lab Results   Component Value Date/Time    TSH 0.78 10/24/2020 11:30 AM        Procedures/imaging: see electronic medical records for all procedures/Xrays and details which were not copied into this note but were reviewed prior to creation of Plan

## 2020-11-01 NOTE — ROUTINE PROCESS
Bedside shift change report given to Jocelyn Funk RN (oncoming nurse) by Vickie Morelos RN (offgoing nurse). Report included the following information SBAR, Kardex, Intake/Output and MAR.

## 2020-11-01 NOTE — PROGRESS NOTES
Assumed care of pt from [address] [place] [address].  1202: pt was cleaned up and linen changed. Range of motion completed. 1645: Pt some how pulled IV out even with restraints. Called medic to place new IV after this RN tried twice. 1738: Still waiting for IV placement by medic.  8216: Pt has new IV placed. Haldol was given for agitation.

## 2020-11-01 NOTE — PROGRESS NOTES
1915 - Bedside and Verbal shift change report given to Piotr Botello RN (oncoming nurse) by Shira Arias RN (offgoing nurse). Report included the following information SBAR, Kardex, ED Summary, Procedure Summary, Intake/Output, MAR, Recent Results, Med Rec Status and Cardiac Rhythm NSR.     2000 - Shift assessment completed. Patient responsive to voice, oriented to person and place. 2300 - Patient increasingly restless/agitated. PRN medications administered. 0000 - Reassessment completed, no changes to previous assessment. 0400 - Reassessment completed, no changes to previous assessment. 0720 - Bedside and Verbal shift change report given to Deborra Denver, RN (oncoming nurse) by Piotr Botello RN (offgoing nurse). Report included the following information SBAR, Kardex, ED Summary, Procedure Summary, Intake/Output, MAR, Recent Results, Med Rec Status and Cardiac Rhythm NSR.

## 2020-11-01 NOTE — PROGRESS NOTES
Problem: Non-Violent Restraints  Goal: *No harm/injury to patient while restraints in use  Outcome: Progressing Towards Goal

## 2020-11-01 NOTE — PROGRESS NOTES
[facility]  Pulmonary, Critical Care, and Sleep Medicine    Name: Jim Storm. MRN: [id number]   : 1955 Hospital: [facility] [place]   Date: 2020        Pulmonary Critical Care Note    IMPRESSION:     Acute respiratory failure with hypoxia (HCC) J96.01     Sepsis with organ dysfunction, resolved shock ([address] A41.9, R65.20     Aspiration pneumonia ([address] J69.0     Deep vein thrombosis (DVT) of lower extremity ([address] I82.409       Patient Active Problem List   Diagnosis Code    Bursitis of elbow M70.30    Medication overdose T50.901A    Polio A80.9    Depressive disorder, not elsewhere classified F32.9    Type II or unspecified type diabetes mellitus without mention of complication, uncontrolled [id number]    Hypotension, unspecified I95.9    Amputation of both lower extremities ([address] S88.911A, R66.014I    PNA (pneumonia) J18.9    ALEXANDER (acute kidney injury) ([address] N17.9    Hyponatremia E87.1    Marijuana abuse F12.10    Centrilobular emphysema ([address] J43.2    CAP (community acquired pneumonia) J18.9    Sepsis ([address] A41.9    Hard of hearing H91.90    Legal blindness H54.8    Acute encephalopathy G93.40    Septic shock (HCC) A41.9, R65.21    Chronic obstructive pulmonary disease with acute exacerbation (HCC) J44.1    Severe protein-calorie malnutrition ([address] V08    Metabolic encephalopathy A93.04    Acute respiratory failure with hypoxia (HCC) J96.01    Aspiration pneumonia ([address] J69.0    Deep vein thrombosis (DVT) of lower extremity ([address] I82.409          RECOMMENDATIONS:   Respiratory: Hx smoking. CTA chest 10/27/2020: No PE. Diffuse peribronchial wall thickening and several areas of endobronchial impaction/occlusion. LLL pneumonia with peribronchial consolidation posterior RUL. Moderately severe upper lobe predominant centrilobular emphysema. Small left and trace right pleural effusion. Recommend repeat CT in 4 to 6 weeks as outpatient.   Bronchodilators: Continue Pulmicort nebs twice daily, Atrovent nebs 4 times daily. HFNC off since 10/30/2020. Now on 3 LPM NC. N.p.o. due to recurrent aspiration. MBS cannot be performed until Monday, per radiology department. Patient still remains at high risk for aspiration. NGT placed 10/31/2020. Continue medication through NGT. Still at high risk for aspiration and so will not start NGT feeding till [address] completed tomorrow. Since patient is n.p.o., continue D5 NS. Patient remains full code after patient's son visited patient on 10/30/2020 and discussed with palliative care team.    Repeat CXR intermittently. Titrate FiO2 to keep SPO2 > 91%. Steroids: Off Solu-Medrol since 10/29/2020. Bronchodilators: Continue Pulmicort twice daily, Atrovent 4 times daily. Continue DuoNeb as needed. Continue pulmonary hygiene and toileting. Continue aspiration precautions. HOB more than 30 degrees all the time. ID:   No fever. Leukocytosis resolved. Urine culture: <100 K. Blood culture: NGTD. Urine antigen panel negative. COVID-19 10/14/2020: Negative. Antibiotic choice: Doxycycline discontinued 10/28/2020. Vancomycin discontinued 10/29/2020. Continue Zosyn. Follow cultures. Deescalate antibiotic when appropriate. CVS:   Echo 10/25/2020: LVEF 50 to 55%. Grade 1 DD. Estimated PASP 74 mmHg. Estimated RVSP 74 mmHg on echo, could be due to hypoxic vasoconstriction. Recommend repeat echo as outpatient. PVL LE 10/27/2020: Chronic nonocclusive thrombus left proximal femoral vein. Age indeterminate nonocclusive thrombus right femoral vein. Due to DVT, will continue Lovenox 40 mg subcutaneously every 12 hours. May transition to oral anticoagulants when and if patient can pass MBS and able to take p.o. safely. Restarted Norvasc and Lipitor since NGT placed. Continue IV Lopressor. It can be changed to Lopressor through NGT later today or tomorrow.     Renal: Monitor renal functions, lytes  Normal creatinine. Monitor urine output, creatinine and electrolytes. Heme: Mild anemia. Normal platelets and coags. Continue Lovenox full dose for DVT. Can be transitioned to newer oral anticoagulants once able to take p.o. No active external bleeding. Monitor closely. Endo: Glycemic control. Monitor for any hypoglycemia. TSH normal    GI: N.p.o. due to risk of aspiration. MBS to be performed tomorrow. Neurology:   Alert awake oriented x3. Continues to moan all day long. Urine drug screen positive for THC on admission. ??  Baseline mild dementia. CT head on admission chronic microvascular disease, no acute intracranial abnormality. Ammonia normal.  Continue Seroquel, it has been slowly increased to 100 mg twice daily. Continue Haldol as needed. PAIN AND SEDATION: none    Prophylaxis:  DVT treatment Lovenox. GI prophylaxis Protonix. Restraints: Wrist soft restraints as needed for patient interfering with medical therapy/management and patient safety. Lines/Tubes:   Central line: Right femoral 10/23/20discontinued 10/26/2020. Lindo: 10/23/20discontinued 10/27/2020. Condom catheter: 10/27/2020. NGT: 10/31/2020. Currently patient does not have a Lindo catheter or central line. Overall poor prognosis, prognosis guarded. ADVANCE DIRECTIVE: Full code  Palliative care consult team on board. Will defer respective systems problem management to primary and other consultant and follow patient in ICU with primary and other medical team  Further recommendations will be based on the patient's response to recommended treatment and results of the investigation ordered. Quality Care: PPI, DVT prophylaxis, HOB elevated, Infection control all reviewed and addressed. DISCUSSION: Events and notes from last 24 hours reviewed. Care plan discussed with nursing, hospitalist, ICU staff, RT, MDR.  D/w patient (answered all questions to satisfaction).      Family discussion:  Patient son Torrie Lundborg visited patient on 10/30/2020, discussed with palliative care team.  Patient remains full code. Patient's other son was supposed to visit patient 10/31/2020. High complexity decision making was performed during the evaluation of this patient at high risk for decompensation with multiple organ involvement. Total critical care time spent rendering care exclusive of procedures/family discussion/coordination of care: 34 minutes. Subjective/History:   Mr. Jim Coronel. has been seen and evaluated as Dr. Simón Gonzalez requested for assisting with ICU care of septic shock. Patient unable to provide history. Patient is a 63 y.o. male COPD, bilateral above-knee amputation, recently discharged from [facility] after treated for pneumonia. Per chart, since discharge about a week ago patient has been in the ER few times at other facilities with complaints of cough, shortness of breath and hypoxemia; had a CT scan done at [facility] which showed persistent pneumonia. Per chart, patient had not picked up his antibiotics that he was discharged on until recently. He was brought to ER with c/o of chest pain between shoulders by EMS. In the ER he was found to be confused and combative with hypothermia, hypotension. He was treated with IVF boluses and Levophed. CT head on admission nil acute. CXR showed improving pneumonia. He has remained confused at the time of this evaluation at bedside in rm 102 in ICU. 11/01/20   Patient remains in ICU room 102. Off HFNC since 10/30/2020. Now on NC 3 LPM O2. NGT placed 10/31/2020. Still remains at high risk for aspiration. Since Seroquel dose has been slowly increased, morning and shouting has improved. Less frequent orotracheal nasotracheal suctioning requirement. MBS to be performed tomorrow since patient is not on HFNC anymore. Tolerating Lovenox without any external bleeding noted. No fever or leukocytosis. [facility] was not called for any issues overnight.   No other overnight issues reported. Review of Systems:  Review of systems not obtained due to patient factors. Intake/Output Summary (Last 24 hours) at 11/1/2020 1143  Last data filed at 11/1/2020 0945  Gross per 24 hour   Intake 1518.33 ml   Output    Net 1518.33 ml                 Latest lactic acid:   Lactic acid   Date Value Ref Range Status   10/24/2020 0.9 0.4 - 2.0 MMOL/L Final   10/23/2020 2.7 (HH) 0.4 - 2.0 MMOL/L Final     Comment:     CALLED TO AND CORRECTLY REPEATED BY:  LEXIE ROJAS RN ED BY CAM ON 10/23/20 AT 1738.     10/23/2020 3.3 (HH) 0.4 - 2.0 MMOL/L Final     Comment:     CALLED TO AND CORRECTLY REPEATED BY:  Rakesh Nguyễn RN ED BY CAM ON 10/23/20 AT 1700. Past Medical History:  Past Medical History:   Diagnosis Date    [facility] of both lower extremities [facility]     Amputee, above knee, left ([facility]     At risk for falls     Chronic pain     back and legs    Diabetes ([facility]     Hypercholesterolemia     Hypertension     Polio         Past Surgical History:  Past Surgical History:   Procedure Laterality Date    HX HERNIA REPAIR      HX OTHER SURGICAL      carpal tunnel     HX OTHER SURGICAL      cyst        Medications:  Prior to Admission medications    Medication Sig Start Date End Date Taking? Authorizing Provider   amoxicillin-clavulanate (AUGMENTIN) 875-125 mg per tablet Take 1 Tab by mouth every twelve (12) hours. 10/20/20   Medardo Ruth MD   acetaminophen (TYLENOL) 325 mg tablet Take  by mouth every four (4) hours as needed for Pain. Tiffany Saravia MD   amLODIPine (NORVASC) 5 mg tablet Take 5 mg by mouth daily. Tiffany Saravia MD   gabapentin (NEURONTIN) 100 mg capsule Take 100 mg by mouth three (3) times daily. Tiffany Saravia MD   clonazePAM (KLONOPIN) 0.5 mg tablet Take 0.5 mg by mouth nightly as needed. Tiffany Saravia MD   atorvastatin (LIPITOR) 20 mg tablet Take 20 mg by mouth daily.     Tiffany Saravia MD   nitroglycerin (NITROSTAT) 0.4 mg SL tablet 0.4 mg by SubLINGual route every five (5) minutes as needed for Chest Pain. Up to 3 doses. Tiffany Saravia MD   metoprolol succinate (TOPROL XL) 25 mg XL tablet Take 25 mg by mouth daily. Tiffany Saravia MD   oxyCODONE-acetaminophen (PERCOCET) 5-325 mg per tablet Take  by mouth every four (4) hours as needed for Pain. Tfifany Saravia MD   traMADol (ULTRAM) 50 mg tablet Take 1 Tab by mouth every six (6) hours as needed for Pain. Max Daily Amount: 200 mg. 12/4/18   Wen Clayton MD   simvastatin (ZOCOR) 20 mg tablet Take 20 mg by mouth nightly. Provider, Historical   lisinopril-hydrochlorothiazide (PRINZIDE, ZESTORETIC) 20-12.5 mg per tablet Take 1 Tab by mouth daily. Provider, Historical   isosorbide mononitrate ER (IMDUR) 30 mg tablet Take 30 mg by mouth daily.     Provider, Historical       Current Facility-Administered Medications   Medication Dose Route Frequency    dextrose 5% and 0.9% NaCl infusion  50 mL/hr IntraVENous CONTINUOUS    QUEtiapine (SEROquel) tablet 100 mg  100 mg Oral BID    insulin lispro (HUMALOG) injection   SubCUTAneous Q6H    metoprolol (LOPRESSOR) injection 5 mg  5 mg IntraVENous Q6H    pantoprazole (PROTONIX) 40 mg in 0.9% sodium chloride 10 mL injection  40 mg IntraVENous Q24H    amLODIPine (NORVASC) tablet 10 mg  10 mg Oral DAILY    [Held by provider] metoprolol tartrate (LOPRESSOR) tablet 12.5 mg  12.5 mg Oral Q12H    enoxaparin (LOVENOX) injection 40 mg  1 mg/kg SubCUTAneous Q12H    ipratropium (ATROVENT) 0.02 % nebulizer solution 0.5 mg  0.5 mg Nebulization QID RT    atorvastatin (LIPITOR) tablet 20 mg  20 mg Oral QHS    gabapentin (NEURONTIN) capsule 100 mg  100 mg Oral TID    piperacillin-tazobactam (ZOSYN) 3.375 g in 0.9% sodium chloride (MBP/ADV) 100 mL MBP  3.375 g IntraVENous Q8H    budesonide (PULMICORT) 250 mcg/2ml nebulizer susp  500 mcg Nebulization BID RT    sodium chloride (NS) flush 5-40 mL  5-40 mL IntraVENous Q8H       Allergy:  No Known Allergies     Social History:  Social History     Tobacco Use    Smoking status: Current Every Day Smoker     Packs/day: 2.00     Years: 40.00     Pack years: 80.00    Smokeless tobacco: Current User     Types: Snuff   Substance Use Topics    Alcohol use: No    Drug use: No        Family History:  Family History   Problem Relation Age of Onset    Heart Attack Mother     Heart Attack Father     Malignant Hyperthermia Neg Hx     Pseudocholinesterase Deficiency Neg Hx     Delayed Awakening Neg Hx     Post-op Nausea/Vomiting Neg Hx     Emergence Delirium Neg Hx     Post-op Cognitive Dysfunction Neg Hx     Other Neg Hx           Objective:   Vital Signs:    Blood pressure 134/76, pulse (!) 101, temperature 98 °F (36.7 °C), resp. rate 24, height 5' 4\" (1.626 m), weight 39.5 kg (87 lb 1.3 oz), SpO2 97 %. Body mass index is 14.95 kg/m². O2 Device: Nasal cannula   O2 Flow Rate (L/min): 3 l/min   Temp (24hrs), Av.7 °F (36.5 °C), Min:97 °F (36.1 °C), Max:98 °F (36.7 °C)         Intake/Output:   Last shift:      701 - 1900  In: 210   Out: -   Last 3 shifts: 10/30 1901 - 700  In: 1508.3 [I.V.:1418.3]  Out: -     Intake/Output Summary (Last 24 hours) at 2020 1143  Last data filed at 2020 0945  Gross per 24 hour   Intake 1518.33 ml   Output    Net 1518.33 ml       Physical Exam:  General/Neurology: Sleeping currently. Arousable. Head:   NCAT. Eye:   EOM intact. No icterus/pallor/cyanosis. Nose:   NGT in place. Throat:  Moist mucosa. Neck:   Trachea midline. No palpable cervical lymphadenopathy. Lung: Moderate air entry bilateral equal.  No rales, rhonchi. No wheezing or stridor. No prolonged expiration or accessory muscle use. Barrel chest.  Heart:   S1 S2 present. No murmur or JVD. Abdomen:  Soft. NT. ND. No palpable masses. Extremities:  Bilateral AKA. No peripheral edema in arms or thigh. Pulses: 2+ and symmetric in DP.   Lymphatic:  No cervical or supraclavicular palpable lymphadenopathy. Data:     Recent Results (from the past 24 hour(s))   GLUCOSE, POC    Collection Time: 10/31/20  6:05 PM   Result Value Ref Range    Glucose (POC) 111 (H) 70 - 110 mg/dL   MAGNESIUM    Collection Time: 11/01/20  4:15 AM   Result Value Ref Range    Magnesium 1.9 1.6 - 2.6 mg/dL   CBC WITH AUTOMATED DIFF    Collection Time: 11/01/20  4:15 AM   Result Value Ref Range    WBC 7.8 4.6 - 13.2 K/uL    RBC 4.00 (L) 4.70 - 5.50 M/uL    HGB 11.3 (L) 13.0 - 16.0 g/dL    HCT 34.7 (L) 36.0 - 48.0 %    MCV 86.8 74.0 - 97.0 FL    MCH 28.3 24.0 - 34.0 PG    MCHC 32.6 31.0 - 37.0 g/dL    RDW 16.7 (H) 11.6 - 14.5 %    PLATELET 992 671 - 192 K/uL    MPV 10.3 9.2 - 11.8 FL    NEUTROPHILS 83 (H) 40 - 73 %    LYMPHOCYTES 7 (L) 21 - 52 %    MONOCYTES 8 3 - 10 %    EOSINOPHILS 2 0 - 5 %    BASOPHILS 0 0 - 2 %    ABS. NEUTROPHILS 6.4 1.8 - 8.0 K/UL    ABS. LYMPHOCYTES 0.6 (L) 0.9 - 3.6 K/UL    ABS. MONOCYTES 0.7 0.05 - 1.2 K/UL    ABS. EOSINOPHILS 0.2 0.0 - 0.4 K/UL    ABS. BASOPHILS 0.0 0.0 - 0.1 K/UL    DF AUTOMATED     METABOLIC PANEL, COMPREHENSIVE    Collection Time: 11/01/20  4:15 AM   Result Value Ref Range    Sodium 144 136 - 145 mmol/L    Potassium 3.4 (L) 3.5 - 5.5 mmol/L    Chloride 114 (H) 100 - 111 mmol/L    CO2 23 21 - 32 mmol/L    Anion gap 7 3.0 - 18 mmol/L    Glucose 90 74 - 99 mg/dL    BUN 15 7.0 - 18 MG/DL    Creatinine 0.56 (L) 0.6 - 1.3 MG/DL    BUN/Creatinine ratio 27 (H) 12 - 20      GFR est AA >60 >60 ml/min/1.73m2    GFR est non-AA >60 >60 ml/min/1.73m2    Calcium 8.8 8.5 - 10.1 MG/DL    Bilirubin, total 0.5 0.2 - 1.0 MG/DL    ALT (SGPT) 16 16 - 61 U/L    AST (SGOT) 15 10 - 38 U/L    Alk.  phosphatase 83 45 - 117 U/L    Protein, total 6.1 (L) 6.4 - 8.2 g/dL    Albumin 2.8 (L) 3.4 - 5.0 g/dL    Globulin 3.3 2.0 - 4.0 g/dL    A-G Ratio 0.8 0.8 - 1.7     GLUCOSE, POC    Collection Time: 11/01/20  6:00 AM   Result Value Ref Range    Glucose (POC) 80 70 - 110 mg/dL   GLUCOSE, POC    Collection Time: 11/01/20 11:34 AM   Result Value Ref Range    Glucose (POC) 102 70 - 110 mg/dL           No results for input(s): FIO2I, IFO2, HCO3I, IHCO3, HCOPOC, PCO2I, PCOPOC, IPHI, PHI, PHPOC, PO2I, PO2POC in the last 72 hours. No lab exists for component: IPOC2    All Micro Results     Procedure Component Value Units Date/Time    CULTURE, BLOOD [[id number]] Collected:  10/23/20 1611    Order Status:  Completed Specimen:  Blood Updated:  10/29/20 0856     Special Requests: NO SPECIAL REQUESTS        Culture result: NO GROWTH 6 DAYS       CULTURE, BLOOD [[id number]] Collected:  10/23/20 1615    Order Status:  Completed Specimen:  Blood Updated:  10/29/20 0856     Special Requests: NO SPECIAL REQUESTS        Culture result: NO GROWTH 6 DAYS       CULTURE, URINE [[id number]] Collected:  10/23/20 1630    Order Status:  Completed Specimen:  Urine from Clean catch Updated:  10/24/20 2026     Special Requests: NO SPECIAL REQUESTS        Culture result: No growth (<1,000 CFU/ML)             Echo 10/25/2020:  Result status: Final result    · Left Ventricle: Normal cavity size and wall thickness. The estimated EF is 50 - 55%. Low normal systolic function. There is mild (grade 1) left ventricular diastolic dysfunction E/E' ratio is 10.31.  · Pulmonary Artery: Pulmonary arteries not well visualized. Pulmonary arterial systolic pressure (PASP) is 74 mmHg. Pulmonary hypertension found to be severe. PVL LE 10/27/2020:   · Chronic non-occlusive thrombus present in the left proximal femoral vein. · Age indeterminate non-occlusive thrombus present in the right common femoral vein. Limited study secondary to bilateral AKA       CTA chest 10/27/2020:  1. No evidence of pulmonary embolism. 2.  Relatively diffuse bronchial wall thickening and several areas of  endobronchial impaction/occlusion.   3. Left lower lobe pneumonia with additional area of peribronchial alveolar  consolidation posterior right upper lobe.     > Recommend radiographic follow-up to document expected clinical resolution  following appropriate treatment in 4-6 weeks. 4. Moderately severe upper lobe predominant centrilobular emphysema. 5. Small left and trace right pleural effusions. 6. Extensive atherosclerotic vascular disease both above and below the  diaphragm. Imaging:  [x]I have personally reviewed the patients chest radiographs images and report   Results from [place] encounter on 10/23/20   XR ABD PORT  1 V    Narrative EXAM: KUB    INDICATION: NG tube placement    COMPARISON: None.    _______________    FINDINGS:    Supine abdominal film was performed. NG tube is present with the tip extending  into the distal body of the stomach. Nonobstructive bowel gas pattern. Extensive vascular calcifications are seen of the aorta and iliac arteries. Asymmetric markings left mid and lower lung field similar to chest x-ray  10/29/2020.    _______________      Impression IMPRESSION:    NG tube good position. Nonobstructive bowel gas pattern. Results from [place] encounter on 10/23/20   CTA CHEST W OR W WO CONT    Narrative EXAM: CTA Chest    INDICATION: 63year-old patient presently admitted with pneumonia, worsening of  hypoxia. Evaluation for pulmonary embolism. COMPARISON: CT chest 9/7/2012; prior chest radiographs, as recently 10/26/2020. TECHNIQUE: Axial CT imaging from the thoracic inlet through the diaphragm with  intravenous contrast utilizing CTA study for pulmonary artery evaluation. Coronal and sagittal MIP reformations were generated at a separate workstation. One or more dose reduction techniques were used on this CT: automated exposure  control, adjustment of the mAs and/or kVp according to patient size, and  iterative reconstruction techniques. The specific techniques used on this CT  exam have been documented in the patient's electronic medical record.   Digital  Imaging and Communications in Medicine (DICOM) format image data are available  to nonaffiliated external healthcare facilities or entities on a secure, media  free, reciprocally searchable basis with patient authorization for at least a  12-month period after this study. _______________    FINDINGS:    EXAM QUALITY: Overall exam quality is adequate. Pulmonary arterial enhancement  is adequate. The breath hold is satisfactory. PULMONARY ARTERIES: No convincing evidence of pulmonary embolism. MEDIASTINUM: Included gland is unremarkable. Cardiac size is normal. There is no  pericardial effusion. Multivessel coronary arterial atherosclerotic vascular  calcification is present. Extensive atherosclerotic vascular calcification of  the thoracic aorta noted without evidence of aneurysmal dilatation. LYMPH NODES: No enlarged mediastinal or hilar nodes by size criteria. AIRWAY: Diffuse bronchial wall thickening with endobronchial debris present  within the bronchus intermedius and right lower lobe segmental bronchi. Additional left lower lobe and segmental bronchial impaction. LUNGS: Focal area of low attenuating consolidation within the medial portion  left lower lobe with adjacent areas of atelectasis. Patchy peribronchial  consolidation within the posterior aspect right upper lobe. Moderately severe  upper lobe predominant centrilobular emphysema. No significant groundglass  abnormality or abnormal septal line thickening. PLEURA: Trace right and small left pleural effusions. No evidence of  pneumothorax. UPPER ABDOMEN: Marked atherosclerotic vascular calcifications present throughout  the suprarenal and infrarenal abdominal aorta. Likely layering biliary sludge  within the gallbladder. OTHER: No acute or aggressive osseous abnormalities identified. Multilevel  degenerative wedging of the thoracic spine noted. Prominent Schmorl's node  superior endplate B34.    _______________      Impression IMPRESSION:    1.   No evidence of pulmonary embolism. 2.  Relatively diffuse bronchial wall thickening and several areas of  endobronchial impaction/occlusion. 3. Left lower lobe pneumonia with additional area of peribronchial alveolar  consolidation posterior right upper lobe.     > Recommend radiographic follow-up to document expected clinical resolution  following appropriate treatment in 4-6 weeks. 4. Moderately severe upper lobe predominant centrilobular emphysema. 5. Small left and trace right pleural effusions. 6. Extensive atherosclerotic vascular disease both above and below the  diaphragm.            Jaleel Cordova MD  11/1/2020

## 2020-11-02 ENCOUNTER — APPOINTMENT (OUTPATIENT)
Dept: GENERAL RADIOLOGY | Age: 65
DRG: 720 | End: 2020-11-02
Attending: INTERNAL MEDICINE
Payer: MEDICAID

## 2020-11-02 LAB
ALBUMIN SERPL-MCNC: 2.7 G/DL (ref 3.4–5)
ALBUMIN/GLOB SERPL: 0.8 {RATIO} (ref 0.8–1.7)
ALP SERPL-CCNC: 87 U/L (ref 45–117)
ALT SERPL-CCNC: 15 U/L (ref 16–61)
ANION GAP SERPL CALC-SCNC: 7 MMOL/L (ref 3–18)
AST SERPL-CCNC: 15 U/L (ref 10–38)
BASOPHILS # BLD: 0 K/UL (ref 0–0.1)
BASOPHILS NFR BLD: 0 % (ref 0–2)
BILIRUB SERPL-MCNC: 0.6 MG/DL (ref 0.2–1)
BUN SERPL-MCNC: 9 MG/DL (ref 7–18)
BUN/CREAT SERPL: 17 (ref 12–20)
CALCIUM SERPL-MCNC: 8.7 MG/DL (ref 8.5–10.1)
CHLORIDE SERPL-SCNC: 111 MMOL/L (ref 100–111)
CO2 SERPL-SCNC: 23 MMOL/L (ref 21–32)
CREAT SERPL-MCNC: 0.53 MG/DL (ref 0.6–1.3)
DIFFERENTIAL METHOD BLD: ABNORMAL
EOSINOPHIL # BLD: 0.2 K/UL (ref 0–0.4)
EOSINOPHIL NFR BLD: 2 % (ref 0–5)
ERYTHROCYTE [DISTWIDTH] IN BLOOD BY AUTOMATED COUNT: 16.1 % (ref 11.6–14.5)
GLOBULIN SER CALC-MCNC: 3.2 G/DL (ref 2–4)
GLUCOSE BLD STRIP.AUTO-MCNC: 123 MG/DL (ref 70–110)
GLUCOSE BLD STRIP.AUTO-MCNC: 72 MG/DL (ref 70–110)
GLUCOSE BLD STRIP.AUTO-MCNC: 92 MG/DL (ref 70–110)
GLUCOSE SERPL-MCNC: 76 MG/DL (ref 74–99)
HCT VFR BLD AUTO: 35 % (ref 36–48)
HGB BLD-MCNC: 11.4 G/DL (ref 13–16)
LYMPHOCYTES # BLD: 0.7 K/UL (ref 0.9–3.6)
LYMPHOCYTES NFR BLD: 8 % (ref 21–52)
MAGNESIUM SERPL-MCNC: 1.8 MG/DL (ref 1.6–2.6)
MCH RBC QN AUTO: 28.6 PG (ref 24–34)
MCHC RBC AUTO-ENTMCNC: 32.6 G/DL (ref 31–37)
MCV RBC AUTO: 87.7 FL (ref 74–97)
MONOCYTES # BLD: 0.8 K/UL (ref 0.05–1.2)
MONOCYTES NFR BLD: 8 % (ref 3–10)
NEUTS SEG # BLD: 7.5 K/UL (ref 1.8–8)
NEUTS SEG NFR BLD: 82 % (ref 40–73)
PLATELET # BLD AUTO: 268 K/UL (ref 135–420)
PMV BLD AUTO: 10.2 FL (ref 9.2–11.8)
POTASSIUM SERPL-SCNC: 3.7 MMOL/L (ref 3.5–5.5)
PROT SERPL-MCNC: 5.9 G/DL (ref 6.4–8.2)
RBC # BLD AUTO: 3.99 M/UL (ref 4.7–5.5)
SODIUM SERPL-SCNC: 141 MMOL/L (ref 136–145)
WBC # BLD AUTO: 9.2 K/UL (ref 4.6–13.2)

## 2020-11-02 PROCEDURE — 74011250636 HC RX REV CODE- 250/636: Performed by: INTERNAL MEDICINE

## 2020-11-02 PROCEDURE — 74011250637 HC RX REV CODE- 250/637: Performed by: INTERNAL MEDICINE

## 2020-11-02 PROCEDURE — 77010033678 HC OXYGEN DAILY

## 2020-11-02 PROCEDURE — 83735 ASSAY OF MAGNESIUM: CPT

## 2020-11-02 PROCEDURE — 94640 AIRWAY INHALATION TREATMENT: CPT

## 2020-11-02 PROCEDURE — 74011250636 HC RX REV CODE- 250/636: Performed by: HOSPITALIST

## 2020-11-02 PROCEDURE — 74011000258 HC RX REV CODE- 258: Performed by: EMERGENCY MEDICINE

## 2020-11-02 PROCEDURE — 74011250637 HC RX REV CODE- 250/637: Performed by: HOSPITALIST

## 2020-11-02 PROCEDURE — 85025 COMPLETE CBC W/AUTO DIFF WBC: CPT

## 2020-11-02 PROCEDURE — 92611 MOTION FLUOROSCOPY/SWALLOW: CPT

## 2020-11-02 PROCEDURE — 74011250637 HC RX REV CODE- 250/637: Performed by: FAMILY MEDICINE

## 2020-11-02 PROCEDURE — 74011000250 HC RX REV CODE- 250: Performed by: INTERNAL MEDICINE

## 2020-11-02 PROCEDURE — 74011250636 HC RX REV CODE- 250/636: Performed by: FAMILY MEDICINE

## 2020-11-02 PROCEDURE — 74230 X-RAY XM SWLNG FUNCJ C+: CPT

## 2020-11-02 PROCEDURE — 74011000258 HC RX REV CODE- 258: Performed by: INTERNAL MEDICINE

## 2020-11-02 PROCEDURE — C9113 INJ PANTOPRAZOLE SODIUM, VIA: HCPCS | Performed by: INTERNAL MEDICINE

## 2020-11-02 PROCEDURE — 82962 GLUCOSE BLOOD TEST: CPT

## 2020-11-02 PROCEDURE — 71045 X-RAY EXAM CHEST 1 VIEW: CPT

## 2020-11-02 PROCEDURE — 74011000255 HC RX REV CODE- 255: Performed by: INTERNAL MEDICINE

## 2020-11-02 PROCEDURE — 74011250636 HC RX REV CODE- 250/636: Performed by: EMERGENCY MEDICINE

## 2020-11-02 PROCEDURE — 80053 COMPREHEN METABOLIC PANEL: CPT

## 2020-11-02 PROCEDURE — 36415 COLL VENOUS BLD VENIPUNCTURE: CPT

## 2020-11-02 PROCEDURE — 94760 N-INVAS EAR/PLS OXIMETRY 1: CPT

## 2020-11-02 PROCEDURE — 65660000000 HC RM CCU STEPDOWN

## 2020-11-02 RX ORDER — METOPROLOL TARTRATE 5 MG/5ML
2.5 INJECTION INTRAVENOUS
Status: DISCONTINUED | OUTPATIENT
Start: 2020-11-02 | End: 2020-11-05

## 2020-11-02 RX ORDER — METOPROLOL TARTRATE 25 MG/1
25 TABLET, FILM COATED ORAL EVERY 12 HOURS
Status: DISCONTINUED | OUTPATIENT
Start: 2020-11-02 | End: 2020-11-20 | Stop reason: HOSPADM

## 2020-11-02 RX ADMIN — LORAZEPAM 1 MG: 2 INJECTION INTRAMUSCULAR at 02:25

## 2020-11-02 RX ADMIN — OXYCODONE HYDROCHLORIDE AND ACETAMINOPHEN 1 TABLET: 5; 325 TABLET ORAL at 12:33

## 2020-11-02 RX ADMIN — ATORVASTATIN CALCIUM 20 MG: 20 TABLET, FILM COATED ORAL at 23:06

## 2020-11-02 RX ADMIN — ENOXAPARIN SODIUM 40 MG: 40 INJECTION SUBCUTANEOUS at 08:19

## 2020-11-02 RX ADMIN — IPRATROPIUM BROMIDE 0.5 MG: 0.5 SOLUTION RESPIRATORY (INHALATION) at 20:09

## 2020-11-02 RX ADMIN — PIPERACILLIN AND TAZOBACTAM 3.38 G: 3; .375 INJECTION, POWDER, LYOPHILIZED, FOR SOLUTION INTRAVENOUS at 02:25

## 2020-11-02 RX ADMIN — BUDESONIDE 500 MCG: 0.25 INHALANT RESPIRATORY (INHALATION) at 07:07

## 2020-11-02 RX ADMIN — METOPROLOL TARTRATE 25 MG: 25 TABLET, FILM COATED ORAL at 12:24

## 2020-11-02 RX ADMIN — GABAPENTIN 100 MG: 100 CAPSULE ORAL at 17:30

## 2020-11-02 RX ADMIN — ENOXAPARIN SODIUM 40 MG: 40 INJECTION SUBCUTANEOUS at 20:34

## 2020-11-02 RX ADMIN — METOPROLOL TARTRATE 5 MG: 5 INJECTION INTRAVENOUS at 01:21

## 2020-11-02 RX ADMIN — PIPERACILLIN AND TAZOBACTAM 3.38 G: 3; .375 INJECTION, POWDER, LYOPHILIZED, FOR SOLUTION INTRAVENOUS at 20:33

## 2020-11-02 RX ADMIN — BARIUM SULFATE 10 ML: 400 PASTE ORAL at 09:25

## 2020-11-02 RX ADMIN — DEXTROSE MONOHYDRATE AND SODIUM CHLORIDE 50 ML/HR: 5; .9 INJECTION, SOLUTION INTRAVENOUS at 19:47

## 2020-11-02 RX ADMIN — QUETIAPINE FUMARATE 100 MG: 100 TABLET ORAL at 23:06

## 2020-11-02 RX ADMIN — IPRATROPIUM BROMIDE 0.5 MG: 0.5 SOLUTION RESPIRATORY (INHALATION) at 07:08

## 2020-11-02 RX ADMIN — SODIUM CHLORIDE 10 ML: 9 INJECTION, SOLUTION INTRAMUSCULAR; INTRAVENOUS; SUBCUTANEOUS at 23:07

## 2020-11-02 RX ADMIN — GABAPENTIN 100 MG: 100 CAPSULE ORAL at 08:19

## 2020-11-02 RX ADMIN — BUDESONIDE 500 MCG: 0.25 INHALANT RESPIRATORY (INHALATION) at 20:00

## 2020-11-02 RX ADMIN — AMLODIPINE BESYLATE 10 MG: 5 TABLET ORAL at 08:19

## 2020-11-02 RX ADMIN — GABAPENTIN 100 MG: 100 CAPSULE ORAL at 23:06

## 2020-11-02 RX ADMIN — IPRATROPIUM BROMIDE 0.5 MG: 0.5 SOLUTION RESPIRATORY (INHALATION) at 15:58

## 2020-11-02 RX ADMIN — METOPROLOL TARTRATE 5 MG: 5 INJECTION INTRAVENOUS at 06:36

## 2020-11-02 RX ADMIN — BARIUM SULFATE 10 ML: 400 SUSPENSION ORAL at 09:25

## 2020-11-02 RX ADMIN — PIPERACILLIN AND TAZOBACTAM 3.38 G: 3; .375 INJECTION, POWDER, LYOPHILIZED, FOR SOLUTION INTRAVENOUS at 12:25

## 2020-11-02 RX ADMIN — SODIUM CHLORIDE 40 MG: 9 INJECTION INTRAMUSCULAR; INTRAVENOUS; SUBCUTANEOUS at 12:24

## 2020-11-02 RX ADMIN — IPRATROPIUM BROMIDE 0.5 MG: 0.5 SOLUTION RESPIRATORY (INHALATION) at 11:06

## 2020-11-02 RX ADMIN — METOPROLOL TARTRATE 25 MG: 25 TABLET, FILM COATED ORAL at 20:34

## 2020-11-02 RX ADMIN — QUETIAPINE FUMARATE 100 MG: 100 TABLET ORAL at 08:19

## 2020-11-02 RX ADMIN — HALOPERIDOL LACTATE 5 MG: 5 INJECTION, SOLUTION INTRAMUSCULAR at 12:33

## 2020-11-02 RX ADMIN — OXYCODONE HYDROCHLORIDE AND ACETAMINOPHEN 1 TABLET: 5; 325 TABLET ORAL at 17:30

## 2020-11-02 NOTE — PROGRESS NOTES
Problem: Dysphagia (Adult)  Goal: *Acute Goals and Plan of Care (Insert Text)  Description: Rec:   NPO  Aspiration precautions  Oral care TID  Consider comfort feeding vs alternate source of nutrition/hydration   Ice chips ok following thorough oral care    Patient will:  1. Tolerate regular diet with thin liquids without overt s/sx of aspiration in 4/5 trials under SLP supervision. 2. Utilize compensatory swallow strategies of small bite/sip, alternate liquid/solid with min cues in 4/5 trials. 3. Complete an objective swallow study (i.e., MBSS) to assess swallow integrity, r/o aspiration, and determine of safest LRD, min A. (goal met 11/2/20)    Outcome: Progressing Towards Goal  SPEECH PATHOLOGY MODIFIED BARIUM SWALLOW STUDY & TREATMENT    Patient: Jess Reid Sr. (66 y.o. male)  Date: 11/2/2020  Primary Diagnosis: [facility] [A41.9, R65.21]        Precautions: Aspiration     PLOF: Living with family    ASSESSMENT :  Modified barium swallow study completed with silent and overt aspiration of all consistency trials, including nectar-thick liquid via straw, honey-thick liquid via cup, and pudding. Patient demo swallow delay and moderately severe vallecular and pyriform residue across all consistency trials. Aspiration occurred following the swallow on pyriform residue. The patient verbally refused all attempted compensatory strategies. Pt presents with mild oral and SEVERE pharyngeal dysphagia, as evidenced above, which places pt at high risk for aspiration. Deficits include decreased elevation/closure, pharyngeal wall constriction, tongue base retraction and sensory awareness. At this time, patient is not safe for PO intake. Recommend comfort feeding vs. alternate source nutrition/hydration. Strict aspiration precautions. Recommend ice chips sparingly following oral care for comfort and swallow stimulation. D/w RN, Tremayne Crouch. SLP will continue to follow as indicated.      Patient will benefit from skilled intervention to address the above impairments. Patient's rehabilitation potential is considered to be Poor - Hospice  Factors which may influence rehabilitation potential include:   []              None noted  [x]              Mental ability/status  [x]              Medical condition  [x]              Home/family situation and support systems  [x]              Safety awareness  []              Pain tolerance/management  []              Other:      PLAN :  Recommendations and Planned Interventions:  NPO  Frequency/Duration: Patient will be followed by speech-language pathology 1-2 times per day/4-7 days per week to address goals. Discharge Recommendations: To Be Determined     SUBJECTIVE:   Patient stated Hurry up and shut up. OBJECTIVE:     Past Medical History:   Diagnosis Date    Amputation of both lower extremities ([facility]     Amputee, above knee, left ([facility]     At risk for falls     Chronic pain     back and legs    Diabetes ([facility]     Hypercholesterolemia     Hypertension     Polio      Past Surgical History:   Procedure Laterality Date    HX HERNIA REPAIR      HX OTHER SURGICAL      carpal tunnel     HX OTHER SURGICAL      cyst     Home Situation:      Diet prior to admission: Regular/thin  Current Diet:  NPO   Radiologist: HRRA  Film Views: Lateral;Fluoro  Patient Position: HOB 60    Trial 1:   Consistency Presented: Nectar thick liquid;Honey thick liquid;Pudding   How Presented: SLP-fed/presented;Cup/sip;Straw;Spoon       Bolus Acceptance: No impairment   Bolus Formation/Control: Impaired: Premature spillage   Propulsion: Delayed (# of seconds)   Oral Residue: None   Initiation of Swallow: Triggered at vallecula   Timing: Pyriform sinus;Vallecular;Pooling 1-5 sec   Penetration: Before swallow; To cords;From residual   Aspiration/Timing: Silent ;Repeated   Pharyngeal Clearance: Vallecular residue;Pyriform residue ;10-50%   Attempted Modifications: Small sips and bites; Double swallow;Effortful swallow Effective Modifications: None   Cues for Modifications: Maximal         Decreased Tongue Base Retraction?: Yes  Laryngeal Elevation: Minimal movement of larynx/epiglottis  Aspiration/Penetration Score: 8 (Aspiration-Contrast passes cords/glottis with no effort to eject, ie/silent aspiration)  Pharyngeal Symmetry: Not assessed  Pharyngeal-Esophageal Segment: Decreased relaxation of upper esophageal segment; Suspected esophageal dysphagia  Pharyngeal Dysfunction: Decreased strength;Decreased elevation/closure;Decreased pharyngeal wall constriction;Decreased tongue base retraction    Oral Phase Severity: Mild  Pharyngeal Phase Severity: Severe    PAIN:  Pain level pre-treatment: 0/10   Pain level post-treatment: 0/10    COMMUNICATION/EDUCATION:   [x]  Patient educated regarding MBS results and diet recommendations. []  Patient/family have participated as able in goal setting and plan of care. []  Patient/family agree to work toward stated goals and plan of care. [x]  Patient understands intent and goals of therapy, but is neutral about his/her participation. []  Patient is unable to participate in goal setting and plan of care.     Thank you for this referral.  Stefanie Mckenna SLP  Time Calculation: 20 mins

## 2020-11-02 NOTE — PROGRESS NOTES
1845 - Bedside and Verbal shift change report given to Tere Raymundo RN (oncoming nurse) by Noreen Wick RN (offgoing nurse). Report included the following information SBAR, Kardex, ED Summary, Procedure Summary, Intake/Output, MAR, Recent Results, Med Rec Status and Cardiac Rhythm NSR.     2000 - Shift assessment completed. Patient alert and oriented to self. Lung sounds coarse and diminished on 3L NC. Patient positive for congested/productive cough. 0000 - Reassessment completed, no changes to previous assessment. 0400 - Reassessment completed, no changes to previous assessment. 0730 - Bedside and Verbal shift change report given to Dai De La Torre RN (oncoming nurse) by Tere Raymundo RN (offgoing nurse). Report included the following information SBAR, Kardex, ED Summary, Procedure Summary, Intake/Output, MAR, Recent Results, Med Rec Status and Cardiac Rhythm NSR.

## 2020-11-02 NOTE — PROGRESS NOTES
Transition of care: TBD      Chart reviewed and noted that Pt remains in ICU at this time. Pt seen by SLP and will re attempt MBS. CM following for goals of care and SLP recommendations. Will send to area LT[facility] to see if patient is a candidate. Care Management Interventions  PCP Verified by CM:  Yes  Transition of Care Consult (CM Consult): Discharge Planning  Current Support Network: Family [facility]

## 2020-11-02 NOTE — PROGRESS NOTES
[address] [facility] [phone]  [facility] [phone]    ICU provided update on concerns for patient's nutritional status. . Mary Lou Calderón failed MBS today and recommendations were made for . Attempted to contact sons to discuss goals of care for feeding; alternative means of feeding via peg tube vs comfort feeds. Called both sons Sybil Murillo and [facility], neither answer call. Was able to leave vm for both sons requesting call back. Await call back from either to discuss goals of care and arrange family meeting. 1315: son [facility] called back. We discussed needing family meeting for discussion on peg tube vs comfort feeds. Explained and discussed reasons leading to needing decision. Kalie Coon stated remembering conversation from two days ago related to similar concern for aspiration. He shared his father has always stated do everything to help him. Kalie Coon would like peg tube placement for his father. Explained will need input from two other siblings on decision for feeding tube. Kalie Coon became little defensive about having his half-siblings involved in decision making. Explained [place] about medical decision making when individual is unable to make own decisions in the absence of advanced directive. Explained decision making would be the majority of patients three living children. Kalie Coon wasn't happy about having to involved his half-siblings Latonia Mangle never come around and I'm the primary caregiver\". Expressed needing to have family meeting to discuss all medical information and decisions with all children. Kalie Coon wasn't willing to committ to having family meeting tomorrow. Informed him still await all back from [employer] to discuss above. NO changes in goals of care at this time. Patient remains FULL code and FULL aggressive care. Thank you for the opportunity to assist in the care of Mr. Mary Lou Calderón.    Carlos Mar, MSW, APHSW-C  Palliative Medicine

## 2020-11-02 NOTE — ROUTINE PROCESS
TRANSFER - IN REPORT: 
 
Verbal report received from Janeth Hernandez. RN(name) on Francesco Ulrich .  being received from ICU (unit) for routine progression of care Report consisted of patients Situation, Background, Assessment and  
Recommendations(SBAR). Information from the following report(s) SBAR, Kardex, Procedure Summary, Intake/Output, MAR and Recent Results was reviewed with the receiving nurse. Opportunity for questions and clarification was provided. Assessment completed upon patients arrival to unit and care assumed. 1530:Bedside and Verbal shift change report given to Briana Schneider RN (oncoming nurse) by Vanessa Snow RN (offgoing nurse). Report included the following information SBAR, Kardex, Procedure Summary, Intake/Output, MAR and Recent Results.

## 2020-11-02 NOTE — PROGRESS NOTES
[facility]  Pulmonary, Critical Care, and Sleep Medicine    Name: Brooklyn Garcia MRN: [id number]   : 1955 Hospital: [facility] [place]   Date: 2020        Pulmonary Critical Care Note    IMPRESSION:     Acute respiratory failure with hypoxia (HCC) J96.01     Sepsis with organ dysfunction, resolved shock ([address] A41.9, R65.20     Aspiration pneumonia ([address] J69.0     Deep vein thrombosis (DVT) of lower extremity ([address] I82.409       Patient Active Problem List   Diagnosis Code    Bursitis of elbow M70.30    Medication overdose T50.901A    Polio A80.9    Depressive disorder, not elsewhere classified F32.9    Type II or unspecified type diabetes mellitus without mention of complication, uncontrolled [id number]    Hypotension, unspecified I95.9    Amputation of both lower extremities ([address] S88.911A, Q33.879M    PNA (pneumonia) J18.9    ALEXANDER (acute kidney injury) ([address] N17.9    Hyponatremia E87.1    Marijuana abuse F12.10    Centrilobular emphysema ([address] J43.2    CAP (community acquired pneumonia) J18.9    Sepsis ([address] A41.9    Hard of hearing H91.90    Legal blindness H54.8    Acute encephalopathy G93.40    Septic shock (HCC) A41.9, R65.21    Chronic obstructive pulmonary disease with acute exacerbation (HCC) J44.1    Severe protein-calorie malnutrition ([address] R16    Metabolic encephalopathy L50.01    Acute respiratory failure with hypoxia (HCC) J96.01    Aspiration pneumonia ([address] J69.0    Deep vein thrombosis (DVT) of lower extremity ([address] I82.409          RECOMMENDATIONS:   Respiratory: Hx smoking. Oxygenation improving  Ration precautions failed modified barium  CTA chest 10/27/2020: No PE. Diffuse peribronchial wall thickening and several areas of endobronchial impaction/occlusion. LLL pneumonia with peribronchial consolidation posterior RUL. Moderately severe upper lobe predominant centrilobular emphysema.   Small left and trace right pleural effusion. Recommend repeat CT in 4 to 6 weeks as outpatient. Bronchodilators: Continue Pulmicort nebs twice daily, Atrovent nebs 4 times daily. HFNC off since 10/30/2020. Now on 3 LPM NC. N.p.o. due to recurrent aspiration. MBS failed cannot eat  Patient still remains at high risk for aspiration. NGT placed 10/31/2020. Continue medication through NGT. Still at high risk for aspiration and so will not start NGT feeding till [facility] completed tomorrow. Since patient is n.p.o., continue D5 NS. Patient remains full code after patient's son visited patient on 10/30/2020 and discussed with palliative care team.    Repeat CXR intermittently. Titrate FiO2 to keep SPO2 > 91%. Steroids: Off Solu-Medrol since 10/29/2020. Bronchodilators: Continue Pulmicort twice daily, Atrovent 4 times daily. Continue DuoNeb as needed. Continue pulmonary hygiene and toileting. Continue aspiration precautions. HOB more than 30 degrees all the time. ID:   No fever. Leukocytosis improved  Urine culture: <100 K. Blood culture: NGTD. Urine antigen panel negative. COVID-19 10/14/2020: Negative. Antibiotic choice: Doxycycline discontinued 10/28/2020. Vancomycin discontinued 10/29/2020. Continue Zosyn. Follow cultures. Deescalate antibiotic when appropriate. CVS: Early hypertensive blood pressure medications added  Echo 10/25/2020: LVEF 50 to 55%. Grade 1 DD. Estimated PASP 74 mmHg. Estimated RVSP 74 mmHg on echo, could be due to hypoxic vasoconstriction. Recommend repeat echo as outpatient. PVL LE 10/27/2020: Chronic nonocclusive thrombus left proximal femoral vein. Age indeterminate nonocclusive thrombus right femoral vein. Due to DVT, will continue Lovenox 40 mg subcutaneously every 12 hours. May transition to oral anticoagulants when and if patient can pass MBS and able to take p.o. safely. Restarted Norvasc and Lipitor since NGT placed. Continue IV Lopressor.   It can be changed to Lopressor through NGT later today or tomorrow. Renal: Monitor renal functions, lytes  Normal creatinine. Monitor urine output, creatinine and electrolytes. Heme: Mild anemia. Normal platelets and coags. Continue Lovenox full dose for DVT. Can be transitioned to newer oral anticoagulants once able to take p.o. No active external bleeding. Monitor closely. Endo: Glycemic control. Monitor for any hypoglycemia. TSH normal    GI: N.p.o. due to risk of aspiration. MBS to be performed tomorrow. Neurology:   Alert awake oriented x3. Continues to moan all day long. Urine drug screen positive for THC on admission. ??  Baseline mild dementia. CT head on admission chronic microvascular disease, no acute intracranial abnormality. Ammonia normal.  Continue Seroquel, it has been slowly increased to 100 mg twice daily. Continue Haldol as needed. PAIN AND SEDATION: none    Prophylaxis:  DVT treatment Lovenox. GI prophylaxis Protonix. Restraints: Wrist soft restraints as needed for patient interfering with medical therapy/management and patient safety. Lines/Tubes:   Central line: Right femoral 10/23/20discontinued 10/26/2020. Lindo: 10/23/20discontinued 10/27/2020. Condom catheter: 10/27/2020. NGT: 10/31/2020. Currently patient does not have a Lindo catheter or central line. Overall poor prognosis, prognosis guarded. ADVANCE DIRECTIVE: Full code  Palliative care consult team on board. Will defer respective systems problem management to primary and other consultant and follow patient in ICU with primary and other medical team  Further recommendations will be based on the patient's response to recommended treatment and results of the investigation ordered. Quality Care: PPI, DVT prophylaxis, HOB elevated, Infection control all reviewed and addressed. DISCUSSION: Events and notes from last 24 hours reviewed.  Care plan discussed with nursing, hospitalist, ICU staff, RT, MDR.  D/w patient (answered all questions to satisfaction). Family discussion:  Patient son Qi Lund visited patient on 10/30/2020, discussed with palliative care team.  Patient remains full code. Patient's other son was supposed to visit patient 10/31/2020. High complexity decision making was performed during the evaluation of this patient at high risk for decompensation with multiple organ involvement. Total critical care time spent rendering care exclusive of procedures/family discussion/coordination of care: 34 minutes. Subjective/History:   Mr. Alistair Field. has been seen and evaluated as Dr. Sapna Chowdary requested for assisting with ICU care of septic shock. Patient unable to provide history. Patient is a 63 y.o. male COPD, bilateral above-knee amputation, recently discharged from [facility] after treated for pneumonia. Per chart, since discharge about a week ago patient has been in the ER few times at other facilities with complaints of cough, shortness of breath and hypoxemia; had a CT scan done at [facility] which showed persistent pneumonia. Per chart, patient had not picked up his antibiotics that he was discharged on until recently. He was brought to ER with c/o of chest pain between shoulders by EMS. In the ER he was found to be confused and combative with hypothermia, hypotension. He was treated with IVF boluses and Levophed. CT head on admission nil acute. CXR showed improving pneumonia. He has remained confused at the time of this evaluation at bedside in rm 102 in ICU. 11/02/20   Patient remains in ICU   Of high flow. Next appearance of agitation oxygenation improved. Failed modified barium aspiration precautions cannot eat. Can transfer later today to medical floor  Review of Systems:  Review of systems not obtained due to patient factors.       Intake/Output Summary (Last 24 hours) at 11/2/2020 6019  Last data filed at 11/2/2020 7570  Gross per 24 hour   Intake 1822.5 ml   Output    Net 1822.5 ml Latest lactic acid:   Lactic acid   Date Value Ref Range Status   10/24/2020 0.9 0.4 - 2.0 MMOL/L Final   10/23/2020 2.7 (HH) 0.4 - 2.0 MMOL/L Final     Comment:     CALLED TO AND CORRECTLY REPEATED BY:  LEXIE ROJAS RN ED BY CAM ON 10/23/20 AT 1738.     10/23/2020 3.3 (HH) 0.4 - 2.0 MMOL/L Final     Comment:     CALLED TO AND CORRECTLY REPEATED BY:  Melyssa Sierra RN ED BY CAM ON 10/23/20 AT 1700. Past Medical History:  Past Medical History:   Diagnosis Date    [facility]putation of both lower [facility]     Amputee, above knee, left ([facility]     At risk for falls     Chronic pain     back and legs    Diabetes ([facility]     Hypercholesterolemia     Hypertension     Polio         Past Surgical History:  Past Surgical History:   Procedure Laterality Date    HX HERNIA REPAIR      HX OTHER SURGICAL      carpal tunnel     HX OTHER SURGICAL      cyst        Medications:  Prior to Admission medications    Medication Sig Start Date End Date Taking? Authorizing Provider   amoxicillin-clavulanate (AUGMENTIN) 875-125 mg per tablet Take 1 Tab by mouth every twelve (12) hours. 10/20/20   Artur Guillaume MD   acetaminophen (TYLENOL) 325 mg tablet Take  by mouth every four (4) hours as needed for Pain. Tiffany Saravia MD   amLODIPine (NORVASC) 5 mg tablet Take 5 mg by mouth daily. Tiffany Saravia MD   gabapentin (NEURONTIN) 100 mg capsule Take 100 mg by mouth three (3) times daily. Tiffany Saravia MD   clonazePAM (KLONOPIN) 0.5 mg tablet Take 0.5 mg by mouth nightly as needed. Tiffany Saravia MD   atorvastatin (LIPITOR) 20 mg tablet Take 20 mg by mouth daily. Tiffany Saravia MD   nitroglycerin (NITROSTAT) 0.4 mg SL tablet 0.4 mg by SubLINGual route every five (5) minutes as needed for Chest Pain. Up to 3 doses. Tiffany Saravia MD   metoprolol succinate (TOPROL XL) 25 mg XL tablet Take 25 mg by mouth daily.     Tiffany Saravia MD   oxyCODONE-acetaminophen (PERCOCET) 5-325 mg per tablet Take  by mouth every four (4) hours as needed for Pain.    Other, MD Tiffany   traMADol (ULTRAM) 50 mg tablet Take 1 Tab by mouth every six (6) hours as needed for Pain. Max Daily Amount: 200 mg. 12/4/18   Jovanna Clayton MD   simvastatin (ZOCOR) 20 mg tablet Take 20 mg by mouth nightly. Provider, Historical   lisinopril-hydrochlorothiazide (PRINZIDE, ZESTORETIC) 20-12.5 mg per tablet Take 1 Tab by mouth daily. Provider, Historical   isosorbide mononitrate ER (IMDUR) 30 mg tablet Take 30 mg by mouth daily.     Provider, Historical       Current Facility-Administered Medications   Medication Dose Route Frequency    dextrose 5% and 0.9% NaCl infusion  50 mL/hr IntraVENous CONTINUOUS    QUEtiapine (SEROquel) tablet 100 mg  100 mg Oral BID    insulin lispro (HUMALOG) injection   SubCUTAneous Q6H    metoprolol (LOPRESSOR) injection 5 mg  5 mg IntraVENous Q6H    pantoprazole (PROTONIX) 40 mg in 0.9% sodium chloride 10 mL injection  40 mg IntraVENous Q24H    amLODIPine (NORVASC) tablet 10 mg  10 mg Oral DAILY    [Held by provider] metoprolol tartrate (LOPRESSOR) tablet 12.5 mg  12.5 mg Oral Q12H    enoxaparin (LOVENOX) injection 40 mg  1 mg/kg SubCUTAneous Q12H    ipratropium (ATROVENT) 0.02 % nebulizer solution 0.5 mg  0.5 mg Nebulization QID RT    atorvastatin (LIPITOR) tablet 20 mg  20 mg Oral QHS    gabapentin (NEURONTIN) capsule 100 mg  100 mg Oral TID    piperacillin-tazobactam (ZOSYN) 3.375 g in 0.9% sodium chloride (MBP/ADV) 100 mL MBP  3.375 g IntraVENous Q8H    budesonide (PULMICORT) 250 mcg/2ml nebulizer susp  500 mcg Nebulization BID RT    sodium chloride (NS) flush 5-40 mL  5-40 mL IntraVENous Q8H       Allergy:  No Known Allergies     Social History:  Social History     Tobacco Use    Smoking status: Current Every Day Smoker     Packs/day: 2.00     Years: 40.00     Pack years: 80.00    Smokeless tobacco: Current User     Types: Snuff   Substance Use Topics    Alcohol use: No    Drug use: No        Family History:  Family History Problem Relation Age of Onset    Heart Attack Mother     Heart Attack Father     Malignant Hyperthermia Neg Hx     Pseudocholinesterase Deficiency Neg Hx     Delayed Awakening Neg Hx     Post-op Nausea/Vomiting Neg Hx     Emergence Delirium Neg Hx     Post-op Cognitive Dysfunction Neg Hx     Other Neg Hx           Objective:   Vital Signs:    Blood pressure 132/89, pulse 69, temperature 98.1 °F (36.7 °C), resp. rate 19, height 5' 4\" (1.626 m), weight 39.5 kg (87 lb 1.3 oz), SpO2 94 %. Body mass index is 14.95 kg/m². O2 Device: Nasal cannula   O2 Flow Rate (L/min): 3 l/min   Temp (24hrs), Av.9 °F (36.6 °C), Min:97.5 °F (36.4 °C), Max:98.8 °F (37.1 °C)         Intake/Output:   Last shift:      No intake/output data recorded. Last 3 shifts: 10/31 1901 -  0700  In: 2850.8 [I.V.:2270.8]  Out: -     Intake/Output Summary (Last 24 hours) at 2020 0934  Last data filed at 2020 0325  Gross per 24 hour   Intake 1822.5 ml   Output    Net 1822.5 ml       Physical Exam:  General/Neurology: Awake alert confused, agitated malnurished   Head:   NCAT. Eye:   EOM intact. No icterus/pallor/cyanosis. Nose:   NGT in place. Throat:  Moist mucosa. Neck:   Trachea midline. No palpable cervical lymphadenopathy. Lung: Moderate air entry bilateral equal.  Right lower lobe mild crackles left CTA no wheezing or stridor. No prolonged expiration or accessory muscle use. Barrel chest.  Heart:   S1 S2 present. No murmur or JVD. Abdomen:  Soft. NT. ND. No palpable masses. Extremities:  Bilateral AKA. No peripheral edema in arms or thigh. Pulses: 2+ and symmetric in DP. Lymphatic:  No cervical or supraclavicular palpable lymphadenopathy.          Data:     Recent Results (from the past 24 hour(s))   GLUCOSE, POC    Collection Time: 20 11:34 AM   Result Value Ref Range    Glucose (POC) 102 70 - 110 mg/dL   POTASSIUM    Collection Time: 20  4:22 PM   Result Value Ref Range    Potassium 3.8 3.5 - 5.5 mmol/L   GLUCOSE, POC    Collection Time: 11/01/20  5:35 PM   Result Value Ref Range    Glucose (POC) 76 70 - 110 mg/dL   GLUCOSE, POC    Collection Time: 11/01/20 11:17 PM   Result Value Ref Range    Glucose (POC) 81 70 - 110 mg/dL   MAGNESIUM    Collection Time: 11/02/20  5:40 AM   Result Value Ref Range    Magnesium 1.8 1.6 - 2.6 mg/dL   CBC WITH AUTOMATED DIFF    Collection Time: 11/02/20  5:40 AM   Result Value Ref Range    WBC 9.2 4.6 - 13.2 K/uL    RBC 3.99 (L) 4.70 - 5.50 M/uL    HGB 11.4 (L) 13.0 - 16.0 g/dL    HCT 35.0 (L) 36.0 - 48.0 %    MCV 87.7 74.0 - 97.0 FL    MCH 28.6 24.0 - 34.0 PG    MCHC 32.6 31.0 - 37.0 g/dL    RDW 16.1 (H) 11.6 - 14.5 %    PLATELET 139 859 - 303 K/uL    MPV 10.2 9.2 - 11.8 FL    NEUTROPHILS 82 (H) 40 - 73 %    LYMPHOCYTES 8 (L) 21 - 52 %    MONOCYTES 8 3 - 10 %    EOSINOPHILS 2 0 - 5 %    BASOPHILS 0 0 - 2 %    ABS. NEUTROPHILS 7.5 1.8 - 8.0 K/UL    ABS. LYMPHOCYTES 0.7 (L) 0.9 - 3.6 K/UL    ABS. MONOCYTES 0.8 0.05 - 1.2 K/UL    ABS. EOSINOPHILS 0.2 0.0 - 0.4 K/UL    ABS. BASOPHILS 0.0 0.0 - 0.1 K/UL    DF AUTOMATED     METABOLIC PANEL, COMPREHENSIVE    Collection Time: 11/02/20  5:40 AM   Result Value Ref Range    Sodium 141 136 - 145 mmol/L    Potassium 3.7 3.5 - 5.5 mmol/L    Chloride 111 100 - 111 mmol/L    CO2 23 21 - 32 mmol/L    Anion gap 7 3.0 - 18 mmol/L    Glucose 76 74 - 99 mg/dL    BUN 9 7.0 - 18 MG/DL    Creatinine 0.53 (L) 0.6 - 1.3 MG/DL    BUN/Creatinine ratio 17 12 - 20      GFR est AA >60 >60 ml/min/1.73m2    GFR est non-AA >60 >60 ml/min/1.73m2    Calcium 8.7 8.5 - 10.1 MG/DL    Bilirubin, total 0.6 0.2 - 1.0 MG/DL    ALT (SGPT) 15 (L) 16 - 61 U/L    AST (SGOT) 15 10 - 38 U/L    Alk.  phosphatase 87 45 - 117 U/L    Protein, total 5.9 (L) 6.4 - 8.2 g/dL    Albumin 2.7 (L) 3.4 - 5.0 g/dL    Globulin 3.2 2.0 - 4.0 g/dL    A-G Ratio 0.8 0.8 - 1.7             No results for input(s): FIO2I, IFO2, HCO3I, IHCO3, HCOPOC, PCO2I, PCOPOC, IPHI, PHI, PHPOC, PO2I, PO2POC in the last 72 hours. No lab exists for component: IPOC2    All Micro Results     Procedure Component Value Units Date/Time    CULTURE, BLOOD [[id number]] Collected:  10/23/20 1611    Order Status:  Completed Specimen:  Blood Updated:  10/29/20 0856     Special Requests: NO SPECIAL REQUESTS        Culture result: NO GROWTH 6 DAYS       CULTURE, BLOOD [[id number]] Collected:  10/23/20 1615    Order Status:  Completed Specimen:  Blood Updated:  10/29/20 0856     Special Requests: NO SPECIAL REQUESTS        Culture result: NO GROWTH 6 DAYS       CULTURE, URINE [[id number]] Collected:  10/23/20 1630    Order Status:  Completed Specimen:  Urine from Clean catch Updated:  10/24/20 2026     Special Requests: NO SPECIAL REQUESTS        Culture result: No growth (<1,000 CFU/ML)             Echo 10/25/2020:  Result status: Final result    · Left Ventricle: Normal cavity size and wall thickness. The estimated EF is 50 - 55%. Low normal systolic function. There is mild (grade 1) left ventricular diastolic dysfunction E/E' ratio is 10.31.  · Pulmonary Artery: Pulmonary arteries not well visualized. Pulmonary arterial systolic pressure (PASP) is 74 mmHg. Pulmonary hypertension found to be severe. PVL LE 10/27/2020:   · Chronic non-occlusive thrombus present in the left proximal femoral vein. · Age indeterminate non-occlusive thrombus present in the right common femoral vein. Limited study secondary to bilateral AKA       CTA chest 10/27/2020:  1. No evidence of pulmonary embolism. 2.  Relatively diffuse bronchial wall thickening and several areas of  endobronchial impaction/occlusion. 3. Left lower lobe pneumonia with additional area of peribronchial alveolar  consolidation posterior right upper lobe.     > Recommend radiographic follow-up to document expected clinical resolution  following appropriate treatment in 4-6 weeks. 4. Moderately severe upper lobe predominant centrilobular emphysema.   5. Small left and trace right pleural effusions. 6. Extensive atherosclerotic vascular disease both above and below the  diaphragm. Imaging:  [x]I have personally reviewed the patients chest radiographs images and report   Results from [place] encounter on 10/23/20   XR ABD PORT  1 V    Narrative EXAM: KUB    INDICATION: NG tube placement    COMPARISON: None.    _______________    FINDINGS:    Supine abdominal film was performed. NG tube is present with the tip extending  into the distal body of the stomach. Nonobstructive bowel gas pattern. Extensive vascular calcifications are seen of the aorta and iliac arteries. Asymmetric markings left mid and lower lung field similar to chest x-ray  10/29/2020.    _______________      Impression IMPRESSION:    NG tube good position. Nonobstructive bowel gas pattern. Results from [place] encounter on 10/23/20   CTA CHEST W OR W WO CONT    Narrative EXAM: CTA Chest    INDICATION: 63year-old patient presently admitted with pneumonia, worsening of  hypoxia. Evaluation for pulmonary embolism. COMPARISON: CT chest 9/7/2012; prior chest radiographs, as recently 10/26/2020. TECHNIQUE: Axial CT imaging from the thoracic inlet through the diaphragm with  intravenous contrast utilizing CTA study for pulmonary artery evaluation. Coronal and sagittal MIP reformations were generated at a separate workstation. One or more dose reduction techniques were used on this CT: automated exposure  control, adjustment of the mAs and/or kVp according to patient size, and  iterative reconstruction techniques. The specific techniques used on this CT  exam have been documented in the patient's electronic medical record.   Digital  Imaging and Communications in Medicine (DICOM) format image data are available  to nonaffiliated external healthcare facilities or entities on a secure, media  free, reciprocally searchable basis with patient authorization for at least a  12-month period after this study. _______________    FINDINGS:    EXAM QUALITY: Overall exam quality is adequate. Pulmonary arterial enhancement  is adequate. The breath hold is satisfactory. PULMONARY ARTERIES: No convincing evidence of pulmonary embolism. MEDIASTINUM: Included gland is unremarkable. Cardiac size is normal. There is no  pericardial effusion. Multivessel coronary arterial atherosclerotic vascular  calcification is present. Extensive atherosclerotic vascular calcification of  the thoracic aorta noted without evidence of aneurysmal dilatation. LYMPH NODES: No enlarged mediastinal or hilar nodes by size criteria. AIRWAY: Diffuse bronchial wall thickening with endobronchial debris present  within the bronchus intermedius and right lower lobe segmental bronchi. Additional left lower lobe and segmental bronchial impaction. LUNGS: Focal area of low attenuating consolidation within the medial portion  left lower lobe with adjacent areas of atelectasis. Patchy peribronchial  consolidation within the posterior aspect right upper lobe. Moderately severe  upper lobe predominant centrilobular emphysema. No significant groundglass  abnormality or abnormal septal line thickening. PLEURA: Trace right and small left pleural effusions. No evidence of  pneumothorax. UPPER ABDOMEN: Marked atherosclerotic vascular calcifications present throughout  the suprarenal and infrarenal abdominal aorta. Likely layering biliary sludge  within the gallbladder. OTHER: No acute or aggressive osseous abnormalities identified. Multilevel  degenerative wedging of the thoracic spine noted. Prominent Schmorl's node  superior endplate L63.    _______________      Impression IMPRESSION:    1. No evidence of pulmonary embolism. 2.  Relatively diffuse bronchial wall thickening and several areas of  endobronchial impaction/occlusion.     3. Left lower lobe pneumonia with additional area of peribronchial alveolar  consolidation posterior right upper lobe.     > Recommend radiographic follow-up to document expected clinical resolution  following appropriate treatment in 4-6 weeks. 4. Moderately severe upper lobe predominant centrilobular emphysema. 5. Small left and trace right pleural effusions. 6. Extensive atherosclerotic vascular disease both above and below the  diaphragm.            Khalif Alegria MD  11/2/2020

## 2020-11-02 NOTE — PROGRESS NOTES
0700  Bedside, Verbal and Written shift change report given to NAT Stevens (oncoming nurse) by Michael Davila (offgoing nurse). Report included the following information SBAR, Kardex, Intake/Output and Recent Results. Pt aox3 with periods of confusion, yells out regularly. Follows commands, NG tube left nare, 3L NC, NPO, incontinent. 0900  Pt transported to Radiology for MBS. Pt failed per speech. 0930  Pt transported back to room. VSS.    1200  Pt status unchanged, VSS, pending transfer to floor. 1300  TRANSFER - OUT REPORT:    Verbal report given to Eduardo RN(name) on [email or web address].  being transferred to [employer] (unit) for routine progression of care       Report consisted of patients Situation, Background, Assessment and   Recommendations(SBAR). Information from the following report(s) SBAR, Kardex, Intake/Output and Recent Results was reviewed with the receiving nurse. Lines:   Peripheral IV 11/01/20 Anterior;Right Arm (Active)   Site Assessment Clean, dry, & intact 11/02/20 0400   Phlebitis Assessment 0 11/02/20 0400   Infiltration Assessment 0 11/02/20 0400   Dressing Status Clean, dry, & intact 11/02/20 0400   Dressing Type Transparent;Tape 11/02/20 0400   Hub Color/Line Status Pink; Infusing 11/02/20 0400   Action Taken Open ports on tubing capped 11/02/20 0400   Alcohol Cap Used Yes 11/02/20 0400        Opportunity for questions and clarification was provided.       Patient transported with:   Monitor  Registered Nurse

## 2020-11-02 NOTE — PROGRESS NOTES
Comprehensive Nutrition Assessment    Type and Reason for Visit: Reassess, Positive nutrition screen    Nutrition Recommendations/Plan: EN: appropriate for EN; recc bolus feeding    Nutrition Assessment:  pt admitted w/ COPD with right upper lobe pneumonia questionable mass/emphysema /aspiration pna, acute resp failure, septic shock, chest pain-echo done cardiology following, metabolic encepahlopathy, pocketed food and aspirated - NPO since 10/29. Estimated Daily Nutrient Needs:  Energy (kcal): 4758; Weight Used for Energy Requirements: Current  Protein (g): 64-86; Weight Used for Protein Requirements: Current  Fluid (ml/day): 2378; Weight Used for Fluid Requirements: Current      Nutrition Related Findings:  (P) Labs reviewed: Med: protonix, zofran, miralax, lopressor,zofran, lipitor, humalog, miralax. +BM 11/1/20; NPO since 10/29/20; Failed MBS. Wounds:    None       Current Nutrition Therapies:  DIET SNACKS Lunch, Dinner; [employer]  DIET NPO With Aida and Benja    Anthropometric Measures:  · Height:  5' 4\" (162.6 cm)  · Current Body Wt:  39.5 kg (87 lb 1.3 oz)   · Admission Body Wt:  95 lb    · Usual Body Wt:        · Ideal Body Wt:  130 lbs:  67 %   · Adjusted Body Weight:  97.4;  Weight Adjustment for: Amputation   · Adjusted BMI:  16.7    · BMI Category:  Underweight (BMI less than 18.5)       Nutrition Diagnosis:   · Severe malnutrition related to impaired respiratory function as evidenced by NPO or clear liquid status due to medical condition, severe muscle loss, severe loss of subcutaneous fat      Nutrition Interventions:   Food and/or Nutrient Delivery: Start tube feeding  Nutrition Education and Counseling: No recommendations at this time  Coordination of Nutrition Care: Continue to monitor while inpatient, Interdisciplinary rounds    Goals:  Start bolus feeds within the next 3-4 days       Nutrition Monitoring and Evaluation:   Behavioral-Environmental Outcomes:    Food/Nutrient Intake Outcomes: Diet advancement/tolerance, Food and nutrient intake  Physical Signs/Symptoms Outcomes: Biochemical data, Nutrition focused physical findings, GI status, Weight, Skin, Meal time behavior    Discharge Planning:     Too soon to determine     Electronically signed by Venkatesh Mar on 11/2/2020 at 1:07 PM

## 2020-11-02 NOTE — DIABETES MGMT
GLYCEMIC CONTROL PROGRESS NOTE:    - discussed in rounds, known h/o T2DM, HbA1C within recommended range for age + comorbids  - Humalog Normal Insulin Sensitivity Corrective Coverage orders in place recommend continue    Recent Glucose Results:   Lab Results   Component Value Date/Time    GLU 76 11/02/2020 05:40 AM    GLUCPOC 81 11/01/2020 11:17 PM    GLUCPOC 76 11/01/2020 05:35 PM    GLUCPOC 102 11/01/2020 11:34 AM     Sarai Cesar MS, RN, CDE  Glycemic Control Team  [phone]  Pager [phone] (M-TH 8:00-4:30P)  *After Hours pager [phone]

## 2020-11-03 PROBLEM — E83.42 HYPOMAGNESEMIA: Status: ACTIVE | Noted: 2020-11-03

## 2020-11-03 PROBLEM — R13.10 DYSPHAGIA: Status: ACTIVE | Noted: 2020-11-03

## 2020-11-03 PROBLEM — E87.6 HYPOKALEMIA: Status: ACTIVE | Noted: 2020-11-03

## 2020-11-03 LAB
ALBUMIN SERPL-MCNC: 2.7 G/DL (ref 3.4–5)
ALBUMIN/GLOB SERPL: 0.8 {RATIO} (ref 0.8–1.7)
ALP SERPL-CCNC: 86 U/L (ref 45–117)
ALT SERPL-CCNC: 13 U/L (ref 16–61)
ANION GAP SERPL CALC-SCNC: 7 MMOL/L (ref 3–18)
AST SERPL-CCNC: 11 U/L (ref 10–38)
BASOPHILS # BLD: 0 K/UL (ref 0–0.1)
BASOPHILS NFR BLD: 0 % (ref 0–2)
BILIRUB SERPL-MCNC: 0.5 MG/DL (ref 0.2–1)
BUN SERPL-MCNC: 7 MG/DL (ref 7–18)
BUN/CREAT SERPL: 13 (ref 12–20)
CALCIUM SERPL-MCNC: 8.4 MG/DL (ref 8.5–10.1)
CHLORIDE SERPL-SCNC: 109 MMOL/L (ref 100–111)
CO2 SERPL-SCNC: 24 MMOL/L (ref 21–32)
CREAT SERPL-MCNC: 0.53 MG/DL (ref 0.6–1.3)
DIFFERENTIAL METHOD BLD: ABNORMAL
EOSINOPHIL # BLD: 0.1 K/UL (ref 0–0.4)
EOSINOPHIL NFR BLD: 2 % (ref 0–5)
ERYTHROCYTE [DISTWIDTH] IN BLOOD BY AUTOMATED COUNT: 15.8 % (ref 11.6–14.5)
GLOBULIN SER CALC-MCNC: 3.2 G/DL (ref 2–4)
GLUCOSE BLD STRIP.AUTO-MCNC: 84 MG/DL (ref 70–110)
GLUCOSE BLD STRIP.AUTO-MCNC: 88 MG/DL (ref 70–110)
GLUCOSE BLD STRIP.AUTO-MCNC: 96 MG/DL (ref 70–110)
GLUCOSE BLD STRIP.AUTO-MCNC: 99 MG/DL (ref 70–110)
GLUCOSE SERPL-MCNC: 99 MG/DL (ref 74–99)
HCT VFR BLD AUTO: 32.5 % (ref 36–48)
HGB BLD-MCNC: 10.6 G/DL (ref 13–16)
LYMPHOCYTES # BLD: 0.5 K/UL (ref 0.9–3.6)
LYMPHOCYTES NFR BLD: 8 % (ref 21–52)
MAGNESIUM SERPL-MCNC: 1.5 MG/DL (ref 1.6–2.6)
MCH RBC QN AUTO: 28.5 PG (ref 24–34)
MCHC RBC AUTO-ENTMCNC: 32.6 G/DL (ref 31–37)
MCV RBC AUTO: 87.4 FL (ref 74–97)
MONOCYTES # BLD: 0.5 K/UL (ref 0.05–1.2)
MONOCYTES NFR BLD: 8 % (ref 3–10)
NEUTS SEG # BLD: 5.2 K/UL (ref 1.8–8)
NEUTS SEG NFR BLD: 82 % (ref 40–73)
PLATELET # BLD AUTO: 271 K/UL (ref 135–420)
PMV BLD AUTO: 10.5 FL (ref 9.2–11.8)
POTASSIUM SERPL-SCNC: 3 MMOL/L (ref 3.5–5.5)
PROT SERPL-MCNC: 5.9 G/DL (ref 6.4–8.2)
RBC # BLD AUTO: 3.72 M/UL (ref 4.7–5.5)
SODIUM SERPL-SCNC: 140 MMOL/L (ref 136–145)
WBC # BLD AUTO: 6.3 K/UL (ref 4.6–13.2)

## 2020-11-03 PROCEDURE — 74011250637 HC RX REV CODE- 250/637: Performed by: INTERNAL MEDICINE

## 2020-11-03 PROCEDURE — 74011250636 HC RX REV CODE- 250/636: Performed by: INTERNAL MEDICINE

## 2020-11-03 PROCEDURE — 94640 AIRWAY INHALATION TREATMENT: CPT

## 2020-11-03 PROCEDURE — 74011000250 HC RX REV CODE- 250: Performed by: INTERNAL MEDICINE

## 2020-11-03 PROCEDURE — 82962 GLUCOSE BLOOD TEST: CPT

## 2020-11-03 PROCEDURE — 36415 COLL VENOUS BLD VENIPUNCTURE: CPT

## 2020-11-03 PROCEDURE — 74011000258 HC RX REV CODE- 258: Performed by: INTERNAL MEDICINE

## 2020-11-03 PROCEDURE — 94760 N-INVAS EAR/PLS OXIMETRY 1: CPT

## 2020-11-03 PROCEDURE — 85025 COMPLETE CBC W/AUTO DIFF WBC: CPT

## 2020-11-03 PROCEDURE — 83735 ASSAY OF MAGNESIUM: CPT

## 2020-11-03 PROCEDURE — 65660000000 HC RM CCU STEPDOWN

## 2020-11-03 PROCEDURE — 74011250637 HC RX REV CODE- 250/637: Performed by: FAMILY MEDICINE

## 2020-11-03 PROCEDURE — 80053 COMPREHEN METABOLIC PANEL: CPT

## 2020-11-03 PROCEDURE — 74011250636 HC RX REV CODE- 250/636: Performed by: HOSPITALIST

## 2020-11-03 PROCEDURE — 74011000258 HC RX REV CODE- 258: Performed by: FAMILY MEDICINE

## 2020-11-03 PROCEDURE — 74011250637 HC RX REV CODE- 250/637: Performed by: HOSPITALIST

## 2020-11-03 PROCEDURE — C9113 INJ PANTOPRAZOLE SODIUM, VIA: HCPCS | Performed by: INTERNAL MEDICINE

## 2020-11-03 RX ORDER — POTASSIUM CHLORIDE 7.45 MG/ML
10 INJECTION INTRAVENOUS
Status: COMPLETED | OUTPATIENT
Start: 2020-11-03 | End: 2020-11-03

## 2020-11-03 RX ORDER — MAGNESIUM SULFATE HEPTAHYDRATE 40 MG/ML
2 INJECTION, SOLUTION INTRAVENOUS
Status: COMPLETED | OUTPATIENT
Start: 2020-11-03 | End: 2020-11-03

## 2020-11-03 RX ADMIN — POTASSIUM CHLORIDE 10 MEQ: 10 INJECTION, SOLUTION INTRAVENOUS at 18:06

## 2020-11-03 RX ADMIN — POTASSIUM CHLORIDE 10 MEQ: 10 INJECTION, SOLUTION INTRAVENOUS at 19:46

## 2020-11-03 RX ADMIN — AMLODIPINE BESYLATE 10 MG: 5 TABLET ORAL at 08:53

## 2020-11-03 RX ADMIN — POTASSIUM CHLORIDE 10 MEQ: 10 INJECTION, SOLUTION INTRAVENOUS at 17:25

## 2020-11-03 RX ADMIN — MAGNESIUM SULFATE HEPTAHYDRATE 2 G: 40 INJECTION, SOLUTION INTRAVENOUS at 16:00

## 2020-11-03 RX ADMIN — ENOXAPARIN SODIUM 40 MG: 40 INJECTION SUBCUTANEOUS at 19:50

## 2020-11-03 RX ADMIN — METOPROLOL TARTRATE 25 MG: 25 TABLET, FILM COATED ORAL at 08:53

## 2020-11-03 RX ADMIN — SODIUM CHLORIDE 40 MG: 9 INJECTION INTRAMUSCULAR; INTRAVENOUS; SUBCUTANEOUS at 13:17

## 2020-11-03 RX ADMIN — DEXTROSE MONOHYDRATE AND SODIUM CHLORIDE 100 ML/HR: 5; .9 INJECTION, SOLUTION INTRAVENOUS at 07:28

## 2020-11-03 RX ADMIN — BUDESONIDE 500 MCG: 0.25 INHALANT RESPIRATORY (INHALATION) at 07:35

## 2020-11-03 RX ADMIN — IPRATROPIUM BROMIDE 0.5 MG: 0.5 SOLUTION RESPIRATORY (INHALATION) at 07:35

## 2020-11-03 RX ADMIN — IPRATROPIUM BROMIDE 0.5 MG: 0.5 SOLUTION RESPIRATORY (INHALATION) at 11:13

## 2020-11-03 RX ADMIN — SODIUM CHLORIDE 10 ML: 9 INJECTION, SOLUTION INTRAMUSCULAR; INTRAVENOUS; SUBCUTANEOUS at 05:07

## 2020-11-03 RX ADMIN — BUDESONIDE 500 MCG: 0.25 INHALANT RESPIRATORY (INHALATION) at 20:40

## 2020-11-03 RX ADMIN — MAGNESIUM SULFATE HEPTAHYDRATE 2 G: 40 INJECTION, SOLUTION INTRAVENOUS at 17:25

## 2020-11-03 RX ADMIN — QUETIAPINE FUMARATE 100 MG: 100 TABLET ORAL at 08:53

## 2020-11-03 RX ADMIN — PIPERACILLIN AND TAZOBACTAM 3.38 G: 3; .375 INJECTION, POWDER, LYOPHILIZED, FOR SOLUTION INTRAVENOUS at 04:18

## 2020-11-03 RX ADMIN — GABAPENTIN 100 MG: 100 CAPSULE ORAL at 08:52

## 2020-11-03 RX ADMIN — SODIUM CHLORIDE 10 ML: 9 INJECTION, SOLUTION INTRAMUSCULAR; INTRAVENOUS; SUBCUTANEOUS at 15:37

## 2020-11-03 RX ADMIN — ENOXAPARIN SODIUM 40 MG: 40 INJECTION SUBCUTANEOUS at 08:53

## 2020-11-03 RX ADMIN — IPRATROPIUM BROMIDE 0.5 MG: 0.5 SOLUTION RESPIRATORY (INHALATION) at 15:29

## 2020-11-03 RX ADMIN — POTASSIUM CHLORIDE 10 MEQ: 10 INJECTION, SOLUTION INTRAVENOUS at 21:02

## 2020-11-03 NOTE — PROGRESS NOTES
2356-Resting in bed. White board updated. 0105-Assessment complete. Restraints  Released for 15 minutes with this writer at bedside.

## 2020-11-03 NOTE — ROUTINE PROCESS
Bedside and Verbal shift change report given to Perico Nicole RN (oncoming nurse) by Beulah Faustin RN (offgoing nurse). Report included the following information SBAR and Kardex.

## 2020-11-03 NOTE — PROGRESS NOTES
8399  Bedside and verbal shift change report given to Neva Taylor, ROB (on coming nurse) by Paola Gregorio. Abdulaziz Mccabe RN (off going nurse). Report included the following information SBAR, Kardex, OR Summary, Intake/Output and MAR.    0911  Pt is asking food and pulling out his NG tube. Condom cath pulled out, too. 1116  Bedside and verbal shift change report given by ROB Iqbal (off going nurse) to BOBBY Santana RN(on coming nurse). Report included the following information SBAR, Kardex, OR Summary, Intake/Output and MAR. Questions on NPO w/ ice chips, aspiration precaution w/ PO meds still given.

## 2020-11-03 NOTE — PROGRESS NOTES
[facility]  Pulmonary, Critical Care, and Sleep Medicine    Name: Ezekiel Saenz. MRN: [id number]   : 1955 Hospital: [facility] [place]   Date: 11/3/2020        Pulmonary Critical Care Note    IMPRESSION:     Acute respiratory failure with hypoxia (HCC) J96.01     Sepsis with organ dysfunction, resolved shock ([address] A41.9, R65.20     Aspiration pneumonia ([address] J69.0     Deep vein thrombosis (DVT) of lower extremity ([address] I82.409       Patient Active Problem List   Diagnosis Code    Bursitis of elbow M70.30    Medication overdose T50.901A    Polio A80.9    Depressive disorder, not elsewhere classified F32.9    Type II or unspecified type diabetes mellitus without mention of complication, uncontrolled [id number]    Hypotension, unspecified I95.9    Amputation of both lower extremities ([address] S88.911A, N68.258M    PNA (pneumonia) J18.9    ALEXANDER (acute kidney injury) ([address] N17.9    Hyponatremia E87.1    Marijuana abuse F12.10    Centrilobular emphysema ([address] J43.2    CAP (community acquired pneumonia) J18.9    Sepsis ([address] A41.9    Hard of hearing H91.90    Legal blindness H54.8    Acute encephalopathy G93.40    Septic shock (HCC) A41.9, R65.21    Chronic obstructive pulmonary disease with acute exacerbation (HCC) J44.1    Severe protein-calorie malnutrition ([address] H94    Metabolic encephalopathy U42.13    Acute respiratory failure with hypoxia (HCC) J96.01    Aspiration pneumonia ([address] J69.0    Deep vein thrombosis (DVT) of lower extremity ([address] I82.409          RECOMMENDATIONS:   Respiratory: Hx smoking. Oxygenation stable on 2 L of oxyegn 92-92 %  Pneumonia imporving   Ration precautions failed modified barium  CTA chest 10/27/2020: No PE. Diffuse peribronchial wall thickening and several areas of endobronchial impaction/occlusion. LLL pneumonia with peribronchial consolidation posterior RUL. Moderately severe upper lobe predominant centrilobular emphysema.   Small left and trace right pleural effusion. Recommend repeat CT in 4 to 6 weeks as outpatient. Bronchodilators: Continue Pulmicort nebs twice daily, Atrovent nebs 4 times daily. HFNC off since 10/30/2020. Now on 3 LPM NC. N.p.o. due to recurrent aspiration. MBS failed cannot eat  Patient still remains at high risk for aspiration. NGT placed 10/31/2020. Continue medication through NGT. Still at high risk for aspiration and so will not start NGT feeding till [facility] completed tomorrow. Since patient is n.p.o., continue D5 NS. Patient remains full code after patient's son visited patient on 10/30/2020 and discussed with palliative care team.    Repeat CXR intermittently. Titrate FiO2 to keep SPO2 > 91%. Steroids: Off Solu-Medrol since 10/29/2020. Bronchodilators: Continue Pulmicort twice daily, Atrovent 4 times daily. Continue DuoNeb as needed. Continue pulmonary hygiene and toileting. Continue aspiration precautions. HOB more than 30 degrees all the time. ID:   Wbc 6.3  No fever. Leukocytosis improved  Urine culture: <100 K. Blood culture: NGTD. Urine antigen panel negative. COVID-19 10/14/2020: Negative. Antibiotic choice: Doxycycline discontinued 10/28/2020. Vancomycin discontinued 10/29/2020. Continue Zosyn. Follow cultures. Deescalate antibiotic when appropriate. CVS: Early hypertensive blood pressure medications added  Echo 10/25/2020: LVEF 50 to 55%. Grade 1 DD. Estimated PASP 74 mmHg. Estimated RVSP 74 mmHg on echo, could be due to hypoxic vasoconstriction. Recommend repeat echo as outpatient. PVL LE 10/27/2020: Chronic nonocclusive thrombus left proximal femoral vein. Age indeterminate nonocclusive thrombus right femoral vein. Due to DVT, will continue Lovenox 40 mg subcutaneously every 12 hours. May transition to oral anticoagulants when and if patient can pass MBS and able to take p.o. safely. Restarted Norvasc and Lipitor since NGT placed. Continue IV Lopressor.   It can be changed to Lopressor through NGT later today or tomorrow. Renal: Monitor renal functions, lytes  Normal creatinine. Monitor urine output, creatinine and electrolytes. Heme: Mild anemia. Normal platelets and coags. Continue Lovenox full dose for DVT. Can be transitioned to newer oral anticoagulants once able to take p.o. No active external bleeding. Monitor closely. Endo: Glycemic control. Monitor for any hypoglycemia. TSH normal    GI: N.p.o. due to risk of aspiration. MBS to be performed tomorrow. Neurology:     Alert awake oriented x3. Less confused . nodistress   Urine drug screen positive for THC on admission. ??  Baseline mild dementia. CT head on admission chronic microvascular disease, no acute intracranial abnormality. Ammonia normal.  Continue Seroquel, it has been slowly increased to 100 mg twice daily. Continue Haldol as needed. PAIN AND SEDATION: none    Prophylaxis:  DVT treatment Lovenox. GI prophylaxis Protonix. Restraints: Wrist soft restraints as needed for patient interfering with medical therapy/management and patient safety. Lines/Tubes:   Central line: Right femoral 10/23/20discontinued 10/26/2020. Lindo: 10/23/20discontinued 10/27/2020. Condom catheter: 10/27/2020. NGT: 10/31/2020. Currently patient does not have a Lindo catheter or central line. Overall poor prognosis, prognosis guarded. ADVANCE DIRECTIVE: Full code  Palliative care consult team on board. Will defer respective systems problem management to primary and other consultant and follow patient in ICU with primary and other medical team  Further recommendations will be based on the patient's response to recommended treatment and results of the investigation ordered. Quality Care: PPI, DVT prophylaxis, HOB elevated, Infection control all reviewed and addressed. DISCUSSION: Events and notes from last 24 hours reviewed.  Care plan discussed with nursing, hospitalist, ICU staff, RT, MDR.  D/w patient (answered all questions to satisfaction). Family discussion:  Patient son Alistair Carrasco visited patient on 10/30/2020, discussed with palliative care team.  Patient remains full code. Patient's other son was supposed to visit patient 10/31/2020. High complexity decision making was performed during the evaluation of this patient at high risk for decompensation with multiple organ involvement. Total critical care time spent rendering care exclusive of procedures/family discussion/coordination of care: 34 minutes. Subjective/History:   Mr. Iwona Hilliard. has been seen and evaluated as Dr. Ceci Castro requested for assisting with ICU care of septic shock. Patient unable to provide history. Patient is a 63 y.o. male COPD, bilateral above-knee amputation, recently discharged [facility] after treated for pneumonia. Per chart, since discharge about a week ago patient has been in the ER few times at other facilities with complaints of cough, shortness of breath and hypoxemia; had a CT scan done at [facility] which showed persistent pneumonia. Per chart, patient had not picked up his antibiotics that he was discharged on until recently. He was brought to ER with c/o of chest pain between shoulders by EMS. In the ER he was found to be confused and combative with hypothermia, hypotension. He was treated with IVF boluses and Levophed. CT head on admission nil acute. CXR showed improving pneumonia. He has remained confused at the time of this evaluation at bedside in rm 102 in ICU. 11/03/20   mynor is on medical floor tele  Oxygenation BP stable   More awake \  No cough  Now on 2L  Please call me if new issues I will sign off   aspiraiton precuations   Review of Systems:  Review of systems not obtained due to patient factors.       Intake/Output Summary (Last 24 hours) at 11/3/2020 1426  Last data filed at 11/3/2020 0851  Gross per 24 hour   Intake 1514.07 ml   Output 300 ml   Net 1214.07 ml Latest lactic acid:   Lactic acid   Date Value Ref Range Status   10/24/2020 0.9 0.4 - 2.0 MMOL/L Final   10/23/2020 2.7 (HH) 0.4 - 2.0 MMOL/L Final     Comment:     CALLED TO AND CORRECTLY REPEATED BY:  LEXIE ROJAS RN ED BY CAM ON 10/23/20 AT 1738.     10/23/2020 3.3 (HH) 0.4 - 2.0 MMOL/L Final     Comment:     CALLED TO AND CORRECTLY REPEATED BY:  Argelia Underwood RN ED BY CAM ON 10/23/20 AT 1700. Past Medical History:  Past Medical History:   Diagnosis Date    [facility] both lower [facility]     Amputee, above knee, left ([facility]     At risk for falls     Chronic pain     back and legs    Diabetes ([facility]     Hypercholesterolemia     Hypertension     Polio         Past Surgical History:  Past Surgical History:   Procedure Laterality Date    HX HERNIA REPAIR      HX OTHER SURGICAL      carpal tunnel     HX OTHER SURGICAL      cyst        Medications:  Prior to Admission medications    Medication Sig Start Date End Date Taking? Authorizing Provider   amoxicillin-clavulanate (AUGMENTIN) 875-125 mg per tablet Take 1 Tab by mouth every twelve (12) hours. 10/20/20   Freddy Chapin MD   acetaminophen (TYLENOL) 325 mg tablet Take  by mouth every four (4) hours as needed for Pain. Tiffany Saravia MD   amLODIPine (NORVASC) 5 mg tablet Take 5 mg by mouth daily. Tiffany Saravia MD   gabapentin (NEURONTIN) 100 mg capsule Take 100 mg by mouth three (3) times daily. Tiffany Saravia MD   clonazePAM (KLONOPIN) 0.5 mg tablet Take 0.5 mg by mouth nightly as needed. Tiffany Saravia MD   atorvastatin (LIPITOR) 20 mg tablet Take 20 mg by mouth daily. Tiffany Saravia MD   nitroglycerin (NITROSTAT) 0.4 mg SL tablet 0.4 mg by SubLINGual route every five (5) minutes as needed for Chest Pain. Up to 3 doses. Tiffany Saravia MD   metoprolol succinate (TOPROL XL) 25 mg XL tablet Take 25 mg by mouth daily.     Tiffany Saravia MD   oxyCODONE-acetaminophen (PERCOCET) 5-325 mg per tablet Take  by mouth every four (4) hours as needed for Pain. Other, MD Tiffany   traMADol (ULTRAM) 50 mg tablet Take 1 Tab by mouth every six (6) hours as needed for Pain. Max Daily Amount: 200 mg. 12/4/18   Jaylen Clayton MD   simvastatin (ZOCOR) 20 mg tablet Take 20 mg by mouth nightly. Provider, Historical   lisinopril-hydrochlorothiazide (PRINZIDE, ZESTORETIC) 20-12.5 mg per tablet Take 1 Tab by mouth daily. Provider, Historical   isosorbide mononitrate ER (IMDUR) 30 mg tablet Take 30 mg by mouth daily.     Provider, Historical       Current Facility-Administered Medications   Medication Dose Route Frequency    metoprolol tartrate (LOPRESSOR) tablet 25 mg  25 mg Oral Q12H    dextrose 5% and 0.9% NaCl infusion  100 mL/hr IntraVENous CONTINUOUS    QUEtiapine (SEROquel) tablet 100 mg  100 mg Oral BID    insulin lispro (HUMALOG) injection   SubCUTAneous Q6H    pantoprazole (PROTONIX) 40 mg in 0.9% sodium chloride 10 mL injection  40 mg IntraVENous Q24H    amLODIPine (NORVASC) tablet 10 mg  10 mg Oral DAILY    [Held by provider] metoprolol tartrate (LOPRESSOR) tablet 12.5 mg  12.5 mg Oral Q12H    enoxaparin (LOVENOX) injection 40 mg  1 mg/kg SubCUTAneous Q12H    ipratropium (ATROVENT) 0.02 % nebulizer solution 0.5 mg  0.5 mg Nebulization QID RT    atorvastatin (LIPITOR) tablet 20 mg  20 mg Oral QHS    gabapentin (NEURONTIN) capsule 100 mg  100 mg Oral TID    budesonide (PULMICORT) 250 mcg/2ml nebulizer susp  500 mcg Nebulization BID RT    sodium chloride (NS) flush 5-40 mL  5-40 mL IntraVENous Q8H       Allergy:  No Known Allergies     Social History:  Social History     Tobacco Use    Smoking status: Current Every Day Smoker     Packs/day: 2.00     Years: 40.00     Pack years: 80.00    Smokeless tobacco: Current User     Types: Snuff   Substance Use Topics    Alcohol use: No    Drug use: No        Family History:  Family History   Problem Relation Age of Onset    Heart Attack Mother     Heart Attack Father     Malignant Hyperthermia Neg Hx     Pseudocholinesterase Deficiency Neg Hx     Delayed Awakening Neg Hx     Post-op Nausea/Vomiting Neg Hx     Emergence Delirium Neg Hx     Post-op Cognitive Dysfunction Neg Hx     Other Neg Hx           Objective:   Vital Signs:    Blood pressure 106/76, pulse 85, temperature 98.5 °F (36.9 °C), resp. rate 16, height 5' 4\" (1.626 m), weight 39.5 kg (87 lb 1.3 oz), SpO2 92 %. Body mass index is 14.95 kg/m². O2 Device: Nasal cannula   O2 Flow Rate (L/min): 2 l/min   Temp (24hrs), Av.3 °F (36.8 °C), Min:97.5 °F (36.4 °C), Max:99.7 °F (37.6 °C)         Intake/Output:   Last shift:      701 - 1900  In: 736.6 [P.O.:60; I.V.:676.6]  Out: -   Last 3 shifts: 1901 - 700  In: 7247 [I.V.:]  Out: 300 [Urine:300]    Intake/Output Summary (Last 24 hours) at 11/3/2020 1426  Last data filed at 11/3/2020 0851  Gross per 24 hour   Intake 1514.07 ml   Output 300 ml   Net 1214.07 ml       Physical Exam:  General/Neurology: Awake alert confused, agitated malnurished   Head:   NCAT. Eye:   EOM intact. No icterus/pallor/cyanosis. Nose:   NGT in place. Throat:  Moist mucosa. Neck:   Trachea midline. No palpable cervical lymphadenopathy. Lung: Moderate air entry bilateral equal.  Right lower lobe decresed brath sounds  left CTA no wheezing or stridor. No prolonged expiration or accessory muscle use. Barrel chest.  Heart:   S1 S2 present. No murmur or JVD. Abdomen:  Soft. NT. ND. No palpable masses. Extremities:  Bilateral AKA. No peripheral edema in arms or thigh. Pulses: 2+ and symmetric in DP. Lymphatic:  No cervical or supraclavicular palpable lymphadenopathy.          Data:     Recent Results (from the past 24 hour(s))   GLUCOSE, POC    Collection Time: 20  7:42 PM   Result Value Ref Range    Glucose (POC) 72 70 - 110 mg/dL   GLUCOSE, POC    Collection Time: 20 12:06 AM   Result Value Ref Range    Glucose (POC) 96 70 - 110 mg/dL   MAGNESIUM Collection Time: 11/03/20 12:20 AM   Result Value Ref Range    Magnesium 1.5 (L) 1.6 - 2.6 mg/dL   CBC WITH AUTOMATED DIFF    Collection Time: 11/03/20 12:20 AM   Result Value Ref Range    WBC 6.3 4.6 - 13.2 K/uL    RBC 3.72 (L) 4.70 - 5.50 M/uL    HGB 10.6 (L) 13.0 - 16.0 g/dL    HCT 32.5 (L) 36.0 - 48.0 %    MCV 87.4 74.0 - 97.0 FL    MCH 28.5 24.0 - 34.0 PG    MCHC 32.6 31.0 - 37.0 g/dL    RDW 15.8 (H) 11.6 - 14.5 %    PLATELET 984 642 - 036 K/uL    MPV 10.5 9.2 - 11.8 FL    NEUTROPHILS 82 (H) 40 - 73 %    LYMPHOCYTES 8 (L) 21 - 52 %    MONOCYTES 8 3 - 10 %    EOSINOPHILS 2 0 - 5 %    BASOPHILS 0 0 - 2 %    ABS. NEUTROPHILS 5.2 1.8 - 8.0 K/UL    ABS. LYMPHOCYTES 0.5 (L) 0.9 - 3.6 K/UL    ABS. MONOCYTES 0.5 0.05 - 1.2 K/UL    ABS. EOSINOPHILS 0.1 0.0 - 0.4 K/UL    ABS. BASOPHILS 0.0 0.0 - 0.1 K/UL    DF AUTOMATED     METABOLIC PANEL, COMPREHENSIVE    Collection Time: 11/03/20 12:20 AM   Result Value Ref Range    Sodium 140 136 - 145 mmol/L    Potassium 3.0 (L) 3.5 - 5.5 mmol/L    Chloride 109 100 - 111 mmol/L    CO2 24 21 - 32 mmol/L    Anion gap 7 3.0 - 18 mmol/L    Glucose 99 74 - 99 mg/dL    BUN 7 7.0 - 18 MG/DL    Creatinine 0.53 (L) 0.6 - 1.3 MG/DL    BUN/Creatinine ratio 13 12 - 20      GFR est AA >60 >60 ml/min/1.73m2    GFR est non-AA >60 >60 ml/min/1.73m2    Calcium 8.4 (L) 8.5 - 10.1 MG/DL    Bilirubin, total 0.5 0.2 - 1.0 MG/DL    ALT (SGPT) 13 (L) 16 - 61 U/L    AST (SGOT) 11 10 - 38 U/L    Alk.  phosphatase 86 45 - 117 U/L    Protein, total 5.9 (L) 6.4 - 8.2 g/dL    Albumin 2.7 (L) 3.4 - 5.0 g/dL    Globulin 3.2 2.0 - 4.0 g/dL    A-G Ratio 0.8 0.8 - 1.7     GLUCOSE, POC    Collection Time: 11/03/20  5:51 AM   Result Value Ref Range    Glucose (POC) 88 70 - 110 mg/dL   GLUCOSE, POC    Collection Time: 11/03/20 12:32 PM   Result Value Ref Range    Glucose (POC) 99 70 - 110 mg/dL           No results for input(s): FIO2I, IFO2, HCO3I, IHCO3, HCOPOC, PCO2I, PCOPOC, IPHI, PHI, PHPOC, PO2I, PO2POC in the last 72 hours. No lab exists for component: IPOC2    All Micro Results     Procedure Component Value Units Date/Time    CULTURE, BLOOD [[id number]] Collected:  10/23/20 1611    Order Status:  Completed Specimen:  Blood Updated:  10/29/20 0856     Special Requests: NO SPECIAL REQUESTS        Culture result: NO GROWTH 6 DAYS       CULTURE, BLOOD [[id number]] Collected:  10/23/20 1615    Order Status:  Completed Specimen:  Blood Updated:  10/29/20 0856     Special Requests: NO SPECIAL REQUESTS        Culture result: NO GROWTH 6 DAYS       CULTURE, URINE [[id number]] Collected:  10/23/20 1630    Order Status:  Completed Specimen:  Urine from Clean catch Updated:  10/24/20 2026     Special Requests: NO SPECIAL REQUESTS        Culture result: No growth (<1,000 CFU/ML)             Echo 10/25/2020:  Result status: Final result    · Left Ventricle: Normal cavity size and wall thickness. The estimated EF is 50 - 55%. Low normal systolic function. There is mild (grade 1) left ventricular diastolic dysfunction E/E' ratio is 10.31.  · Pulmonary Artery: Pulmonary arteries not well visualized. Pulmonary arterial systolic pressure (PASP) is 74 mmHg. Pulmonary hypertension found to be severe. PVL LE 10/27/2020:   · Chronic non-occlusive thrombus present in the left proximal femoral vein. · Age indeterminate non-occlusive thrombus present in the right common femoral vein. Limited study secondary to bilateral AKA       CTA chest 10/27/2020:  1. No evidence of pulmonary embolism. 2.  Relatively diffuse bronchial wall thickening and several areas of  endobronchial impaction/occlusion. 3. Left lower lobe pneumonia with additional area of peribronchial alveolar  consolidation posterior right upper lobe.     > Recommend radiographic follow-up to document expected clinical resolution  following appropriate treatment in 4-6 weeks. 4. Moderately severe upper lobe predominant centrilobular emphysema.   5. Small left and trace right pleural effusions. 6. Extensive atherosclerotic vascular disease both above and below the  diaphragm. Imaging:  [x]I have personally reviewed the patients chest radiographs images and report   Results from [place] encounter on 10/23/20   XR CHEST PORT    Narrative EXAM: Portable Chest    CLINICAL INDICATION: hypoxia  -Additional: None    COMPARISON: 10/29/20    TECHNIQUE: AP portable view at 1417    ______________    FINDINGS:    HEART AND MEDIASTINUM: The heart size is normal.    LUNGS AND AIRWAYS: The lungs appear hyperexpanded suggesting COPD. Infiltrative  changes are evident involving the upper right chest and lower left chest. The  lungs appear stable    PLEURA: No pleural effusion or pneumothorax. BONES: Chronic spondylosis is present    OTHER: NG tube enters the stomach. Cardiac monitor leads overlie the chest.    ______________      Impression IMPRESSION:    Stable appearance of the chest with infiltrative changes at the left lung base  and upper right chest       Results from Hospital Encounter encounter on 10/23/20   CTA CHEST W OR W WO CONT    Narrative EXAM: CTA Chest    INDICATION: 63year-old patient presently admitted with pneumonia, worsening of  hypoxia. Evaluation for pulmonary embolism. COMPARISON: CT chest 9/7/2012; prior chest radiographs, as recently 10/26/2020. TECHNIQUE: Axial CT imaging from the thoracic inlet through the diaphragm with  intravenous contrast utilizing CTA study for pulmonary artery evaluation. Coronal and sagittal MIP reformations were generated at a separate workstation. One or more dose reduction techniques were used on this CT: automated exposure  control, adjustment of the mAs and/or kVp according to patient size, and  iterative reconstruction techniques. The specific techniques used on this CT  exam have been documented in the patient's electronic medical record.   Digital  Imaging and Communications in Medicine (DICOM) format image data are available  to nonaffiliated external healthcare facilities or entities on a secure, media  free, reciprocally searchable basis with patient authorization for at least a  12-month period after this study. _______________    FINDINGS:    EXAM QUALITY: Overall exam quality is adequate. Pulmonary arterial enhancement  is adequate. The breath hold is satisfactory. PULMONARY ARTERIES: No convincing evidence of pulmonary embolism. MEDIASTINUM: Included gland is unremarkable. Cardiac size is normal. There is no  pericardial effusion. Multivessel coronary arterial atherosclerotic vascular  calcification is present. Extensive atherosclerotic vascular calcification of  the thoracic aorta noted without evidence of aneurysmal dilatation. LYMPH NODES: No enlarged mediastinal or hilar nodes by size criteria. AIRWAY: Diffuse bronchial wall thickening with endobronchial debris present  within the bronchus intermedius and right lower lobe segmental bronchi. Additional left lower lobe and segmental bronchial impaction. LUNGS: Focal area of low attenuating consolidation within the medial portion  left lower lobe with adjacent areas of atelectasis. Patchy peribronchial  consolidation within the posterior aspect right upper lobe. Moderately severe  upper lobe predominant centrilobular emphysema. No significant groundglass  abnormality or abnormal septal line thickening. PLEURA: Trace right and small left pleural effusions. No evidence of  pneumothorax. UPPER ABDOMEN: Marked atherosclerotic vascular calcifications present throughout  the suprarenal and infrarenal abdominal aorta. Likely layering biliary sludge  within the gallbladder. OTHER: No acute or aggressive osseous abnormalities identified. Multilevel  degenerative wedging of the thoracic spine noted. Prominent Schmorl's node  superior endplate U51.    _______________      Impression IMPRESSION:    1. No evidence of pulmonary embolism.     2. Relatively diffuse bronchial wall thickening and several areas of  endobronchial impaction/occlusion. 3. Left lower lobe pneumonia with additional area of peribronchial alveolar  consolidation posterior right upper lobe.     > Recommend radiographic follow-up to document expected clinical resolution  following appropriate treatment in 4-6 weeks. 4. Moderately severe upper lobe predominant centrilobular emphysema. 5. Small left and trace right pleural effusions. 6. Extensive atherosclerotic vascular disease both above and below the  diaphragm.            Mireya Amaya MD  11/3/2020

## 2020-11-03 NOTE — PROGRESS NOTES
Assumed care of patient, shift change received by Yanna Welch RN. Report included the following information  SBAR, Intake/Output, MAR, Recent Results and Cardiac Rhythm.

## 2020-11-03 NOTE — ROUTINE PROCESS
Bedside and Verbal shift change report given to Steven Bradford RN (oncoming nurse) by Perico Nicole RN (offgoing nurse). Report included the following information SBAR and Kardex.

## 2020-11-03 NOTE — PROGRESS NOTES
Chart reviewed. Pt admitted to tele. Noted pt transferred from ICU. Pt disoriented. Palliative following. Local LTC and LTACH referrals have been sent for continuity of care.  CM will cont to follow [phone]

## 2020-11-03 NOTE — PROGRESS NOTES
Hospitalist Progress Note-critical care note     Patient: Clark Diaz Sr. MRN: [id number]  CSN: [id number]    YOB: 1955  Age: 63 y.o.   Sex: male    DOA: 10/23/2020 LOS:  LOS: 11 days            Chief complaint: Pneumonia, marijuana abuse, legal blindness, amputation bilateral lower extremity, encephalopathy, acute respiratory failure with hypoxia    Assessment/Plan         Hospital Problems  Date Reviewed: 10/23/2020          Codes Class Noted POA    Dysphagia ICD-10-CM: R13.10  ICD-9-CM: 787.20  11/3/2020 Unknown        Aspiration pneumonia ([facility] ICD-10-CM: J69.0  ICD-9-CM: 507.0  10/28/2020 Unknown        Deep vein thrombosis (DVT) of lower extremity ([facility] ICD-10-CM: I82.409  ICD-9-CM: 453.40  10/28/2020 Unknown        Acute respiratory failure with hypoxia (HCC) ICD-10-CM: J96.01  ICD-9-CM: 518.81  10/26/2020 Unknown        Chronic obstructive pulmonary disease with acute exacerbation ([facility] ICD-10-CM: J44.1  ICD-9-CM: 491.21  10/24/2020 Unknown        Severe protein-calorie malnutrition ([facility] ICD-10-CM: E43  ICD-9-CM: 262  10/24/2020 Unknown        Metabolic encephalopathy EMJ-44-DN: G93.41  ICD-9-CM: 348.31  10/24/2020 Unknown        * (Principal) Septic shock ([facility] ICD-10-CM: A41.9, R65.21  ICD-9-CM: 038.9, 785.52, 995.92  10/23/2020 Unknown        Amputation of both lower extremities ([facility] ICD-10-CM: X38.683D, X51.872I  ICD-9-CM: 897.6  Unknown Yes        PNA (pneumonia) ICD-10-CM: J18.9  ICD-9-CM: 486  Unknown Yes        Marijuana abuse ICD-10-CM: F12.10  ICD-9-CM: 305.20  Unknown Yes        Centrilobular emphysema ([facility] ICD-10-CM: J43.2  ICD-9-CM: 492.8  Unknown Unknown        Legal blindness ICD-10-CM: H54.8  ICD-9-CM: 369.4  10/14/2020 Yes        Hypotension, unspecified ICD-10-CM: I95.9  ICD-9-CM: 458.9  1/27/2014 Yes                A 80-year-old male with a history of a COPD, bilateral above knee amputation, recently discharged from [facility] after treated for pneumonia who was admitted for septic shock and metabolic encephalopathy.       Acute respiratory failure with hypoxia   Resolving   continue aspiration precaution,   Complete iv abx for aspiration pna       COPD with right upper lobe pneumonia questionable mass/emphysema /aspiration pna  Aspiration precaution    Need repeat ct in4-6 wks as out -pt        Cardiovascular septic shock: resolved.       Prolong Qt   Dr. Allegra Hussein on board-continue medical treatment     HTN: on  Norvasc and metoprolol.       Chronic dvt noted from pvl   On lovenox     Hypokalemia and hypomagnsemia  K and mg replacement         Acute metabolic encephalopathy   Agitation-improving  -on Seroquel     Legal blindness :fall /aspiration precaution      marijuana use     Severe malnutrition    Bilateral AKA    Subjective: no chest pain , I want to eat         Need family meeting again for care goal-palliative care is working on it, he is not a candidate for peg tube. He pulled out ng tube     Will replace K per iv   Disposition :tbd,   Review of systems:  General: no fever/chils   Lung : no sob  Heart : no chest pain   Gi: no abdomen pain, no n/v     Vital signs/Intake and Output:  Visit Vitals  /76   Pulse 85   Temp 98.5 °F (36.9 °C)   Resp 16   Ht 5' 4\" (1.626 m)   Wt 39.5 kg (87 lb 1.3 oz)   SpO2 92%   BMI 14.95 kg/m²     Current Shift:  11/03 0701 - 11/03 1900  In: 736.6 [P.O.:60; I.V.:676.6]  Out: -   Last three shifts:  11/01 1901 - 11/03 0700  In: 2430 [I.V.:2060]  Out: 300 [Urine:300]    Physical Exam:  General: WD, WN. Alert, , no acute distress    HEENT: NC, Atraumatic. PERRLA, anicteric sclerae. Lungs: Coarse BS   Heart:  Regular  rhythm,  No murmur, No Rubs, No Gallops  Abdomen: Soft, Non distended, Non tender.   +Bowel sounds,   Extremities: No c/c, aka  Psych:   Calm,no agitation   Neurologic:   Alert and oriented, no acute neuro deficit           Labs: Results:       Chemistry Recent Labs     11/03/20  0020 11/02/20  0540 11/01/20  1622 11/01/20  3454 GLU 99 76  --  90    141  --  144   K 3.0* 3.7 3.8 3.4*    111  --  114*   CO2 24 23  --  23   BUN 7 9  --  15   CREA 0.53* 0.53*  --  0.56*   CA 8.4* 8.7  --  8.8   AGAP 7 7  --  7   BUCR 13 17  --  27*   AP 86 87  --  83   TP 5.9* 5.9*  --  6.1*   ALB 2.7* 2.7*  --  2.8*   GLOB 3.2 3.2  --  3.3   AGRAT 0.8 0.8  --  0.8      CBC w/Diff Recent Labs     11/03/20  0020 11/02/20  0540 11/01/20  0415   WBC 6.3 9.2 7.8   RBC 3.72* 3.99* 4.00*   HGB 10.6* 11.4* 11.3*   HCT 32.5* 35.0* 34.7*    268 298   GRANS 82* 82* 83*   LYMPH 8* 8* 7*   EOS 2 2 2      Cardiac Enzymes No results for input(s): CPK, CKND1, CHUY in the last 72 hours. No lab exists for component: CKRMB, TROIP   Coagulation No results for input(s): PTP, INR, APTT, INREXT, INREXT in the last 72 hours. Lipid Panel No results found for: CHOL, CHOLPOCT, CHOLX, CHLST, CHOLV, 095629, HDL, HDLP, LDL, LDLC, DLDLP, 268046, VLDLC, VLDL, TGLX, TRIGL, TRIGP, TGLPOCT, CHHD, CHHDX   BNP No results for input(s): BNPP in the last 72 hours. Liver Enzymes Recent Labs     11/03/20  0020   TP 5.9*   ALB 2.7*   AP 86      Thyroid Studies Lab Results   Component Value Date/Time    TSH 0.78 10/24/2020 11:30 AM        Procedures/imaging: see electronic medical records for all procedures/Xrays and details which were not copied into this note but were reviewed prior to creation of Plan    Ct Head Wo Cont    Result Date: 10/24/2020  EXAM: CT head INDICATION: Altered mental status. COMPARISON: October 15, 2020. TECHNIQUE: Axial CT imaging of the head was performed without intravenous contrast. Dose reduction techniques used: automated exposure control, adjustment of the mAs and/or kVp according to patient size, and iterative reconstruction techniques.  Digital imaging and communications in Medicine (DICOM) format image data are available to nonaffiliated external healthcare facilities or entities on a secure, media free, reciprocally searchable basis with patient authorization for at least 12 months after this study. _______________ FINDINGS: BRAIN AND POSTERIOR FOSSA: There is cerebral volume loss with prominence of the lateral and the third ventricles. The cortical sulci are widened appropriately. The fourth ventricle and basal cisterns are normally outlined. There is mild bilateral periventricular and central white matter diminished attenuation. There is no acute territorial defect, hemorrhage or midline shift. EXTRA-AXIAL SPACES AND MENINGES: There are no abnormal extra-axial fluid collections. CALVARIUM: Intact. SINUSES: Clear. OTHER: Partial right mastoid opacification is again seen. _______________     IMPRESSION: Cerebral volume loss and mild bilateral periventricular and central white matter diminished attenuation which is nonspecific but likely to represent microvascular disease. There is no acute intracranial abnormality. No significant interval change. Ct Head Wo Cont    Result Date: 10/15/2020  EXAM: CT head INDICATION: Dental status change COMPARISON: None. TECHNIQUE: Axial CT imaging of the head was performed without intravenous contrast. One or more dose reduction techniques were used on this CT: automated exposure control, adjustment of the mAs and/or kVp according to patient size, and iterative reconstruction techniques. The specific techniques used on this CT exam have been documented in the patient's electronic medical record. Digital Imaging and Communications in Medicine (DICOM) format image data are available to nonaffiliated external healthcare facilities or entities on a secure, media free, reciprocally searchable basis with patient authorization for at least a 12 month period after this study. _______________ FINDINGS: BRAIN AND POSTERIOR FOSSA: The sulci, folia, ventricles and basal cisterns are within normal limits for the patient's with age concordant atrophy There is no intracranial hemorrhage, mass effect, or midline shift.  Mild periventricular low-attenuation. Although nonspecific this is frequently seen with chronic small vessel ischemic change. Otherwise, there are no areas of abnormal parenchymal attenuation. EXTRA-AXIAL SPACES AND MENINGES: There are no abnormal extra-axial fluid collections. CALVARIUM: Intact. SINUSES: Clear. OTHER: None. _______________     IMPRESSION: No acute intracranial abnormalities. Xr Chest Port    Result Date: 10/26/2020  EXAM: CHEST RADIOGRAPH CLINICAL INDICATION/HISTORY: Pneumonia   > Additional: None COMPARISON: 10/23/2020 TECHNIQUE: Portable frontal view of the chest _______________ FINDINGS: SUPPORT DEVICES: None. HEART AND MEDIASTINUM: Heart size is stable. Atherosclerotic calcification seen in the aorta. LUNGS AND PLEURAL SPACES: Increased hazy density at the left base. Slight blunting of the right costophrenic angle. No pneumothorax. Patchy right upper lobe opacity is similar to slightly improved. BONES AND SOFT TISSUES: Unremarkable. _______________     IMPRESSION: 1. Increased hazy opacity at the left base compared to the prior exam, possibly developing left lower lobe atelectasis and/or consolidation. Questionable small right effusion. 2. Patchy right upper lobe patchy opacity which is similar to slightly improved. Xr Chest Port    Result Date: 10/24/2020  EXAM: XR CHEST PORT. CLINICAL INDICATION/HISTORY: meets SIRS criteria. -Additional: None. TECHNIQUE: A portable erect AP radiographic view of the chest is compared with several other chest radiographs performed the same month. _______________ FINDINGS: See impression. No pneumothorax or appreciable significant pleural effusion. The cardiac silhouette, vanessa, and mediastinal contours are normal for age. No acute osseous abnormality is revealed. _______________     IMPRESSION: Slight improvement in multifocal airspace disease most in keeping with pneumonia, again most pronounced at the right lung apex with no new or worsening findings. Recommend continued radiographic follow-up to resolution. _______________     Jt Kins Chest Port    Result Date: 10/18/2020  EXAM: CHEST RADIOGRAPH CLINICAL INDICATION/HISTORY: SBO   > Additional: None COMPARISON: 10/17/2020. TECHNIQUE: Portable frontal view of the chest _______________ FINDINGS: SUPPORT DEVICES: None. HEART AND MEDIASTINUM: No appreciable cardiomegaly. Remaining mediastinal contours within normal limits. LUNGS AND PLEURAL SPACES: No significant change of bilateral upper lobe parenchymal opacities. Hyperexpansion of the lungs. No evidence for pneumothorax. BONY THORAX AND SOFT TISSUES: Unremarkable. _______________     IMPRESSION: 1. No significant change of bilateral lung pneumonia. Follow-up to resolution is recommended. 2. Emphysema. Xr Chest Port    Result Date: 10/18/2020  EXAM: CHEST RADIOGRAPH CLINICAL INDICATION/HISTORY: shortness of breath   > Additional: None COMPARISON: October 14, 2020. TECHNIQUE: Portable frontal view of the chest _______________ FINDINGS: SUPPORT DEVICES: None. HEART AND MEDIASTINUM: No appreciable cardiomegaly. Remaining mediastinal contours within normal limits. LUNGS AND PLEURAL SPACES: No significant change of bilateral upper lobe parenchymal opacities. Hyperexpansion of the lungs. No evidence for pneumothorax or pleural effusion. BONY THORAX AND SOFT TISSUES: Unremarkable. _______________     IMPRESSION: 1. No significant change of bilateral lung pneumonia. Follow-up to resolution is recommended to exclude underlying mass. 2. Emphysema. Xr Chest Port    Result Date: 10/14/2020  EXAM: XR CHEST PORT CLINICAL INDICATION/HISTORY: Shortness of breath with cough -Additional: None COMPARISON: Several prior studies, most recently 2/19/2019 TECHNIQUE: Frontal view of the chest _______________ FINDINGS: HEART AND MEDIASTINUM: Normal cardiac size and mediastinal contours.  LUNGS AND PLEURAL SPACES: Patchy multifocal opacities noted throughout bilateral upper lobes, right greater than left as well as throughout the periphery of the right lower lobe. No evidence of pneumothorax. BONY THORAX AND SOFT TISSUES: No acute osseous abnormality _______________     IMPRESSION: Patchy multifocal airspace disease compatible with pneumonia. Recommend radiographic follow-up to document expected clinical resolution in 4-6 weeks' time.        Elke Crews MD

## 2020-11-03 NOTE — PROGRESS NOTES
Problem: Falls - Risk of  Goal: *Absence of Falls  Description: Document Billy Click Fall Risk and appropriate interventions in the flowsheet.   Outcome: Progressing Towards Goal  Note: Fall Risk Interventions:  Mobility Interventions: Communicate number of staff needed for ambulation/transfer    Mentation Interventions: Adequate sleep, hydration, pain control    Medication Interventions: Bed/chair exit alarm    Elimination Interventions: Call light in reach

## 2020-11-04 LAB
ALBUMIN SERPL-MCNC: 2.8 G/DL (ref 3.4–5)
ALBUMIN/GLOB SERPL: 0.8 {RATIO} (ref 0.8–1.7)
ALP SERPL-CCNC: 95 U/L (ref 45–117)
ALT SERPL-CCNC: 15 U/L (ref 16–61)
ANION GAP SERPL CALC-SCNC: 9 MMOL/L (ref 3–18)
AST SERPL-CCNC: 17 U/L (ref 10–38)
BASOPHILS # BLD: 0 K/UL (ref 0–0.1)
BASOPHILS NFR BLD: 0 % (ref 0–2)
BILIRUB SERPL-MCNC: 0.3 MG/DL (ref 0.2–1)
BUN SERPL-MCNC: 5 MG/DL (ref 7–18)
BUN/CREAT SERPL: 9 (ref 12–20)
CALCIUM SERPL-MCNC: 8.3 MG/DL (ref 8.5–10.1)
CHLORIDE SERPL-SCNC: 106 MMOL/L (ref 100–111)
CO2 SERPL-SCNC: 23 MMOL/L (ref 21–32)
CREAT SERPL-MCNC: 0.54 MG/DL (ref 0.6–1.3)
DIFFERENTIAL METHOD BLD: ABNORMAL
EOSINOPHIL # BLD: 0 K/UL (ref 0–0.4)
EOSINOPHIL NFR BLD: 0 % (ref 0–5)
ERYTHROCYTE [DISTWIDTH] IN BLOOD BY AUTOMATED COUNT: 15.4 % (ref 11.6–14.5)
GLOBULIN SER CALC-MCNC: 3.5 G/DL (ref 2–4)
GLUCOSE BLD STRIP.AUTO-MCNC: 78 MG/DL (ref 70–110)
GLUCOSE BLD STRIP.AUTO-MCNC: 85 MG/DL (ref 70–110)
GLUCOSE BLD STRIP.AUTO-MCNC: 85 MG/DL (ref 70–110)
GLUCOSE BLD STRIP.AUTO-MCNC: 93 MG/DL (ref 70–110)
GLUCOSE BLD STRIP.AUTO-MCNC: 95 MG/DL (ref 70–110)
GLUCOSE BLD STRIP.AUTO-MCNC: 97 MG/DL (ref 70–110)
GLUCOSE SERPL-MCNC: 96 MG/DL (ref 74–99)
HCT VFR BLD AUTO: 31.7 % (ref 36–48)
HGB BLD-MCNC: 10.5 G/DL (ref 13–16)
LYMPHOCYTES # BLD: 0.6 K/UL (ref 0.9–3.6)
LYMPHOCYTES NFR BLD: 6 % (ref 21–52)
MAGNESIUM SERPL-MCNC: 2.5 MG/DL (ref 1.6–2.6)
MCH RBC QN AUTO: 28.4 PG (ref 24–34)
MCHC RBC AUTO-ENTMCNC: 33.1 G/DL (ref 31–37)
MCV RBC AUTO: 85.7 FL (ref 74–97)
MONOCYTES # BLD: 0.5 K/UL (ref 0.05–1.2)
MONOCYTES NFR BLD: 5 % (ref 3–10)
NEUTS SEG # BLD: 8.7 K/UL (ref 1.8–8)
NEUTS SEG NFR BLD: 89 % (ref 40–73)
PLATELET # BLD AUTO: 282 K/UL (ref 135–420)
PMV BLD AUTO: 10.3 FL (ref 9.2–11.8)
POTASSIUM SERPL-SCNC: 3 MMOL/L (ref 3.5–5.5)
PROT SERPL-MCNC: 6.3 G/DL (ref 6.4–8.2)
RBC # BLD AUTO: 3.7 M/UL (ref 4.7–5.5)
SODIUM SERPL-SCNC: 138 MMOL/L (ref 136–145)
WBC # BLD AUTO: 9.8 K/UL (ref 4.6–13.2)

## 2020-11-04 PROCEDURE — 74011250636 HC RX REV CODE- 250/636: Performed by: HOSPITALIST

## 2020-11-04 PROCEDURE — 65660000000 HC RM CCU STEPDOWN

## 2020-11-04 PROCEDURE — 82962 GLUCOSE BLOOD TEST: CPT

## 2020-11-04 PROCEDURE — 74011000258 HC RX REV CODE- 258: Performed by: FAMILY MEDICINE

## 2020-11-04 PROCEDURE — C9113 INJ PANTOPRAZOLE SODIUM, VIA: HCPCS | Performed by: INTERNAL MEDICINE

## 2020-11-04 PROCEDURE — 74011000250 HC RX REV CODE- 250: Performed by: INTERNAL MEDICINE

## 2020-11-04 PROCEDURE — 36415 COLL VENOUS BLD VENIPUNCTURE: CPT

## 2020-11-04 PROCEDURE — 94640 AIRWAY INHALATION TREATMENT: CPT

## 2020-11-04 PROCEDURE — 74011250637 HC RX REV CODE- 250/637: Performed by: FAMILY MEDICINE

## 2020-11-04 PROCEDURE — 74011250636 HC RX REV CODE- 250/636: Performed by: INTERNAL MEDICINE

## 2020-11-04 PROCEDURE — 99233 SBSQ HOSP IP/OBS HIGH 50: CPT | Performed by: NURSE PRACTITIONER

## 2020-11-04 PROCEDURE — 74011250637 HC RX REV CODE- 250/637: Performed by: INTERNAL MEDICINE

## 2020-11-04 PROCEDURE — 74011250636 HC RX REV CODE- 250/636: Performed by: FAMILY MEDICINE

## 2020-11-04 PROCEDURE — 85025 COMPLETE CBC W/AUTO DIFF WBC: CPT

## 2020-11-04 PROCEDURE — 74011250637 HC RX REV CODE- 250/637: Performed by: HOSPITALIST

## 2020-11-04 PROCEDURE — 80053 COMPREHEN METABOLIC PANEL: CPT

## 2020-11-04 PROCEDURE — 83735 ASSAY OF MAGNESIUM: CPT

## 2020-11-04 RX ORDER — POTASSIUM CHLORIDE 7.45 MG/ML
10 INJECTION INTRAVENOUS
Status: COMPLETED | OUTPATIENT
Start: 2020-11-04 | End: 2020-11-04

## 2020-11-04 RX ORDER — SODIUM CHLORIDE 9 MG/ML
500 INJECTION, SOLUTION INTRAVENOUS ONCE
Status: COMPLETED | OUTPATIENT
Start: 2020-11-04 | End: 2020-11-04

## 2020-11-04 RX ADMIN — ENOXAPARIN SODIUM 40 MG: 40 INJECTION SUBCUTANEOUS at 12:21

## 2020-11-04 RX ADMIN — POTASSIUM CHLORIDE 10 MEQ: 7.46 INJECTION, SOLUTION INTRAVENOUS at 17:40

## 2020-11-04 RX ADMIN — POTASSIUM CHLORIDE 10 MEQ: 7.46 INJECTION, SOLUTION INTRAVENOUS at 19:35

## 2020-11-04 RX ADMIN — IPRATROPIUM BROMIDE 0.5 MG: 0.5 SOLUTION RESPIRATORY (INHALATION) at 20:35

## 2020-11-04 RX ADMIN — ENOXAPARIN SODIUM 40 MG: 40 INJECTION SUBCUTANEOUS at 20:50

## 2020-11-04 RX ADMIN — DEXTROSE MONOHYDRATE AND SODIUM CHLORIDE 100 ML/HR: 5; .9 INJECTION, SOLUTION INTRAVENOUS at 01:01

## 2020-11-04 RX ADMIN — GABAPENTIN 100 MG: 100 CAPSULE ORAL at 20:52

## 2020-11-04 RX ADMIN — SODIUM CHLORIDE 10 ML: 9 INJECTION, SOLUTION INTRAMUSCULAR; INTRAVENOUS; SUBCUTANEOUS at 06:24

## 2020-11-04 RX ADMIN — SODIUM CHLORIDE 500 ML: 900 INJECTION, SOLUTION INTRAVENOUS at 15:40

## 2020-11-04 RX ADMIN — SODIUM CHLORIDE 10 ML: 9 INJECTION, SOLUTION INTRAMUSCULAR; INTRAVENOUS; SUBCUTANEOUS at 15:49

## 2020-11-04 RX ADMIN — POTASSIUM CHLORIDE 10 MEQ: 7.46 INJECTION, SOLUTION INTRAVENOUS at 15:45

## 2020-11-04 RX ADMIN — QUETIAPINE FUMARATE 100 MG: 100 TABLET ORAL at 20:52

## 2020-11-04 RX ADMIN — ATORVASTATIN CALCIUM 20 MG: 20 TABLET, FILM COATED ORAL at 20:52

## 2020-11-04 RX ADMIN — QUETIAPINE FUMARATE 100 MG: 100 TABLET ORAL at 12:20

## 2020-11-04 RX ADMIN — LORAZEPAM 1 MG: 2 INJECTION INTRAMUSCULAR at 01:14

## 2020-11-04 RX ADMIN — CLONAZEPAM 0.5 MG: 0.5 TABLET ORAL at 12:23

## 2020-11-04 RX ADMIN — METOPROLOL TARTRATE 25 MG: 25 TABLET, FILM COATED ORAL at 22:46

## 2020-11-04 RX ADMIN — DEXTROSE MONOHYDRATE AND SODIUM CHLORIDE 100 ML/HR: 5; .9 INJECTION, SOLUTION INTRAVENOUS at 18:29

## 2020-11-04 RX ADMIN — IPRATROPIUM BROMIDE 0.5 MG: 0.5 SOLUTION RESPIRATORY (INHALATION) at 15:38

## 2020-11-04 RX ADMIN — METOPROLOL TARTRATE 25 MG: 25 TABLET, FILM COATED ORAL at 12:20

## 2020-11-04 RX ADMIN — IPRATROPIUM BROMIDE 0.5 MG: 0.5 SOLUTION RESPIRATORY (INHALATION) at 11:59

## 2020-11-04 RX ADMIN — SODIUM CHLORIDE 40 MG: 9 INJECTION INTRAMUSCULAR; INTRAVENOUS; SUBCUTANEOUS at 12:21

## 2020-11-04 RX ADMIN — POTASSIUM CHLORIDE 10 MEQ: 7.46 INJECTION, SOLUTION INTRAVENOUS at 16:42

## 2020-11-04 RX ADMIN — POTASSIUM CHLORIDE 10 MEQ: 7.46 INJECTION, SOLUTION INTRAVENOUS at 18:28

## 2020-11-04 RX ADMIN — BUDESONIDE 500 MCG: 0.25 INHALANT RESPIRATORY (INHALATION) at 20:35

## 2020-11-04 RX ADMIN — GABAPENTIN 100 MG: 100 CAPSULE ORAL at 12:20

## 2020-11-04 RX ADMIN — HALOPERIDOL LACTATE 5 MG: 5 INJECTION, SOLUTION INTRAMUSCULAR at 03:50

## 2020-11-04 RX ADMIN — AMLODIPINE BESYLATE 10 MG: 5 TABLET ORAL at 12:20

## 2020-11-04 RX ADMIN — BUDESONIDE 500 MCG: 0.25 INHALANT RESPIRATORY (INHALATION) at 11:59

## 2020-11-04 NOTE — ROUTINE PROCESS
Bedside and Verbal shift change report given to Erasmo Rosenthal RN (oncoming nurse) by Amy Ch RN (offgoing nurse). Report included the following information SBAR and Kardex.

## 2020-11-04 NOTE — PROGRESS NOTES
[address] [facility] [phone]  [facility] [phone]    Palliative team, NP Marty Machado and this MSW, saw Mr. Ryan Cee at bedside. He was confused to situation, time and place. He verbalized feeling scared and alone. He begged us not to leave him alone and to take with him with us. We provided emotional support and reassurance to him. Patient has been NPO due to failed MBSS. Need family decisions on peg tube vs comfort measures. Patient never executed an [employer] Directive to appoint an individual as his healthcare agent. Medical decision making is the majority of the three children; Gurpreet Bhatt and Savanna Coreas. Monday 11/02 called and spoke with son Patricia Lloyd at [phone] he stated his decision was for Peg tube. Informed him will need decision from his other two siblings. Today 11/04 called and spoke with son Mary Perez at [phone]. Daughter Mireya Odonnell was in background listening in on conversation. Explained concerns with father aspirating on all food and liquid textures and informed of failed MBSS. Explained a decision needs to be made by children because patient isn't able to understand the complexity of the situation to make a good informed decision. Discussed in detail the risks and burdens of peg tubes and due to patient's confusion he may pull out peg before it had sufficient time to heal. Discussed alternative option of comfort feeds with comfort measures and hospice services. Informed comfort measures plan would be accepting the risks of aspiration but providing a good quality of life at end of life. Patient has been asking to drink coffee. Explained needing majority of the children to be in agreement on medical decisions. Informed them of Patricia Lloyd decision for peg tube. Mary Born and Mireya Odonnell had no questions on the information provided.  Did offer family meeting and for them to visit with patient, but they had no comment on the two offers. Abner Merino and Sage Rivas would like time to discuss among themselves and will reach out to palliative team tomorrow afternoon. Palliative will follow up with Abner Merino and Sage Rivas tomorrow 11/05 afternoon for decision on peg tube vs comfort measures. At this time no change in goals of care. Patient remains FULL code and FULL aggressive care. Informed and discussed with Dr. Shari Ramirez and LANETTE Newton. Thank you for the opportunity to assist in the care of Mr. Paola Thomas.     SIMI Nguyen, Rome Memorial HospitalSW-C  Palliative Medicine

## 2020-11-04 NOTE — PROGRESS NOTES
Chart reviewed. Pt admitted to tele. Pt is total care, not appropriate for SNF/rehab. Pt noted to live with son, Noelle Hong per chart review. VM left for pts son on his cell phone, attempted to call home and unable to leave [facility]. Local LTC referrals sent for continuity of care, pt has been accepted @ [facility] per Niko Moulton - will need to discuss with family. Palliative following for care decisions. CM will cont to follow for transition of care needs [phone]  Spoke with palliative, awaiting family decisions with regards to feeding tube v comfort    [phone]  Attempted again to call pts son Noelle Hong, no answer. Spoke with pts son Salomón Apple regarding dc plan on phone. He states pt lives with Noelle Hong and has personal care. Pt has hospital bed and wheelchair. He acknowledges family meeting to discuss feeding tube. Informed him recommendation for LTC. Informed him CM can find LTC bed and he agrees that will likely be best dc plan, he will discuss with siblings and CM will touch base tomorrow.     CM did speak with [facility] and they can assist with LTC bed Fri or Monday    [phone]  Spoke with [address] with [facility] who has been working with pt/family - updated on plan

## 2020-11-04 NOTE — PROGRESS NOTES
Problem: Falls - Risk of  Goal: *Absence of Falls  Description: Document Sander Hoang Fall Risk and appropriate interventions in the flowsheet.   Outcome: Progressing Towards Goal  Note: Fall Risk Interventions:  Mobility Interventions: Communicate number of staff needed for ambulation/transfer, Strengthening exercises (ROM-active/passive), PT Consult for assist device competence, Patient to call before getting OOB, Assess mobility with egress test    Mentation Interventions: Adequate sleep, hydration, pain control, Door open when patient unattended, Evaluate medications/consider consulting pharmacy, Increase mobility, More frequent rounding, Reorient patient, Room close to nurse's station, Update white board, Toileting rounds    Medication Interventions: Evaluate medications/consider consulting pharmacy, Patient to call before getting OOB, Teach patient to arise slowly, Bed/chair exit alarm    Elimination Interventions: Bed/chair exit alarm, Patient to call for help with toileting needs, Toileting schedule/hourly rounds              Problem: Non-Violent Restraints  Goal: *Removal from restraints as soon as assessed to be safe  Outcome: Progressing Towards Goal  Goal: *No harm/injury to patient while restraints in use  Outcome: Progressing Towards Goal  Goal: *Patient's dignity will be maintained  Outcome: Progressing Towards Goal  Goal: Non-violent Restaints:Standard Interventions  Outcome: Progressing Towards Goal  Goal: Non-violent Restraints:Patient Interventions  Outcome: Progressing Towards Goal  Goal: Patient/Family Education  Outcome: Progressing Towards Goal

## 2020-11-04 NOTE — ROUTINE PROCESS
Bedside and Verbal shift change report given to Perico Nicole RN (oncoming nurse) by Panda Cottrell RN (offgoing nurse). Report included the following information SBAR and Kardex.

## 2020-11-04 NOTE — PROGRESS NOTES
[facility]: [phone]  MABEL DESAI [facility]: [phone]   [facility]: [phone]    Patient Name: Estelita Brito. YOB: 1955    Date of Initial Consult: 10/27/2020, follow up 11/4//2020   Reason for Consult: care decisions  Requesting Provider: Raiza Day MD  Primary Care Physician: Abhay Grimaldo MD      SUMMARY:   Estelita Pierce is a 63 y.o. male with a past history of legally blind, bilateral AKA, emphysema, HTN, chronic pain, HLD, T2DM, polio, CAD, PVD who was admitted on 10/23/2020 from home with a diagnosis of pneumonia/sepsis. Current medical issues leading to Palliative Medicine involvement include: recurrent admissions with multiple co-morbidities and goals of care. 10/28/2020 Mr Tanner Medel is alert, oriented to place and why he is in the hospital. Denies pain. He is softly restrained for his safety. Just says \" cut me loose, give a knife\" ( referring to his restraints). 10/30/2020: Palliative care team including Garrie Primrose, MSW and this NP met with patient in his ICU room. He is quietly laying in bed. When asked how he was his speech was mumbled. Was finally able to understand his c/o being cold. 11/4/2020: Palliative care team including Garrie Primrose, MSW and this NP met with patient at bedside. He is confused; unaware of time of day (likely related to blindness) and where he is. Asked to be taken to [facility]. PALLIATIVE DIAGNOSES:   1. Advanced care decisions  2. HCAP/sepsis  3. Encephalopathy  4. COPD  5. Bilateral AKA     PLAN:     11/4/2020: Upon entering patient's room he stated he wasn't feeling well. He complained of back pain, nausea and shortness of breath. He also asked for food and coffee during our visit. Explained NPO status to him secondary to aspiration and needing to find a way to feed him possibly by placing a tube in his stomach.  Also explained accepting that time may be short and risk of eating for comfort is an option for him. Further discussed need to discuss these matters with his children so everyone is on the same page. Patient did express a fear of dying with frequent requests \"don't leave me\". Patient also confused by the presence of soft wrist restraints. Much emotional support offered. When describing comfort medications, patient was anxious and requesting ativan. Instructed him I cannot use comfort measures until family agrees. I do not think patient has ability to appreciate how sick he is and would agree to medications without understanding hospice/comfort plan. Telephone call placed to patient's son, Waleska Eugene. His sister was listening in on the phone call. Updated Marcie Caldwell and Alex Greer regarding patient's MBS results and recommendations for PEG vs comfort feeding. Discussed risks and benefits of tube-feeding including aspiration and self-discontinuation. They are talking it over and to get back with us with a decision. Family may benefit from discussion with MD. Continue current level of care. Will further clarify code status during next conversation with family. GOALS OF CARE: FULL CODE with FULL INTERVENTIONS.      (please see below for previous conversations)     10/30/2020 ADDENDUM: 1400: Reese Shirley presented to the ICU to visit with his father. Instructed him on COPD and it being a progressive disease especially without smoking cessation. He admits to his father continuing to smoke 2 ppd. Then discussed the superimposing of an aspiration pneumonia on weakened lungs. Reese Shirley was instructed on weakening of swallow and aspiration as he was also fixated on getting his dad to eat and drink. Explained that he needs further swallow evaluation when stable to determine safe diet. Explained that patient is now on large amount of HFNC and next step if he continues to deteriorate would be oral intubation.  Explained fears of patient's inability to be weaned from vent and needing trach/PEG and discharge to a facility. Carlos Sutton wanted to bring him home on vent if necessary. I explained care for trach with vent is not available in the home. He is aware of what a trach entails as a family friend has a trach for throat cancer. Discussed intubation with patient and he indicated that he wanted to life support on machines if necessary which reinforced his son's determination for aggressive treatment. Discussed risks and benefits of CPR in the event of cardiopulmonary arrest and Carlos Sutton was firm that he would want CPR in attempt to prolong patient's life. Lawrence's girlfriend, Lalo Hernandez, present for part of conversation and feels that Carlos Sutton needs time to process what his father looks like and what he has heard before making decisions. Attempted to get Carlos Sutton to think from his dad's standpoint and understand he is suffering; he is sure his dad has pulled through before and can do it again. 1531 Received telephone call from Chayito Lugo and [facility]. Was informed by them that Carlos Sutton had texted their sister, Brianda Hui, and told her that Emily Boss is in the hospital. Melanie Sanchez and Mariam Banks (the estranged children) have not been kept in the loop secondary to Carlos Sutton non-cooperation. Meri Keyes with medical information. He stated that a physician at [facility] in 4/[place] had talked with the family regarding code status after patient had been admitted in cardiac arrest. Patient was discharged after that hospitalization to LTC. However, when Carlos Sutton got out of [facility], he took his dad out the [facility]. This was against the wishes of the other children. GOALS OF CARE: FULL CODE with FULL INTERVENTIONS. GOC remain unchanged and ICU information provided to Chayito Lugo to call after he talks with his sister. Melanie Sanchez and Mariam Salvage share a home in [place] [facility]. 10/30/2020: While in the ICU, aCrlos Sutton returned telephone call. Instructed him that his father was very ill with worsening respiratory status.  Explained he may need intubation and medical concerns regarding inability to eventually be able to liberate patient from ventilator. Described to him that it is likely he would require trach, long term ventilator, PEG tube for feeding and placement in long term care. His concern at that point was to have his father brought home \"we have a nurse that comes to the house\". Explained his father is far too sick to be discharged at this time. He stated he wanted us to continue all aggressive measures. Asked Derrick Singh to come to the hospital to see his father so he could better understand the seriousness of this illness. He said he would be here later today. Derrick Singh stated he had not received any messages on his cellphone. Explained that his VM is full and he needs to delete all his old voicemails. He admitted he would need someone to show him how. 10/28/2020 Seen along with SIMI Sexton. Remains in ICU on high flow oxygen. He is alert, oriented x 3 but do not believe at this time he can participate in his medical decisions or complex decisions. There is no AMD on file. Son Derrick Sinhg is reported care giver. Very chronically ill gentleman with recent admission whose overall prognosis is poor. We have attempted x 2 today and yesterday to reach Derrick Singh to discuss his father's care and goals of care, no answer at listed home phone and cell goes to  with no ability to leave a message. We will continue to follow with you to try and reach family and assess if patient can participate can participate in his care decisions. Goals of care full code with full interventions. 10/27/2020  1. Advanced care decisions: Palliative care team including SIMI Lopez and this NP met with patient at bedside in the ICU. Patient is known to the palliative care team from his previous admission. Per notes \"patient is a  and had four children of which one is . Derrick Singh claims he is the caregiver and lives with his father. ..his half siblings, Conor Alvarez and [place], do not come around and he does not know where they are.\" Patient currently delirious and continues to yell \"wake me up\" or \"Lawrence\" over and over; although, responded yes to having pain and nausea. Attempted to contact patient's son, Nidia Marley, via his cell phone (voicemail box is full) and the home phone number (no answer after multiple rings). Need to discuss goals of care for this chronically ill gentleman. GOALS OF CARE: FULL CODE with FULL INTERVENTIONS. 2.  HCAP/sepsis: Patient recently admitted to [facility] with diagnosis of pneumonia 10/14 to 10/20/2020. He was seen at [facility] ER on 10/21 and 10/22 and 10/23/2020. He then presented here with c/o chest pain. Conflicting reports of whether he took antibiotics s/p his discharge on 10/20. Found to be hypothermic and hypotensive in ED. Primary team managing. 3.  Encephalopathy: Presented with confusion and is combative. ED provider notes that patient likely does not have capacity to make medical decisions for himself on day of admit. Also per notes son reports patient has not been acting appropriately since leaving [facility] ED on 10/23/2020.  4. COPD: CT completed at outside facility reveals question of neoplasm. Will need repeat CT. Primary team managing. 5.  Bilateral AKA: per history. 6.  Initial consult note routed to primary continuity provider  7. Communicated plan of care with: Palliative IDT    GOALS OF CARE:  Patient/Health Care Proxy Stated Goals: Prolong life      TREATMENT PREFERENCES:   Code Status: Full Code    Advance Care Planning:  Advance Care Planning 10/14/2020   Confirm Advance Directive None   Patient Would Like to Complete Advance Directive No   Does the patient have other document types Other (comment)       Medical Interventions: Full interventions         Other: As far as possible, the palliative care team has discussed with patient/health care proxy about goals of care/treatment preferences for patient.      HISTORY: History obtained from: chart    CHIEF COMPLAINT: not feeling well. HPI/SUBJECTIVE:    The patient is:   [x] Verbal and participatory  [] Non-participatory due to:   See summary    Clinical Pain Assessment (nonverbal scale for nonverbal patients): Clinical Pain Assessment  Severity: 5  Location: lower back          Duration: for how long has pt been experiencing pain (e.g., 2 days, 1 month, years)  Frequency: how often pain is an issue (e.g., several times per day, once every few days, constant)     FUNCTIONAL ASSESSMENT:     Palliative Performance Scale (PPS):  PPS: 10    ECOG  ECOG Status : Completely disabled     PSYCHOSOCIAL/SPIRITUAL SCREENING:      Any spiritual / Yazidism concerns:  [] Yes /  [x] No    Caregiver Burnout:  [] Yes /  [] No /  [x] No Caregiver Present      Anticipatory grief assessment:   [x] Normal  / [] Maladaptive        REVIEW OF SYSTEMS:     Positive and pertinent negative findings in ROS are noted above in HPI. The following systems were [x] reviewed / [] unable to be reviewed as noted in HPI  Other findings are noted below. Systems: constitutional, ears/nose/mouth/throat, respiratory, gastrointestinal, genitourinary, musculoskeletal, integumentary, neurologic, psychiatric, endocrine. Positive findings noted below. Modified ESAS Completed by: provider           Pain: 5     Nausea: 5     Dyspnea: 5           Stool Occurrence(s): 1        PHYSICAL EXAM:     Wt Readings from Last 3 Encounters:   10/30/20 39.5 kg (87 lb 1.3 oz)   10/20/20 34.7 kg (76 lb 9.6 oz)   02/18/19 45 kg (99 lb 3.3 oz)     Blood pressure (!) 166/96, pulse (!) 104, temperature 98.2 °F (36.8 °C), resp. rate 16, height 5' 4\" (1.626 m), weight 39.5 kg (87 lb 1.3 oz), SpO2 96 %.   Pain:  Pain Scale 1: FLACC  Pain Intensity 1: 0  Pain Onset 1: gradual  Pain Location 1: Other (comment)(stumps)  Pain Orientation 1: Upper  Pain Description 1: Aching  Pain Intervention(s) 1: Medication (see MAR), Repositioned Constitutional: 63year old gentleman who appears older then stated age,restless in bed, soft restraints bilateral wrist   Eyes: blind, anicteric  Respiratory: breathing not labored, occasional wet breath sounds  Musculoskeletal: Bilateral AKA   Skin: warm, dry  Neurologic: following commands, moving all extremities       HISTORY:     Principal Problem:    Septic shock ([address] (10/23/2020)    Active Problems:    Hypotension, unspecified (1/27/2014)      Amputation of both lower extremities (HCC) ()      PNA (pneumonia) ()      Marijuana abuse ()      Centrilobular emphysema (HCC) ()      Legal blindness (10/14/2020)      Chronic obstructive pulmonary disease with acute exacerbation ([address] (10/24/2020)      Severe protein-calorie malnutrition ([address] (84/25/1542)      Metabolic encephalopathy (52/77/8054)      Acute respiratory failure with hypoxia ([address] (10/26/2020)      Aspiration pneumonia ([address] (10/28/2020)      Deep vein thrombosis (DVT) of lower extremity ([address] (10/28/2020)      Dysphagia (11/3/2020)      Hypokalemia (11/3/2020)      Hypomagnesemia (11/3/2020)      Past Medical History:   Diagnosis Date    Amputation of both lower extremities ([address]     Amputee, above knee, left ([address]     At risk for falls     Chronic pain     back and legs    Diabetes ([address]     Hypercholesterolemia     Hypertension     Polio       Past Surgical History:   Procedure Laterality Date    HX HERNIA REPAIR      HX OTHER SURGICAL      carpal tunnel     HX OTHER SURGICAL      cyst      Family History   Problem Relation Age of Onset    Heart Attack Mother     Heart Attack Father     Malignant Hyperthermia Neg Hx     Pseudocholinesterase Deficiency Neg Hx     Delayed Awakening Neg Hx     Post-op Nausea/Vomiting Neg Hx     Emergence Delirium Neg Hx     Post-op Cognitive Dysfunction Neg Hx     Other Neg Hx      History reviewed, no pertinent family history.   Social History     Tobacco Use    Smoking status: Current Every Day Smoker     Packs/day: 2.00     Years: 40.00     Pack years: 80.00    Smokeless tobacco: Current User     Types: Snuff   Substance Use Topics    Alcohol use: No     No Known Allergies   Current Facility-Administered Medications   Medication Dose Route Frequency    potassium chloride 10 mEq in 100 ml IVPB  10 mEq IntraVENous Q1H    metoprolol tartrate (LOPRESSOR) tablet 25 mg  25 mg Oral Q12H    metoprolol (LOPRESSOR) injection 2.5 mg  2.5 mg IntraVENous Q6H PRN    dextrose 5% and 0.9% NaCl infusion  100 mL/hr IntraVENous CONTINUOUS    QUEtiapine (SEROquel) tablet 100 mg  100 mg Oral BID    insulin lispro (HUMALOG) injection   SubCUTAneous Q6H    pantoprazole (PROTONIX) 40 mg in 0.9% sodium chloride 10 mL injection  40 mg IntraVENous Q24H    hydrALAZINE (APRESOLINE) 20 mg/mL injection 20 mg  20 mg IntraVENous Q6H PRN    amLODIPine (NORVASC) tablet 10 mg  10 mg Oral DAILY    [Held by provider] metoprolol tartrate (LOPRESSOR) tablet 12.5 mg  12.5 mg Oral Q12H    enoxaparin (LOVENOX) injection 40 mg  1 mg/kg SubCUTAneous Q12H    ipratropium (ATROVENT) 0.02 % nebulizer solution 0.5 mg  0.5 mg Nebulization QID RT    atorvastatin (LIPITOR) tablet 20 mg  20 mg Oral QHS    clonazePAM (KlonoPIN) tablet 0.5 mg  0.5 mg Oral TID PRN    gabapentin (NEURONTIN) capsule 100 mg  100 mg Oral TID    oxyCODONE-acetaminophen (PERCOCET) 5-325 mg per tablet 1 Tab  1 Tab Oral Q4H PRN    LORazepam (ATIVAN) injection 1 mg  1 mg IntraVENous Q4H PRN    haloperidol lactate (HALDOL) injection 5 mg  5 mg IntraVENous Q8H PRN    albuterol-ipratropium (DUO-NEB) 2.5 MG-0.5 MG/3 ML  3 mL Nebulization Q4H PRN    glucose chewable tablet 16 g  4 Tab Oral PRN    glucagon (GLUCAGEN) injection 1 mg  1 mg IntraMUSCular PRN    dextrose 10% infusion 125-250 mL  125-250 mL IntraVENous PRN    budesonide (PULMICORT) 250 mcg/2ml nebulizer susp  500 mcg Nebulization BID RT    sodium chloride (NS) flush 5-40 mL  5-40 mL IntraVENous Q8H    sodium chloride (NS) flush 5-40 mL  5-40 mL IntraVENous PRN    acetaminophen (TYLENOL) tablet 650 mg  650 mg Oral Q6H PRN    Or    acetaminophen (TYLENOL) suppository 650 mg  650 mg Rectal Q6H PRN    polyethylene glycol (MIRALAX) packet 17 g  17 g Oral DAILY PRN    promethazine (PHENERGAN) tablet 12.5 mg  12.5 mg Oral Q6H PRN    Or    ondansetron (ZOFRAN) injection 4 mg  4 mg IntraVENous Q6H PRN        LAB AND IMAGING FINDINGS:     Lab Results   Component Value Date/Time    WBC 9.8 11/04/2020 02:10 AM    HGB 10.5 (L) 11/04/2020 02:10 AM    PLATELET 629 32/98/6482 02:10 AM     Lab Results   Component Value Date/Time    Sodium 138 11/04/2020 02:10 AM    Potassium 3.0 (L) 11/04/2020 02:10 AM    Chloride 106 11/04/2020 02:10 AM    CO2 23 11/04/2020 02:10 AM    BUN 5 (L) 11/04/2020 02:10 AM    Creatinine 0.54 (L) 11/04/2020 02:10 AM    Calcium 8.3 (L) 11/04/2020 02:10 AM    Magnesium 2.5 11/04/2020 02:10 AM    Phosphorus 3.3 10/29/2020 05:44 AM      Lab Results   Component Value Date/Time    Alk. phosphatase 95 11/04/2020 02:10 AM    Protein, total 6.3 (L) 11/04/2020 02:10 AM    Albumin 2.8 (L) 11/04/2020 02:10 AM    Globulin 3.5 11/04/2020 02:10 AM     Lab Results   Component Value Date/Time    INR 1.0 01/27/2014 04:08 PM    Prothrombin time 12.7 01/27/2014 04:08 PM    aPTT 33.7 01/27/2014 04:08 PM      No results found for: IRON, FE, TIBC, IBCT, PSAT, FERR   No results found for: PH, PCO2, PO2  No components found for: [place]   Lab Results   Component Value Date/Time     10/26/2020 06:45 PM    CK - MB 2.5 10/26/2020 06:45 PM              Total time: 35 minutes  Counseling / coordination time, spent as noted above > 50% counseling / coordination: 30 minutes with patient and family. Prolonged service was provided for  []30 min   []75 min in face to face time in the presence of the patient, spent as noted above.   Time Start:   Time End:   Note: this can only be billed with 52556 (initial) or 50026 (follow up).   If multiple start / stop times, list each separately.    '

## 2020-11-04 NOTE — PROGRESS NOTES
2330-Resting in bed. Stable, restless. White board updated. 0020-Assessment complete. Call light and personal items within reach. Restraints released, patient repositioned, and linen changed. 0114-Anxious, restless, yelling;  Medicated per orders. 0410-Restless, repositioned in bed and rubbed back from shoulders to buttocks. Patient tolerated well. 0539-Eyes closed and snoring.

## 2020-11-04 NOTE — PROGRESS NOTES
Hospitalist Progress Note-critical care note     Patient: Mariela Taylor Sr. MRN: [id number]  [facility]: [id number]    YOB: 1955  Age: 63 y.o.   Sex: male    DOA: 10/23/2020 LOS:  LOS: 12 days            Chief complaint: Pneumonia, marijuana abuse, legal blindness, amputation bilateral lower extremity, encephalopathy, acute respiratory failure with hypoxia    Assessment/Plan         Hospital Problems  Date Reviewed: 10/23/2020          Codes Class Noted POA    Dysphagia ICD-10-CM: R13.10  ICD-9-CM: 787.20  11/3/2020 Unknown        Hypokalemia ICD-10-CM: E87.6  ICD-9-CM: 276.8  11/3/2020 Unknown        Hypomagnesemia ICD-10-CM: E83.42  ICD-9-CM: 275.2  11/3/2020 Unknown        Aspiration pneumonia ([facility] ICD-10-CM: J69.0  ICD-9-CM: 507.0  10/28/2020 Unknown        Deep vein thrombosis (DVT) of lower extremity ([facility] ICD-10-CM: I82.409  ICD-9-CM: 453.40  10/28/2020 Unknown        Acute respiratory failure with hypoxia (HCC) ICD-10-CM: J96.01  ICD-9-CM: 518.81  10/26/2020 Unknown        Chronic obstructive pulmonary disease with acute exacerbation ([facility] ICD-10-CM: J44.1  ICD-9-CM: 491.21  10/24/2020 Unknown        Severe protein-calorie malnutrition ([facility] ICD-10-CM: E43  ICD-9-CM: 262  10/24/2020 Unknown        Metabolic encephalopathy [facility]: G93.41  ICD-9-CM: 348.31  10/24/2020 Unknown        * (Principal) Septic shock ([facility] ICD-10-CM: A41.9, R65.21  ICD-9-CM: 038.9, 785.52, 995.92  10/23/2020 Unknown        Amputation of both lower extremities ([facility] ICD-10-CM: L64.157S, B21.510P  ICD-9-CM: 897.6  Unknown Yes        PNA (pneumonia) ICD-10-CM: J18.9  ICD-9-CM: 486  Unknown Yes        Marijuana abuse ICD-10-CM: F12.10  ICD-9-CM: 305.20  Unknown Yes        Centrilobular emphysema ([facility] ICD-10-CM: J43.2  ICD-9-CM: 492.8  Unknown Unknown        Legal blindness ICD-10-CM: H54.8  ICD-9-CM: 369.4  10/14/2020 Yes        Hypotension, unspecified ICD-10-CM: I95.9  ICD-9-CM: 458.9  1/27/2014 Yes                A 81-year-old male with a history of a COPD, bilateral above knee amputation, recently discharged from [facility] after treated for pneumonia who was admitted for septic shock and metabolic encephalopathy.     No significant change compared with yesterday , still waiting family decision about care of goal-comfort feeding with hospice vs peg tube  Discussed with palliative care team. Possible will have decision tomorrow   Continue supportive care. Acute respiratory failure with hypoxia   Resolving   continue aspiration precaution,   Complete iv abx for aspiration pna       COPD with right upper lobe pneumonia questionable mass/emphysema /aspiration pna  Aspiration precaution    Need repeat ct in 4-6 wks as out -pt        Cardiovascular septic shock: resolved.       Eliana Escobar on board-continue medical treatment     HTN: on  Norvasc and metoprolol.       Chronic dvt noted from pvl   On lovenox     Hypokalemia and hypomagnsemia  K and mg replacement         Acute metabolic encephalopathy   Agitation-improving  -on Seroquel     Legal blindness :fall /aspiration precaution      marijuana use     Severe malnutrition    Bilateral AKA    Will give K replacement     Disposition :tbd,   Review of systems:  Unable to obtain-haldol given am     Vital signs/Intake and Output:  Visit Vitals  BP (!) 166/96 (BP 1 Location: Left arm, BP Patient Position: At rest)   Pulse (!) 104   Temp 98.2 °F (36.8 °C)   Resp 16   Ht 5' 4\" (1.626 m)   Wt 39.5 kg (87 lb 1.3 oz)   SpO2 96%   BMI 14.95 kg/m²     Current Shift:  No intake/output data recorded. Last three shifts:  11/02 1901 - 11/04 0700  In: 1674.1 [P.O.:60; I.V.:1434.1]  Out: 1500 [Urine:1500]    Physical Exam:  General: Sleeping  no acute distress    HEENT: NC, Atraumatic. PERRLA, anicteric sclerae. Lungs: Coarse BS   Heart:  Regular  rhythm,  No murmur, No Rubs, No Gallops  Abdomen: Soft, Non distended, Non tender.   +Bowel sounds,   Extremities: No c/c, aka  Psych: Calm,no agitation   Neurologic:   Unable to obtain due to sleeping       Labs: Results:       Chemistry Recent Labs     11/04/20 0210 11/03/20  0020 11/02/20  0540   GLU 96 99 76    140 141   K 3.0* 3.0* 3.7    109 111   CO2 23 24 23   BUN 5* 7 9   CREA 0.54* 0.53* 0.53*   CA 8.3* 8.4* 8.7   AGAP 9 7 7   BUCR 9* 13 17   AP 95 86 87   TP 6.3* 5.9* 5.9*   ALB 2.8* 2.7* 2.7*   GLOB 3.5 3.2 3.2   AGRAT 0.8 0.8 0.8      CBC w/Diff Recent Labs     11/04/20 0210 11/03/20 0020 11/02/20  0540   WBC 9.8 6.3 9.2   RBC 3.70* 3.72* 3.99*   HGB 10.5* 10.6* 11.4*   HCT 31.7* 32.5* 35.0*    271 268   GRANS 89* 82* 82*   LYMPH 6* 8* 8*   EOS 0 2 2      Cardiac Enzymes No results for input(s): CPK, CKND1, CHUY in the last 72 hours. No lab exists for component: CKRMB, TROIP   Coagulation No results for input(s): PTP, INR, APTT, INREXT, INREXT in the last 72 hours. Lipid Panel No results found for: CHOL, CHOLPOCT, CHOLX, CHLST, CHOLV, 001909, HDL, HDLP, LDL, LDLC, DLDLP, 365290, VLDLC, VLDL, TGLX, TRIGL, TRIGP, TGLPOCT, CHHD, CHHDX   BNP No results for input(s): BNPP in the last 72 hours. Liver Enzymes Recent Labs     11/04/20 0210   TP 6.3*   ALB 2.8*   AP 95      Thyroid Studies Lab Results   Component Value Date/Time    TSH 0.78 10/24/2020 11:30 AM        Procedures/imaging: see electronic medical records for all procedures/Xrays and details which were not copied into this note but were reviewed prior to creation of Plan    Ct Head Wo Cont    Result Date: 10/24/2020  EXAM: CT head INDICATION: Altered mental status. COMPARISON: October 15, 2020. TECHNIQUE: Axial CT imaging of the head was performed without intravenous contrast. Dose reduction techniques used: automated exposure control, adjustment of the mAs and/or kVp according to patient size, and iterative reconstruction techniques.  Digital imaging and communications in Medicine (DICOM) format image data are available to nonaffiliated external healthcare facilities or entities on a secure, media free, reciprocally searchable basis with patient authorization for at least 12 months after this study. _______________ FINDINGS: BRAIN AND POSTERIOR FOSSA: There is cerebral volume loss with prominence of the lateral and the third ventricles. The cortical sulci are widened appropriately. The fourth ventricle and basal cisterns are normally outlined. There is mild bilateral periventricular and central white matter diminished attenuation. There is no acute territorial defect, hemorrhage or midline shift. EXTRA-AXIAL SPACES AND MENINGES: There are no abnormal extra-axial fluid collections. CALVARIUM: Intact. SINUSES: Clear. OTHER: Partial right mastoid opacification is again seen. _______________     IMPRESSION: Cerebral volume loss and mild bilateral periventricular and central white matter diminished attenuation which is nonspecific but likely to represent microvascular disease. There is no acute intracranial abnormality. No significant interval change. Ct Head Wo Cont    Result Date: 10/15/2020  EXAM: CT head INDICATION: Dental status change COMPARISON: None. TECHNIQUE: Axial CT imaging of the head was performed without intravenous contrast. One or more dose reduction techniques were used on this CT: automated exposure control, adjustment of the mAs and/or kVp according to patient size, and iterative reconstruction techniques. The specific techniques used on this CT exam have been documented in the patient's electronic medical record. Digital Imaging and Communications in Medicine (DICOM) format image data are available to nonaffiliated external healthcare facilities or entities on a secure, media free, reciprocally searchable basis with patient authorization for at least a 12 month period after this study.  _______________ FINDINGS: BRAIN AND POSTERIOR FOSSA: The sulci, folia, ventricles and basal cisterns are within normal limits for the patient's with age concordant atrophy There is no intracranial hemorrhage, mass effect, or midline shift. Mild periventricular low-attenuation. Although nonspecific this is frequently seen with chronic small vessel ischemic change. Otherwise, there are no areas of abnormal parenchymal attenuation. EXTRA-AXIAL SPACES AND MENINGES: There are no abnormal extra-axial fluid collections. CALVARIUM: Intact. SINUSES: Clear. OTHER: None. _______________     IMPRESSION: No acute intracranial abnormalities. Xr Chest Port    Result Date: 10/26/2020  EXAM: CHEST RADIOGRAPH CLINICAL INDICATION/HISTORY: Pneumonia   > Additional: None COMPARISON: 10/23/2020 TECHNIQUE: Portable frontal view of the chest _______________ FINDINGS: SUPPORT DEVICES: None. HEART AND MEDIASTINUM: Heart size is stable. Atherosclerotic calcification seen in the aorta. LUNGS AND PLEURAL SPACES: Increased hazy density at the left base. Slight blunting of the right costophrenic angle. No pneumothorax. Patchy right upper lobe opacity is similar to slightly improved. BONES AND SOFT TISSUES: Unremarkable. _______________     IMPRESSION: 1. Increased hazy opacity at the left base compared to the prior exam, possibly developing left lower lobe atelectasis and/or consolidation. Questionable small right effusion. 2. Patchy right upper lobe patchy opacity which is similar to slightly improved. Xr Chest Port    Result Date: 10/24/2020  EXAM: XR CHEST PORT. CLINICAL INDICATION/HISTORY: meets SIRS criteria. -Additional: None. TECHNIQUE: A portable erect AP radiographic view of the chest is compared with several other chest radiographs performed the same month. _______________ FINDINGS: See impression. No pneumothorax or appreciable significant pleural effusion. The cardiac silhouette, vanessa, and mediastinal contours are normal for age. No acute osseous abnormality is revealed.  _______________     IMPRESSION: Slight improvement in multifocal airspace disease most in keeping with pneumonia, again most pronounced at the right lung apex with no new or worsening findings. Recommend continued radiographic follow-up to resolution. _______________     Marilee Nguyen Chest Port    Result Date: 10/18/2020  EXAM: CHEST RADIOGRAPH CLINICAL INDICATION/HISTORY: SBO   > Additional: None COMPARISON: 10/17/2020. TECHNIQUE: Portable frontal view of the chest _______________ FINDINGS: SUPPORT DEVICES: None. HEART AND MEDIASTINUM: No appreciable cardiomegaly. Remaining mediastinal contours within normal limits. LUNGS AND PLEURAL SPACES: No significant change of bilateral upper lobe parenchymal opacities. Hyperexpansion of the lungs. No evidence for pneumothorax. BONY THORAX AND SOFT TISSUES: Unremarkable. _______________     IMPRESSION: 1. No significant change of bilateral lung pneumonia. Follow-up to resolution is recommended. 2. Emphysema. Xr Chest Port    Result Date: 10/18/2020  EXAM: CHEST RADIOGRAPH CLINICAL INDICATION/HISTORY: shortness of breath   > Additional: None COMPARISON: October 14, 2020. TECHNIQUE: Portable frontal view of the chest _______________ FINDINGS: SUPPORT DEVICES: None. HEART AND MEDIASTINUM: No appreciable cardiomegaly. Remaining mediastinal contours within normal limits. LUNGS AND PLEURAL SPACES: No significant change of bilateral upper lobe parenchymal opacities. Hyperexpansion of the lungs. No evidence for pneumothorax or pleural effusion. BONY THORAX AND SOFT TISSUES: Unremarkable. _______________     IMPRESSION: 1. No significant change of bilateral lung pneumonia. Follow-up to resolution is recommended to exclude underlying mass. 2. Emphysema. Xr Chest Port    Result Date: 10/14/2020  EXAM: XR CHEST PORT CLINICAL INDICATION/HISTORY: Shortness of breath with cough -Additional: None COMPARISON: Several prior studies, most recently 2/19/2019 TECHNIQUE: Frontal view of the chest _______________ FINDINGS: HEART AND MEDIASTINUM: Normal cardiac size and mediastinal contours.  LUNGS AND PLEURAL SPACES: Patchy multifocal opacities noted throughout bilateral upper lobes, right greater than left as well as throughout the periphery of the right lower lobe. No evidence of pneumothorax. BONY THORAX AND SOFT TISSUES: No acute osseous abnormality _______________     IMPRESSION: Patchy multifocal airspace disease compatible with pneumonia. Recommend radiographic follow-up to document expected clinical resolution in 4-6 weeks' time.        Chris Walker MD

## 2020-11-04 NOTE — PROGRESS NOTES
26 Pt received from offgoing nurse without any signs or symptoms of distress. Pt vitals are stable and within normal limits. Pt bed in low position with wheels locked and call bell within reach. 1650 Assessment completed and documented in flow sheet. Pt denies any further needs at this time. Pt in NAD with bed in low position, wheels locked and call bell within reach. Purposeful rounding completed. Pt resting quietly. No further needs voiced at this time. 1725 Scheduled medications administered as ordered. 1806 Scheduled medications administered as ordered. 1946 Scheduled medications administered as ordered. 2012 Reassessment completed with no changes noted. Bed locked, in lowest position, with call light within reach. Purposeful rounding completed. Pt resting quietly. No further needs voiced at this time. 2330 Bedside and Verbal shift change report given to Perico Nicole RN (oncoming nurse) by Beatriz Cason RN (offgoing nurse). Report included the following information SBAR, Intake/Output, MAR and Recent Results.

## 2020-11-05 ENCOUNTER — HOSPICE ADMISSION (OUTPATIENT)
Dept: HOSPICE | Facility: HOSPICE | Age: 65
End: 2020-11-05

## 2020-11-05 PROBLEM — Z51.5 COMFORT MEASURES ONLY STATUS: Status: ACTIVE | Noted: 2020-11-05

## 2020-11-05 LAB
ALBUMIN SERPL-MCNC: 2.5 G/DL (ref 3.4–5)
ALBUMIN/GLOB SERPL: 0.8 {RATIO} (ref 0.8–1.7)
ALP SERPL-CCNC: 91 U/L (ref 45–117)
ALT SERPL-CCNC: 19 U/L (ref 16–61)
ANION GAP SERPL CALC-SCNC: 7 MMOL/L (ref 3–18)
AST SERPL-CCNC: 21 U/L (ref 10–38)
BASOPHILS # BLD: 0 K/UL (ref 0–0.1)
BASOPHILS NFR BLD: 0 % (ref 0–2)
BILIRUB SERPL-MCNC: 0.2 MG/DL (ref 0.2–1)
BUN SERPL-MCNC: 5 MG/DL (ref 7–18)
BUN/CREAT SERPL: 9 (ref 12–20)
CALCIUM SERPL-MCNC: 8.2 MG/DL (ref 8.5–10.1)
CHLORIDE SERPL-SCNC: 114 MMOL/L (ref 100–111)
CO2 SERPL-SCNC: 22 MMOL/L (ref 21–32)
CREAT SERPL-MCNC: 0.54 MG/DL (ref 0.6–1.3)
DIFFERENTIAL METHOD BLD: ABNORMAL
EOSINOPHIL # BLD: 0.1 K/UL (ref 0–0.4)
EOSINOPHIL NFR BLD: 2 % (ref 0–5)
ERYTHROCYTE [DISTWIDTH] IN BLOOD BY AUTOMATED COUNT: 15.8 % (ref 11.6–14.5)
GLOBULIN SER CALC-MCNC: 3.3 G/DL (ref 2–4)
GLUCOSE BLD STRIP.AUTO-MCNC: 86 MG/DL (ref 70–110)
GLUCOSE SERPL-MCNC: 88 MG/DL (ref 74–99)
HCT VFR BLD AUTO: 29.7 % (ref 36–48)
HGB BLD-MCNC: 9.6 G/DL (ref 13–16)
LYMPHOCYTES # BLD: 0.6 K/UL (ref 0.9–3.6)
LYMPHOCYTES NFR BLD: 10 % (ref 21–52)
MAGNESIUM SERPL-MCNC: 1.8 MG/DL (ref 1.6–2.6)
MCH RBC QN AUTO: 28.1 PG (ref 24–34)
MCHC RBC AUTO-ENTMCNC: 32.3 G/DL (ref 31–37)
MCV RBC AUTO: 86.8 FL (ref 74–97)
MONOCYTES # BLD: 0.6 K/UL (ref 0.05–1.2)
MONOCYTES NFR BLD: 10 % (ref 3–10)
NEUTS SEG # BLD: 4.4 K/UL (ref 1.8–8)
NEUTS SEG NFR BLD: 78 % (ref 40–73)
PLATELET # BLD AUTO: 249 K/UL (ref 135–420)
PMV BLD AUTO: 10.7 FL (ref 9.2–11.8)
POTASSIUM SERPL-SCNC: 3.5 MMOL/L (ref 3.5–5.5)
PROT SERPL-MCNC: 5.8 G/DL (ref 6.4–8.2)
RBC # BLD AUTO: 3.42 M/UL (ref 4.7–5.5)
SODIUM SERPL-SCNC: 143 MMOL/L (ref 136–145)
WBC # BLD AUTO: 5.7 K/UL (ref 4.6–13.2)

## 2020-11-05 PROCEDURE — 74011250637 HC RX REV CODE- 250/637: Performed by: INTERNAL MEDICINE

## 2020-11-05 PROCEDURE — 74011250637 HC RX REV CODE- 250/637: Performed by: NURSE PRACTITIONER

## 2020-11-05 PROCEDURE — 80053 COMPREHEN METABOLIC PANEL: CPT

## 2020-11-05 PROCEDURE — 36415 COLL VENOUS BLD VENIPUNCTURE: CPT

## 2020-11-05 PROCEDURE — 99233 SBSQ HOSP IP/OBS HIGH 50: CPT | Performed by: NURSE PRACTITIONER

## 2020-11-05 PROCEDURE — 74011250637 HC RX REV CODE- 250/637: Performed by: HOSPITALIST

## 2020-11-05 PROCEDURE — 65270000029 HC RM PRIVATE

## 2020-11-05 PROCEDURE — 74011250636 HC RX REV CODE- 250/636: Performed by: INTERNAL MEDICINE

## 2020-11-05 PROCEDURE — 82962 GLUCOSE BLOOD TEST: CPT

## 2020-11-05 PROCEDURE — 74011000250 HC RX REV CODE- 250: Performed by: INTERNAL MEDICINE

## 2020-11-05 PROCEDURE — 74011000250 HC RX REV CODE- 250: Performed by: HOSPITALIST

## 2020-11-05 PROCEDURE — 74011000258 HC RX REV CODE- 258: Performed by: FAMILY MEDICINE

## 2020-11-05 PROCEDURE — 74011250637 HC RX REV CODE- 250/637: Performed by: FAMILY MEDICINE

## 2020-11-05 PROCEDURE — 74011250636 HC RX REV CODE- 250/636: Performed by: HOSPITALIST

## 2020-11-05 PROCEDURE — 94640 AIRWAY INHALATION TREATMENT: CPT

## 2020-11-05 PROCEDURE — 85025 COMPLETE CBC W/AUTO DIFF WBC: CPT

## 2020-11-05 PROCEDURE — 83735 ASSAY OF MAGNESIUM: CPT

## 2020-11-05 RX ORDER — LIDOCAINE 4 G/100G
1 PATCH TOPICAL EVERY 24 HOURS
Status: DISCONTINUED | OUTPATIENT
Start: 2020-11-05 | End: 2020-11-20 | Stop reason: HOSPADM

## 2020-11-05 RX ORDER — MORPHINE SULFATE 2 MG/ML
0.5 INJECTION, SOLUTION INTRAMUSCULAR; INTRAVENOUS ONCE
Status: COMPLETED | OUTPATIENT
Start: 2020-11-05 | End: 2020-11-05

## 2020-11-05 RX ORDER — LORAZEPAM 2 MG/ML
1 CONCENTRATE ORAL
Status: DISCONTINUED | OUTPATIENT
Start: 2020-11-05 | End: 2020-11-18

## 2020-11-05 RX ORDER — HYOSCYAMINE SULFATE 0.12 MG/1
0.12 TABLET SUBLINGUAL
Status: DISCONTINUED | OUTPATIENT
Start: 2020-11-05 | End: 2020-11-20 | Stop reason: HOSPADM

## 2020-11-05 RX ORDER — PANTOPRAZOLE SODIUM 40 MG/1
40 TABLET, DELAYED RELEASE ORAL DAILY
Status: DISCONTINUED | OUTPATIENT
Start: 2020-11-06 | End: 2020-11-20 | Stop reason: HOSPADM

## 2020-11-05 RX ORDER — LORAZEPAM 1 MG/1
1 TABLET ORAL
Status: DISCONTINUED | OUTPATIENT
Start: 2020-11-05 | End: 2020-11-18

## 2020-11-05 RX ORDER — MORPHINE SULFATE 100 MG/5ML
5 SOLUTION ORAL
Status: DISCONTINUED | OUTPATIENT
Start: 2020-11-05 | End: 2020-11-20 | Stop reason: HOSPADM

## 2020-11-05 RX ADMIN — GABAPENTIN 100 MG: 100 CAPSULE ORAL at 15:29

## 2020-11-05 RX ADMIN — IPRATROPIUM BROMIDE 0.5 MG: 0.5 SOLUTION RESPIRATORY (INHALATION) at 11:10

## 2020-11-05 RX ADMIN — GABAPENTIN 100 MG: 100 CAPSULE ORAL at 21:48

## 2020-11-05 RX ADMIN — QUETIAPINE FUMARATE 100 MG: 100 TABLET ORAL at 21:48

## 2020-11-05 RX ADMIN — SODIUM CHLORIDE 10 ML: 9 INJECTION, SOLUTION INTRAMUSCULAR; INTRAVENOUS; SUBCUTANEOUS at 15:33

## 2020-11-05 RX ADMIN — METOPROLOL TARTRATE 25 MG: 25 TABLET, FILM COATED ORAL at 21:48

## 2020-11-05 RX ADMIN — LORAZEPAM 1 MG: 2 INJECTION INTRAMUSCULAR at 22:11

## 2020-11-05 RX ADMIN — METOPROLOL TARTRATE 25 MG: 25 TABLET, FILM COATED ORAL at 18:15

## 2020-11-05 RX ADMIN — IPRATROPIUM BROMIDE 0.5 MG: 0.5 SOLUTION RESPIRATORY (INHALATION) at 07:30

## 2020-11-05 RX ADMIN — BUDESONIDE 500 MCG: 0.25 INHALANT RESPIRATORY (INHALATION) at 07:30

## 2020-11-05 RX ADMIN — DEXTROSE MONOHYDRATE AND SODIUM CHLORIDE 100 ML/HR: 5; .9 INJECTION, SOLUTION INTRAVENOUS at 05:00

## 2020-11-05 RX ADMIN — LORAZEPAM 1 MG: 1 TABLET ORAL at 18:31

## 2020-11-05 RX ADMIN — ENOXAPARIN SODIUM 40 MG: 40 INJECTION SUBCUTANEOUS at 09:02

## 2020-11-05 RX ADMIN — Medication 10 ML: at 15:30

## 2020-11-05 RX ADMIN — AMLODIPINE BESYLATE 10 MG: 5 TABLET ORAL at 18:14

## 2020-11-05 RX ADMIN — MORPHINE SULFATE 0.5 MG: 2 INJECTION, SOLUTION INTRAMUSCULAR; INTRAVENOUS at 15:29

## 2020-11-05 NOTE — PROGRESS NOTES
19:40 Assessment completed. Lungs are clear bilat but are slightly decreased in the bases. Resting quietly in bed with TV on.    22:40 Shift assessment completed. See nsg flow sheet for details. 02:40 Reassessed with 0 changes noted. Resting quietly in in bed with eyes closed between cares. 07:25 Bedside and Verbal shift change report given to [address] (oncoming nurse) by Marivel Larsen RN (offgoing nurse). Report included the following information SBAR.

## 2020-11-05 NOTE — PROGRESS NOTES
[facility]: [phone] [id number]  [facility]: [phone]   [facility]: [phone]    Patient Name: Dona Chavez. YOB: 1955    Date of Initial Consult: 10/27/2020, follow up 11/5//2020   Reason for Consult: care decisions  Requesting Provider: Conrad Jefferson MD  Primary Care Physician: Isac Kee MD      SUMMARY:   Dona Denis is a 63 y.o. male with a past history of legally blind, bilateral AKA, emphysema, HTN, chronic pain, HLD, T2DM, polio, CAD, PVD who was admitted on 10/23/2020 from home with a diagnosis of pneumonia/sepsis. Current medical issues leading to Palliative Medicine involvement include: recurrent admissions with multiple co-morbidities and goals of care. 10/28/2020 Mr Shaun Kumar is alert, oriented to place and why he is in the hospital. Denies pain. He is softly restrained for his safety. Just says \" cut me loose, give a knife\" ( referring to his restraints). 10/30/2020: Palliative care team including Isac Harada, MSW and this NP met with patient in his ICU room. He is quietly laying in bed. When asked how he was his speech was mumbled. Was finally able to understand his c/o being cold. 11/4/2020: Palliative care team including Isac Harada, MSW and this NP met with patient at bedside. He is confused; unaware of time of day (likely related to blindness) and where he is. Asked to be taken to [facility]. 11/5/2020: This NP met with patient at bedside. He believes he is at home, outside and watching TV. Re-oriented to [facility]. He then requested a cigarette. Admits to some back pain but denies shortness of breath and nausea at this time. PALLIATIVE DIAGNOSES:   1. Advanced care decisions  2. HCAP/sepsis  3. Encephalopathy  4. COPD  5.  Bilateral AKA     PLAN:     11/5/2020: Telephone call placed to patient's son, Mary Dawson who then put call on speaker phone so his sister, Yoselin Cruz, could be involved in the discussion. This conversation includes two of three children and represents a [de-identified]. Karina Allen has not been returning phone calls. Rediscussed patient's MBS results demonstrating severe aspiration on all consistencies. They agree that patient would not tolerate a tube for feeding and every chance he could he would eat and drink what he chooses. For quality of life, they agreed on comfort feeds. Discussed risks and benefits of CPR in the event of cardiopulmonary arrest and intubation for respiratory distress. Explained recommendation for DNR/DNI as patient suffers from COPD with recurrent pneumonias along with his physical debilities and medical intervention would prolong his suffering. They state their father is a tenacious man but they have talked it over and agreed that DNR/DNI/comfort measures only with placement in a LTC facility would provide patient with best quality of life. They believe it may even add quantity with regular meals, medications and care. POST form sent via email to [email or web address] for signatures. Comfort orders initiated. Dr. Tom Shannon, nursing and CM notified of change in code status. GOALS OF CARE: DNR/DNI, Comfort measures only. Plan for discharge to [address] [place] [address] with hospice services.     (please see below for previous conversations)     11/4/2020: Upon entering patient's room he stated he wasn't feeling well. He complained of back pain, nausea and shortness of breath. He also asked for food and coffee during our visit. Explained NPO status to him secondary to aspiration and needing to find a way to feed him possibly by placing a tube in his stomach. Also explained accepting that time may be short and risk of eating for comfort is an option for him. Further discussed need to discuss these matters with his children so everyone is on the same page. Patient did express a fear of dying with frequent requests \"don't leave me\".  Patient also confused by the presence of soft wrist restraints. Much emotional support offered. When describing comfort medications, patient was anxious and requesting ativan. Instructed him I cannot use comfort measures until family agrees. I do not think patient has ability to appreciate how sick he is and would agree to medications without understanding hospice/comfort plan. Telephone call placed to patient's son, Chester Vázquez. His sister was listening in on the phone call. Updated Mary Clayton and Bridger regarding patient's MBS results and recommendations for PEG vs comfort feeding. Discussed risks and benefits of tube-feeding including aspiration and self-discontinuation. They are talking it over and to get back with us with a decision. Family may benefit from discussion with MD. Continue current level of care. Will further clarify code status during next conversation with family. GOALS OF CARE: FULL CODE with FULL INTERVENTIONS. 10/30/2020 ADDENDUM: 1400: Shira Stevenson presented to the ICU to visit with his father. Instructed him on COPD and it being a progressive disease especially without smoking cessation. He admits to his father continuing to smoke 2 ppd. Then discussed the superimposing of an aspiration pneumonia on weakened lungs. Shira Stevenson was instructed on weakening of swallow and aspiration as he was also fixated on getting his dad to eat and drink. Explained that he needs further swallow evaluation when stable to determine safe diet. Explained that patient is now on large amount of HFNC and next step if he continues to deteriorate would be oral intubation. Explained fears of patient's inability to be weaned from vent and needing trach/PEG and discharge to a facility. Shira Stevenson wanted to bring him home on vent if necessary. I explained care for trach with vent is not available in the home. He is aware of what a trach entails as a family friend has a trach for throat cancer.  Discussed intubation with patient and he indicated that he wanted to life support on machines if necessary which reinforced his son's determination for aggressive treatment. Discussed risks and benefits of CPR in the event of cardiopulmonary arrest and Torrie Lundborg was firm that he would want CPR in attempt to prolong patient's life. Lawrence's girlfriend, Bárbara, present for part of conversation and feels that Torrie Lundborg needs time to process what his father looks like and what he has heard before making decisions. Attempted to get Torrie Lundborg to think from his dad's standpoint and understand he is suffering; he is sure his dad has pulled through before and can do it again. 1531 Received telephone call from Indra Avery and [facility]. Was informed by them that Torrie Lundborg had texted their sister, Estefany Mora, and told her that Ebonie Jon is in the hospital. Modesta Nelsonanu and Shirin Randhawa (the estranged children) have not been kept in the loop secondary to Torrie Lundborg non-cooperation. Lucrecia Reddy with medical information. He stated that a physician at [facility] in 4/2019 had talked with the family regarding code status after patient had been admitted in cardiac arrest. Patient was discharged after that hospitalization to LTC. However, when Torrie Lundborg got out of snf, he took his dad out the [place]. This was against the wishes of the other children. GOALS OF CARE: FULL CODE with FULL INTERVENTIONS. GOC remain unchanged and ICU information provided to Indra Vacaroy to call after he talks with his sister. Modesta Blair and Dylanlela Edis share a home in [place]. 10/30/2020: While in the ICU, Torrie Lundborg returned telephone call. Instructed him that his father was very ill with worsening respiratory status. Explained he may need intubation and medical concerns regarding inability to eventually be able to liberate patient from ventilator. Described to him that it is likely he would require trach, long term ventilator, PEG tube for feeding and placement in long term care.  His concern at that point was to have his father brought home \"we have a nurse that comes to the house\". Explained his father is far too sick to be discharged at this time. He stated he wanted us to continue all aggressive measures. Asked Kranthi Nath to come to the hospital to see his father so he could better understand the seriousness of this illness. He said he would be here later today. Kranthi Nath stated he had not received any messages on his cellphone. Explained that his VM is full and he needs to delete all his old voicemails. He admitted he would need someone to show him how. 10/28/2020 Seen along with SIMI Osborne. Remains in ICU on high flow oxygen. He is alert, oriented x 3 but do not believe at this time he can participate in his medical decisions or complex decisions. There is no AMD on file. Son Kranthi Nath is reported care giver. Very chronically ill gentleman with recent admission whose overall prognosis is poor. We have attempted x 2 today and yesterday to reach Kranthi Nath to discuss his father's care and goals of care, no answer at listed home phone and cell goes to  with no ability to leave a message. We will continue to follow with you to try and reach family and assess if patient can participate can participate in his care decisions. Goals of care full code with full interventions. 10/27/2020  1. Advanced care decisions: Palliative care team including SIMI Calixto and this NP met with patient at bedside in the ICU. Patient is known to the palliative care team from his previous admission. Per notes \"patient is a  and had four children of which one is . Kranthi Nath claims he is the caregiver and lives with his father. ..his half siblings, Kacy Rodriguez and Lori Carrasquillo, do not come around and he does not know where they are.\" Patient currently delirious and continues to yell \"wake me up\" or \"Lawrence\" over and over; although, responded yes to having pain and nausea.  Attempted to contact patient's son, Kranthi Nath, via his cell phone (voicemail box is full) and the home phone number (no answer after multiple rings). Need to discuss goals of care for this chronically ill gentleman. GOALS OF CARE: FULL CODE with FULL INTERVENTIONS. 2.  HCAP/sepsis: Patient recently admitted to [facility] with diagnosis of pneumonia 10/14 to 10/20/2020. He was seen at [facility] ER on 10/21 and 10/22 and 10/23/2020. He then presented here with c/o chest pain. Conflicting reports of whether he took antibiotics s/p his discharge on 10/20. Found to be hypothermic and hypotensive in ED. Primary team managing. 3.  Encephalopathy: Presented with confusion and is combative. ED provider notes that patient likely does not have capacity to make medical decisions for himself on day of admit. Also per notes son reports patient has not been acting appropriately since leaving [facility] ED on 10/23/2020.  4. COPD: CT completed at outside facility reveals question of neoplasm. Will need repeat CT. Primary team managing. 5.  Bilateral AKA: per history. 6.  Initial consult note routed to primary continuity provider  7. Communicated plan of care with: Palliative IDT    GOALS OF CARE:  Patient/Health Care Proxy Stated Goals: Comfort      TREATMENT PREFERENCES:   Code Status: DNR/DNI    Advance Care Planning:  Advance Care Planning 10/14/2020   Confirm Advance Directive None   Patient Would Like to Complete Advance Directive No   Does the patient have other document types Other (comment)       Medical Interventions: Comfort measures   Artificially Administered Nutrition: No feeding tube     Other: As far as possible, the palliative care team has discussed with patient/health care proxy about goals of care/treatment preferences for patient.      HISTORY:     History obtained from: chart    CHIEF COMPLAINT: back pain, want cigarette      HPI/SUBJECTIVE:    The patient is:   [x] Verbal and participatory  [] Non-participatory due to:   See summary    Clinical Pain Assessment (nonverbal scale for nonverbal patients): Clinical Pain Assessment  Severity: 5  Location: lower back          Duration: for how long has pt been experiencing pain (e.g., 2 days, 1 month, years)  Frequency: how often pain is an issue (e.g., several times per day, once every few days, constant)     FUNCTIONAL ASSESSMENT:     Palliative Performance Scale (PPS):  PPS: 30    ECOG  ECOG Status : Limited self-care     PSYCHOSOCIAL/SPIRITUAL SCREENING:      Any spiritual / Jain concerns:  [] Yes /  [x] No    Caregiver Burnout:  [] Yes /  [] No /  [x] No Caregiver Present      Anticipatory grief assessment:   [x] Normal  / [] Maladaptive        REVIEW OF SYSTEMS:     Positive and pertinent negative findings in ROS are noted above in HPI. The following systems were [x] reviewed / [] unable to be reviewed as noted in HPI  Other findings are noted below. Systems: constitutional, ears/nose/mouth/throat, respiratory, gastrointestinal, genitourinary, musculoskeletal, integumentary, neurologic, psychiatric, endocrine. Positive findings noted below. Modified ESAS Completed by: provider           Pain: 5     Nausea: 0     Dyspnea: 0           Stool Occurrence(s): 1        PHYSICAL EXAM:     Wt Readings from Last 3 Encounters:   10/30/20 39.5 kg (87 lb 1.3 oz)   10/20/20 34.7 kg (76 lb 9.6 oz)   02/18/19 45 kg (99 lb 3.3 oz)     Blood pressure (!) 149/88, pulse 77, temperature 98 °F (36.7 °C), resp. rate 16, height 5' 4\" (1.626 m), weight 39.5 kg (87 lb 1.3 oz), SpO2 98 %.   Pain:  Pain Scale 1: Numeric (0 - 10)  Pain Intensity 1: 0  Pain Onset 1: gradual  Pain Location 1: Other (comment)(stumps)  Pain Orientation 1: Upper  Pain Description 1: Aching  Pain Intervention(s) 1: Medication (see MAR), Repositioned       Constitutional: 63year old gentleman who appears older then stated age, restless in bed, soft restraints bilateral wrist   Eyes: blind, anicteric  Respiratory: breathing not labored, room air  Musculoskeletal: Bilateral AKA   Skin: warm, dry  Neurologic: following commands, moving all extremities       HISTORY:     Principal Problem:    Septic shock ([address] (10/23/2020)    Active Problems:    Hypotension, unspecified (1/27/2014)      Amputation of both lower extremities (HCC) ()      PNA (pneumonia) ()      Marijuana abuse ()      Centrilobular emphysema (HCC) ()      Legal blindness (10/14/2020)      Chronic obstructive pulmonary disease with acute exacerbation ([address] (10/24/2020)      Severe protein-calorie malnutrition ([address] (59/67/8757)      Metabolic encephalopathy (50/98/3457)      Acute respiratory failure with hypoxia ([address] (10/26/2020)      Aspiration pneumonia ([address] (10/28/2020)      Deep vein thrombosis (DVT) of lower extremity ([address] (10/28/2020)      Dysphagia (11/3/2020)      Hypokalemia (11/3/2020)      Hypomagnesemia (11/3/2020)      Past Medical History:   Diagnosis Date    Amputation of both lower extremities ([address]     Amputee, above knee, left ([address]     At risk for falls     Chronic pain     back and legs    Diabetes ([address]     Hypercholesterolemia     Hypertension     Polio       Past Surgical History:   Procedure Laterality Date    HX HERNIA REPAIR      HX OTHER SURGICAL      carpal tunnel     HX OTHER SURGICAL      cyst      Family History   Problem Relation Age of Onset    Heart Attack Mother     Heart Attack Father     Malignant Hyperthermia Neg Hx     Pseudocholinesterase Deficiency Neg Hx     Delayed Awakening Neg Hx     Post-op Nausea/Vomiting Neg Hx     Emergence Delirium Neg Hx     Post-op Cognitive Dysfunction Neg Hx     Other Neg Hx      History reviewed, no pertinent family history.   Social History     Tobacco Use    Smoking status: Current Every Day Smoker     Packs/day: 2.00     Years: 40.00     Pack years: 80.00    Smokeless tobacco: Current User     Types: Snuff   Substance Use Topics    Alcohol use: No     No Known Allergies   Current Facility-Administered Medications   Medication Dose Route Frequency    LORazepam (ATIVAN) tablet 1 mg  1 mg Oral Q6H PRN    LORazepam (INTENSOL) 2 mg/mL oral concentrate 1 mg  1 mg SubLINGual Q3H PRN    hyoscyamine SL (LEVSIN/SL) tablet 0.125 mg  0.125 mg SubLINGual Q4H PRN    morphine (ROXANOL) 100 mg/5 mL (20 mg/mL) concentrated solution 5 mg  5 mg SubLINGual Q1H PRN    [START ON 11/6/2020] pantoprazole (PROTONIX) tablet 40 mg  40 mg Oral DAILY    metoprolol tartrate (LOPRESSOR) tablet 25 mg  25 mg Oral Q12H    QUEtiapine (SEROquel) tablet 100 mg  100 mg Oral BID    amLODIPine (NORVASC) tablet 10 mg  10 mg Oral DAILY    [Held by provider] metoprolol tartrate (LOPRESSOR) tablet 12.5 mg  12.5 mg Oral Q12H    ipratropium (ATROVENT) 0.02 % nebulizer solution 0.5 mg  0.5 mg Nebulization QID RT    gabapentin (NEURONTIN) capsule 100 mg  100 mg Oral TID    LORazepam (ATIVAN) injection 1 mg  1 mg IntraVENous Q4H PRN    budesonide (PULMICORT) 250 mcg/2ml nebulizer susp  500 mcg Nebulization BID RT    sodium chloride (NS) flush 5-40 mL  5-40 mL IntraVENous Q8H    sodium chloride (NS) flush 5-40 mL  5-40 mL IntraVENous PRN    acetaminophen (TYLENOL) tablet 650 mg  650 mg Oral Q6H PRN    Or    acetaminophen (TYLENOL) suppository 650 mg  650 mg Rectal Q6H PRN    polyethylene glycol (MIRALAX) packet 17 g  17 g Oral DAILY PRN    promethazine (PHENERGAN) tablet 12.5 mg  12.5 mg Oral Q6H PRN    Or    ondansetron (ZOFRAN) injection 4 mg  4 mg IntraVENous Q6H PRN        LAB AND IMAGING FINDINGS:     Lab Results   Component Value Date/Time    WBC 5.7 11/05/2020 05:00 AM    HGB 9.6 (L) 11/05/2020 05:00 AM    PLATELET 529 47/66/9684 05:00 AM     Lab Results   Component Value Date/Time    Sodium 143 11/05/2020 05:00 AM    Potassium 3.5 11/05/2020 05:00 AM    Chloride 114 (H) 11/05/2020 05:00 AM    CO2 22 11/05/2020 05:00 AM    BUN 5 (L) 11/05/2020 05:00 AM    Creatinine 0.54 (L) 11/05/2020 05:00 AM    Calcium 8.2 (L) 11/05/2020 05:00 AM    Magnesium 1.8 11/05/2020 05:00 AM    Phosphorus 3.3 10/29/2020 05:44 AM      Lab Results   Component Value Date/Time    Alk. phosphatase 91 11/05/2020 05:00 AM    Protein, total 5.8 (L) 11/05/2020 05:00 AM    Albumin 2.5 (L) 11/05/2020 05:00 AM    Globulin 3.3 11/05/2020 05:00 AM     Lab Results   Component Value Date/Time    INR 1.0 01/27/2014 04:08 PM    Prothrombin time 12.7 01/27/2014 04:08 PM    aPTT 33.7 01/27/2014 04:08 PM      No results found for: IRON, FE, TIBC, IBCT, PSAT, FERR   No results found for: PH, PCO2, PO2  No components found for: [place]   Lab Results   Component Value Date/Time     10/26/2020 06:45 PM    CK - MB 2.5 10/26/2020 06:45 PM              Total time: 35 minutes  Counseling / coordination time, spent as noted above > 50% counseling / coordination: 30 minutes with patient and family. Prolonged service was provided for  []30 min   []75 min in face to face time in the presence of the patient, spent as noted above. Time Start:   Time End:   Note: this can only be billed with 43183 (initial) or 30624 (follow up).   If multiple start / stop times, list each separately.    '

## 2020-11-05 NOTE — PROGRESS NOTES
Hospitalist Progress Note-critical care note     Patient: Jess Reid Sr. MRN: [id number]  [facility]: [id number]    YOB: 1955  Age: 63 y.o.   Sex: male    DOA: 10/23/2020 LOS:  LOS: 13 days            Chief complaint: Pneumonia, marijuana abuse, legal blindness, amputation bilateral lower extremity, encephalopathy, acute respiratory failure with hypoxia    Assessment/Plan         Hospital Problems  Date Reviewed: 10/23/2020          Codes Class Noted POA    Comfort measures only status ICD-10-CM: Z51.5  ICD-9-CM: V66.7  11/5/2020 Unknown        Dysphagia ICD-10-CM: R13.10  ICD-9-CM: 787.20  11/3/2020 Unknown        Hypokalemia ICD-10-CM: E87.6  ICD-9-CM: 276.8  11/3/2020 Unknown        Hypomagnesemia ICD-10-CM: E83.42  ICD-9-CM: 275.2  11/3/2020 Unknown        Aspiration pneumonia ([facility] ICD-10-CM: J69.0  ICD-9-CM: 507.0  10/28/2020 Unknown        Deep vein thrombosis (DVT) of lower extremity ([facility] ICD-10-CM: I82.409  ICD-9-CM: 453.40  10/28/2020 Unknown        Acute respiratory failure with hypoxia ([facility] ICD-10-CM: J96.01  ICD-9-CM: 518.81  10/26/2020 Unknown        Chronic obstructive pulmonary disease with acute exacerbation ([facility] ICD-10-CM: J44.1  ICD-9-CM: 491.21  10/24/2020 Unknown        Severe protein-calorie malnutrition ([facility] ICD-10-CM: E43  ICD-9-CM: 262  10/24/2020 Unknown        Metabolic encephalopathy IPV-45-VI: G93.41  ICD-9-CM: 348.31  10/24/2020 Unknown        * (Principal) Septic shock ([facility] ICD-10-CM: A41.9, R65.21  ICD-9-CM: 038.9, 785.52, 995.92  10/23/2020 Unknown        Amputation of both lower extremities ([facility] ICD-10-CM: L77.480J, D61.896C  ICD-9-CM: 897.6  Unknown Yes        PNA (pneumonia) ICD-10-CM: J18.9  ICD-9-CM: 258  Unknown Yes        Marijuana abuse ICD-10-CM: F12.10  ICD-9-CM: 305.20  Unknown Yes        Centrilobular emphysema ([facility] ICD-10-CM: J43.2  ICD-9-CM: 492.8  Unknown Unknown        Legal blindness ICD-10-CM: H54.8  ICD-9-CM: 369.4  10/14/2020 Yes Hypotension, unspecified ICD-10-CM: I95.9  ICD-9-CM: 458.9  1/27/2014 Yes                A 79-year-old male with a history of a COPD, bilateral above knee amputation, recently discharged from [facility] after treated for pneumonia who was admitted for septic shock and metabolic encephalopathy.     Family made final decision and comfort care granted. Cm is on board for hospice , will start comfort feeding     Acute respiratory failure with hypoxia   Resolving   continue aspiration precaution,   Complete iv abx for aspiration pna       COPD with right upper lobe pneumonia questionable mass/emphysema /aspiration pna  Aspiration precaution        Cardiovascular septic shock: resolved.       Prolong Qt   Cardiologist was on board   HTN: on  Norvasc and metoprolol.       Chronic dvt noted from pvl   On lovenox     Hypokalemia and hypomagnsemia  K and mg replacement         Acute metabolic encephalopathy   Agitation-improving  -on Seroquel     Legal blindness :fall /aspiration precaution      marijuana use     Severe malnutrition    Bilateral AKA    Subjective : give me a cigaret, please help me get rid of here      Disposition : when has been accepted   Review of systems:  Unable to obtain due to on and off confusion, need redirected     Vital signs/Intake and Output:  Visit Vitals  BP (!) 149/88   Pulse 77   Temp 98 °F (36.7 °C)   Resp 16   Ht 5' 4\" (1.626 m)   Wt 39.5 kg (87 lb 1.3 oz)   SpO2 98%   BMI 14.95 kg/m²     Current Shift:  No intake/output data recorded. Last three shifts:  11/03 1901 - 11/05 0700  In: 2305 [P.O.:120; I.V.:2185]  Out: 1200 [Urine:1200]    Physical Exam:  General: Alert and oriented to him self no acute distress    HEENT: NC, Atraumatic. PERRLA, anicteric sclerae. Lungs: Coarse BS   Heart:  Regular  rhythm,  No murmur, No Rubs, No Gallops  Abdomen: Soft, Non distended, Non tender.   +Bowel sounds,   Extremities: No c/c, aka  Psych:   Calm,no agitation   Neurologic:   Alert       Labs: Results: Chemistry Recent Labs     11/05/20  0500 11/04/20 0210 11/03/20  0020   GLU 88 96 99    138 140   K 3.5 3.0* 3.0*   * 106 109   CO2 22 23 24   BUN 5* 5* 7   CREA 0.54* 0.54* 0.53*   CA 8.2* 8.3* 8.4*   AGAP 7 9 7   BUCR 9* 9* 13   AP 91 95 86   TP 5.8* 6.3* 5.9*   ALB 2.5* 2.8* 2.7*   GLOB 3.3 3.5 3.2   AGRAT 0.8 0.8 0.8      CBC w/Diff Recent Labs     11/05/20  0500 11/04/20 0210 11/03/20  0020   WBC 5.7 9.8 6.3   RBC 3.42* 3.70* 3.72*   HGB 9.6* 10.5* 10.6*   HCT 29.7* 31.7* 32.5*    282 271   GRANS 78* 89* 82*   LYMPH 10* 6* 8*   EOS 2 0 2      Cardiac Enzymes No results for input(s): CPK, CKND1, CHUY in the last 72 hours. No lab exists for component: CKRMB, TROIP   Coagulation No results for input(s): PTP, INR, APTT, INREXT, INREXT in the last 72 hours. Lipid Panel No results found for: CHOL, CHOLPOCT, CHOLX, CHLST, CHOLV, 681568, HDL, HDLP, LDL, LDLC, DLDLP, 913973, VLDLC, VLDL, TGLX, TRIGL, TRIGP, TGLPOCT, CHHD, CHHDX   BNP No results for input(s): BNPP in the last 72 hours. Liver Enzymes Recent Labs     11/05/20  0500   TP 5.8*   ALB 2.5*   AP 91      Thyroid Studies Lab Results   Component Value Date/Time    TSH 0.78 10/24/2020 11:30 AM        Procedures/imaging: see electronic medical records for all procedures/Xrays and details which were not copied into this note but were reviewed prior to creation of Plan    Ct Head Wo Cont    Result Date: 10/24/2020  EXAM: CT head INDICATION: Altered mental status. COMPARISON: October 15, 2020. TECHNIQUE: Axial CT imaging of the head was performed without intravenous contrast. Dose reduction techniques used: automated exposure control, adjustment of the mAs and/or kVp according to patient size, and iterative reconstruction techniques.  Digital imaging and communications in Medicine (DICOM) format image data are available to nonaffiliated external healthcare facilities or entities on a secure, media free, reciprocally searchable basis with patient authorization for at least 12 months after this study. _______________ FINDINGS: BRAIN AND POSTERIOR FOSSA: There is cerebral volume loss with prominence of the lateral and the third ventricles. The cortical sulci are widened appropriately. The fourth ventricle and basal cisterns are normally outlined. There is mild bilateral periventricular and central white matter diminished attenuation. There is no acute territorial defect, hemorrhage or midline shift. EXTRA-AXIAL SPACES AND MENINGES: There are no abnormal extra-axial fluid collections. CALVARIUM: Intact. SINUSES: Clear. OTHER: Partial right mastoid opacification is again seen. _______________     IMPRESSION: Cerebral volume loss and mild bilateral periventricular and central white matter diminished attenuation which is nonspecific but likely to represent microvascular disease. There is no acute intracranial abnormality. No significant interval change. Ct Head Wo Cont    Result Date: 10/15/2020  EXAM: CT head INDICATION: Dental status change COMPARISON: None. TECHNIQUE: Axial CT imaging of the head was performed without intravenous contrast. One or more dose reduction techniques were used on this CT: automated exposure control, adjustment of the mAs and/or kVp according to patient size, and iterative reconstruction techniques. The specific techniques used on this CT exam have been documented in the patient's electronic medical record. Digital Imaging and Communications in Medicine (DICOM) format image data are available to nonaffiliated external healthcare facilities or entities on a secure, media free, reciprocally searchable basis with patient authorization for at least a 12 month period after this study. _______________ FINDINGS: BRAIN AND POSTERIOR FOSSA: The sulci, folia, ventricles and basal cisterns are within normal limits for the patient's with age concordant atrophy There is no intracranial hemorrhage, mass effect, or midline shift.  Mild periventricular low-attenuation. Although nonspecific this is frequently seen with chronic small vessel ischemic change. Otherwise, there are no areas of abnormal parenchymal attenuation. EXTRA-AXIAL SPACES AND MENINGES: There are no abnormal extra-axial fluid collections. CALVARIUM: Intact. SINUSES: Clear. OTHER: None. _______________     IMPRESSION: No acute intracranial abnormalities. Xr Chest Port    Result Date: 10/26/2020  EXAM: CHEST RADIOGRAPH CLINICAL INDICATION/HISTORY: Pneumonia   > Additional: None COMPARISON: 10/23/2020 TECHNIQUE: Portable frontal view of the chest _______________ FINDINGS: SUPPORT DEVICES: None. HEART AND MEDIASTINUM: Heart size is stable. Atherosclerotic calcification seen in the aorta. LUNGS AND PLEURAL SPACES: Increased hazy density at the left base. Slight blunting of the right costophrenic angle. No pneumothorax. Patchy right upper lobe opacity is similar to slightly improved. BONES AND SOFT TISSUES: Unremarkable. _______________     IMPRESSION: 1. Increased hazy opacity at the left base compared to the prior exam, possibly developing left lower lobe atelectasis and/or consolidation. Questionable small right effusion. 2. Patchy right upper lobe patchy opacity which is similar to slightly improved. Xr Chest Port    Result Date: 10/24/2020  EXAM: XR CHEST PORT. CLINICAL INDICATION/HISTORY: meets SIRS criteria. -Additional: None. TECHNIQUE: A portable erect AP radiographic view of the chest is compared with several other chest radiographs performed the same month. _______________ FINDINGS: See impression. No pneumothorax or appreciable significant pleural effusion. The cardiac silhouette, vanessa, and mediastinal contours are normal for age. No acute osseous abnormality is revealed. _______________     IMPRESSION: Slight improvement in multifocal airspace disease most in keeping with pneumonia, again most pronounced at the right lung apex with no new or worsening findings. Recommend continued radiographic follow-up to resolution. _______________     Yareli Hodgesl Chest Port    Result Date: 10/18/2020  EXAM: CHEST RADIOGRAPH CLINICAL INDICATION/HISTORY: SBO   > Additional: None COMPARISON: 10/17/2020. TECHNIQUE: Portable frontal view of the chest _______________ FINDINGS: SUPPORT DEVICES: None. HEART AND MEDIASTINUM: No appreciable cardiomegaly. Remaining mediastinal contours within normal limits. LUNGS AND PLEURAL SPACES: No significant change of bilateral upper lobe parenchymal opacities. Hyperexpansion of the lungs. No evidence for pneumothorax. BONY THORAX AND SOFT TISSUES: Unremarkable. _______________     IMPRESSION: 1. No significant change of bilateral lung pneumonia. Follow-up to resolution is recommended. 2. Emphysema. Xr Chest Port    Result Date: 10/18/2020  EXAM: CHEST RADIOGRAPH CLINICAL INDICATION/HISTORY: shortness of breath   > Additional: None COMPARISON: October 14, 2020. TECHNIQUE: Portable frontal view of the chest _______________ FINDINGS: SUPPORT DEVICES: None. HEART AND MEDIASTINUM: No appreciable cardiomegaly. Remaining mediastinal contours within normal limits. LUNGS AND PLEURAL SPACES: No significant change of bilateral upper lobe parenchymal opacities. Hyperexpansion of the lungs. No evidence for pneumothorax or pleural effusion. BONY THORAX AND SOFT TISSUES: Unremarkable. _______________     IMPRESSION: 1. No significant change of bilateral lung pneumonia. Follow-up to resolution is recommended to exclude underlying mass. 2. Emphysema. Xr Chest Port    Result Date: 10/14/2020  EXAM: XR CHEST PORT CLINICAL INDICATION/HISTORY: Shortness of breath with cough -Additional: None COMPARISON: Several prior studies, most recently 2/19/2019 TECHNIQUE: Frontal view of the chest _______________ FINDINGS: HEART AND MEDIASTINUM: Normal cardiac size and mediastinal contours.  LUNGS AND PLEURAL SPACES: Patchy multifocal opacities noted throughout bilateral upper lobes, right greater than left as well as throughout the periphery of the right lower lobe. No evidence of pneumothorax. BONY THORAX AND SOFT TISSUES: No acute osseous abnormality _______________     IMPRESSION: Patchy multifocal airspace disease compatible with pneumonia. Recommend radiographic follow-up to document expected clinical resolution in 4-6 weeks' time.        Quinton Paige MD

## 2020-11-05 NOTE — PROGRESS NOTES
Quick note    Spoke with patient's son, Venessa Ramirez, and daughter, Ced Jett via speaker phone. Currently plan is to engage hospice services with discharge to a LTC. Change in code status to DNR/DNI, comfort measures only. Left voicemail for patient's son, Kd Garcia [phone] to notify him that I have been working on plan of care with his siblings and that we will be planning to discharge soon. If he has questions, instructed him to contact myself or his siblings.

## 2020-11-05 NOTE — HOSPICE
Hospice referral received. Chart review in process. Thank you for the referral to [facility].  If we can be of further assistance please contact [address] [place], [address] [place] [address]., [address] [facility], [address]  [phone]  Email: [email or web address]

## 2020-11-05 NOTE — PROGRESS NOTES
DC Plan:  LTC with hospice    Chart reviewed. Pt admitted to tele. Pt unable to provide hx, pt confused and restrained. Pts family members - son Ousmane Todd and daughter Judy Anderson have had palliative meeting and now decided on comfort care and LTC. VM left for Karla Roque to further discuss dc. Pt has been accepted @ [facility] for LTC. They can accept Monday. Pt has UAI from [facility]. Pt has neg covid. CM did speak with Jabari Mathew @ [facility] [place]) admissions and they contract with Milo Murray, referral to be sent.  CM will cont to follow [phone]

## 2020-11-05 NOTE — PROGRESS NOTES
Bedside and Verbal shift change report given to LORENZO Villalta Rn (oncoming nurse) by [employer] (offgoing nurse). Report included the following information SBAR, Kardex, Intake/Output, MAR and Recent Results.

## 2020-11-05 NOTE — PROGRESS NOTES
Problem: Falls - Risk of  Goal: *Absence of Falls  Description: Document Ivin Herrera Fall Risk and appropriate interventions in the flowsheet.   Outcome: Progressing Towards Goal  Note: Fall Risk Interventions:  Mobility Interventions: Bed/chair exit alarm, Communicate number of staff needed for ambulation/transfer, Patient to call before getting OOB    Mentation Interventions: Adequate sleep, hydration, pain control    Medication Interventions: Assess postural VS orthostatic hypotension, Evaluate medications/consider consulting pharmacy, Patient to call before getting OOB    Elimination Interventions: Bed/chair exit alarm, Call light in reach, Patient to call for help with toileting needs

## 2020-11-05 NOTE — PROGRESS NOTES
NUTRITION update    ASSESSMENT/PLAN:     Current Diet: Dental Soft; Magic Cup TID    Chart reviewed, note change in goals of care now focused on comfort measures only. Aggressive nutrition intervention is not indicated at this time. Will assist as needed; please consult if nutrition intervention is medically indicated and consistent with patient's goals of care.       Lennox Null, RD  Pager:  [phone]

## 2020-11-06 PROCEDURE — 74011250637 HC RX REV CODE- 250/637: Performed by: HOSPITALIST

## 2020-11-06 PROCEDURE — 74011250636 HC RX REV CODE- 250/636: Performed by: INTERNAL MEDICINE

## 2020-11-06 PROCEDURE — 65270000029 HC RM PRIVATE

## 2020-11-06 PROCEDURE — 74011250637 HC RX REV CODE- 250/637: Performed by: INTERNAL MEDICINE

## 2020-11-06 PROCEDURE — 74011250637 HC RX REV CODE- 250/637: Performed by: NURSE PRACTITIONER

## 2020-11-06 RX ADMIN — MORPHINE SULFATE 5 MG: 100 SOLUTION ORAL at 00:50

## 2020-11-06 RX ADMIN — METOPROLOL TARTRATE 25 MG: 25 TABLET, FILM COATED ORAL at 20:09

## 2020-11-06 RX ADMIN — GABAPENTIN 100 MG: 100 CAPSULE ORAL at 21:16

## 2020-11-06 RX ADMIN — SODIUM CHLORIDE 10 ML: 9 INJECTION, SOLUTION INTRAMUSCULAR; INTRAVENOUS; SUBCUTANEOUS at 14:00

## 2020-11-06 RX ADMIN — QUETIAPINE FUMARATE 100 MG: 100 TABLET ORAL at 20:09

## 2020-11-06 RX ADMIN — LORAZEPAM 1 MG: 2 INJECTION INTRAMUSCULAR at 03:37

## 2020-11-06 RX ADMIN — LORAZEPAM 1 MG: 2 INJECTION INTRAMUSCULAR at 20:18

## 2020-11-06 NOTE — PROGRESS NOTES
Transition of care to SNF: [facility]i spoke with Lay Bolaños and confirmed that he is agreeable to hospice/LTC at facility and will have [facility] Providing hospice services. Niko is aware pt will dc on Monday after Noon. Information emailed to [email or web address] in preparation. Cm confirmed with Kassandra Geo [phone] that they are expecting him on Monday after Noon, cannot accept in [facility] due to limited access on their part now. Communication to Patient/Family:  Met with patient and family, and they are agreeable to the transition plan. The Plan for Transition of Care is related to the following treatment goals: septic shock, Hospice    The Patient and/or patient representative  was provided with a choice of provider and agrees   with the discharge plan. Yes [x] No []    Freedom of choice list was provided with basic dialogue that supports the patient's individualized plan of care/goals and shares the quality data associated with the providers. Yes [x] No []    SNF/Rehab Transition:  Patient has been accepted to [address] at [address] [place] [address], [address] [place] [address], and meets criteria for admission. Patient will transported by medical transport and expected to leave after NOON on day of dc. Please set up transport as early as 1pm.     Communication to SNF/Rehab:  Bedside RN,, has been notified to update the transition plan to the facility and call report [phone], [phone]. Discharge information has been updated on the AVS and sent via [facility] and/or CC link. Discharge instructions to be fax'd to facility at [phone]     Please include all hard scripts for controlled substances, med rec and dc summary, and AVS in packet. Please medicate for pain prior to dc if possible and needed to help offset delay when patient first arrives to facility.     Reviewed and confirmed with facility, [address] can manage the patient care needs for the following:     Call Sitter with (X) only those applicable:  Medication:  []Medications are available at the facility  []IV Antibiotics    []Controlled Substance  hard copies available sent. []Weekly Labs    Equipment:  []CPAP/BiPAP  []Wound Vacuum  []Lindo or Urinary Device  []PICC/Central Line  []Nebulizer  []Ventilator    Treatment:  []Isolation (for MRSA, VRE, etc.)  []Surgical Drain Management  []Tracheostomy Care  []Dressing Changes  []Dialysis with transportation  []PEG Care  []Oxygen  []Daily Weights for Heart Failure    Dietary:  []Any diet limitations  []Tube Feedings   []Total Parenteral Management (TPN)    Financial Resources:  []Medicaid Application Completed    []UAI Completed and copy given to pt/family    [x]A screening has previously been completed. []Level II Completed    [] Private pay individual who will not become   financially eligible for Medicaid within 6 months from admission to a [address] [place] [address] facility. [] Individual refused to have screening conducted. [x]Medicaid Application Completed    []The screening denied because it was determined individual did not need/did not qualify for nursing facility level of care. [] Out of state residents seeking direct admission to a [address] facility. [] Individuals who are inpatients of an out of state hospital, or in state or out of state veterans/ hospital and seek direct admission to a [address] facility  [] Individuals who are pateints or residents of a state owned/operated facility that is licensed by [employer] of [employer] and seek direct admission to [address] [place] [address]  [] A screening not required for enrollment in [facility] services as set out in [address] [place] [address] 30-  [] [facility]) staff shall perform screenings of the [facility] clients.  At [facility]:  [x]Surrogate Decision Maker of Care  []POA  []Communicated Code Status and copy sent.  Niko Ramos   Other:

## 2020-11-06 NOTE — PROGRESS NOTES
[facility] [phone]  [facility] [phone]    Palliative SW f/u with Mr. Darwin Fothergill who was converted to comfort measures yesterday 11/5. Mr. Darwin Fothergill was asleep on arrival and appeared comfortable without distress. Didn't wake him to allow him to rest.  D/c plan is for LTC with hospice services. Awaiting POST form with return signature from daughter Arnol Neal. when POST is received will place on paper chart for scanning. Palliative will continue to follow along. Thank you for the opportunity to assist in the care of Mr. Darwin Fothergill.    Jameson Blas MSW, APHSW-C  Palliative Medicine

## 2020-11-06 NOTE — PROGRESS NOTES
Problem: Falls - Risk of  Goal: *Absence of Falls  Description: Document Sintia Patel Fall Risk and appropriate interventions in the flowsheet.   Outcome: Progressing Towards Goal  Note: Fall Risk Interventions:  Mobility Interventions: Bed/chair exit alarm    Mentation Interventions: Bed/chair exit alarm    Medication Interventions: Patient to call before getting OOB, Teach patient to arise slowly    Elimination Interventions: Call light in reach, Patient to call for help with toileting needs

## 2020-11-06 NOTE — PROGRESS NOTES
DC Plan:  LTC with hospice    Per previous CM plan:      Chart reviewed    Pts family members - son Alana Stearns and daughter Too Trippr have had palliative meeting and now decided on comfort care and LTC. Tonye Cogan Pt has been accepted @ [facility] for LTC. They can accept Monday. Pt has UAI from [facility]. Pt has neg covid. CM did speak with Lyn Rubio @ [facility] [place]) admissions and they contract with Milo Murray, referral to be sent.

## 2020-11-06 NOTE — PROGRESS NOTES
TRANSFER - OUT REPORT:    Verbal report given to REBECCA Pickard RN(name) on Sayah Sr.  being transferred to  (unit) for routine progression of care       Report consisted of patients Situation, Background, Assessment and   Recommendations(SBAR). Information from the following report(s) SBAR, Kardex, Intake/Output, MAR, Accordion and Recent Results was reviewed with the receiving nurse. Lines:   Peripheral IV 57/10/41 Right Basilic (Active)   Site Assessment Clean, dry, & intact 11/04/20 1600   Phlebitis Assessment 0 11/04/20 1600   Infiltration Assessment 0 11/04/20 1600   Dressing Status Occlusive 11/04/20 1600   Dressing Type Tape;Transparent 11/04/20 1600   Hub Color/Line Status Pink; Infusing 11/04/20 1600   Alcohol Cap Used Yes 11/04/20 1600        Opportunity for questions and clarification was provided.       Patient transported with:   Registered Nurse

## 2020-11-06 NOTE — PROGRESS NOTES
2200: Bedside report received from Kylee Gonzalez RN. Patient transferred from Telemetry. Assumed care of pt at this time. Patient is on comfort measures. Pt in bed with no signs of distress. Pt left with call light within reach and encouraged to call for assistance. 2211: Patient screaming out for nurse despite having call bell on chest. Ativan 1mg given IV.     0050: Patient in bed, found laying on stomach asking for cigarette. Patient complains of some pain after repositioning him supine. Morphine 5mg given. 3769: Patient restless in bed -- yelling. IV Ativan given PRN. Shift summary: Patient's head of bed elevated due to aspiration precautions. Patient slept on and off throughout shift. Occasions of restlessness. Incontinence care completed. Linens changed. Patient remained on room air. Remains on comfort measures. Bed alarm remains on. Patient did not attempt to leave bed. Patient fed pudding and coffee. 1858: Bedside shift change report given to Alejandra Rodrigues RN (oncoming nurse) by Junior HILLIARD RN (offgoing nurse). Report included the following information SBAR, Kardex and MAR.

## 2020-11-06 NOTE — PROGRESS NOTES
Hospitalist Progress Note-critical care note     Patient: Brea Blanchard Sr. MRN: [id number]  [facility]: [id number]    YOB: 1955  Age: 63 y.o.   Sex: male    DOA: 10/23/2020 LOS:  LOS: 14 days            Chief complaint: Pneumonia, marijuana abuse, legal blindness, amputation bilateral lower extremity, encephalopathy, acute respiratory failure with hypoxia    Assessment/Plan         Hospital Problems  Date Reviewed: 10/23/2020          Codes Class Noted POA    Comfort measures only status ICD-10-CM: Z51.5  ICD-9-CM: V66.7  11/5/2020 Unknown        Dysphagia ICD-10-CM: R13.10  ICD-9-CM: 787.20  11/3/2020 Unknown        Hypokalemia ICD-10-CM: E87.6  ICD-9-CM: 276.8  11/3/2020 Unknown        Hypomagnesemia ICD-10-CM: E83.42  ICD-9-CM: 275.2  11/3/2020 Unknown        Aspiration pneumonia ([address] ICD-10-CM: J69.0  ICD-9-CM: 507.0  10/28/2020 Unknown        Deep vein thrombosis (DVT) of lower extremity ([address] ICD-10-CM: I82.409  ICD-9-CM: 453.40  10/28/2020 Unknown        Acute respiratory failure with hypoxia (HCC) ICD-10-CM: J96.01  ICD-9-CM: 518.81  10/26/2020 Unknown        Chronic obstructive pulmonary disease with acute exacerbation ([address] ICD-10-CM: J44.1  ICD-9-CM: 491.21  10/24/2020 Unknown        Severe protein-calorie malnutrition ([address] ICD-10-CM: E43  ICD-9-CM: 262  10/24/2020 Unknown        Metabolic encephalopathy DCI-49-KB: G93.41  ICD-9-CM: 348.31  10/24/2020 Unknown        * (Principal) Septic shock ([address] ICD-10-CM: A41.9, R65.21  ICD-9-CM: 038.9, 785.52, 995.92  10/23/2020 Unknown        Amputation of both lower extremities ([address] ICD-10-CM: P24.877A, S19.095K  ICD-9-CM: 897.6  Unknown Yes        PNA (pneumonia) ICD-10-CM: J18.9  ICD-9-CM: 486  Unknown Yes        Marijuana abuse ICD-10-CM: F12.10  ICD-9-CM: 305.20  Unknown Yes        Centrilobular emphysema ([address] ICD-10-CM: J43.2  ICD-9-CM: 492.8  Unknown Unknown        Legal blindness ICD-10-CM: H54.8  ICD-9-CM: 369.4  10/14/2020 Yes Hypotension, unspecified ICD-10-CM: I95.9  ICD-9-CM: 458.9  1/27/2014 Yes                A 63-year-old male with a history of a COPD, bilateral above knee amputation, recently discharged from [facility] after treated for pneumonia who was admitted for septic shock and metabolic encephalopathy.     Family made final decision and comfort care granted. Cm is on board for hospice , on  comfort feeding     Acute respiratory failure with hypoxia   Resolving   continue aspiration precaution,   Complete iv abx for aspiration pna       COPD with right upper lobe pneumonia questionable mass/emphysema /aspiration pna  Aspiration precaution        Cardiovascular septic shock: resolved.       Prolong Qt   Cardiologist was on board   HTN: on  Norvasc and metoprolol.       Chronic dvt noted from pvl   On lovenox     Hypokalemia and hypomagnsemia  K and mg replacement         Acute metabolic encephalopathy   Agitation-improving  -on Seroquel     Legal blindness :fall /aspiration precaution      marijuana use     Severe malnutrition    Bilateral AKA    Subjective : non verbal and sedated   Disposition : hospice LTC  Review of systems:  Unable to obtain due to on and off confusion, need redirected     Vital signs/Intake and Output:  Visit Vitals  BP (!) 142/80 (BP 1 Location: Right arm, BP Patient Position: At rest)   Pulse 65   Temp 97.5 °F (36.4 °C)   Resp 18   Ht 5' 4\" (1.626 m)   Wt 41.1 kg (90 lb 11.2 oz)   SpO2 99%   BMI 15.57 kg/m²     Current Shift:  No intake/output data recorded. Last three shifts:  11/04 1901 - 11/06 0700  In: 1680 [P.O.:480; I.V.:1200]  Out: 200 [Urine:200]    Physical Exam:  General: Lethargic but arousable appears comfortable    HEENT: NC, Atraumatic. PERRLA, anicteric sclerae. Lungs: Coarse BS   Heart:  Regular  rhythm,  No murmur, No Rubs, No Gallops  Abdomen: Soft, Non distended, Non tender.   +Bowel sounds,   Extremities: No c/c, aka bilateral  Psych:   Calm,no agitation   Neurologic:   Lethargic but arousable      Labs: Results:       Chemistry Recent Labs     11/05/20  0500 11/04/20  0210   GLU 88 96    138   K 3.5 3.0*   * 106   CO2 22 23   BUN 5* 5*   CREA 0.54* 0.54*   CA 8.2* 8.3*   AGAP 7 9   BUCR 9* 9*   AP 91 95   TP 5.8* 6.3*   ALB 2.5* 2.8*   GLOB 3.3 3.5   AGRAT 0.8 0.8      CBC w/Diff Recent Labs     11/05/20  0500 11/04/20  0210   WBC 5.7 9.8   RBC 3.42* 3.70*   HGB 9.6* 10.5*   HCT 29.7* 31.7*    282   GRANS 78* 89*   LYMPH 10* 6*   EOS 2 0      Cardiac Enzymes No results for input(s): CPK, CKND1, CHUY in the last 72 hours. No lab exists for component: CKRMB, TROIP   Coagulation No results for input(s): PTP, INR, APTT, INREXT, INREXT in the last 72 hours. Lipid Panel No results found for: CHOL, CHOLPOCT, CHOLX, CHLST, CHOLV, 140942, HDL, HDLP, LDL, LDLC, DLDLP, 121290, VLDLC, VLDL, TGLX, TRIGL, TRIGP, TGLPOCT, CHHD, CHHDX   BNP No results for input(s): BNPP in the last 72 hours. Liver Enzymes Recent Labs     11/05/20  0500   TP 5.8*   ALB 2.5*   AP 91      Thyroid Studies Lab Results   Component Value Date/Time    TSH 0.78 10/24/2020 11:30 AM        Procedures/imaging: see electronic medical records for all procedures/Xrays and details which were not copied into this note but were reviewed prior to creation of Plan    Ct Head Wo Cont    Result Date: 10/24/2020  EXAM: CT head INDICATION: Altered mental status. COMPARISON: October 15, 2020. TECHNIQUE: Axial CT imaging of the head was performed without intravenous contrast. Dose reduction techniques used: automated exposure control, adjustment of the mAs and/or kVp according to patient size, and iterative reconstruction techniques. Digital imaging and communications in Medicine (DICOM) format image data are available to nonaffiliated external healthcare facilities or entities on a secure, media free, reciprocally searchable basis with patient authorization for at least 12 months after this study.  _______________ FINDINGS: BRAIN AND POSTERIOR FOSSA: There is cerebral volume loss with prominence of the lateral and the third ventricles. The cortical sulci are widened appropriately. The fourth ventricle and basal cisterns are normally outlined. There is mild bilateral periventricular and central white matter diminished attenuation. There is no acute territorial defect, hemorrhage or midline shift. EXTRA-AXIAL SPACES AND MENINGES: There are no abnormal extra-axial fluid collections. CALVARIUM: Intact. SINUSES: Clear. OTHER: Partial right mastoid opacification is again seen. _______________     IMPRESSION: Cerebral volume loss and mild bilateral periventricular and central white matter diminished attenuation which is nonspecific but likely to represent microvascular disease. There is no acute intracranial abnormality. No significant interval change. Ct Head Wo Cont    Result Date: 10/15/2020  EXAM: CT head INDICATION: Dental status change COMPARISON: None. TECHNIQUE: Axial CT imaging of the head was performed without intravenous contrast. One or more dose reduction techniques were used on this CT: automated exposure control, adjustment of the mAs and/or kVp according to patient size, and iterative reconstruction techniques. The specific techniques used on this CT exam have been documented in the patient's electronic medical record. Digital Imaging and Communications in Medicine (DICOM) format image data are available to nonaffiliated external healthcare facilities or entities on a secure, media free, reciprocally searchable basis with patient authorization for at least a 12 month period after this study. _______________ FINDINGS: BRAIN AND POSTERIOR FOSSA: The sulci, folia, ventricles and basal cisterns are within normal limits for the patient's with age concordant atrophy There is no intracranial hemorrhage, mass effect, or midline shift. Mild periventricular low-attenuation.  Although nonspecific this is frequently seen with chronic small vessel ischemic change. Otherwise, there are no areas of abnormal parenchymal attenuation. EXTRA-AXIAL SPACES AND MENINGES: There are no abnormal extra-axial fluid collections. CALVARIUM: Intact. SINUSES: Clear. OTHER: None. _______________     IMPRESSION: No acute intracranial abnormalities. Xr Chest Port    Result Date: 10/26/2020  EXAM: CHEST RADIOGRAPH CLINICAL INDICATION/HISTORY: Pneumonia   > Additional: None COMPARISON: 10/23/2020 TECHNIQUE: Portable frontal view of the chest _______________ FINDINGS: SUPPORT DEVICES: None. HEART AND MEDIASTINUM: Heart size is stable. Atherosclerotic calcification seen in the aorta. LUNGS AND PLEURAL SPACES: Increased hazy density at the left base. Slight blunting of the right costophrenic angle. No pneumothorax. Patchy right upper lobe opacity is similar to slightly improved. BONES AND SOFT TISSUES: Unremarkable. _______________     IMPRESSION: 1. Increased hazy opacity at the left base compared to the prior exam, possibly developing left lower lobe atelectasis and/or consolidation. Questionable small right effusion. 2. Patchy right upper lobe patchy opacity which is similar to slightly improved. Xr Chest Port    Result Date: 10/24/2020  EXAM: XR CHEST PORT. CLINICAL INDICATION/HISTORY: meets SIRS criteria. -Additional: None. TECHNIQUE: A portable erect AP radiographic view of the chest is compared with several other chest radiographs performed the same month. _______________ FINDINGS: See impression. No pneumothorax or appreciable significant pleural effusion. The cardiac silhouette, vanessa, and mediastinal contours are normal for age. No acute osseous abnormality is revealed. _______________     IMPRESSION: Slight improvement in multifocal airspace disease most in keeping with pneumonia, again most pronounced at the right lung apex with no new or worsening findings. Recommend continued radiographic follow-up to resolution.  _______________     [facility] Chest Port    Result Date: 10/18/2020  EXAM: CHEST RADIOGRAPH CLINICAL INDICATION/HISTORY: SBO   > Additional: None COMPARISON: 10/17/2020. TECHNIQUE: Portable frontal view of the chest _______________ FINDINGS: SUPPORT DEVICES: None. HEART AND MEDIASTINUM: No appreciable cardiomegaly. Remaining mediastinal contours within normal limits. LUNGS AND PLEURAL SPACES: No significant change of bilateral upper lobe parenchymal opacities. Hyperexpansion of the lungs. No evidence for pneumothorax. BONY THORAX AND SOFT TISSUES: Unremarkable. _______________     IMPRESSION: 1. No significant change of bilateral lung pneumonia. Follow-up to resolution is recommended. 2. Emphysema. Xr Chest Port    Result Date: 10/18/2020  EXAM: CHEST RADIOGRAPH CLINICAL INDICATION/HISTORY: shortness of breath   > Additional: None COMPARISON: October 14, 2020. TECHNIQUE: Portable frontal view of the chest _______________ FINDINGS: SUPPORT DEVICES: None. HEART AND MEDIASTINUM: No appreciable cardiomegaly. Remaining mediastinal contours within normal limits. LUNGS AND PLEURAL SPACES: No significant change of bilateral upper lobe parenchymal opacities. Hyperexpansion of the lungs. No evidence for pneumothorax or pleural effusion. BONY THORAX AND SOFT TISSUES: Unremarkable. _______________     IMPRESSION: 1. No significant change of bilateral lung pneumonia. Follow-up to resolution is recommended to exclude underlying mass. 2. Emphysema. Xr Chest Port    Result Date: 10/14/2020  EXAM: XR CHEST PORT CLINICAL INDICATION/HISTORY: Shortness of breath with cough -Additional: None COMPARISON: Several prior studies, most recently 2/19/2019 TECHNIQUE: Frontal view of the chest _______________ FINDINGS: HEART AND MEDIASTINUM: Normal cardiac size and mediastinal contours. LUNGS AND PLEURAL SPACES: Patchy multifocal opacities noted throughout bilateral upper lobes, right greater than left as well as throughout the periphery of the right lower lobe.  No evidence of pneumothorax. BONY THORAX AND SOFT TISSUES: No acute osseous abnormality _______________     IMPRESSION: Patchy multifocal airspace disease compatible with pneumonia. Recommend radiographic follow-up to document expected clinical resolution in 4-6 weeks' time.

## 2020-11-06 NOTE — PROGRESS NOTES
Bedside and Verbal shift change report given to PADDY Valadez RN (oncoming nurse) by Sunday Vazquez (offgoing nurse). Report included the following information SBAR, Kardex, Intake/Output and MAR. Patient A/OX4. Patient in bed resting quietly. No complaints at this time. Patient comfort measures only. Spoke with Eloina Cruz with [facility]. Her contact is [phone]. Patient slept majority of shift. Shift uneventful. Bedside and Verbal shift change report given to LEXIE Hoover RN (oncoming nurse) by Amaya Yancey (offgoing nurse). Report included the following information SBAR, Kardex, Intake/Output and MAR.

## 2020-11-06 NOTE — PROGRESS NOTES
Problem: Falls - Risk of  Goal: *Absence of Falls  Description: Document Ambika Saraviaann Fall Risk and appropriate interventions in the flowsheet.   Outcome: Progressing Towards Goal  Note: Fall Risk Interventions:  Mobility Interventions: Bed/chair exit alarm    Mentation Interventions: Bed/chair exit alarm    Medication Interventions: Bed/chair exit alarm    Elimination Interventions: Call light in reach, Toileting schedule/hourly rounds              Problem: Patient Education: Go to Patient Education Activity  Goal: Patient/Family Education  Outcome: Progressing Towards Goal

## 2020-11-07 PROCEDURE — 74011250637 HC RX REV CODE- 250/637: Performed by: NURSE PRACTITIONER

## 2020-11-07 PROCEDURE — 74011250637 HC RX REV CODE- 250/637: Performed by: FAMILY MEDICINE

## 2020-11-07 PROCEDURE — 74011250637 HC RX REV CODE- 250/637: Performed by: INTERNAL MEDICINE

## 2020-11-07 PROCEDURE — 74011250636 HC RX REV CODE- 250/636: Performed by: INTERNAL MEDICINE

## 2020-11-07 PROCEDURE — 74011250636 HC RX REV CODE- 250/636: Performed by: FAMILY MEDICINE

## 2020-11-07 PROCEDURE — 65270000029 HC RM PRIVATE

## 2020-11-07 PROCEDURE — 74011000250 HC RX REV CODE- 250: Performed by: HOSPITALIST

## 2020-11-07 PROCEDURE — 74011250637 HC RX REV CODE- 250/637: Performed by: HOSPITALIST

## 2020-11-07 RX ORDER — IBUPROFEN 200 MG
1 TABLET ORAL DAILY
Status: DISCONTINUED | OUTPATIENT
Start: 2020-11-07 | End: 2020-11-20 | Stop reason: HOSPADM

## 2020-11-07 RX ORDER — HALOPERIDOL 5 MG/ML
5 INJECTION INTRAMUSCULAR
Status: DISCONTINUED | OUTPATIENT
Start: 2020-11-07 | End: 2020-11-08

## 2020-11-07 RX ADMIN — HALOPERIDOL LACTATE 5 MG: 5 INJECTION, SOLUTION INTRAMUSCULAR at 19:39

## 2020-11-07 RX ADMIN — GABAPENTIN 100 MG: 100 CAPSULE ORAL at 08:15

## 2020-11-07 RX ADMIN — LORAZEPAM 1 MG: 2 INJECTION INTRAMUSCULAR at 15:47

## 2020-11-07 RX ADMIN — LORAZEPAM 1 MG: 2 INJECTION INTRAMUSCULAR at 22:31

## 2020-11-07 RX ADMIN — PANTOPRAZOLE SODIUM 40 MG: 40 TABLET, DELAYED RELEASE ORAL at 08:15

## 2020-11-07 RX ADMIN — GABAPENTIN 100 MG: 100 CAPSULE ORAL at 15:18

## 2020-11-07 RX ADMIN — LORAZEPAM 1 MG: 2 INJECTION INTRAMUSCULAR at 18:44

## 2020-11-07 RX ADMIN — MORPHINE SULFATE 5 MG: 100 SOLUTION ORAL at 14:39

## 2020-11-07 RX ADMIN — SODIUM CHLORIDE 10 ML: 9 INJECTION, SOLUTION INTRAMUSCULAR; INTRAVENOUS; SUBCUTANEOUS at 14:00

## 2020-11-07 RX ADMIN — GABAPENTIN 100 MG: 100 CAPSULE ORAL at 22:25

## 2020-11-07 RX ADMIN — METOPROLOL TARTRATE 25 MG: 25 TABLET, FILM COATED ORAL at 20:00

## 2020-11-07 RX ADMIN — QUETIAPINE FUMARATE 100 MG: 100 TABLET ORAL at 08:15

## 2020-11-07 RX ADMIN — AMLODIPINE BESYLATE 10 MG: 5 TABLET ORAL at 08:15

## 2020-11-07 RX ADMIN — LORAZEPAM 1 MG: 2 INJECTION INTRAMUSCULAR at 02:41

## 2020-11-07 RX ADMIN — SODIUM CHLORIDE 10 ML: 9 INJECTION, SOLUTION INTRAMUSCULAR; INTRAVENOUS; SUBCUTANEOUS at 05:38

## 2020-11-07 RX ADMIN — METOPROLOL TARTRATE 25 MG: 25 TABLET, FILM COATED ORAL at 08:15

## 2020-11-07 RX ADMIN — HALOPERIDOL LACTATE 5 MG: 5 INJECTION, SOLUTION INTRAMUSCULAR at 03:27

## 2020-11-07 RX ADMIN — MORPHINE SULFATE 5 MG: 100 SOLUTION ORAL at 02:59

## 2020-11-07 RX ADMIN — QUETIAPINE FUMARATE 100 MG: 100 TABLET ORAL at 20:00

## 2020-11-07 NOTE — PROGRESS NOTES
1905 Assumed patient care at this time. Report received from Lorenza Ramires RN.   2000 Shift assessment completed and documented in flow sheets at this time. Incontinence care provided. Brief changed. Gown and linen changed. Patient repositioned in bed. 2018 PRN Ativan administered for agitation. 0241 PRN Ativan administered for agitation. 0259 PRN Roxanol administered. 0295 Paged Dr. Harvey Garcia at this time regarding patient becoming increasingly agitated. Patient yelling and demanding his cigarettes. Throwing the call light and phone against the wall. Patient sitting up in bed and leaning forward. Lui Gonzalez 82 with Dr. Harvey Garcia at this time. Order for 5 mg Haldol every 6 hours. 0327 PRN Haldol administered. 3995 Bedside and verbal shift change report given to Toro Gamble RN (oncoming nurse) by Sue Lambert RN (offgoing nurse). Report included the following information: SBAR, Kardex, MAR, and recent results.

## 2020-11-07 NOTE — PROGRESS NOTES
Hospitalist Progress Note    Patient: Prabhu Dan Sr. MRN: [id number]  CSN: [id number]    YOB: 1955  Age: 63 y.o. Sex: male    DOA: 10/23/2020 LOS:  LOS: 15 days            Assessment/Plan     1. Acute hypoxemic respiratory fiailure   2. Aspiration pneumonia  3. COPD    Plan:  Continue comfort measures only  For DC monday      Patient Active Problem List   Diagnosis Code    Bursitis of elbow M70.30    Medication overdose T50.901A    Polio A80.9    Depressive disorder, not elsewhere classified F32.9    Type II or unspecified type diabetes mellitus without mention of complication, uncontrolled [id number]    Hypotension, unspecified I95.9    Amputation of both lower extremities ([facility] S88.911A, W70.786N    PNA (pneumonia) J18.9    ALEXANDER (acute kidney injury) ([facility] N17.9    Hyponatremia E87.1    Marijuana abuse F12.10    Centrilobular emphysema ([facility] J43.2    CAP (community acquired pneumonia) J18.9    Sepsis ([facility] A41.9    Hard of hearing H91.90    Legal blindness H54.8    Acute encephalopathy G93.40    Septic shock (HCC) A41.9, R65.21    Chronic obstructive pulmonary disease with acute exacerbation (HCC) J44.1    Severe protein-calorie malnutrition ([facility] H04    Metabolic encephalopathy L08.23    Acute respiratory failure with hypoxia (HCC) J96.01    Aspiration pneumonia (HCC) J69.0    Deep vein thrombosis (DVT) of lower extremity (HCC) I82.409    Dysphagia R13.10    Hypokalemia E87.6    Hypomagnesemia E83.42    Comfort measures only status Z51.5               Subjective:    cc: agitation  Pt agitated overnmight  Confused this AM  Eating breakfast      REVIEW OF SYSTEMS:  Unable to botain 2/2 mental status  Objective:        Vital signs/Intake and Output:  Visit Vitals  /68 (BP 1 Location: Right arm, BP Patient Position: At rest)   Pulse 83   Temp 98.7 °F (37.1 °C)   Resp 18   Ht 5' 4\" (1.626 m)   Wt 41.1 kg (90 lb 11.2 oz)   SpO2 99%   BMI 15.57 kg/m²     Current Shift:  No intake/output data recorded. Last three shifts:  11/05 1901 - 11/07 0700  In: 100 [P.O.:100]  Out: 200 [Urine:200]    Body mass index is 15.57 kg/m².     Physical Exam:  GEN: Alert , chronically ill appearing  EYES: conjunctiva normal, lids with out lesions  HEENT: MMdry, No thyromegaly, no lymphadenopathy  HEART: RRR +S1 +S2, no JVD, pulses 2+ distally  LUNGS: CTA B/L no wheezes, rales or rhonchi  ABDOMEN: + BS, soft NT/ND no organomegaly,  No rebound  EXTREMITIES: No edema cyanosis, cap refill normal, bilat amputee   SKIN: no rashes or skin breakdown, no nodules, normal turgor  Current Facility-Administered Medications   Medication Dose Route Frequency    haloperidol lactate (HALDOL) injection 5 mg  5 mg IntraVENous Q6H PRN    LORazepam (ATIVAN) tablet 1 mg  1 mg Oral Q6H PRN    LORazepam (INTENSOL) 2 mg/mL oral concentrate 1 mg  1 mg SubLINGual Q3H PRN    hyoscyamine SL (LEVSIN/SL) tablet 0.125 mg  0.125 mg SubLINGual Q4H PRN    morphine (ROXANOL) concentrated oral syringe 5 mg  5 mg SubLINGual Q1H PRN    pantoprazole (PROTONIX) tablet 40 mg  40 mg Oral DAILY    lidocaine 4 % patch 1 Patch  1 Patch TransDERmal Q24H    metoprolol tartrate (LOPRESSOR) tablet 25 mg  25 mg Oral Q12H    QUEtiapine (SEROquel) tablet 100 mg  100 mg Oral BID    amLODIPine (NORVASC) tablet 10 mg  10 mg Oral DAILY    gabapentin (NEURONTIN) capsule 100 mg  100 mg Oral TID    LORazepam (ATIVAN) injection 1 mg  1 mg IntraVENous Q4H PRN    sodium chloride (NS) flush 5-40 mL  5-40 mL IntraVENous Q8H    sodium chloride (NS) flush 5-40 mL  5-40 mL IntraVENous PRN    acetaminophen (TYLENOL) tablet 650 mg  650 mg Oral Q6H PRN    Or    acetaminophen (TYLENOL) suppository 650 mg  650 mg Rectal Q6H PRN    polyethylene glycol (MIRALAX) packet 17 g  17 g Oral DAILY PRN    promethazine (PHENERGAN) tablet 12.5 mg  12.5 mg Oral Q6H PRN    Or    ondansetron (ZOFRAN) injection 4 mg  4 mg IntraVENous Q6H PRN         All the patient's labs over the past 24 hours were reviewed both during my initial daily workflow process and at the time notated as \"note time\" in [employer]. (It is not time stamped separately in this workflow.)  Select labs are listed below.         Labs: Results:       Chemistry Recent Labs     11/05/20  0500   GLU 88      K 3.5   *   CO2 22   BUN 5*   CREA 0.54*   CA 8.2*   AGAP 7   BUCR 9*   AP 91   TP 5.8*   ALB 2.5*   GLOB 3.3   AGRAT 0.8      CBC w/Diff Recent Labs     11/05/20  0500   WBC 5.7   RBC 3.42*   HGB 9.6*   HCT 29.7*      GRANS 78*   LYMPH 10*   EOS 2                      Liver Enzymes Recent Labs     11/05/20  0500   TP 5.8*   ALB 2.5*   AP 91      Thyroid Studies Lab Results   Component Value Date/Time    TSH 0.78 10/24/2020 11:30 AM        Procedures/imaging: see electronic medical records for all procedures/Xrays and details which were not copied into this note but were reviewed prior to creation of Plan    Imaging personally reviewed:              Camila June, DO  Internal Medicine/Geriatrics

## 2020-11-07 NOTE — ROUTINE PROCESS
Bedside and Verbal shift change report given to Yoseph Magana (oncoming nurse) by Lupe Ndiaye RN (offgoing nurse). Report included the following information SBAR, Kardex, MAR and Recent Results.

## 2020-11-07 NOTE — PROGRESS NOTES
0700-Assumed patient care at this time. Received report from [facility], [facility]. complete and documented in flowsheets    0992-Verbal order with read back for patient to have a nicotine patch 14mg/24hr from Dr. Marcela Ramey    8239-Gave PRN pain medication at this time. Gave patient 5ml of morphine. 1547-Gave PRN ativan 1mg.      1844-Gave PRN Ativan for patient anxiety. Shift Summary-  Pt is alert and oriented x 1. Pt had uneventful shift. Pt voiding sufficient amounts. Pain controlled by PRN medication. Anxiety and agitation controlled by PRN medication.

## 2020-11-08 PROCEDURE — 74011250636 HC RX REV CODE- 250/636: Performed by: INTERNAL MEDICINE

## 2020-11-08 PROCEDURE — 74011250637 HC RX REV CODE- 250/637: Performed by: INTERNAL MEDICINE

## 2020-11-08 PROCEDURE — 74011000250 HC RX REV CODE- 250: Performed by: HOSPITALIST

## 2020-11-08 PROCEDURE — 74011250637 HC RX REV CODE- 250/637: Performed by: NURSE PRACTITIONER

## 2020-11-08 PROCEDURE — 74011250636 HC RX REV CODE- 250/636: Performed by: FAMILY MEDICINE

## 2020-11-08 PROCEDURE — 74011250637 HC RX REV CODE- 250/637: Performed by: FAMILY MEDICINE

## 2020-11-08 PROCEDURE — 65270000029 HC RM PRIVATE

## 2020-11-08 RX ORDER — QUETIAPINE FUMARATE 100 MG/1
100 TABLET, FILM COATED ORAL
Status: COMPLETED | OUTPATIENT
Start: 2020-11-08 | End: 2020-11-08

## 2020-11-08 RX ORDER — LORAZEPAM 1 MG/1
1 TABLET ORAL 3 TIMES DAILY
Status: DISCONTINUED | OUTPATIENT
Start: 2020-11-08 | End: 2020-11-18

## 2020-11-08 RX ORDER — QUETIAPINE FUMARATE 100 MG/1
200 TABLET, FILM COATED ORAL 2 TIMES DAILY
Status: DISCONTINUED | OUTPATIENT
Start: 2020-11-08 | End: 2020-11-09

## 2020-11-08 RX ORDER — ACETAMINOPHEN 325 MG/1
650 TABLET ORAL 3 TIMES DAILY
Status: DISCONTINUED | OUTPATIENT
Start: 2020-11-08 | End: 2020-11-19

## 2020-11-08 RX ORDER — GABAPENTIN 300 MG/1
300 CAPSULE ORAL 3 TIMES DAILY
Status: DISCONTINUED | OUTPATIENT
Start: 2020-11-08 | End: 2020-11-20 | Stop reason: HOSPADM

## 2020-11-08 RX ORDER — HALOPERIDOL 2 MG/ML
5 SOLUTION ORAL
Status: DISCONTINUED | OUTPATIENT
Start: 2020-11-08 | End: 2020-11-09

## 2020-11-08 RX ADMIN — LORAZEPAM 1 MG: 1 TABLET ORAL at 21:51

## 2020-11-08 RX ADMIN — ACETAMINOPHEN 650 MG: 325 TABLET ORAL at 16:55

## 2020-11-08 RX ADMIN — LORAZEPAM 1 MG: 1 TABLET ORAL at 16:55

## 2020-11-08 RX ADMIN — ACETAMINOPHEN 650 MG: 325 TABLET ORAL at 21:51

## 2020-11-08 RX ADMIN — LORAZEPAM 1 MG: 1 TABLET ORAL at 12:43

## 2020-11-08 RX ADMIN — SODIUM CHLORIDE 10 ML: 9 INJECTION, SOLUTION INTRAMUSCULAR; INTRAVENOUS; SUBCUTANEOUS at 06:40

## 2020-11-08 RX ADMIN — HALOPERIDOL LACTATE 5 MG: 5 INJECTION, SOLUTION INTRAMUSCULAR at 10:07

## 2020-11-08 RX ADMIN — GABAPENTIN 300 MG: 300 CAPSULE ORAL at 21:51

## 2020-11-08 RX ADMIN — METOPROLOL TARTRATE 25 MG: 25 TABLET, FILM COATED ORAL at 10:08

## 2020-11-08 RX ADMIN — SODIUM CHLORIDE 10 ML: 9 INJECTION, SOLUTION INTRAMUSCULAR; INTRAVENOUS; SUBCUTANEOUS at 16:59

## 2020-11-08 RX ADMIN — PANTOPRAZOLE SODIUM 40 MG: 40 TABLET, DELAYED RELEASE ORAL at 10:07

## 2020-11-08 RX ADMIN — QUETIAPINE FUMARATE 200 MG: 100 TABLET ORAL at 21:51

## 2020-11-08 RX ADMIN — AMLODIPINE BESYLATE 10 MG: 5 TABLET ORAL at 10:09

## 2020-11-08 RX ADMIN — HALOPERIDOL LACTATE 5 MG: 5 INJECTION, SOLUTION INTRAMUSCULAR at 03:15

## 2020-11-08 RX ADMIN — SODIUM CHLORIDE 10 ML: 9 INJECTION, SOLUTION INTRAMUSCULAR; INTRAVENOUS; SUBCUTANEOUS at 21:51

## 2020-11-08 RX ADMIN — METOPROLOL TARTRATE 25 MG: 25 TABLET, FILM COATED ORAL at 21:51

## 2020-11-08 RX ADMIN — GABAPENTIN 300 MG: 300 CAPSULE ORAL at 16:55

## 2020-11-08 RX ADMIN — LORAZEPAM 1 MG: 2 INJECTION INTRAMUSCULAR at 03:31

## 2020-11-08 RX ADMIN — QUETIAPINE FUMARATE 100 MG: 100 TABLET ORAL at 10:09

## 2020-11-08 NOTE — PROGRESS NOTES
1902 Assumed patient care at this time. Report received from [facility], RN.   7108 Shift assessment completed and documented in flow sheets at this time. 1939 PRN Haldol administered for agitation. Patient trying to get out of bed and yelling. 2231 PRN Ativan administered. 0315 PRN Haldol administered for agitation. Patient yelling out.   0331 PRN Ativan administered. Bedside and verbal shift change report given to Laurence Gomez RN (oncoming nurse) by Mihaela Walden RN (offgoing nurse). Report included the following information: SBAR, Kardex, MAR, and recent results.

## 2020-11-08 NOTE — PROGRESS NOTES
Patient assisted with compliance to current plan of care as directed.    Winston Johnson  11/8/2020  11:59 AM

## 2020-11-08 NOTE — PROGRESS NOTES
Hospitalist Progress Note    Patient: Rossy Shepard Sr. MRN: [id number]  CSN: [id number]    YOB: 1955  Age: 63 y.o. Sex: male    DOA: 10/23/2020 LOS:  LOS: 16 days            Assessment/Plan   1. Acute hypoxemic respiratory fiailure   2. Aspiration pneumonia  3. COPD  4. agitation    Plan:  - increase seroquel to 200 bid  - increase gabapentin 300 tid  - start scheduled ativan 1mg tid  - start scheudled tylenol  - he is on comfort measures for DC tomorrow      Patient Active Problem List   Diagnosis Code    Bursitis of elbow M70.30    Medication overdose T50.901A    Polio A80.9    Depressive disorder, not elsewhere classified F32.9    Type II or unspecified type diabetes mellitus without mention of complication, uncontrolled [id number]    Hypotension, unspecified I95.9    Amputation of both lower extremities ([facility] S88.911A, R34.995V    PNA (pneumonia) J18.9    ALEXANDER (acute kidney injury) ([facility] N17.9    Hyponatremia E87.1    Marijuana abuse F12.10    Centrilobular emphysema ([facility] J43.2    CAP (community acquired pneumonia) J18.9    Sepsis ([facility] A41.9    Hard of hearing H91.90    Legal blindness H54.8    Acute encephalopathy G93.40    Septic shock (HCC) A41.9, R65.21    Chronic obstructive pulmonary disease with acute exacerbation (HCC) J44.1    Severe protein-calorie malnutrition ([address] O28    Metabolic encephalopathy P55.19    Acute respiratory failure with hypoxia (HCC) J96.01    Aspiration pneumonia (HCC) J69.0    Deep vein thrombosis (DVT) of lower extremity ([facility]) I82.409    Dysphagia R13.10    Hypokalemia E87.6    Hypomagnesemia E83.42    Comfort measures only status Z51.5               Subjective:    cc: my arm hurts  Pt w continued agitation overnight requiring frequent IV ativan & haldol w modest effect    Yelling out this morning      REVIEW OF SYSTEMS:  Unable to obtain 2/2 confusion    Objective:        Vital signs/Intake and Output:  Visit Vitals  /76   Pulse 71   Temp 98 °F (36.7 °C)   Resp 18   Ht 5' 4\" (1.626 m)   Wt 41.1 kg (90 lb 11.2 oz)   SpO2 93%   BMI 15.57 kg/m²     Current Shift:  No intake/output data recorded. Last three shifts:  11/06 1901 - 11/08 0700  In: 580 [P.O.:400; I.V.:180]  Out: 300 [Urine:300]    Body mass index is 15.57 kg/m².     Physical Exam:  GEN: Alert, NAD  EYES: conjunctiva normal, lids with out lesions  HEENT: MMM, No thyromegaly, no lymphadenopathy  HEART: RRR +S1 +S2, no JVD, pulses 2+ distally  LUNGS: CTA B/L no wheezes, rales or rhonchi  ABDOMEN: + BS, soft NT/ND no organomegaly,  No rebound  EXTREMITIES:bilat amputee    SKIN: no rashes or skin breakdown, no nodules, normal turgor  Current Facility-Administered Medications   Medication Dose Route Frequency    LORazepam (ATIVAN) tablet 1 mg  1 mg Oral TID    gabapentin (NEURONTIN) capsule 300 mg  300 mg Oral TID    QUEtiapine (SEROquel) tablet 200 mg  200 mg Oral BID    acetaminophen (TYLENOL) tablet 650 mg  650 mg Oral TID    haloperidol lactate (HALDOL) injection 5 mg  5 mg IntraVENous Q6H PRN    nicotine (NICODERM CQ) 14 mg/24 hr patch 1 Patch  1 Patch TransDERmal DAILY    LORazepam (ATIVAN) tablet 1 mg  1 mg Oral Q6H PRN    LORazepam (INTENSOL) 2 mg/mL oral concentrate 1 mg  1 mg SubLINGual Q3H PRN    hyoscyamine SL (LEVSIN/SL) tablet 0.125 mg  0.125 mg SubLINGual Q4H PRN    morphine (ROXANOL) concentrated oral syringe 5 mg  5 mg SubLINGual Q1H PRN    pantoprazole (PROTONIX) tablet 40 mg  40 mg Oral DAILY    lidocaine 4 % patch 1 Patch  1 Patch TransDERmal Q24H    metoprolol tartrate (LOPRESSOR) tablet 25 mg  25 mg Oral Q12H    amLODIPine (NORVASC) tablet 10 mg  10 mg Oral DAILY    LORazepam (ATIVAN) injection 1 mg  1 mg IntraVENous Q4H PRN    sodium chloride (NS) flush 5-40 mL  5-40 mL IntraVENous Q8H    sodium chloride (NS) flush 5-40 mL  5-40 mL IntraVENous PRN    acetaminophen (TYLENOL) tablet 650 mg  650 mg Oral Q6H PRN    Or    acetaminophen (TYLENOL) suppository 650 mg  650 mg Rectal Q6H PRN    polyethylene glycol (MIRALAX) packet 17 g  17 g Oral DAILY PRN    promethazine (PHENERGAN) tablet 12.5 mg  12.5 mg Oral Q6H PRN    Or    ondansetron (ZOFRAN) injection 4 mg  4 mg IntraVENous Q6H PRN         All the patient's labs over the past 24 hours were reviewed both during my initial daily workflow process and at the time notated as \"note time\" in [facility]. (It is not time stamped separately in this workflow.)  Select labs are listed below.           Princess Stringer, DO  Internal Medicine/Geriatrics

## 2020-11-09 PROCEDURE — 74011000250 HC RX REV CODE- 250: Performed by: HOSPITALIST

## 2020-11-09 PROCEDURE — 74011250637 HC RX REV CODE- 250/637: Performed by: INTERNAL MEDICINE

## 2020-11-09 PROCEDURE — 65270000029 HC RM PRIVATE

## 2020-11-09 PROCEDURE — 74011250637 HC RX REV CODE- 250/637: Performed by: FAMILY MEDICINE

## 2020-11-09 PROCEDURE — 74011250637 HC RX REV CODE- 250/637: Performed by: HOSPITALIST

## 2020-11-09 PROCEDURE — 74011250637 HC RX REV CODE- 250/637: Performed by: NURSE PRACTITIONER

## 2020-11-09 RX ORDER — HALOPERIDOL 0.5 MG/1
0.5 TABLET ORAL 2 TIMES DAILY
Qty: 1 TAB | Refills: 0 | Status: SHIPPED | OUTPATIENT
Start: 2020-11-09 | End: 2020-11-20

## 2020-11-09 RX ORDER — QUETIAPINE FUMARATE 300 MG/1
300 TABLET, FILM COATED ORAL 2 TIMES DAILY
Qty: 2 TAB | Refills: 0 | Status: SHIPPED | OUTPATIENT
Start: 2020-11-09 | End: 2020-11-20 | Stop reason: SDUPTHER

## 2020-11-09 RX ORDER — LORAZEPAM 1 MG/1
1 TABLET ORAL 3 TIMES DAILY
Qty: 3 TAB | Refills: 0 | Status: SHIPPED | OUTPATIENT
Start: 2020-11-09 | End: 2020-11-20 | Stop reason: SDUPTHER

## 2020-11-09 RX ORDER — GABAPENTIN 300 MG/1
300 CAPSULE ORAL 3 TIMES DAILY
Qty: 3 CAP | Refills: 0 | Status: SHIPPED | OUTPATIENT
Start: 2020-11-09 | End: 2020-11-10

## 2020-11-09 RX ORDER — NALOXONE HYDROCHLORIDE 4 MG/.1ML
SPRAY NASAL
Qty: 2 EACH | Refills: 0 | Status: SHIPPED | OUTPATIENT
Start: 2020-11-09

## 2020-11-09 RX ORDER — QUETIAPINE FUMARATE 100 MG/1
300 TABLET, FILM COATED ORAL 2 TIMES DAILY
Status: DISCONTINUED | OUTPATIENT
Start: 2020-11-09 | End: 2020-11-20 | Stop reason: HOSPADM

## 2020-11-09 RX ORDER — HALOPERIDOL 1 MG/1
0.5 TABLET ORAL 2 TIMES DAILY
Status: DISCONTINUED | OUTPATIENT
Start: 2020-11-09 | End: 2020-11-10

## 2020-11-09 RX ADMIN — METOPROLOL TARTRATE 25 MG: 25 TABLET, FILM COATED ORAL at 22:43

## 2020-11-09 RX ADMIN — SODIUM CHLORIDE 10 ML: 9 INJECTION, SOLUTION INTRAMUSCULAR; INTRAVENOUS; SUBCUTANEOUS at 18:01

## 2020-11-09 RX ADMIN — LORAZEPAM 1 MG: 1 TABLET ORAL at 11:03

## 2020-11-09 RX ADMIN — ACETAMINOPHEN 650 MG: 325 TABLET ORAL at 18:00

## 2020-11-09 RX ADMIN — METOPROLOL TARTRATE 25 MG: 25 TABLET, FILM COATED ORAL at 11:03

## 2020-11-09 RX ADMIN — SODIUM CHLORIDE 10 ML: 9 INJECTION, SOLUTION INTRAMUSCULAR; INTRAVENOUS; SUBCUTANEOUS at 22:48

## 2020-11-09 RX ADMIN — SODIUM CHLORIDE 10 ML: 9 INJECTION, SOLUTION INTRAMUSCULAR; INTRAVENOUS; SUBCUTANEOUS at 06:00

## 2020-11-09 RX ADMIN — LORAZEPAM 1 MG: 1 TABLET ORAL at 18:00

## 2020-11-09 RX ADMIN — QUETIAPINE FUMARATE 300 MG: 100 TABLET ORAL at 22:43

## 2020-11-09 RX ADMIN — PANTOPRAZOLE SODIUM 40 MG: 40 TABLET, DELAYED RELEASE ORAL at 11:02

## 2020-11-09 RX ADMIN — GABAPENTIN 300 MG: 300 CAPSULE ORAL at 11:02

## 2020-11-09 RX ADMIN — QUETIAPINE FUMARATE 200 MG: 100 TABLET ORAL at 11:02

## 2020-11-09 RX ADMIN — HALOPERIDOL 0.5 MG: 1 TABLET ORAL at 22:44

## 2020-11-09 RX ADMIN — GABAPENTIN 300 MG: 300 CAPSULE ORAL at 22:43

## 2020-11-09 RX ADMIN — ACETAMINOPHEN 650 MG: 325 TABLET ORAL at 22:43

## 2020-11-09 RX ADMIN — ACETAMINOPHEN 650 MG: 325 TABLET ORAL at 11:03

## 2020-11-09 RX ADMIN — LORAZEPAM 1 MG: 1 TABLET ORAL at 02:49

## 2020-11-09 RX ADMIN — AMLODIPINE BESYLATE 10 MG: 5 TABLET ORAL at 11:03

## 2020-11-09 RX ADMIN — GABAPENTIN 300 MG: 300 CAPSULE ORAL at 18:00

## 2020-11-09 NOTE — PROGRESS NOTES
D/C Plan: SNF/LTC     has been notified that [facility] are no longer able to accept this pt. CM contacted pt's son, Abigail Taylor ([phone]), and provided an update. Family is agreeable to having clinical information resent to area facilities for review.   CMS has been notified to assist.  CM  to continue to follow and assist.

## 2020-11-09 NOTE — PROGRESS NOTES
Bedside and Verbal shift change report given to Leonor (oncoming nurse) by Yeison BAUTISTAN, RN (offgoing nurse). Report included the following information SBAR, Kardex and MAR. SHIFT UPDATES:  According to nightshift staff, patient presented with generalized agitation on comfort measures and received/tolerated PRN Haldol 5 mg IV every 6 hours and PRN Ativan 1 mg PO every 6 hours or Ativan 1 mg IV every 4 hours. Dr. Petr Ga ordered for patient to receive scheduled Ativan 1 mg tablet by mouth three times a day for agitation management. Patient presented with bilateral above the knee amputation and presented legally blind and with hearing deficits (No hearing aides available). Patient tolerates all meal trays provided. No complications presented for patient for duration of shift. ABNORMAL LAB:   Results for Jimy Chandra (MRN [id number]) as of 11/8/2020 14:22   Ref. Range 11/5/2020 05:00   WBC Latest Ref Range: 4.6 - 13.2 K/uL 5.7   RBC Latest Ref Range: 4.70 - 5.50 M/uL 3.42 (L)   HGB Latest Ref Range: 13.0 - 16.0 g/dL 9.6 (L)   HCT Latest Ref Range: 36.0 - 48.0 % 29.7 (L)   MCV Latest Ref Range: 74.0 - 97.0 FL 86.8   MCH Latest Ref Range: 24.0 - 34.0 PG 28.1   MCHC Latest Ref Range: 31.0 - 37.0 g/dL 32.3   RDW Latest Ref Range: 11.6 - 14.5 % 15.8 (H)   PLATELET Latest Ref Range: 135 - 420 K/uL 249   MPV Latest Ref Range: 9.2 - 11.8 FL 10.7   NEUTROPHILS Latest Ref Range: 40 - 73 % 78 (H)   LYMPHOCYTES Latest Ref Range: 21 - 52 % 10 (L)   MONOCYTES Latest Ref Range: 3 - 10 % 10   EOSINOPHILS Latest Ref Range: 0 - 5 % 2   BASOPHILS Latest Ref Range: 0 - 2 % 0   DF Latest Units:   AUTOMATED   ABS. NEUTROPHILS Latest Ref Range: 1.8 - 8.0 K/UL 4.4   ABS. LYMPHOCYTES Latest Ref Range: 0.9 - 3.6 K/UL 0.6 (L)   ABS. MONOCYTES Latest Ref Range: 0.05 - 1.2 K/UL 0.6   ABS. EOSINOPHILS Latest Ref Range: 0.0 - 0.4 K/UL 0.1   ABS.  BASOPHILS Latest Ref Range: 0.0 - 0.1 K/UL 0.0   Sodium Latest Ref Range: 136 - 145 mmol/L 143   Potassium Latest Ref Range: 3.5 - 5.5 mmol/L 3.5   Chloride Latest Ref Range: 100 - 111 mmol/L 114 (H)   CO2 Latest Ref Range: 21 - 32 mmol/L 22   Anion gap Latest Ref Range: 3.0 - 18 mmol/L 7   Glucose Latest Ref Range: 74 - 99 mg/dL 88   BUN Latest Ref Range: 7.0 - 18 MG/DL 5 (L)   Creatinine Latest Ref Range: 0.6 - 1.3 MG/DL 0.54 (L)   BUN/Creatinine ratio Latest Ref Range: 12 - 20   9 (L)   Calcium Latest Ref Range: 8.5 - 10.1 MG/DL 8.2 (L)   Magnesium Latest Ref Range: 1.6 - 2.6 mg/dL 1.8   GFR est non-AA Latest Ref Range: >60 ml/min/1.73m2 >60   GFR est AA Latest Ref Range: >60 ml/min/1.73m2 >60   Bilirubin, total Latest Ref Range: 0.2 - 1.0 MG/DL 0.2   Protein, total Latest Ref Range: 6.4 - 8.2 g/dL 5.8 (L)   Albumin Latest Ref Range: 3.4 - 5.0 g/dL 2.5 (L)   Globulin Latest Ref Range: 2.0 - 4.0 g/dL 3.3   A-G Ratio Latest Ref Range: 0.8 - 1.7   0.8   ALT Latest Ref Range: 16 - 61 U/L 19   AST Latest Ref Range: 10 - 38 U/L 21   Alk. phosphatase Latest Ref Range: 45 - 117 U/L 91     Wounds? Sacral (Redness)-Mepilex Dressing applied. Central Lines? None. Last BM: 11/5/2020      Pending Labs for AM Draw: None. Discharge plan:  As of 11/6/2020 at 12:21 pm, case management note states that patient will be discharged prior to 12 noon to [address] when found medically stable.      Winston Johnson  11/8/2020  7:07 PM

## 2020-11-09 NOTE — PROGRESS NOTES
Hospitalist Progress Note-critical care note     Patient: Jemima Bernal Sr. MRN: [id number]  [facility]: [id number]    YOB: 1955  Age: 63 y.o.   Sex: male    DOA: 10/23/2020 LOS:  LOS: 17 days            Chief complaint: Pneumonia, marijuana abuse, legal blindness, amputation bilateral lower extremity, encephalopathy, acute respiratory failure with hypoxia    Assessment/Plan         Hospital Problems  Date Reviewed: 10/23/2020          Codes Class Noted POA    Comfort measures only status ICD-10-CM: Z51.5  ICD-9-CM: V66.7  11/5/2020 Unknown        Dysphagia ICD-10-CM: R13.10  ICD-9-CM: 787.20  11/3/2020 Unknown        Hypokalemia ICD-10-CM: E87.6  ICD-9-CM: 276.8  11/3/2020 Unknown        Hypomagnesemia ICD-10-CM: E83.42  ICD-9-CM: 275.2  11/3/2020 Unknown        Aspiration pneumonia ([facility] ICD-10-CM: J69.0  ICD-9-CM: 507.0  10/28/2020 Unknown        Deep vein thrombosis (DVT) of lower extremity ([facility] ICD-10-CM: I82.409  ICD-9-CM: 453.40  10/28/2020 Unknown        Acute respiratory failure with hypoxia (HCC) ICD-10-CM: J96.01  ICD-9-CM: 518.81  10/26/2020 Unknown        Chronic obstructive pulmonary disease with acute exacerbation ([facility] ICD-10-CM: J44.1  ICD-9-CM: 491.21  10/24/2020 Unknown        Severe protein-calorie malnutrition ([facility] ICD-10-CM: E43  ICD-9-CM: 262  10/24/2020 Unknown        Metabolic encephalopathy MZX-40-GH: G93.41  ICD-9-CM: 348.31  10/24/2020 Unknown        * (Principal) Septic shock ([facility] ICD-10-CM: A41.9, R65.21  ICD-9-CM: 038.9, 785.52, 995.92  10/23/2020 Unknown        Amputation of both lower extremities ([facility] ICD-10-CM: H36.163K, S70.304Y  ICD-9-CM: 897.6  Unknown Yes        PNA (pneumonia) ICD-10-CM: J18.9  ICD-9-CM: 486  Unknown Yes        Marijuana abuse ICD-10-CM: F12.10  ICD-9-CM: 305.20  Unknown Yes        Centrilobular emphysema ([address] ICD-10-CM: J43.2  ICD-9-CM: 492.8  Unknown Unknown        Legal blindness ICD-10-CM: H54.8  ICD-9-CM: 369.4  10/14/2020 Yes Hypotension, unspecified ICD-10-CM: I95.9  ICD-9-CM: 458.9  1/27/2014 Yes                A 53-year-old male with a history of a COPD, bilateral above knee amputation, recently discharged from [facility] after treated for pneumonia who was admitted for septic shock and metabolic encephalopathy.     Family made final decision and comfort care granted. Cm is on board for hospice  CM is looking for placement       Acute respiratory failure with hypoxia   Resolving   continue aspiration precaution,   Complete iv abx for aspiration pna       COPD with right upper lobe pneumonia questionable mass/emphysema /aspiration pna  Aspiration precaution        Cardiovascular septic shock: resolved.       Prolong Qt   Cardiologist was on board   HTN: on  Norvasc and metoprolol.       Chronic dvt noted from pvl   On lovenox before d/c due to comfort management         Acute metabolic encephalopathy   Agitation-continue seroqual and ativan  And halodol     Legal blindness :fall /aspiration precaution      marijuana use     Severe malnutrition    Bilateral AKA    Subjective : I want to have a rest     Disposition : while having a place   Review of systems:  Reported no chest pain , limited due to on and off confusion     Vital signs/Intake and Output:  Visit Vitals  /85 (BP 1 Location: Left arm, BP Patient Position: At rest)   Pulse 75   Temp 97.6 °F (36.4 °C)   Resp 18   Ht 5' 4\" (1.626 m)   Wt 41.1 kg (90 lb 11.2 oz)   SpO2 99%   BMI 15.57 kg/m²     Current Shift:  No intake/output data recorded. Last three shifts:  11/07 1901 - 11/09 0700  In: 1 [P.O.:830]  Out: -     Physical Exam:  General: Alert and oriented to him self no acute distress    HEENT: NC, Atraumatic. PERRLA, anicteric sclerae. Lungs: Cta  BS   Heart:  Regular  rhythm,  No murmur, No Rubs, No Gallops  Abdomen: Soft, Non distended, Non tender.   +Bowel sounds,   Extremities: No c/c, aka  Psych:   Calm,no agitation   Neurologic:   Alert       Labs: Results: Chemistry No results for input(s): GLU, NA, K, CL, CO2, BUN, CREA, CA, AGAP, BUCR, TBIL, AP, TP, ALB, GLOB, AGRAT in the last 72 hours. No lab exists for component: GPT   CBC w/Diff No results for input(s): WBC, RBC, HGB, HCT, PLT, GRANS, LYMPH, EOS, HGBEXT, HCTEXT, PLTEXT, HGBEXT, HCTEXT, PLTEXT in the last 72 hours. Cardiac Enzymes No results for input(s): CPK, CKND1, CHUY in the last 72 hours. No lab exists for component: CKRMB, TROIP   Coagulation No results for input(s): PTP, INR, APTT, INREXT, INREXT in the last 72 hours. Lipid Panel No results found for: CHOL, CHOLPOCT, CHOLX, CHLST, CHOLV, 833138, HDL, HDLP, LDL, LDLC, DLDLP, 277783, VLDLC, VLDL, TGLX, TRIGL, TRIGP, TGLPOCT, CHHD, CHHDX   BNP No results for input(s): BNPP in the last 72 hours. Liver Enzymes No results for input(s): TP, ALB, TBIL, AP in the last 72 hours. No lab exists for component: SGOT, GPT, DBIL   Thyroid Studies Lab Results   Component Value Date/Time    TSH 0.78 10/24/2020 11:30 AM        Procedures/imaging: see electronic medical records for all procedures/Xrays and details which were not copied into this note but were reviewed prior to creation of Plan    Ct Head Wo Cont    Result Date: 10/24/2020  EXAM: CT head INDICATION: Altered mental status. COMPARISON: October 15, 2020. TECHNIQUE: Axial CT imaging of the head was performed without intravenous contrast. Dose reduction techniques used: automated exposure control, adjustment of the mAs and/or kVp according to patient size, and iterative reconstruction techniques. Digital imaging and communications in Medicine (DICOM) format image data are available to nonaffiliated external healthcare facilities or entities on a secure, media free, reciprocally searchable basis with patient authorization for at least 12 months after this study. _______________ FINDINGS: BRAIN AND POSTERIOR FOSSA: There is cerebral volume loss with prominence of the lateral and the third ventricles. The cortical sulci are widened appropriately. The fourth ventricle and basal cisterns are normally outlined. There is mild bilateral periventricular and central white matter diminished attenuation. There is no acute territorial defect, hemorrhage or midline shift. EXTRA-AXIAL SPACES AND MENINGES: There are no abnormal extra-axial fluid collections. CALVARIUM: Intact. SINUSES: Clear. OTHER: Partial right mastoid opacification is again seen. _______________     IMPRESSION: Cerebral volume loss and mild bilateral periventricular and central white matter diminished attenuation which is nonspecific but likely to represent microvascular disease. There is no acute intracranial abnormality. No significant interval change. Ct Head Wo Cont    Result Date: 10/15/2020  EXAM: CT head INDICATION: Dental status change COMPARISON: None. TECHNIQUE: Axial CT imaging of the head was performed without intravenous contrast. One or more dose reduction techniques were used on this CT: automated exposure control, adjustment of the mAs and/or kVp according to patient size, and iterative reconstruction techniques. The specific techniques used on this CT exam have been documented in the patient's electronic medical record. Digital Imaging and Communications in Medicine (DICOM) format image data are available to nonaffiliated external healthcare facilities or entities on a secure, media free, reciprocally searchable basis with patient authorization for at least a 12 month period after this study. _______________ FINDINGS: BRAIN AND POSTERIOR FOSSA: The sulci, folia, ventricles and basal cisterns are within normal limits for the patient's with age concordant atrophy There is no intracranial hemorrhage, mass effect, or midline shift. Mild periventricular low-attenuation. Although nonspecific this is frequently seen with chronic small vessel ischemic change. Otherwise, there are no areas of abnormal parenchymal attenuation.  EXTRA-AXIAL SPACES AND MENINGES: There are no abnormal extra-axial fluid collections. CALVARIUM: Intact. SINUSES: Clear. OTHER: None. _______________     IMPRESSION: No acute intracranial abnormalities. Xr Chest Port    Result Date: 10/26/2020  EXAM: CHEST RADIOGRAPH CLINICAL INDICATION/HISTORY: Pneumonia   > Additional: None COMPARISON: 10/23/2020 TECHNIQUE: Portable frontal view of the chest _______________ FINDINGS: SUPPORT DEVICES: None. HEART AND MEDIASTINUM: Heart size is stable. Atherosclerotic calcification seen in the aorta. LUNGS AND PLEURAL SPACES: Increased hazy density at the left base. Slight blunting of the right costophrenic angle. No pneumothorax. Patchy right upper lobe opacity is similar to slightly improved. BONES AND SOFT TISSUES: Unremarkable. _______________     IMPRESSION: 1. Increased hazy opacity at the left base compared to the prior exam, possibly developing left lower lobe atelectasis and/or consolidation. Questionable small right effusion. 2. Patchy right upper lobe patchy opacity which is similar to slightly improved. Xr Chest Port    Result Date: 10/24/2020  EXAM: XR CHEST PORT. CLINICAL INDICATION/HISTORY: meets SIRS criteria. -Additional: None. TECHNIQUE: A portable erect AP radiographic view of the chest is compared with several other chest radiographs performed the same month. _______________ FINDINGS: See impression. No pneumothorax or appreciable significant pleural effusion. The cardiac silhouette, vanessa, and mediastinal contours are normal for age. No acute osseous abnormality is revealed. _______________     IMPRESSION: Slight improvement in multifocal airspace disease most in keeping with pneumonia, again most pronounced at the right lung apex with no new or worsening findings. Recommend continued radiographic follow-up to resolution.  _______________     Malia Ferraro Chest Port    Result Date: 10/18/2020  EXAM: CHEST RADIOGRAPH CLINICAL INDICATION/HISTORY: SBO   > Additional: None COMPARISON: 10/17/2020. TECHNIQUE: Portable frontal view of the chest _______________ FINDINGS: SUPPORT DEVICES: None. HEART AND MEDIASTINUM: No appreciable cardiomegaly. Remaining mediastinal contours within normal limits. LUNGS AND PLEURAL SPACES: No significant change of bilateral upper lobe parenchymal opacities. Hyperexpansion of the lungs. No evidence for pneumothorax. BONY THORAX AND SOFT TISSUES: Unremarkable. _______________     IMPRESSION: 1. No significant change of bilateral lung pneumonia. Follow-up to resolution is recommended. 2. Emphysema. Xr Chest Port    Result Date: 10/18/2020  EXAM: CHEST RADIOGRAPH CLINICAL INDICATION/HISTORY: shortness of breath   > Additional: None COMPARISON: October 14, 2020. TECHNIQUE: Portable frontal view of the chest _______________ FINDINGS: SUPPORT DEVICES: None. HEART AND MEDIASTINUM: No appreciable cardiomegaly. Remaining mediastinal contours within normal limits. LUNGS AND PLEURAL SPACES: No significant change of bilateral upper lobe parenchymal opacities. Hyperexpansion of the lungs. No evidence for pneumothorax or pleural effusion. BONY THORAX AND SOFT TISSUES: Unremarkable. _______________     IMPRESSION: 1. No significant change of bilateral lung pneumonia. Follow-up to resolution is recommended to exclude underlying mass. 2. Emphysema. Xr Chest Port    Result Date: 10/14/2020  EXAM: XR CHEST PORT CLINICAL INDICATION/HISTORY: Shortness of breath with cough -Additional: None COMPARISON: Several prior studies, most recently 2/19/2019 TECHNIQUE: Frontal view of the chest _______________ FINDINGS: HEART AND MEDIASTINUM: Normal cardiac size and mediastinal contours. LUNGS AND PLEURAL SPACES: Patchy multifocal opacities noted throughout bilateral upper lobes, right greater than left as well as throughout the periphery of the right lower lobe. No evidence of pneumothorax.  BONY THORAX AND SOFT TISSUES: No acute osseous abnormality _______________     IMPRESSION: Patchy multifocal airspace disease compatible with pneumonia. Recommend radiographic follow-up to document expected clinical resolution in 4-6 weeks' time.        Dhruv Ramos MD

## 2020-11-09 NOTE — PROGRESS NOTES
1930 - Assumed care at this time. 2150 - Patient confused yet calm on RA. No signs of distress. Respirations even and unlabored. Lungs clear. Bed alarm on, door left open. Will continue to monitor.

## 2020-11-09 NOTE — ROUTINE PROCESS
Bedside and Verbal shift change report given to ROBERTO Richardson RN by Amy Arndt RN. Report included the following information SBAR, Kardex, OR Summary, Intake/Output and MAR.

## 2020-11-09 NOTE — PROGRESS NOTES
26 - Bedside shift report received from Katiana Cifuentes RN. Assumed care of patient. Patient noted resting in bed at this time. Call light in reach. 1102 - Assessment completed as per flowsheet. 1740 - Patient noted with slurred speech and mouth twisted. Call out to Dr. Bryan Howell to make aware. Awaiting return call. 56 - Dr. Bryan Howell notified. No new orders r/t patient being on comfort measures.

## 2020-11-10 LAB — GLUCOSE BLD STRIP.AUTO-MCNC: 135 MG/DL (ref 70–110)

## 2020-11-10 PROCEDURE — 65270000029 HC RM PRIVATE

## 2020-11-10 PROCEDURE — 74011250637 HC RX REV CODE- 250/637: Performed by: NURSE PRACTITIONER

## 2020-11-10 PROCEDURE — 2709999900 HC NON-CHARGEABLE SUPPLY

## 2020-11-10 PROCEDURE — 74011250637 HC RX REV CODE- 250/637: Performed by: HOSPITALIST

## 2020-11-10 PROCEDURE — 74011250637 HC RX REV CODE- 250/637: Performed by: INTERNAL MEDICINE

## 2020-11-10 PROCEDURE — 74011250637 HC RX REV CODE- 250/637: Performed by: FAMILY MEDICINE

## 2020-11-10 PROCEDURE — 99231 SBSQ HOSP IP/OBS SF/LOW 25: CPT | Performed by: NURSE PRACTITIONER

## 2020-11-10 PROCEDURE — 82962 GLUCOSE BLOOD TEST: CPT

## 2020-11-10 PROCEDURE — 74011000250 HC RX REV CODE- 250: Performed by: HOSPITALIST

## 2020-11-10 RX ADMIN — HALOPERIDOL 0.5 MG: 1 TABLET ORAL at 09:40

## 2020-11-10 RX ADMIN — PANTOPRAZOLE SODIUM 40 MG: 40 TABLET, DELAYED RELEASE ORAL at 09:39

## 2020-11-10 RX ADMIN — METOPROLOL TARTRATE 25 MG: 25 TABLET, FILM COATED ORAL at 22:45

## 2020-11-10 RX ADMIN — ACETAMINOPHEN 650 MG: 325 TABLET ORAL at 09:39

## 2020-11-10 RX ADMIN — SODIUM CHLORIDE 10 ML: 9 INJECTION, SOLUTION INTRAMUSCULAR; INTRAVENOUS; SUBCUTANEOUS at 16:34

## 2020-11-10 RX ADMIN — LORAZEPAM 1 MG: 1 TABLET ORAL at 09:39

## 2020-11-10 RX ADMIN — ACETAMINOPHEN 650 MG: 325 TABLET ORAL at 16:34

## 2020-11-10 RX ADMIN — GABAPENTIN 300 MG: 300 CAPSULE ORAL at 22:45

## 2020-11-10 RX ADMIN — METOPROLOL TARTRATE 25 MG: 25 TABLET, FILM COATED ORAL at 09:39

## 2020-11-10 RX ADMIN — LORAZEPAM 1 MG: 1 TABLET ORAL at 22:45

## 2020-11-10 RX ADMIN — QUETIAPINE FUMARATE 300 MG: 100 TABLET ORAL at 22:45

## 2020-11-10 RX ADMIN — GABAPENTIN 300 MG: 300 CAPSULE ORAL at 09:39

## 2020-11-10 RX ADMIN — GABAPENTIN 300 MG: 300 CAPSULE ORAL at 16:34

## 2020-11-10 RX ADMIN — QUETIAPINE FUMARATE 300 MG: 100 TABLET ORAL at 09:39

## 2020-11-10 RX ADMIN — AMLODIPINE BESYLATE 10 MG: 5 TABLET ORAL at 09:39

## 2020-11-10 RX ADMIN — LORAZEPAM 1 MG: 1 TABLET ORAL at 16:34

## 2020-11-10 RX ADMIN — ACETAMINOPHEN 650 MG: 325 TABLET ORAL at 22:45

## 2020-11-10 RX ADMIN — SODIUM CHLORIDE 10 ML: 9 INJECTION, SOLUTION INTRAMUSCULAR; INTRAVENOUS; SUBCUTANEOUS at 06:31

## 2020-11-10 RX ADMIN — SODIUM CHLORIDE 10 ML: 9 INJECTION, SOLUTION INTRAMUSCULAR; INTRAVENOUS; SUBCUTANEOUS at 22:46

## 2020-11-10 NOTE — PROGRESS NOTES
Hospitalist Progress Note-critical care note     Patient: Lalita Saravia Sr. MRN: [id number]  [facility]: [id number]    YOB: 1955  Age: 63 y.o.   Sex: male    DOA: 10/23/2020 LOS:  LOS: 18 days            Chief complaint: Pneumonia, marijuana abuse, legal blindness, amputation bilateral lower extremity, encephalopathy, acute respiratory failure with hypoxia    Assessment/Plan         Hospital Problems  Date Reviewed: 10/23/2020          Codes Class Noted POA    Comfort measures only status ICD-10-CM: Z51.5  ICD-9-CM: V66.7  11/5/2020 Unknown        Dysphagia ICD-10-CM: R13.10  ICD-9-CM: 787.20  11/3/2020 Unknown        Hypokalemia ICD-10-CM: E87.6  ICD-9-CM: 276.8  11/3/2020 Unknown        Hypomagnesemia ICD-10-CM: E83.42  ICD-9-CM: 275.2  11/3/2020 Unknown        Aspiration pneumonia ([facility] ICD-10-CM: J69.0  ICD-9-CM: 507.0  10/28/2020 Unknown        Deep vein thrombosis (DVT) of lower extremity ([facility] ICD-10-CM: I82.409  ICD-9-CM: 453.40  10/28/2020 Unknown        Acute respiratory failure with hypoxia (HCC) ICD-10-CM: J96.01  ICD-9-CM: 518.81  10/26/2020 Unknown        Chronic obstructive pulmonary disease with acute exacerbation ([facility] ICD-10-CM: J44.1  ICD-9-CM: 491.21  10/24/2020 Unknown        Severe protein-calorie malnutrition ([facility] ICD-10-CM: E43  ICD-9-CM: 262  10/24/2020 Unknown        Metabolic encephalopathy NSF-21-YD: G93.41  ICD-9-CM: 348.31  10/24/2020 Unknown        * (Principal) Septic shock ([facility] ICD-10-CM: A41.9, R65.21  ICD-9-CM: 038.9, 785.52, 995.92  10/23/2020 Unknown        Amputation of both lower extremities ([facility] ICD-10-CM: A89.437W, N25.687E  ICD-9-CM: 897.6  Unknown Yes        PNA (pneumonia) ICD-10-CM: J18.9  ICD-9-CM: 486  Unknown Yes        Marijuana abuse ICD-10-CM: F12.10  ICD-9-CM: 305.20  Unknown Yes        Centrilobular emphysema ([address] ICD-10-CM: J43.2  ICD-9-CM: 492.8  Unknown Unknown        Legal blindness ICD-10-CM: H54.8  ICD-9-CM: 369.4  10/14/2020 Yes Hypotension, unspecified ICD-10-CM: I95.9  ICD-9-CM: 458.9  1/27/2014 Yes                A 26-year-old male with a history of a COPD, bilateral above knee amputation, recently discharged from [facility] after treated for pneumonia who was admitted for septic shock and metabolic encephalopathy.     Family made final decision and comfort care granted. Abdullahi is on board for hospice. So far no accept facility. Abdullahi is looking for place vs going home with home hospice . He remained stable during the night     Acute respiratory failure with hypoxia   Resolving   continue aspiration precaution,   Complete iv abx for aspiration pna       COPD with right upper lobe pneumonia questionable mass/emphysema /aspiration pna  Aspiration precaution        Cardiovascular septic shock: resolved.       Prolong Qt   Cardiologist was on board   HTN: on  Norvasc and metoprolol.       Chronic dvt noted from pvl   On lovenox before d/c due to comfort management         Acute metabolic encephalopathy   Agitation-continue seroqual and ativan  And halodol     Legal blindness :fall /aspiration precaution      marijuana use     Severe malnutrition    Bilateral AKA    Subjective : I want to cell phone to look for my bag. Disposition : while having a place   Review of systems:  Reported no chest pain , limited due to on and off confusion     Vital signs/Intake and Output:  Visit Vitals  BP (!) 143/51 (BP 1 Location: Left arm, BP Patient Position: At rest)   Pulse 86   Temp 98.6 °F (37 °C)   Resp 18   Ht 5' 4\" (1.626 m)   Wt 41.1 kg (90 lb 11.2 oz)   SpO2 100%   BMI 15.57 kg/m²     Current Shift:  11/10 0701 - 11/10 1900  In: 220 [P.O.:220]  Out: -   Last three shifts:  11/08 1901 - 11/10 0700  In: 18 [P.O.:570]  Out: -     Physical Exam:  General: Alert and oriented to him self no acute distress    HEENT: NC, Atraumatic. PERRLA, anicteric sclerae.    Lungs: Cta  BS   Heart:  Regular  rhythm,  No murmur, No Rubs, No Gallops  Abdomen: Soft, Non distended, Non tender. +Bowel sounds,   Extremities: No c/c, aka  Psych:   Calm,no agitation   Neurologic:   Alert       Labs: Results:       Chemistry No results for input(s): GLU, NA, K, CL, CO2, BUN, CREA, CA, AGAP, BUCR, TBIL, AP, TP, ALB, GLOB, AGRAT in the last 72 hours. No lab exists for component: GPT   CBC w/Diff No results for input(s): WBC, RBC, HGB, HCT, PLT, GRANS, LYMPH, EOS, HGBEXT, HCTEXT, PLTEXT, HGBEXT, HCTEXT, PLTEXT in the last 72 hours. Cardiac Enzymes No results for input(s): CPK, CKND1, CHUY in the last 72 hours. No lab exists for component: CKRMB, TROIP   Coagulation No results for input(s): PTP, INR, APTT, INREXT, INREXT in the last 72 hours. Lipid Panel No results found for: CHOL, CHOLPOCT, CHOLX, CHLST, CHOLV, 335290, HDL, HDLP, LDL, LDLC, DLDLP, 518289, VLDLC, VLDL, TGLX, TRIGL, TRIGP, TGLPOCT, CHHD, CHHDX   BNP No results for input(s): BNPP in the last 72 hours. Liver Enzymes No results for input(s): TP, ALB, TBIL, AP in the last 72 hours. No lab exists for component: SGOT, GPT, DBIL   Thyroid Studies Lab Results   Component Value Date/Time    TSH 0.78 10/24/2020 11:30 AM        Procedures/imaging: see electronic medical records for all procedures/Xrays and details which were not copied into this note but were reviewed prior to creation of Plan    Ct Head Wo Cont    Result Date: 10/24/2020  EXAM: CT head INDICATION: Altered mental status. COMPARISON: October 15, 2020. TECHNIQUE: Axial CT imaging of the head was performed without intravenous contrast. Dose reduction techniques used: automated exposure control, adjustment of the mAs and/or kVp according to patient size, and iterative reconstruction techniques.  Digital imaging and communications in Medicine (DICOM) format image data are available to nonaffiliated external healthcare facilities or entities on a secure, media free, reciprocally searchable basis with patient authorization for at least 12 months after this study. _______________ FINDINGS: BRAIN AND POSTERIOR FOSSA: There is cerebral volume loss with prominence of the lateral and the third ventricles. The cortical sulci are widened appropriately. The fourth ventricle and basal cisterns are normally outlined. There is mild bilateral periventricular and central white matter diminished attenuation. There is no acute territorial defect, hemorrhage or midline shift. EXTRA-AXIAL SPACES AND MENINGES: There are no abnormal extra-axial fluid collections. CALVARIUM: Intact. SINUSES: Clear. OTHER: Partial right mastoid opacification is again seen. _______________     IMPRESSION: Cerebral volume loss and mild bilateral periventricular and central white matter diminished attenuation which is nonspecific but likely to represent microvascular disease. There is no acute intracranial abnormality. No significant interval change. Ct Head Wo Cont    Result Date: 10/15/2020  EXAM: CT head INDICATION: Dental status change COMPARISON: None. TECHNIQUE: Axial CT imaging of the head was performed without intravenous contrast. One or more dose reduction techniques were used on this CT: automated exposure control, adjustment of the mAs and/or kVp according to patient size, and iterative reconstruction techniques. The specific techniques used on this CT exam have been documented in the patient's electronic medical record. Digital Imaging and Communications in Medicine (DICOM) format image data are available to nonaffiliated external healthcare facilities or entities on a secure, media free, reciprocally searchable basis with patient authorization for at least a 12 month period after this study. _______________ FINDINGS: BRAIN AND POSTERIOR FOSSA: The sulci, folia, ventricles and basal cisterns are within normal limits for the patient's with age concordant atrophy There is no intracranial hemorrhage, mass effect, or midline shift. Mild periventricular low-attenuation.  Although nonspecific this is frequently seen with chronic small vessel ischemic change. Otherwise, there are no areas of abnormal parenchymal attenuation. EXTRA-AXIAL SPACES AND MENINGES: There are no abnormal extra-axial fluid collections. CALVARIUM: Intact. SINUSES: Clear. OTHER: None. _______________     IMPRESSION: No acute intracranial abnormalities. Xr Chest Port    Result Date: 10/26/2020  EXAM: CHEST RADIOGRAPH CLINICAL INDICATION/HISTORY: Pneumonia   > Additional: None COMPARISON: 10/23/2020 TECHNIQUE: Portable frontal view of the chest _______________ FINDINGS: SUPPORT DEVICES: None. HEART AND MEDIASTINUM: Heart size is stable. Atherosclerotic calcification seen in the aorta. LUNGS AND PLEURAL SPACES: Increased hazy density at the left base. Slight blunting of the right costophrenic angle. No pneumothorax. Patchy right upper lobe opacity is similar to slightly improved. BONES AND SOFT TISSUES: Unremarkable. _______________     IMPRESSION: 1. Increased hazy opacity at the left base compared to the prior exam, possibly developing left lower lobe atelectasis and/or consolidation. Questionable small right effusion. 2. Patchy right upper lobe patchy opacity which is similar to slightly improved. Xr Chest Port    Result Date: 10/24/2020  EXAM: XR CHEST PORT. CLINICAL INDICATION/HISTORY: meets SIRS criteria. -Additional: None. TECHNIQUE: A portable erect AP radiographic view of the chest is compared with several other chest radiographs performed the same month. _______________ FINDINGS: See impression. No pneumothorax or appreciable significant pleural effusion. The cardiac silhouette, vanessa, and mediastinal contours are normal for age. No acute osseous abnormality is revealed. _______________     IMPRESSION: Slight improvement in multifocal airspace disease most in keeping with pneumonia, again most pronounced at the right lung apex with no new or worsening findings. Recommend continued radiographic follow-up to resolution. _______________     [facility] Chest Port    Result Date: 10/18/2020  EXAM: CHEST RADIOGRAPH CLINICAL INDICATION/HISTORY: SBO   > Additional: None COMPARISON: 10/17/2020. TECHNIQUE: Portable frontal view of the chest _______________ FINDINGS: SUPPORT DEVICES: None. HEART AND MEDIASTINUM: No appreciable cardiomegaly. Remaining mediastinal contours within normal limits. LUNGS AND PLEURAL SPACES: No significant change of bilateral upper lobe parenchymal opacities. Hyperexpansion of the lungs. No evidence for pneumothorax. BONY THORAX AND SOFT TISSUES: Unremarkable. _______________     IMPRESSION: 1. No significant change of bilateral lung pneumonia. Follow-up to resolution is recommended. 2. Emphysema. Xr Chest Port    Result Date: 10/18/2020  EXAM: CHEST RADIOGRAPH CLINICAL INDICATION/HISTORY: shortness of breath   > Additional: None COMPARISON: October 14, 2020. TECHNIQUE: Portable frontal view of the chest _______________ FINDINGS: SUPPORT DEVICES: None. HEART AND MEDIASTINUM: No appreciable cardiomegaly. Remaining mediastinal contours within normal limits. LUNGS AND PLEURAL SPACES: No significant change of bilateral upper lobe parenchymal opacities. Hyperexpansion of the lungs. No evidence for pneumothorax or pleural effusion. BONY THORAX AND SOFT TISSUES: Unremarkable. _______________     IMPRESSION: 1. No significant change of bilateral lung pneumonia. Follow-up to resolution is recommended to exclude underlying mass. 2. Emphysema. Xr Chest Port    Result Date: 10/14/2020  EXAM: XR CHEST PORT CLINICAL INDICATION/HISTORY: Shortness of breath with cough -Additional: None COMPARISON: Several prior studies, most recently 2/19/2019 TECHNIQUE: Frontal view of the chest _______________ FINDINGS: HEART AND MEDIASTINUM: Normal cardiac size and mediastinal contours.  LUNGS AND PLEURAL SPACES: Patchy multifocal opacities noted throughout bilateral upper lobes, right greater than left as well as throughout the periphery of the right lower lobe. No evidence of pneumothorax. BONY THORAX AND SOFT TISSUES: No acute osseous abnormality _______________     IMPRESSION: Patchy multifocal airspace disease compatible with pneumonia. Recommend radiographic follow-up to document expected clinical resolution in 4-6 weeks' time.        Cornelio Mckinney MD

## 2020-11-10 NOTE — PROGRESS NOTES
1940 - Assumed care at this time. 2232 - Patient confused yet calm on RA. Pt appears a little drowsier than usual, but awakened to meal tray. No signs of distress. Respirations even and unlabored. Lungs clear. Bed alarm on, door left open. Will continue to monitor.

## 2020-11-10 NOTE — PROGRESS NOTES
Problem: Falls - Risk of  Goal: *Absence of Falls  Description: Document Sitkapadmini Kohler Fall Risk and appropriate interventions in the flowsheet. Outcome: Progressing Towards Goal  Note: Fall Risk Interventions:  Mobility Interventions: Bed/chair exit alarm    Mentation Interventions: Bed/chair exit alarm, Door open when patient unattended, Reorient patient, More frequent rounding, Toileting rounds    Medication Interventions: Patient to call before getting OOB    Elimination Interventions: Bed/chair exit alarm, Call light in reach, Toileting schedule/hourly rounds              Problem: Patient Education: Go to Patient Education Activity  Goal: Patient/Family Education  Outcome: Progressing Towards Goal     Problem: Non-Violent Restraints  Goal: *Removal from restraints as soon as assessed to be safe  Outcome: Progressing Towards Goal  Goal: *No harm/injury to patient while restraints in use  Outcome: Progressing Towards Goal  Goal: *Patient's dignity will be maintained  Outcome: Progressing Towards Goal  Goal: *Patient Specific Goal (EDIT GOAL, INSERT TEXT)  Outcome: Progressing Towards Goal  Goal: Non-violent Restaints:Standard Interventions  Outcome: Progressing Towards Goal  Goal: Non-violent Restraints:Patient Interventions  Outcome: Progressing Towards Goal  Goal: Patient/Family Education  Outcome: Progressing Towards Goal     Problem: Pressure Injury - Risk of  Goal: *Prevention of pressure injury  Description: Document Shelton Scale and appropriate interventions in the flowsheet.   Outcome: Progressing Towards Goal  Note: Pressure Injury Interventions:  Sensory Interventions: Assess changes in LOC, Check visual cues for pain, Keep linens dry and wrinkle-free, Minimize linen layers    Moisture Interventions: Absorbent underpads, Check for incontinence Q2 hours and as needed, Moisture barrier, Apply protective barrier, creams and emollients    Activity Interventions: Pressure redistribution bed/mattress(bed type)    Mobility Interventions: Pressure redistribution bed/mattress (bed type)    Nutrition Interventions: Document food/fluid/supplement intake    Friction and Shear Interventions: Apply protective barrier, creams and emollients(mepilex)                Problem: Patient Education: Go to Patient Education Activity  Goal: Patient/Family Education  Outcome: Progressing Towards Goal     Problem: Patient Education: Go to Patient Education Activity  Goal: Patient/Family Education  Outcome: Progressing Towards Goal

## 2020-11-10 NOTE — ROUTINE PROCESS
Bedside and Verbal shift change report given to DONNA Monroy RN by Slime Pierce RN. Report included the following information SBAR, Kardex, OR Summary, Intake/Output and MAR.

## 2020-11-10 NOTE — PROGRESS NOTES
[facility]: [phone]  [facility]: [phone]   [facility]: [phone]    Patient Name: Tressa Littlejohn. YOB: 1955    Date of Initial Consult: 10/27/2020, follow up 11/10/2020   Reason for Consult: care decisions  Requesting Provider: Bisi Sahu MD  Primary Care Physician: Pablo Bermudez MD      SUMMARY:   Tressa Moreno is a 63 y.o. male with a past history of legally blind, bilateral AKA, emphysema, HTN, chronic pain, HLD, T2DM, polio, CAD, PVD who was admitted on 10/23/2020 from home with a diagnosis of pneumonia/sepsis. Current medical issues leading to Palliative Medicine involvement include: recurrent admissions with multiple co-morbidities and goals of care. 10/28/2020 Mr Trang Torres is alert, oriented to place and why he is in the hospital. Denies pain. He is softly restrained for his safety. Just says \" cut me loose, give a knife\" ( referring to his restraints). 10/30/2020: Palliative care team including SIMI Tapia and this NP met with patient in his ICU room. He is quietly laying in bed. When asked how he was his speech was mumbled. Was finally able to understand his c/o being cold. 11/4/2020: Palliative care team including SIMI Tapia and this NP met with patient at bedside. He is confused; unaware of time of day (likely related to blindness) and where he is. Asked to be taken to [facility]. 11/5/2020: This NP met with patient at bedside. He believes he is at home, outside and watching TV. Re-oriented to [facility]. He then requested a cigarette. Admits to some back pain but denies shortness of breath and nausea at this time. 11/10/2020: This NP met with patient in his room. He is sitting up on side of bed facing the wall. He admits to \"some\" back pain and shortness of breath. Denies nausea. PALLIATIVE DIAGNOSES:   1.  Advanced care decisions  2. HCAP/sepsis  3. Encephalopathy  4. COPD  5. Bilateral AKA     PLAN:     11/10/2020: Patient remains on comfort measures. Scheduled tylenol and lidocaine patches are controlling his back pain. Patient has oxygen available per orders PRN shortness of breath. Primary team has adjusted his gabapentin and seroquel doses which seem to have increased his fatigue. In addition, his ativan is now scheduled TID. Will discontinue the scheduled haldol. POST form received back via [employer]. Copy placed on chart. GOALS OF CARE: DNR/DNI, comfort measures only. Plan for discharge to LTC with hospice services in place.     (please see below for previous conversations)     11/5/2020: Telephone call placed to patient's son, Chau Denis who then put call on speaker phone so his sister, [facility], could be involved in the discussion. This conversation includes two of three children and represents a [de-identified]. Dylan Lara has not been returning phone calls. Rediscussed patient's MBS results demonstrating severe aspiration on all consistencies. They agree that patient would not tolerate a tube for feeding and every chance he could he would eat and drink what he chooses. For quality of life, they agreed on comfort feeds. Discussed risks and benefits of CPR in the event of cardiopulmonary arrest and intubation for respiratory distress. Explained recommendation for DNR/DNI as patient suffers from COPD with recurrent pneumonias along with his physical debilities and medical intervention would prolong his suffering. They state their father is a tenacious man but they have talked it over and agreed that DNR/DNI/comfort measures only with placement in a LTC facility would provide patient with best quality of life. They believe it may even add quantity with regular meals, medications and care. POST form sent via email to [email or web address] for signatures. Comfort orders initiated. Dr. Martha Manrique, nursing and CM notified of change in code status. GOALS OF CARE: DNR/DNI, Comfort measures only. Plan for discharge to LTC facility with hospice services. 11/4/2020: Upon entering patient's room he stated he wasn't feeling well. He complained of back pain, nausea and shortness of breath. He also asked for food and coffee during our visit. Explained NPO status to him secondary to aspiration and needing to find a way to feed him possibly by placing a tube in his stomach. Also explained accepting that time may be short and risk of eating for comfort is an option for him. Further discussed need to discuss these matters with his children so everyone is on the same page. Patient did express a fear of dying with frequent requests \"don't leave me\". Patient also confused by the presence of soft wrist restraints. Much emotional support offered. When describing comfort medications, patient was anxious and requesting ativan. Instructed him I cannot use comfort measures until family agrees. I do not think patient has ability to appreciate how sick he is and would agree to medications without understanding hospice/comfort plan. Telephone call placed to patient's son, Becca Leslie. His sister was listening in on the phone call. Updated Ga [facility] and [facility] regarding patient's MBS results and recommendations for PEG vs comfort feeding. Discussed risks and benefits of tube-feeding including aspiration and self-discontinuation. They are talking it over and to get back with us with a decision. Family may benefit from discussion with MD. Continue current level of care. Will further clarify code status during next conversation with family. GOALS OF CARE: FULL CODE with FULL INTERVENTIONS. 10/30/2020 ADDENDUM: 1400: Ayla Cochran presented to the ICU to visit with his father. Instructed him on COPD and it being a progressive disease especially without smoking cessation. He admits to his father continuing to smoke 2 ppd.  Then discussed the superimposing of an aspiration pneumonia on weakened lungs. Dylan Lara was instructed on weakening of swallow and aspiration as he was also fixated on getting his dad to eat and drink. Explained that he needs further swallow evaluation when stable to determine safe diet. Explained that patient is now on large amount of HFNC and next step if he continues to deteriorate would be oral intubation. Explained fears of patient's inability to be weaned from vent and needing trach/PEG and discharge to a facility. Dylan Lara wanted to bring him home on vent if necessary. I explained care for trach with vent is not available in the home. He is aware of what a trach entails as a family friend has a trach for throat cancer. Discussed intubation with patient and he indicated that he wanted to life support on machines if necessary which reinforced his son's determination for aggressive treatment. Discussed risks and benefits of CPR in the event of cardiopulmonary arrest and Dylan Lara was firm that he would want CPR in attempt to prolong patient's life. Lawrence's girlfriend, Bárbara, present for part of conversation and feels that Dylan Lara needs time to process what his father looks like and what he has heard before making decisions. Attempted to get Dylan Lara to think from his dad's standpoint and understand he is suffering; he is sure his dad has pulled through before and can do it again. 1531 Received telephone call from [facility] and [facility]. Was informed by them that Dylan Lara had texted their sister, Nina Desai, and told her that Jessie Robledo is in the hospital. Rula May and Adelia Haley (the estranged children) have not been kept in the loop secondary to Dylan Lara non-cooperation. Zaira Case with medical information. He stated that a physician at [facility] in 4/2019 had talked with the family regarding code status after patient had been admitted in cardiac arrest. Patient was discharged after that hospitalization to LTC.  However, when Dylan Lara got out of retirement, he took his dad out the [employer]. This was against the wishes of the other children. GOALS OF CARE: FULL CODE with FULL INTERVENTIONS. GOC remain unchanged and ICU information provided to Rian Parker to call after he talks with his sister. Kimberly Lawson and Cheryl Milner share a home in [place]. 10/30/2020: While in the ICU, Adelso Herron returned telephone call. Instructed him that his father was very ill with worsening respiratory status. Explained he may need intubation and medical concerns regarding inability to eventually be able to liberate patient from ventilator. Described to him that it is likely he would require trach, long term ventilator, PEG tube for feeding and placement in long term care. His concern at that point was to have his father brought home \"we have a nurse that comes to the house\". Explained his father is far too sick to be discharged at this time. He stated he wanted us to continue all aggressive measures. Asked Adelso Herron to come to the hospital to see his father so he could better understand the seriousness of this illness. He said he would be here later today. Adelso Herron stated he had not received any messages on his cellphone. Explained that his VM is full and he needs to delete all his old voicemails. He admitted he would need someone to show him how. 10/28/2020 Seen along with SIMI Velasco. Remains in ICU on high flow oxygen. He is alert, oriented x 3 but do not believe at this time he can participate in his medical decisions or complex decisions. There is no AMD on file. Son Adelso Herron is reported care giver. Very chronically ill gentleman with recent admission whose overall prognosis is poor. We have attempted x 2 today and yesterday to reach Adelso Herron to discuss his father's care and goals of care, no answer at listed home phone and cell goes to VM with no ability to leave a message. We will continue to follow with you to try and reach family and assess if patient can participate can participate in his care decisions.   Goals of care full code with full interventions. 10/27/2020  1. Advanced care decisions: Palliative care team including SIMI Red and this NP met with patient at bedside in the ICU. Patient is known to the palliative care team from his previous admission. Per notes \"patient is a  and had four children of which one is . Gabi Vazquez claims he is the caregiver and lives with his father. ..his half siblings, Barry Abarca and Dasia Both, do not come around and he does not know where they are.\" Patient currently delirious and continues to yell \"wake me up\" or \"Lawrence\" over and over; although, responded yes to having pain and nausea. Attempted to contact patient's son, Gabi Vazquez, via his cell phone (voicemail box is full) and the home phone number (no answer after multiple rings). Need to discuss goals of care for this chronically ill gentleman. GOALS OF CARE: FULL CODE with FULL INTERVENTIONS. 2.  HCAP/sepsis: Patient recently admitted to [facility] with diagnosis of pneumonia 10/14 to 10/20/2020. He was seen at [address] [place] [address] ER on 10/21 and 10/22 and 10/23/2020. He then presented here with c/o chest pain. Conflicting reports of whether he took antibiotics s/p his discharge on 10/20. Found to be hypothermic and hypotensive in ED. Primary team managing. 3.  Encephalopathy: Presented with confusion and is combative. ED provider notes that patient likely does not have capacity to make medical decisions for himself on day of admit. Also per notes son reports patient has not been acting appropriately since leaving [address] [place] [address] ED on 10/23/2020.  4. COPD: CT completed at outside facility reveals question of neoplasm. Will need repeat CT. Primary team managing. 5.  Bilateral AKA: per history. 6.  Initial consult note routed to primary continuity provider  7.   Communicated plan of care with: Palliative IDT    GOALS OF CARE:  Patient/Health Care Proxy Stated Goals: Comfort      TREATMENT PREFERENCES:   Code Status: DNR/DNI    Advance Care Planning:  Advance Care Planning 10/14/2020   Confirm Advance Directive None   Patient Would Like to Complete Advance Directive No   Does the patient have other document types Other (comment)       Medical Interventions: Comfort measures   Artificially Administered Nutrition: No feeding tube     Other: As far as possible, the palliative care team has discussed with patient/health care proxy about goals of care/treatment preferences for patient. HISTORY:     History obtained from: chart    CHIEF COMPLAINT: back pain      HPI/SUBJECTIVE:    The patient is:   [x] Verbal and participatory  [] Non-participatory due to:   See summary. Speech is more mumbled and difficult to understand. Clinical Pain Assessment (nonverbal scale for nonverbal patients): Clinical Pain Assessment  Severity: 5  Location: back  Character: dull  Duration: constant          Duration: for how long has pt been experiencing pain (e.g., 2 days, 1 month, years)  Frequency: how often pain is an issue (e.g., several times per day, once every few days, constant)     FUNCTIONAL ASSESSMENT:     Palliative Performance Scale (PPS):  PPS: 30    ECOG  ECOG Status : Completely disabled     PSYCHOSOCIAL/SPIRITUAL SCREENING:      Any spiritual / Synagogue concerns:  [] Yes /  [x] No    Caregiver Burnout:  [] Yes /  [] No /  [x] No Caregiver Present      Anticipatory grief assessment:   [x] Normal  / [] Maladaptive        REVIEW OF SYSTEMS:     Positive and pertinent negative findings in ROS are noted above in HPI. The following systems were [x] reviewed / [] unable to be reviewed as noted in HPI  Other findings are noted below. Systems: constitutional, ears/nose/mouth/throat, respiratory, gastrointestinal, genitourinary, musculoskeletal, integumentary, neurologic, psychiatric, endocrine. Positive findings noted below.   Modified ESAS Completed by: provider           Pain: 5     Nausea: 0     Dyspnea: 5           Stool Occurrence(s): 1        PHYSICAL EXAM:     Wt Readings from Last 3 Encounters:   11/05/20 41.1 kg (90 lb 11.2 oz)   10/20/20 34.7 kg (76 lb 9.6 oz)   02/18/19 45 kg (99 lb 3.3 oz)     Blood pressure (!) 143/51, pulse 86, temperature 98.6 °F (37 °C), resp. rate 18, height 5' 4\" (1.626 m), weight 41.1 kg (90 lb 11.2 oz), SpO2 100 %. Pain:  Pain Scale 1: Numeric (0 - 10)  Pain Intensity 1: 0  Pain Onset 1: gradual  Pain Location 1: Leg  Pain Orientation 1: Upper  Pain Description 1: Other (comment)(pt states it just hurts)  Pain Intervention(s) 1: Medication (see MAR)       Constitutional: 63year old gentleman who appears older then stated age, sitting up in bed, no apparent distress.    Eyes: blind, anicteric  Respiratory: breathing not labored, room air  Musculoskeletal: Bilateral AKA   Skin: warm, dry  Neurologic: following commands, moving all extremities, oriented X 1       HISTORY:     Principal Problem:    Septic shock ([address] (10/23/2020)    Active Problems:    Hypotension, unspecified (1/27/2014)      Amputation of both lower extremities (HCC) ()      PNA (pneumonia) ()      Marijuana abuse ()      Centrilobular emphysema (HCC) ()      Legal blindness (10/14/2020)      Chronic obstructive pulmonary disease with acute exacerbation ([address] (10/24/2020)      Severe protein-calorie malnutrition ([address] (46/45/3717)      Metabolic encephalopathy (33/34/4844)      Acute respiratory failure with hypoxia ([address] (10/26/2020)      Aspiration pneumonia ([address] (10/28/2020)      Deep vein thrombosis (DVT) of lower extremity ([address] (10/28/2020)      Dysphagia (11/3/2020)      Hypokalemia (11/3/2020)      Hypomagnesemia (11/3/2020)      Comfort measures only status (11/5/2020)      Past Medical History:   Diagnosis Date    Amputation of both lower extremities ([address]     Amputee, above knee, left ([address]     At risk for falls     Chronic pain     back and legs    Diabetes [address]     Hypercholesterolemia     Hypertension     Polio       Past Surgical History: Procedure Laterality Date    HX HERNIA REPAIR      HX OTHER SURGICAL      carpal tunnel     HX OTHER SURGICAL      cyst      Family History   Problem Relation Age of Onset    Heart Attack Mother     Heart Attack Father     Malignant Hyperthermia Neg Hx     Pseudocholinesterase Deficiency Neg Hx     Delayed Awakening Neg Hx     Post-op Nausea/Vomiting Neg Hx     Emergence Delirium Neg Hx     Post-op Cognitive Dysfunction Neg Hx     Other Neg Hx      History reviewed, no pertinent family history.   Social History     Tobacco Use    Smoking status: Current Every Day Smoker     Packs/day: 2.00     Years: 40.00     Pack years: 80.00    Smokeless tobacco: Current User     Types: Snuff   Substance Use Topics    Alcohol use: No     No Known Allergies   Current Facility-Administered Medications   Medication Dose Route Frequency    QUEtiapine (SEROquel) tablet 300 mg  300 mg Oral BID    LORazepam (ATIVAN) tablet 1 mg  1 mg Oral TID    gabapentin (NEURONTIN) capsule 300 mg  300 mg Oral TID    acetaminophen (TYLENOL) tablet 650 mg  650 mg Oral TID    nicotine (NICODERM CQ) 14 mg/24 hr patch 1 Patch  1 Patch TransDERmal DAILY    LORazepam (ATIVAN) tablet 1 mg  1 mg Oral Q6H PRN    LORazepam (INTENSOL) 2 mg/mL oral concentrate 1 mg  1 mg SubLINGual Q3H PRN    hyoscyamine SL (LEVSIN/SL) tablet 0.125 mg  0.125 mg SubLINGual Q4H PRN    morphine (ROXANOL) concentrated oral syringe 5 mg  5 mg SubLINGual Q1H PRN    pantoprazole (PROTONIX) tablet 40 mg  40 mg Oral DAILY    lidocaine 4 % patch 1 Patch  1 Patch TransDERmal Q24H    metoprolol tartrate (LOPRESSOR) tablet 25 mg  25 mg Oral Q12H    amLODIPine (NORVASC) tablet 10 mg  10 mg Oral DAILY    sodium chloride (NS) flush 5-40 mL  5-40 mL IntraVENous Q8H    sodium chloride (NS) flush 5-40 mL  5-40 mL IntraVENous PRN    acetaminophen (TYLENOL) tablet 650 mg  650 mg Oral Q6H PRN    Or    acetaminophen (TYLENOL) suppository 650 mg  650 mg Rectal Q6H PRN  polyethylene glycol (MIRALAX) packet 17 g  17 g Oral DAILY PRN    promethazine (PHENERGAN) tablet 12.5 mg  12.5 mg Oral Q6H PRN    Or    ondansetron (ZOFRAN) injection 4 mg  4 mg IntraVENous Q6H PRN        LAB AND IMAGING FINDINGS:     Lab Results   Component Value Date/Time    WBC 5.7 11/05/2020 05:00 AM    HGB 9.6 (L) 11/05/2020 05:00 AM    PLATELET 477 17/95/1626 05:00 AM     Lab Results   Component Value Date/Time    Sodium 143 11/05/2020 05:00 AM    Potassium 3.5 11/05/2020 05:00 AM    Chloride 114 (H) 11/05/2020 05:00 AM    CO2 22 11/05/2020 05:00 AM    BUN 5 (L) 11/05/2020 05:00 AM    Creatinine 0.54 (L) 11/05/2020 05:00 AM    Calcium 8.2 (L) 11/05/2020 05:00 AM    Magnesium 1.8 11/05/2020 05:00 AM    Phosphorus 3.3 10/29/2020 05:44 AM      Lab Results   Component Value Date/Time    Alk. phosphatase 91 11/05/2020 05:00 AM    Protein, total 5.8 (L) 11/05/2020 05:00 AM    Albumin 2.5 (L) 11/05/2020 05:00 AM    Globulin 3.3 11/05/2020 05:00 AM     Lab Results   Component Value Date/Time    INR 1.0 01/27/2014 04:08 PM    Prothrombin time 12.7 01/27/2014 04:08 PM    aPTT 33.7 01/27/2014 04:08 PM      No results found for: IRON, FE, TIBC, IBCT, PSAT, FERR   No results found for: PH, PCO2, PO2  No components found for: [place]   Lab Results   Component Value Date/Time     10/26/2020 06:45 PM    CK - MB 2.5 10/26/2020 06:45 PM              Total time: 15 minutes  Counseling / coordination time, spent as noted above > 50% counseling / coordination: 10 minutes with patient and family. Prolonged service was provided for  []30 min   []75 min in face to face time in the presence of the patient, spent as noted above. Time Start:   Time End:   Note: this can only be billed with 74752 (initial) or 83198 (follow up).   If multiple start / stop times, list each separately.    '

## 2020-11-10 NOTE — PROGRESS NOTES
Patient assisted with compliance to current plan of care as directed.    Winston Johnson  11/10/2020  12:18 PM

## 2020-11-10 NOTE — ROUTINE PROCESS
Bedside shift report received from Annie Gates RN. Assumed care of patient. Patient noted in bed at this time. Call light in reach.

## 2020-11-10 NOTE — PROGRESS NOTES
D/C Plan: Home with Hospice vs SNF    CM contacted pt's son, Shahid Kaminski ([phone]), to discuss transition of care. CM notified pt/family pt has been accepted by [facility] and [address], [address] and [address]. Pt's family is not pleased with these options and they would like speak with family to see if they can provide 24 hour care for this pt so they can potentially transition home with hospice.   CM to continue to follow and assist.

## 2020-11-11 PROCEDURE — 74011250637 HC RX REV CODE- 250/637: Performed by: NURSE PRACTITIONER

## 2020-11-11 PROCEDURE — 2709999900 HC NON-CHARGEABLE SUPPLY

## 2020-11-11 PROCEDURE — 74011250637 HC RX REV CODE- 250/637: Performed by: FAMILY MEDICINE

## 2020-11-11 PROCEDURE — 74011250637 HC RX REV CODE- 250/637: Performed by: INTERNAL MEDICINE

## 2020-11-11 PROCEDURE — 74011250636 HC RX REV CODE- 250/636: Performed by: FAMILY MEDICINE

## 2020-11-11 PROCEDURE — 74011000250 HC RX REV CODE- 250: Performed by: HOSPITALIST

## 2020-11-11 PROCEDURE — 65270000029 HC RM PRIVATE

## 2020-11-11 PROCEDURE — 74011250637 HC RX REV CODE- 250/637: Performed by: HOSPITALIST

## 2020-11-11 RX ORDER — HALOPERIDOL 5 MG/ML
INJECTION INTRAMUSCULAR
Status: DISPENSED
Start: 2020-11-11 | End: 2020-11-11

## 2020-11-11 RX ORDER — HALOPERIDOL 5 MG/ML
5 INJECTION INTRAMUSCULAR
Status: DISCONTINUED | OUTPATIENT
Start: 2020-11-11 | End: 2020-11-12

## 2020-11-11 RX ADMIN — ACETAMINOPHEN 650 MG: 325 TABLET ORAL at 15:39

## 2020-11-11 RX ADMIN — GABAPENTIN 300 MG: 300 CAPSULE ORAL at 21:52

## 2020-11-11 RX ADMIN — GABAPENTIN 300 MG: 300 CAPSULE ORAL at 09:44

## 2020-11-11 RX ADMIN — SODIUM CHLORIDE 10 ML: 9 INJECTION, SOLUTION INTRAMUSCULAR; INTRAVENOUS; SUBCUTANEOUS at 15:40

## 2020-11-11 RX ADMIN — ACETAMINOPHEN 650 MG: 325 TABLET ORAL at 09:45

## 2020-11-11 RX ADMIN — PANTOPRAZOLE SODIUM 40 MG: 40 TABLET, DELAYED RELEASE ORAL at 09:45

## 2020-11-11 RX ADMIN — METOPROLOL TARTRATE 25 MG: 25 TABLET, FILM COATED ORAL at 09:44

## 2020-11-11 RX ADMIN — HALOPERIDOL LACTATE 5 MG: 5 INJECTION, SOLUTION INTRAMUSCULAR at 06:20

## 2020-11-11 RX ADMIN — AMLODIPINE BESYLATE 10 MG: 5 TABLET ORAL at 09:44

## 2020-11-11 RX ADMIN — LORAZEPAM 1 MG: 1 TABLET ORAL at 21:51

## 2020-11-11 RX ADMIN — LORAZEPAM 1 MG: 1 TABLET ORAL at 09:44

## 2020-11-11 RX ADMIN — LORAZEPAM 1 MG: 1 TABLET ORAL at 15:39

## 2020-11-11 RX ADMIN — ACETAMINOPHEN 650 MG: 325 TABLET ORAL at 21:52

## 2020-11-11 RX ADMIN — SODIUM CHLORIDE 10 ML: 9 INJECTION, SOLUTION INTRAMUSCULAR; INTRAVENOUS; SUBCUTANEOUS at 06:20

## 2020-11-11 RX ADMIN — METOPROLOL TARTRATE 25 MG: 25 TABLET, FILM COATED ORAL at 21:51

## 2020-11-11 RX ADMIN — GABAPENTIN 300 MG: 300 CAPSULE ORAL at 15:39

## 2020-11-11 RX ADMIN — QUETIAPINE FUMARATE 300 MG: 100 TABLET ORAL at 09:44

## 2020-11-11 RX ADMIN — QUETIAPINE FUMARATE 300 MG: 100 TABLET ORAL at 21:51

## 2020-11-11 NOTE — PROGRESS NOTES
Problem: Falls - Risk of  Goal: *Absence of Falls  Description: Document Huy Going Fall Risk and appropriate interventions in the flowsheet. Outcome: Progressing Towards Goal  Note: Fall Risk Interventions:  Mobility Interventions: Communicate number of staff needed for ambulation/transfer, Bed/chair exit alarm    Mentation Interventions: Adequate sleep, hydration, pain control, Bed/chair exit alarm, Door open when patient unattended, Family/sitter at bedside, Reorient patient, Room close to nurse's station    Medication Interventions: Bed/chair exit alarm    Elimination Interventions: Bed/chair exit alarm, Call light in reach              Problem: Non-Violent Restraints  Goal: *Patient's dignity will be maintained  Outcome: Progressing Towards Goal     Problem: Non-Violent Restraints  Goal: Non-violent Restaints:Standard Interventions  Outcome: Progressing Towards Goal     Problem: Pressure Injury - Risk of  Goal: *Prevention of pressure injury  Description: Document [place] Scale and appropriate interventions in the flowsheet.   Outcome: Progressing Towards Goal  Note: Pressure Injury Interventions:  Sensory Interventions: Assess changes in LOC, Keep linens dry and wrinkle-free, Maintain/enhance activity level, Monitor skin under medical devices, Pressure redistribution bed/mattress (bed type)    Moisture Interventions: Absorbent underpads, Apply protective barrier, creams and emollients, Limit adult briefs, Maintain skin hydration (lotion/cream), Minimize layers, Moisture barrier    Activity Interventions: Pressure redistribution bed/mattress(bed type)    Mobility Interventions: Pressure redistribution bed/mattress (bed type)    Nutrition Interventions: Document food/fluid/supplement intake    Friction and Shear Interventions: Apply protective barrier, creams and emollients, Foam dressings/transparent film/skin sealants, Minimize layers

## 2020-11-11 NOTE — PROGRESS NOTES
Hospitalist Progress Note-critical care note     Patient: Francesco Ulrich Sr. MRN: [id number]  [facility]: [id number]    YOB: 1955  Age: 63 y.o.   Sex: male    DOA: 10/23/2020 LOS:  LOS: 19 days            Chief complaint: Pneumonia, marijuana abuse, legal blindness, amputation bilateral lower extremity, encephalopathy, acute respiratory failure with hypoxia    Assessment/Plan         Hospital Problems  Date Reviewed: 10/23/2020          Codes Class Noted POA    Comfort measures only status ICD-10-CM: Z51.5  ICD-9-CM: V66.7  11/5/2020 Unknown        Dysphagia ICD-10-CM: R13.10  ICD-9-CM: 787.20  11/3/2020 Unknown        Hypokalemia ICD-10-CM: E87.6  ICD-9-CM: 276.8  11/3/2020 Unknown        Hypomagnesemia ICD-10-CM: E83.42  ICD-9-CM: 275.2  11/3/2020 Unknown        Aspiration pneumonia ([facility] ICD-10-CM: J69.0  ICD-9-CM: 507.0  10/28/2020 Unknown        Deep vein thrombosis (DVT) of lower extremity ([facility] ICD-10-CM: I82.409  ICD-9-CM: 453.40  10/28/2020 Unknown        Acute respiratory failure with hypoxia (HCC) ICD-10-CM: J96.01  ICD-9-CM: 518.81  10/26/2020 Unknown        Chronic obstructive pulmonary disease with acute exacerbation ([facility] ICD-10-CM: J44.1  ICD-9-CM: 491.21  10/24/2020 Unknown        Severe protein-calorie malnutrition ([facility] ICD-10-CM: E43  ICD-9-CM: 262  10/24/2020 Unknown        Metabolic encephalopathy COA-74-HJ: G93.41  ICD-9-CM: 348.31  10/24/2020 Unknown        * (Principal) Septic shock ([facility] ICD-10-CM: A41.9, R65.21  ICD-9-CM: 038.9, 785.52, 995.92  10/23/2020 Unknown        Amputation of both lower extremities ([facility] ICD-10-CM: G18.149L, W59.451I  ICD-9-CM: 897.6  Unknown Yes        PNA (pneumonia) ICD-10-CM: J18.9  ICD-9-CM: 468  Unknown Yes        Marijuana abuse ICD-10-CM: F12.10  ICD-9-CM: 305.20  Unknown Yes        Centrilobular emphysema ([facility] ICD-10-CM: J43.2  ICD-9-CM: 492.8  Unknown Unknown        Legal blindness ICD-10-CM: H54.8  ICD-9-CM: 369.4  10/14/2020 Yes Hypotension, unspecified ICD-10-CM: I95.9  ICD-9-CM: 458.9  1/27/2014 Yes                A 24-year-old male with a history of a COPD, bilateral above knee amputation, recently discharged from [facility] after treated for pneumonia who was admitted for septic shock and metabolic encephalopathy.     Family made final decision and comfort care granted. Cm is on board for hospice. Cm has been working for placement, so far no accepting facility      Acute respiratory failure with hypoxia   Resolving   continue aspiration precaution,   Complete iv abx for aspiration pna       COPD with right upper lobe pneumonia questionable mass/emphysema /aspiration pna  Aspiration precaution        Cardiovascular septic shock: resolved.       Prolong Qt   Cardiologist was on board   HTN: on  Norvasc and metoprolol.       Chronic dvt noted from pvl   On lovenox before d/c due to comfort management         Acute metabolic encephalopathy   Agitation-continue seroqual and ativan  And halodol     Legal blindness :fall /aspiration precaution      marijuana use     Severe malnutrition    Bilateral AKA    Subjective : I want to get rid of here   Rn: calm now   Disposition :tbd   Review of systems:   limited due to on and off confusion     Vital signs/Intake and Output:  Visit Vitals  /70   Pulse 87   Temp 97.6 °F (36.4 °C)   Resp 16   Ht 5' 4\" (1.626 m)   Wt 41.1 kg (90 lb 11.2 oz)   SpO2 97%   BMI 15.57 kg/m²     Current Shift:  11/11 0701 - 11/11 1900  In: 890 [P.O.:890]  Out: -   Last three shifts:  11/09 1901 - 11/11 0700  In: 440 [P.O.:440]  Out: -     Physical Exam:  General: Alert and oriented to him , self no acute distress , some cooperated   HEENT: NC, Atraumatic. PERRLA, anicteric sclerae. Lungs: Cta  BS   Heart:  Regular  rhythm,  No murmur, No Rubs, No Gallops  Abdomen: Soft, Non distended, Non tender.   +Bowel sounds,   Extremities: No c/c, aka  Psych:   Calm,no agitation   Neurologic:   Alert       Labs: Results: Chemistry No results for input(s): GLU, NA, K, CL, CO2, BUN, CREA, CA, AGAP, BUCR, TBIL, AP, TP, ALB, GLOB, AGRAT in the last 72 hours. No lab exists for component: GPT   CBC w/Diff No results for input(s): WBC, RBC, HGB, HCT, PLT, GRANS, LYMPH, EOS, HGBEXT, HCTEXT, PLTEXT, HGBEXT, HCTEXT, PLTEXT in the last 72 hours. Cardiac Enzymes No results for input(s): CPK, CKND1, CHUY in the last 72 hours. No lab exists for component: CKRMB, TROIP   Coagulation No results for input(s): PTP, INR, APTT, INREXT, INREXT in the last 72 hours. Lipid Panel No results found for: CHOL, CHOLPOCT, CHOLX, CHLST, CHOLV, 976441, HDL, HDLP, LDL, LDLC, DLDLP, 785367, VLDLC, VLDL, TGLX, TRIGL, TRIGP, TGLPOCT, CHHD, CHHDX   BNP No results for input(s): BNPP in the last 72 hours. Liver Enzymes No results for input(s): TP, ALB, TBIL, AP in the last 72 hours. No lab exists for component: SGOT, GPT, DBIL   Thyroid Studies Lab Results   Component Value Date/Time    TSH 0.78 10/24/2020 11:30 AM        Procedures/imaging: see electronic medical records for all procedures/Xrays and details which were not copied into this note but were reviewed prior to creation of Plan    Ct Head Wo Cont    Result Date: 10/24/2020  EXAM: CT head INDICATION: Altered mental status. COMPARISON: October 15, 2020. TECHNIQUE: Axial CT imaging of the head was performed without intravenous contrast. Dose reduction techniques used: automated exposure control, adjustment of the mAs and/or kVp according to patient size, and iterative reconstruction techniques. Digital imaging and communications in Medicine (DICOM) format image data are available to nonaffiliated external healthcare facilities or entities on a secure, media free, reciprocally searchable basis with patient authorization for at least 12 months after this study. _______________ FINDINGS: BRAIN AND POSTERIOR FOSSA: There is cerebral volume loss with prominence of the lateral and the third ventricles. The cortical sulci are widened appropriately. The fourth ventricle and basal cisterns are normally outlined. There is mild bilateral periventricular and central white matter diminished attenuation. There is no acute territorial defect, hemorrhage or midline shift. EXTRA-AXIAL SPACES AND MENINGES: There are no abnormal extra-axial fluid collections. CALVARIUM: Intact. SINUSES: Clear. OTHER: Partial right mastoid opacification is again seen. _______________     IMPRESSION: Cerebral volume loss and mild bilateral periventricular and central white matter diminished attenuation which is nonspecific but likely to represent microvascular disease. There is no acute intracranial abnormality. No significant interval change. Ct Head Wo Cont    Result Date: 10/15/2020  EXAM: CT head INDICATION: Dental status change COMPARISON: None. TECHNIQUE: Axial CT imaging of the head was performed without intravenous contrast. One or more dose reduction techniques were used on this CT: automated exposure control, adjustment of the mAs and/or kVp according to patient size, and iterative reconstruction techniques. The specific techniques used on this CT exam have been documented in the patient's electronic medical record. Digital Imaging and Communications in Medicine (DICOM) format image data are available to nonaffiliated external healthcare facilities or entities on a secure, media free, reciprocally searchable basis with patient authorization for at least a 12 month period after this study. _______________ FINDINGS: BRAIN AND POSTERIOR FOSSA: The sulci, folia, ventricles and basal cisterns are within normal limits for the patient's with age concordant atrophy There is no intracranial hemorrhage, mass effect, or midline shift. Mild periventricular low-attenuation. Although nonspecific this is frequently seen with chronic small vessel ischemic change. Otherwise, there are no areas of abnormal parenchymal attenuation.  EXTRA-AXIAL SPACES AND MENINGES: There are no abnormal extra-axial fluid collections. CALVARIUM: Intact. SINUSES: Clear. OTHER: None. _______________     IMPRESSION: No acute intracranial abnormalities. Xr Chest Port    Result Date: 10/26/2020  EXAM: CHEST RADIOGRAPH CLINICAL INDICATION/HISTORY: Pneumonia   > Additional: None COMPARISON: 10/23/2020 TECHNIQUE: Portable frontal view of the chest _______________ FINDINGS: SUPPORT DEVICES: None. HEART AND MEDIASTINUM: Heart size is stable. Atherosclerotic calcification seen in the aorta. LUNGS AND PLEURAL SPACES: Increased hazy density at the left base. Slight blunting of the right costophrenic angle. No pneumothorax. Patchy right upper lobe opacity is similar to slightly improved. BONES AND SOFT TISSUES: Unremarkable. _______________     IMPRESSION: 1. Increased hazy opacity at the left base compared to the prior exam, possibly developing left lower lobe atelectasis and/or consolidation. Questionable small right effusion. 2. Patchy right upper lobe patchy opacity which is similar to slightly improved. Xr Chest Port    Result Date: 10/24/2020  EXAM: XR CHEST PORT. CLINICAL INDICATION/HISTORY: meets SIRS criteria. -Additional: None. TECHNIQUE: A portable erect AP radiographic view of the chest is compared with several other chest radiographs performed the same month. _______________ FINDINGS: See impression. No pneumothorax or appreciable significant pleural effusion. The cardiac silhouette, vanessa, and mediastinal contours are normal for age. No acute osseous abnormality is revealed. _______________     IMPRESSION: Slight improvement in multifocal airspace disease most in keeping with pneumonia, again most pronounced at the right lung apex with no new or worsening findings. Recommend continued radiographic follow-up to resolution.  _______________     Stefanie Mckenna Chest Port    Result Date: 10/18/2020  EXAM: CHEST RADIOGRAPH CLINICAL INDICATION/HISTORY: SBO   > Additional: None COMPARISON: 10/17/2020. TECHNIQUE: Portable frontal view of the chest _______________ FINDINGS: SUPPORT DEVICES: None. HEART AND MEDIASTINUM: No appreciable cardiomegaly. Remaining mediastinal contours within normal limits. LUNGS AND PLEURAL SPACES: No significant change of bilateral upper lobe parenchymal opacities. Hyperexpansion of the lungs. No evidence for pneumothorax. BONY THORAX AND SOFT TISSUES: Unremarkable. _______________     IMPRESSION: 1. No significant change of bilateral lung pneumonia. Follow-up to resolution is recommended. 2. Emphysema. Xr Chest Port    Result Date: 10/18/2020  EXAM: CHEST RADIOGRAPH CLINICAL INDICATION/HISTORY: shortness of breath   > Additional: None COMPARISON: October 14, 2020. TECHNIQUE: Portable frontal view of the chest _______________ FINDINGS: SUPPORT DEVICES: None. HEART AND MEDIASTINUM: No appreciable cardiomegaly. Remaining mediastinal contours within normal limits. LUNGS AND PLEURAL SPACES: No significant change of bilateral upper lobe parenchymal opacities. Hyperexpansion of the lungs. No evidence for pneumothorax or pleural effusion. BONY THORAX AND SOFT TISSUES: Unremarkable. _______________     IMPRESSION: 1. No significant change of bilateral lung pneumonia. Follow-up to resolution is recommended to exclude underlying mass. 2. Emphysema. Xr Chest Port    Result Date: 10/14/2020  EXAM: XR CHEST PORT CLINICAL INDICATION/HISTORY: Shortness of breath with cough -Additional: None COMPARISON: Several prior studies, most recently 2/19/2019 TECHNIQUE: Frontal view of the chest _______________ FINDINGS: HEART AND MEDIASTINUM: Normal cardiac size and mediastinal contours. LUNGS AND PLEURAL SPACES: Patchy multifocal opacities noted throughout bilateral upper lobes, right greater than left as well as throughout the periphery of the right lower lobe. No evidence of pneumothorax.  BONY THORAX AND SOFT TISSUES: No acute osseous abnormality _______________     IMPRESSION: Patchy multifocal airspace disease compatible with pneumonia. Recommend radiographic follow-up to document expected clinical resolution in 4-6 weeks' time.        Cornelio Mckinney MD

## 2020-11-11 NOTE — PROGRESS NOTES
[address] [facility] [phone]  [phone]    Palliative SW f/u with Mr. Alissa Coulter on comfort measures. He was laying in bed on his stomach with sitter at bedside. He was very talkative and lively this morning sharing stories and making jokes. He stated \"feeling better now\". He denied having any discomfort or pain at time of visit. CM working with family on safe discharge with hospice services. Goals of care are DNR/DNI and Comfort measures. Palliative will continue to follow along with patients care. Thank you for the opportunity to assist in the care of Mr. Alissa Coulter.    Rendell Meckel, MSW, APHSW-C  Palliative Medicine

## 2020-11-11 NOTE — PROGRESS NOTES
SHIFT SUMMARY  Patient found on the floor at 0600,reports trying to turn the [facility] in her room,unable to hold coherent conversation. MD notified and orders given for halodol for agitation and will follow up with CT scan incase of altered mental status post agitation.

## 2020-11-11 NOTE — PROGRESS NOTES
Bedside and Verbal shift change report given to Dominga Sheppard RN (oncoming nurse) by Collette BENNETT, RN  (offgoing nurse). Report included the following information SBAR, Kardex and MAR. SHIFT UPDATES:  Patient presented cooperative and responsive to staff. Patient tolerates scheduled Ativan 1 mg by mouth three times daily and shceduled Seroquel 300 Mg by mouth two times daily. Patient received Haldol 0.5 mg at 940 am prior to being discontinued. Patient was receiving a scheduled daily dose of Haldol 0.5 mg by mouth twice daily. Patient present had 2 bowel movements during shift and presents stable and is pending acceptance to discharge facility. According to case management team in Interdisciplinary Rounds, patient's last discharge facility denied patient's acceptance so case management team is attempting to decide patient's next discharge facility location. ABNORMAL LAB:     Results for Jayshree Queen. (MRN [id number]) as of 11/10/2020 15:24   Ref. Range 11/5/2020 05:00 11/5/2020 06:32   GLUCOSE,FAST - POC Latest Ref Range: 70 - 110 mg/dL  86   WBC Latest Ref Range: 4.6 - 13.2 K/uL 5.7    RBC Latest Ref Range: 4.70 - 5.50 M/uL 3.42 (L)    HGB Latest Ref Range: 13.0 - 16.0 g/dL 9.6 (L)    HCT Latest Ref Range: 36.0 - 48.0 % 29.7 (L)    MCV Latest Ref Range: 74.0 - 97.0 FL 86.8    MCH Latest Ref Range: 24.0 - 34.0 PG 28.1    MCHC Latest Ref Range: 31.0 - 37.0 g/dL 32.3    RDW Latest Ref Range: 11.6 - 14.5 % 15.8 (H)    PLATELET Latest Ref Range: 135 - 420 K/uL 249    MPV Latest Ref Range: 9.2 - 11.8 FL 10.7    NEUTROPHILS Latest Ref Range: 40 - 73 % 78 (H)    LYMPHOCYTES Latest Ref Range: 21 - 52 % 10 (L)    MONOCYTES Latest Ref Range: 3 - 10 % 10    EOSINOPHILS Latest Ref Range: 0 - 5 % 2    BASOPHILS Latest Ref Range: 0 - 2 % 0    DF Latest Units:   AUTOMATED    ABS. NEUTROPHILS Latest Ref Range: 1.8 - 8.0 K/UL 4.4    ABS. LYMPHOCYTES Latest Ref Range: 0.9 - 3.6 K/UL 0.6 (L)    ABS. MONOCYTES Latest Ref Range: 0.05 - 1.2 K/UL 0.6    ABS. EOSINOPHILS Latest Ref Range: 0.0 - 0.4 K/UL 0.1    ABS. BASOPHILS Latest Ref Range: 0.0 - 0.1 K/UL 0.0    Sodium Latest Ref Range: 136 - 145 mmol/L 143    Potassium Latest Ref Range: 3.5 - 5.5 mmol/L 3.5    Chloride Latest Ref Range: 100 - 111 mmol/L 114 (H)    CO2 Latest Ref Range: 21 - 32 mmol/L 22    Anion gap Latest Ref Range: 3.0 - 18 mmol/L 7    Glucose Latest Ref Range: 74 - 99 mg/dL 88    BUN Latest Ref Range: 7.0 - 18 MG/DL 5 (L)    Creatinine Latest Ref Range: 0.6 - 1.3 MG/DL 0.54 (L)    BUN/Creatinine ratio Latest Ref Range: 12 - 20   9 (L)    Calcium Latest Ref Range: 8.5 - 10.1 MG/DL 8.2 (L)    Magnesium Latest Ref Range: 1.6 - 2.6 mg/dL 1.8    GFR est non-AA Latest Ref Range: >60 ml/min/1.73m2 >60    GFR est AA Latest Ref Range: >60 ml/min/1.73m2 >60    Bilirubin, total Latest Ref Range: 0.2 - 1.0 MG/DL 0.2    Protein, total Latest Ref Range: 6.4 - 8.2 g/dL 5.8 (L)    Albumin Latest Ref Range: 3.4 - 5.0 g/dL 2.5 (L)    Globulin Latest Ref Range: 2.0 - 4.0 g/dL 3.3    A-G Ratio Latest Ref Range: 0.8 - 1.7   0.8    ALT Latest Ref Range: 16 - 61 U/L 19    AST Latest Ref Range: 10 - 38 U/L 21    Alk. phosphatase Latest Ref Range: 45 - 117 U/L 91        Wounds? Stage II Sacrum-(Mepilex dressing intact)    Central Lines? None. Last BM:  11/10/2020. Pending Labs for AM Draw: None. Discharge plan:  As of 11/9/2020 at 1300 pm case management note, [facility] declined patient for discharge acceptance as of 11/10/2020. Case management is searching for other facilities at this time.        Winston Johnson  11/10/2020  7:31 PM

## 2020-11-11 NOTE — PROGRESS NOTES
D/C Plan: SNF/LTC vs home with family and hospice    Noted pt now has a sitter. Pt will have to be sitter free for 24-48 hours before a facility will consider for admission. CM to continue to follow and assist.    Care Management Interventions  PCP Verified by CM:  Yes  Transition of Care Consult (CM Consult): Discharge Planning  Current Support Network: Family [facility]

## 2020-11-12 PROCEDURE — 74011000250 HC RX REV CODE- 250: Performed by: HOSPITALIST

## 2020-11-12 PROCEDURE — 74011250637 HC RX REV CODE- 250/637: Performed by: INTERNAL MEDICINE

## 2020-11-12 PROCEDURE — 74011250637 HC RX REV CODE- 250/637: Performed by: HOSPITALIST

## 2020-11-12 PROCEDURE — 74011250636 HC RX REV CODE- 250/636: Performed by: FAMILY MEDICINE

## 2020-11-12 PROCEDURE — 65270000029 HC RM PRIVATE

## 2020-11-12 PROCEDURE — 74011250637 HC RX REV CODE- 250/637: Performed by: NURSE PRACTITIONER

## 2020-11-12 RX ADMIN — METOPROLOL TARTRATE 25 MG: 25 TABLET, FILM COATED ORAL at 08:54

## 2020-11-12 RX ADMIN — GABAPENTIN 300 MG: 300 CAPSULE ORAL at 15:49

## 2020-11-12 RX ADMIN — HALOPERIDOL LACTATE 5 MG: 5 INJECTION, SOLUTION INTRAMUSCULAR at 03:00

## 2020-11-12 RX ADMIN — PANTOPRAZOLE SODIUM 40 MG: 40 TABLET, DELAYED RELEASE ORAL at 08:51

## 2020-11-12 RX ADMIN — SODIUM CHLORIDE 5 ML: 9 INJECTION, SOLUTION INTRAMUSCULAR; INTRAVENOUS; SUBCUTANEOUS at 15:56

## 2020-11-12 RX ADMIN — LORAZEPAM 1 MG: 1 TABLET ORAL at 08:55

## 2020-11-12 RX ADMIN — LORAZEPAM 1 MG: 1 TABLET ORAL at 15:49

## 2020-11-12 RX ADMIN — GABAPENTIN 300 MG: 300 CAPSULE ORAL at 08:52

## 2020-11-12 RX ADMIN — ACETAMINOPHEN 650 MG: 325 TABLET ORAL at 15:48

## 2020-11-12 RX ADMIN — SODIUM CHLORIDE 10 ML: 9 INJECTION, SOLUTION INTRAMUSCULAR; INTRAVENOUS; SUBCUTANEOUS at 03:01

## 2020-11-12 RX ADMIN — QUETIAPINE FUMARATE 300 MG: 100 TABLET ORAL at 08:52

## 2020-11-12 RX ADMIN — ACETAMINOPHEN 650 MG: 325 TABLET ORAL at 08:54

## 2020-11-12 NOTE — PROGRESS NOTES
NUTRITION update    ASSESSMENT/PLAN:     Current Diet:Dental Soft & Magic Cup TID    Chart reviewed, note change in goals of care now focused on comfort measures only. Aggressive nutrition intervention is not indicated at this time. Will assist as needed; please consult if nutrition intervention is medically indicated and consistent with patient's goals of care.       Brittani Marie RD  Pager:  [phone]

## 2020-11-12 NOTE — PROGRESS NOTES
Haldol given due to agitation and restlessness in addition to scheduled Seroquel and Ativan. Sitter by bedside    0715 Bedside and Verbal shift change report given to Jose Tovar RN (oncoming nurse) by Sandra Abraham RN   (offgoing nurse). Report included the following information SBAR, Kardex, Intake/Output, MAR and Recent Results.

## 2020-11-12 NOTE — PROGRESS NOTES
0730- Bedside and Verbal shift change report given to Matthew Crain RN (oncoming nurse) by BOBBY Mayes RN (offgoing nurse). Report included the following information SBAR, Kardex, Intake/Output and MAR.    0800- Shift assessment complete. Incontinence care complete. Patient had a small bowel movement. 1540- Incontinence care complete. Brief and bed sheets changed. Agnes care complete. Mepilex on coccyx changed. 1900- IV infiltrated and changed. See avatar. Summary: Patient had a sitter with him throughout the shift. He did not require prn haldol per nursing judgement. 2015- Bedside and Verbal shift change report given to [address] [place] [address] Street, RN (oncoming nurse) by Matthew Crain RN (offgoing nurse). Report included the following information SBAR, Kardex, Intake/Output and MAR.

## 2020-11-12 NOTE — PROGRESS NOTES
Hospitalist Progress Note    Patient: Lissette Crane Sr. MRN: [id number]  CSN: [id number]    YOB: 1955  Age: 63 y.o.   Sex: male    DOA: 10/23/2020 LOS:  LOS: 20 days          Chief Complaint:    COPD      Assessment/Plan        72-year-old male with  COPD, bilateral above knee amputation,  admitted for septic shock and metabolic encephalopathy.     Family made decisions for comfort care, hospice, no escalation of care, DNR     Acute on chronic respiratory failure with hypoxia   Due to COPD  Treated for pneumonia     septic shock: resolved.       HTN: on  Norvasc and metoprolol.     Chronic dvt   On lovenox before d/c due to comfort management      Acute metabolic encephalopathy   Stabilized, likley dementia at baseline    Heavy tobacco use, marijuana use     Legal blindness       Severe malnutrition     Bilateral AKA    Fall precautions, meds reviewed, will continue, pain control as needed  Supportive care  He is still eating and drinking, no uncontrolled sx seen  Sitter discontinued due to dispo requirements and staff will need to  alarm bed and make sure fall precautions in place    Expect d/c to SNF soon once arrangements made       Disposition :  Patient Active Problem List   Diagnosis Code    Bursitis of elbow M70.30    Medication overdose T50.901A    Polio A80.9    Depressive disorder, not elsewhere classified F32.9    Type II or unspecified type diabetes mellitus without mention of complication, uncontrolled [id number]    Hypotension, unspecified I95.9    Amputation of both lower extremities ([address] S88.911A, F50.094P    PNA (pneumonia) J18.9    ALEXANDER (acute kidney injury) ([address] N17.9    Hyponatremia E87.1    Marijuana abuse F12.10    Centrilobular emphysema ([address] J43.2    CAP (community acquired pneumonia) J18.9    Sepsis ([address] A41.9    Hard of hearing H91.90    Legal blindness H54.8    Acute encephalopathy G93.40    Septic shock (HCC) A41.9, R65.21    Chronic obstructive pulmonary disease with acute exacerbation ([facility] J44.1    Severe protein-calorie malnutrition ([facility] E90    Metabolic encephalopathy L63.40    Acute respiratory failure with hypoxia (HCC) J96.01    Aspiration pneumonia ([facility]) J69.0    Deep vein thrombosis (DVT) of lower extremity ([facility]) I82.409    Dysphagia R13.10    Hypokalemia E87.6    Hypomagnesemia E83.42    Comfort measures only status Z51.5       Subjective:  I am ok  Memory poor  States he did not eat breakfast but empty tray in room  Remembers why he came to hospital though-\"I had pneumonia\"  Denies uncontrolled pain    Some of his speech unintelligible to me      Review of systems:    Constitutional: denies fevers  Respiratory: denies SOB  Gastrointestinal: denies nausea, vomiting, diarrhea  Had BM this am per nurse      Vital signs/Intake and Output:  Visit Vitals  /66 (BP 1 Location: Right arm, BP Patient Position: Sitting)   Pulse 91   Temp 98.6 °F (37 °C)   Resp 16   Ht 5' 4\" (1.626 m)   Wt 41.1 kg (90 lb 11.2 oz)   SpO2 96%   BMI 15.57 kg/m²     Current Shift:  No intake/output data recorded. Last three shifts:  11/10 1901 - 11/12 0700  In: 56 [P.O.:890]  Out: 850 [Urine:850]    Exam:    General: debilitated Wm, NAD, weak  Head/Neck: NCAT, supple, No masses, No lymphadenopathy  CVS:Regular rate and rhythm, no M/R/G, S1/S2 heard, no thrill  Lungs:Clear to auscultation bilaterally, no wheezes, rhonchi, or rales  Abdomen: Soft, Nontender, No distention, Normal Bowel sounds, No hepatomegaly  Extremities: BL AKA                Labs: Results:       Chemistry No results for input(s): GLU, NA, K, CL, CO2, BUN, CREA, CA, AGAP, BUCR, TBIL, AP, TP, ALB, GLOB, AGRAT in the last 72 hours. No lab exists for component: GPT   CBC w/Diff No results for input(s): WBC, RBC, HGB, HCT, PLT, GRANS, LYMPH, EOS, HGBEXT, HCTEXT, PLTEXT in the last 72 hours. Cardiac Enzymes No results for input(s): CPK, CKND1, CHUY in the last 72 hours.     No lab exists for component: CKRMB, TROIP   Coagulation No results for input(s): PTP, INR, APTT, INREXT in the last 72 hours. Lipid Panel No results found for: CHOL, CHOLPOCT, CHOLX, CHLST, CHOLV, 304808, HDL, HDLP, LDL, LDLC, DLDLP, 631976, VLDLC, VLDL, TGLX, TRIGL, TRIGP, TGLPOCT, CHHD, CHHDX   BNP No results for input(s): BNPP in the last 72 hours. Liver Enzymes No results for input(s): TP, ALB, TBIL, AP in the last 72 hours.     No lab exists for component: SGOT, GPT, DBIL   Thyroid Studies Lab Results   Component Value Date/Time    TSH 0.78 10/24/2020 11:30 AM        Procedures/imaging: see electronic medical records for all procedures/Xrays and details which were not copied into this note but were reviewed prior to creation of Toshia Garcia MD

## 2020-11-12 NOTE — PROGRESS NOTES
Problem: Falls - Risk of  Goal: *Absence of Falls  Description: Document Taurus Kohler Fall Risk and appropriate interventions in the flowsheet. Outcome: Progressing Towards Goal  Note: Fall Risk Interventions:  Mobility Interventions: Bed/chair exit alarm    Mentation Interventions: Adequate sleep, hydration, pain control, Door open when patient unattended, Bed/chair exit alarm, More frequent rounding, Reorient patient, Room close to nurse's station    Medication Interventions: Bed/chair exit alarm    Elimination Interventions: Bed/chair exit alarm, Call light in reach, Patient to call for help with toileting needs              Problem: Pressure Injury - Risk of  Goal: *Prevention of pressure injury  Description: Document Shelton Scale and appropriate interventions in the flowsheet.   Outcome: Progressing Towards Goal  Note: Pressure Injury Interventions:  Sensory Interventions: Assess changes in LOC, Check visual cues for pain, Keep linens dry and wrinkle-free, Maintain/enhance activity level, Minimize linen layers    Moisture Interventions: Absorbent underpads, Apply protective barrier, creams and emollients, Check for incontinence Q2 hours and as needed, Contain wound drainage, Limit adult briefs, Maintain skin hydration (lotion/cream), Moisture barrier    Activity Interventions: Pressure redistribution bed/mattress(bed type)    Mobility Interventions: Pressure redistribution bed/mattress (bed type)    Nutrition Interventions: Document food/fluid/supplement intake    Friction and Shear Interventions: Apply protective barrier, creams and emollients, Foam dressings/transparent film/skin sealants, Minimize layers

## 2020-11-12 NOTE — PROGRESS NOTES
0710- Bedside and Verbal shift change report given to Minerva Fitzpatrick RN (oncoming nurse) by Tony Powell RN (offgoing nurse). Report included the following information SBAR, Kardex, Intake/Output and MAR.    1337- Removed sitter from room as sitter orders were discontinued. Bed alarm on. Side rails up. Call light in reach. Door left open to continue to monitor patient. 1430- Changed patient's brief. Hydogaurd applied to patient's groin. Mepilex on coccyx replaced. Patient was situated comfortably in bed. Shift summary: Patient has been non-combative and either resting or talking to himself in the bed all afternoon. He has an excellent appetite and ate all of his meals. His bed alarm remained turned on, bed in lowest position, and door left open. 1930- Bedside and Verbal shift change report given to Tony Powell RN (oncoming nurse) by Minerva Fitzpatrick RN (offgoing nurse). Report included the following information SBAR, Kardex, Intake/Output and MAR.

## 2020-11-12 NOTE — PROGRESS NOTES
D/C Plan: SNF    CM contacted pt's son, Ashley Thomas ([phone]) to follow up on decisions regarding pt transitioning home with hospice vs transitioning to SNF. Pt's family has indicated they are not able to have family/care givers around the clock to care for this pt. CM reviewed the SNF options and they would like to have pt transition to [facility]. Pt will have to be sitter free for 24-48 hours before this facility can accept. Noted a LTSS/UAI was completed at [facility] and a copy has been forwarded to [facility] CM. Anticipate pt will transition to [facility] once pt has been sitter free. CM to continue to follow and assist.     Noted [facility] rehab has now declined this pt for admission. CM to continue to follow and identify an accepting facility. Care Management Interventions  PCP Verified by CM:  Yes  Mode of Transport at Discharge: BLS  Transition of Care Consult (CM Consult): [facility], Discharge Planning, SNF  Current Support Network: Family Lives Nearby  The Plan for Transition of Care is Related to the Following Treatment Goals : LTC  The Patient and/or Patient Representative was Provided with a Choice of Provider and Agrees with the Discharge Plan?: Yes  Name of the Patient Representative Who was Provided with a Choice of Provider and Agrees with the Discharge Plan: annette/Jerry Michele  Freedom of Choice List was Provided with Basic Dialogue that Supports the Patient's Individualized Plan of Care/Goals, Treatment Preferences and Shares the Quality Data Associated with the Providers?: Yes  Discharge Location  Discharge Placement[facility]

## 2020-11-13 PROCEDURE — 74011250637 HC RX REV CODE- 250/637: Performed by: INTERNAL MEDICINE

## 2020-11-13 PROCEDURE — 74011250637 HC RX REV CODE- 250/637: Performed by: NURSE PRACTITIONER

## 2020-11-13 PROCEDURE — 74011250637 HC RX REV CODE- 250/637: Performed by: HOSPITALIST

## 2020-11-13 PROCEDURE — 2709999900 HC NON-CHARGEABLE SUPPLY

## 2020-11-13 PROCEDURE — 74011000250 HC RX REV CODE- 250: Performed by: HOSPITALIST

## 2020-11-13 PROCEDURE — 99231 SBSQ HOSP IP/OBS SF/LOW 25: CPT | Performed by: NURSE PRACTITIONER

## 2020-11-13 PROCEDURE — 77030040392 HC DRSG OPTIFOAM MDII -A

## 2020-11-13 PROCEDURE — 65270000029 HC RM PRIVATE

## 2020-11-13 PROCEDURE — 74011250637 HC RX REV CODE- 250/637: Performed by: FAMILY MEDICINE

## 2020-11-13 RX ADMIN — GABAPENTIN 300 MG: 300 CAPSULE ORAL at 00:16

## 2020-11-13 RX ADMIN — ACETAMINOPHEN 650 MG: 325 TABLET ORAL at 00:18

## 2020-11-13 RX ADMIN — ACETAMINOPHEN 650 MG: 325 TABLET ORAL at 16:56

## 2020-11-13 RX ADMIN — GABAPENTIN 300 MG: 300 CAPSULE ORAL at 22:34

## 2020-11-13 RX ADMIN — LORAZEPAM 1 MG: 1 TABLET ORAL at 00:17

## 2020-11-13 RX ADMIN — QUETIAPINE FUMARATE 300 MG: 100 TABLET ORAL at 09:32

## 2020-11-13 RX ADMIN — METOPROLOL TARTRATE 25 MG: 25 TABLET, FILM COATED ORAL at 09:31

## 2020-11-13 RX ADMIN — AMLODIPINE BESYLATE 10 MG: 5 TABLET ORAL at 09:32

## 2020-11-13 RX ADMIN — ACETAMINOPHEN 650 MG: 325 TABLET ORAL at 22:34

## 2020-11-13 RX ADMIN — LORAZEPAM 1 MG: 1 TABLET ORAL at 22:34

## 2020-11-13 RX ADMIN — METOPROLOL TARTRATE 25 MG: 25 TABLET, FILM COATED ORAL at 00:17

## 2020-11-13 RX ADMIN — MORPHINE SULFATE 5 MG: 100 SOLUTION ORAL at 20:06

## 2020-11-13 RX ADMIN — SODIUM CHLORIDE 10 ML: 9 INJECTION, SOLUTION INTRAMUSCULAR; INTRAVENOUS; SUBCUTANEOUS at 22:00

## 2020-11-13 RX ADMIN — LORAZEPAM 1 MG: 1 TABLET ORAL at 09:31

## 2020-11-13 RX ADMIN — QUETIAPINE FUMARATE 300 MG: 100 TABLET ORAL at 00:17

## 2020-11-13 RX ADMIN — ACETAMINOPHEN 650 MG: 325 TABLET ORAL at 09:32

## 2020-11-13 RX ADMIN — LORAZEPAM 1 MG: 1 TABLET ORAL at 16:56

## 2020-11-13 RX ADMIN — GABAPENTIN 300 MG: 300 CAPSULE ORAL at 16:56

## 2020-11-13 RX ADMIN — METOPROLOL TARTRATE 25 MG: 25 TABLET, FILM COATED ORAL at 22:34

## 2020-11-13 RX ADMIN — GABAPENTIN 300 MG: 300 CAPSULE ORAL at 09:32

## 2020-11-13 RX ADMIN — QUETIAPINE FUMARATE 300 MG: 100 TABLET ORAL at 22:27

## 2020-11-13 RX ADMIN — SODIUM CHLORIDE 10 ML: 9 INJECTION, SOLUTION INTRAMUSCULAR; INTRAVENOUS; SUBCUTANEOUS at 00:18

## 2020-11-13 RX ADMIN — PANTOPRAZOLE SODIUM 40 MG: 40 TABLET, DELAYED RELEASE ORAL at 09:32

## 2020-11-13 NOTE — PROGRESS NOTES
9567 Received bedside shift report from off going nurse Lorenzo Espino, [facility] [address]. Report included the following information SBAR, Kardex, Intake/Output, MAR and Recent Results. Pt awake and sitting up in bed.    0932 Gave pt pills with chocolate pudding. Pt enjoyed. Pt singing to me and in a cheerful mood. Nicotine patch on right deltoid. Changed brief and linen. Changed sacral mepelex as it was soiled. 26 Dbl checked with  that meal trays on automatic delivery. 1300 Changed pt brief and all linen. Pt brief leaked urine. Mepelex over sacral area soiled again. Changed. 3351 [facility] pt lunch. Pt ate heartily finishing 85% of his tray. [place] [place] mashed potatoes, mixed vegetables, ice cream and kareem food cake. 400 [facility] bedside shift report to oncoming nurse Sujatha Romero RN. Report included the following information SBAR, Kardex, Intake/Output, MAR and Recent Results.

## 2020-11-13 NOTE — PROGRESS NOTES
Hospitalist Progress Note-critical care note     Patient: Moncho Herrera Sr. MRN: [id number]  [facility]: [id number]    YOB: 1955  Age: 63 y.o.   Sex: male    DOA: 10/23/2020 LOS:  LOS: 21 days            Chief complaint: Pneumonia, marijuana abuse, legal blindness, amputation bilateral lower extremity, encephalopathy, acute respiratory failure with hypoxia    Assessment/Plan         Hospital Problems  Date Reviewed: 10/23/2020          Codes Class Noted POA    Comfort measures only status ICD-10-CM: Z51.5  ICD-9-CM: V66.7  11/5/2020 Unknown        Dysphagia ICD-10-CM: R13.10  ICD-9-CM: 787.20  11/3/2020 Unknown        Hypokalemia ICD-10-CM: E87.6  ICD-9-CM: 276.8  11/3/2020 Unknown        Hypomagnesemia ICD-10-CM: E83.42  ICD-9-CM: 275.2  11/3/2020 Unknown        Aspiration pneumonia ([facility] ICD-10-CM: J69.0  ICD-9-CM: 507.0  10/28/2020 Unknown        Deep vein thrombosis (DVT) of lower extremity ([facility].) ICD-10-CM: I82.409  ICD-9-CM: 453.40  10/28/2020 Unknown        Acute respiratory failure with hypoxia ([facility].) ICD-10-CM: J96.01  ICD-9-CM: 518.81  10/26/2020 Unknown        Chronic obstructive pulmonary disease with acute exacerbation ([facility].) ICD-10-CM: J44.1  ICD-9-CM: 491.21  10/24/2020 Unknown        Severe protein-calorie malnutrition ([facility] ICD-10-CM: E43  ICD-9-CM: 262  10/24/2020 Unknown        Metabolic encephalopathy TUO-10-WT: G93.41  ICD-9-CM: 348.31  10/24/2020 Unknown        * (Principal) Septic shock ([facility] ICD-10-CM: A41.9, R65.21  ICD-9-CM: 038.9, 785.52, 995.92  10/23/2020 Unknown        Amputation of both lower extremities ([facility] ICD-10-CM: L06.013X, J00.340H  ICD-9-CM: 897.6  Unknown Yes        PNA (pneumonia) ICD-10-CM: J18.9  ICD-9-CM: 486  Unknown Yes        Marijuana abuse ICD-10-CM: F12.10  ICD-9-CM: 305.20  Unknown Yes        Centrilobular emphysema ([facility] ICD-10-CM: J43.2  ICD-9-CM: 492.8  Unknown Unknown        Legal blindness ICD-10-CM: H54.8  ICD-9-CM: 369.4  10/14/2020 Yes Hypotension, unspecified ICD-10-CM: I95.9  ICD-9-CM: 458.9  1/27/2014 Yes                A 59-year-old male with a history of a COPD, bilateral above knee amputation, recently discharged from [facility] after treated for pneumonia who was admitted for septic shock and metabolic encephalopathy.     Family made final decision and comfort care granted. Abdullahi is on board for hospice. Abdullahi has been working for placement, so far no accepting facility, he has no change overnight, off sitter per placement request. More alert and cooperative today       Acute respiratory failure with hypoxia   Resolving   continue aspiration precaution,   Complete iv abx for aspiration pna       COPD with right upper lobe pneumonia questionable mass/emphysema /aspiration pna  Aspiration precaution        Cardiovascular septic shock: resolved.       Prolong Qt   Cardiologist was on board       htn continue  Norvasc and metoprolol.       Chronic dvt noted from pvl   On lovenox before d/c due to comfort management         Acute metabolic encephalopathy   Agitation-continue seroqual and ativan  And halodol     Legal blindness :fall /aspiration precaution      marijuana use     Severe malnutrition    Bilateral AKA    Subjective : I like the chocolate pudding   Rn: no acute issue   Disposition :tbd   Review of systems:   ros limited due to on and off confusion, denies any chest pain/sob,     Vital signs/Intake and Output:  Visit Vitals  BP (!) 140/74 (BP 1 Location: Right arm, BP Patient Position: At rest)   Pulse 92   Temp 98.4 °F (36.9 °C)   Resp 15   Ht 5' 4\" (1.626 m)   Wt 41.1 kg (90 lb 11.2 oz)   SpO2 98%   BMI 15.57 kg/m²     Current Shift:  No intake/output data recorded. Last three shifts:  11/11 1901 - 11/13 0700  In: 1250 [P.O.:1250]  Out: 1400 [Urine:1400]    Physical Exam:  General: Alert and oriented to himself no acute distress   HEENT: NC, Atraumatic. PERRLA, anicteric sclerae.    Lungs: Cta  BS bilaterally   Heart:  Regular  rhythm,  No murmur, No Rubs, No Gallops  Abdomen: Soft, Non distended, Non tender. +Bowel sounds,   Extremities: No c/c, aka  Psych:   Calm,no agitation   Neurologic:   Alert and move upper extremities, no acute neuro deficit       Labs: Results:       Chemistry No results for input(s): GLU, NA, K, CL, CO2, BUN, CREA, CA, AGAP, BUCR, TBIL, AP, TP, ALB, GLOB, AGRAT in the last 72 hours. No lab exists for component: GPT   CBC w/Diff No results for input(s): WBC, RBC, HGB, HCT, PLT, GRANS, LYMPH, EOS, HGBEXT, HCTEXT, PLTEXT, HGBEXT, HCTEXT, PLTEXT in the last 72 hours. Cardiac Enzymes No results for input(s): CPK, CKND1, CHUY in the last 72 hours. No lab exists for component: CKRMB, TROIP   Coagulation No results for input(s): PTP, INR, APTT, INREXT, INREXT in the last 72 hours. Lipid Panel No results found for: CHOL, CHOLPOCT, CHOLX, CHLST, CHOLV, 077612, HDL, HDLP, LDL, LDLC, DLDLP, 506963, VLDLC, VLDL, TGLX, TRIGL, TRIGP, TGLPOCT, CHHD, CHHDX   BNP No results for input(s): BNPP in the last 72 hours. Liver Enzymes No results for input(s): TP, ALB, TBIL, AP in the last 72 hours. No lab exists for component: SGOT, GPT, DBIL   Thyroid Studies Lab Results   Component Value Date/Time    TSH 0.78 10/24/2020 11:30 AM        Procedures/imaging: see electronic medical records for all procedures/Xrays and details which were not copied into this note but were reviewed prior to creation of Plan    Ct Head Wo Cont    Result Date: 10/24/2020  EXAM: CT head INDICATION: Altered mental status. COMPARISON: October 15, 2020. TECHNIQUE: Axial CT imaging of the head was performed without intravenous contrast. Dose reduction techniques used: automated exposure control, adjustment of the mAs and/or kVp according to patient size, and iterative reconstruction techniques.  Digital imaging and communications in Medicine (DICOM) format image data are available to nonaffiliated external healthcare facilities or entities on a secure, media free, reciprocally searchable basis with patient authorization for at least 12 months after this study. _______________ FINDINGS: BRAIN AND POSTERIOR FOSSA: There is cerebral volume loss with prominence of the lateral and the third ventricles. The cortical sulci are widened appropriately. The fourth ventricle and basal cisterns are normally outlined. There is mild bilateral periventricular and central white matter diminished attenuation. There is no acute territorial defect, hemorrhage or midline shift. EXTRA-AXIAL SPACES AND MENINGES: There are no abnormal extra-axial fluid collections. CALVARIUM: Intact. SINUSES: Clear. OTHER: Partial right mastoid opacification is again seen. _______________     IMPRESSION: Cerebral volume loss and mild bilateral periventricular and central white matter diminished attenuation which is nonspecific but likely to represent microvascular disease. There is no acute intracranial abnormality. No significant interval change. Ct Head Wo Cont    Result Date: 10/15/2020  EXAM: CT head INDICATION: Dental status change COMPARISON: None. TECHNIQUE: Axial CT imaging of the head was performed without intravenous contrast. One or more dose reduction techniques were used on this CT: automated exposure control, adjustment of the mAs and/or kVp according to patient size, and iterative reconstruction techniques. The specific techniques used on this CT exam have been documented in the patient's electronic medical record. Digital Imaging and Communications in Medicine (DICOM) format image data are available to nonaffiliated external healthcare facilities or entities on a secure, media free, reciprocally searchable basis with patient authorization for at least a 12 month period after this study.  _______________ FINDINGS: BRAIN AND POSTERIOR FOSSA: The sulci, folia, ventricles and basal cisterns are within normal limits for the patient's with age concordant atrophy There is no intracranial hemorrhage, mass effect, or midline shift. Mild periventricular low-attenuation. Although nonspecific this is frequently seen with chronic small vessel ischemic change. Otherwise, there are no areas of abnormal parenchymal attenuation. EXTRA-AXIAL SPACES AND MENINGES: There are no abnormal extra-axial fluid collections. CALVARIUM: Intact. SINUSES: Clear. OTHER: None. _______________     IMPRESSION: No acute intracranial abnormalities. Xr Chest Port    Result Date: 10/26/2020  EXAM: CHEST RADIOGRAPH CLINICAL INDICATION/HISTORY: Pneumonia   > Additional: None COMPARISON: 10/23/2020 TECHNIQUE: Portable frontal view of the chest _______________ FINDINGS: SUPPORT DEVICES: None. HEART AND MEDIASTINUM: Heart size is stable. Atherosclerotic calcification seen in the aorta. LUNGS AND PLEURAL SPACES: Increased hazy density at the left base. Slight blunting of the right costophrenic angle. No pneumothorax. Patchy right upper lobe opacity is similar to slightly improved. BONES AND SOFT TISSUES: Unremarkable. _______________     IMPRESSION: 1. Increased hazy opacity at the left base compared to the prior exam, possibly developing left lower lobe atelectasis and/or consolidation. Questionable small right effusion. 2. Patchy right upper lobe patchy opacity which is similar to slightly improved. Xr Chest Port    Result Date: 10/24/2020  EXAM: XR CHEST PORT. CLINICAL INDICATION/HISTORY: meets SIRS criteria. -Additional: None. TECHNIQUE: A portable erect AP radiographic view of the chest is compared with several other chest radiographs performed the same month. _______________ FINDINGS: See impression. No pneumothorax or appreciable significant pleural effusion. The cardiac silhouette, vanessa, and mediastinal contours are normal for age. No acute osseous abnormality is revealed.  _______________     IMPRESSION: Slight improvement in multifocal airspace disease most in keeping with pneumonia, again most pronounced at the right lung apex with no new or worsening findings. Recommend continued radiographic follow-up to resolution. _______________     [place] Chest Port    Result Date: 10/18/2020  EXAM: CHEST RADIOGRAPH CLINICAL INDICATION/HISTORY: SBO   > Additional: None COMPARISON: 10/17/2020. TECHNIQUE: Portable frontal view of the chest _______________ FINDINGS: SUPPORT DEVICES: None. HEART AND MEDIASTINUM: No appreciable cardiomegaly. Remaining mediastinal contours within normal limits. LUNGS AND PLEURAL SPACES: No significant change of bilateral upper lobe parenchymal opacities. Hyperexpansion of the lungs. No evidence for pneumothorax. BONY THORAX AND SOFT TISSUES: Unremarkable. _______________     IMPRESSION: 1. No significant change of bilateral lung pneumonia. Follow-up to resolution is recommended. 2. Emphysema. Xr Chest Port    Result Date: 10/18/2020  EXAM: CHEST RADIOGRAPH CLINICAL INDICATION/HISTORY: shortness of breath   > Additional: None COMPARISON: October 14, 2020. TECHNIQUE: Portable frontal view of the chest _______________ FINDINGS: SUPPORT DEVICES: None. HEART AND MEDIASTINUM: No appreciable cardiomegaly. Remaining mediastinal contours within normal limits. LUNGS AND PLEURAL SPACES: No significant change of bilateral upper lobe parenchymal opacities. Hyperexpansion of the lungs. No evidence for pneumothorax or pleural effusion. BONY THORAX AND SOFT TISSUES: Unremarkable. _______________     IMPRESSION: 1. No significant change of bilateral lung pneumonia. Follow-up to resolution is recommended to exclude underlying mass. 2. Emphysema. Xr Chest Port    Result Date: 10/14/2020  EXAM: XR CHEST PORT CLINICAL INDICATION/HISTORY: Shortness of breath with cough -Additional: None COMPARISON: Several prior studies, most recently 2/19/2019 TECHNIQUE: Frontal view of the chest _______________ FINDINGS: HEART AND MEDIASTINUM: Normal cardiac size and mediastinal contours.  LUNGS AND PLEURAL SPACES: Patchy multifocal opacities noted throughout bilateral upper lobes, right greater than left as well as throughout the periphery of the right lower lobe. No evidence of pneumothorax. BONY THORAX AND SOFT TISSUES: No acute osseous abnormality _______________     IMPRESSION: Patchy multifocal airspace disease compatible with pneumonia. Recommend radiographic follow-up to document expected clinical resolution in 4-6 weeks' time.        Ajay Haynes MD

## 2020-11-13 NOTE — PROGRESS NOTES
D/C Plan: LTC     CM contacted pt's son, Rosevelt Bosworth ([phone]) to discuss transition of care. CM notified family that [address] has now declined this pt. Pt has tentative acceptances from the following facilities: [facility]), [address] and [address] and rehab in [place]. CM updated pt's family on the above. [address] [facility] has escalated to their leadership and has had discussions regarding having a onsite visit to see if they are able to manage this pt. CM will discuss [address] with the family if they area able to accept, as this was the family's first choice. Noted sitter was discontinued yesterday at approximately 1:00pm.  Anticipate pt will remain inpt through the weekend. CM to continue to follow and assit. Care Management Interventions  PCP Verified by CM:  Yes  Mode of Transport at Discharge: BLS  Transition of Care Consult (CM Consult): [address] [place] [address], Discharge Planning, SNF  Current Support Network: Family Lives Nearby  The Plan for Transition of Care is Related to the Following Treatment Goals : LTC  The Patient and/or Patient Representative was Provided with a Choice of Provider and Agrees with the Discharge Plan?: Yes  Name of the Patient Representative Who was Provided with a Choice of Provider and Agrees with the Discharge Plan: annette/Jerry Michele  Freedom of Choice List was Provided with Basic Dialogue that Supports the Patient's Individualized Plan of Care/Goals, Treatment Preferences and Shares the Quality Data Associated with the Providers?: Yes  Discharge Location  Discharge Placement: [address] [place] [address]

## 2020-11-13 NOTE — WOUND CARE
Attempted to see pt for consult, pt not interactive with staff, sleep in the bed with his head at the foot of the bed, safely positioned with pillows, sleeping. Noted pt is comfort measures only.

## 2020-11-13 NOTE — PROGRESS NOTES
[facility]: [phone]  [facility]: [phone]   [facility]: [phone]    Patient Name: Brittany Granado. YOB: 1955    Date of Initial Consult: 10/27/2020, follow up 11/13/2020   Reason for Consult: care decisions  Requesting Provider: Shayy Sharpe MD  Primary Care Physician: Destini Velazquez MD      SUMMARY:   Brittany Allen is a 63 y.o. male with a past history of legally blind, bilateral AKA, emphysema, HTN, chronic pain, HLD, T2DM, polio, CAD, PVD who was admitted on 10/23/2020 from home with a diagnosis of pneumonia/sepsis. Current medical issues leading to Palliative Medicine involvement include: recurrent admissions with multiple co-morbidities and goals of care. 10/28/2020 Mr Doreen Sherwood is alert, oriented to place and why he is in the hospital. Denies pain. He is softly restrained for his safety. Just says \" cut me loose, give a knife\" ( referring to his restraints). 10/30/2020: Palliative care team including SIMI Valle and this NP met with patient in his ICU room. He is quietly laying in bed. When asked how he was his speech was mumbled. Was finally able to understand his c/o being cold. 11/4/2020: Palliative care team including SIMI Valle and this NP met with patient at bedside. He is confused; unaware of time of day (likely related to blindness) and where he is. Asked to be taken to [facility]. 11/5/2020: This NP met with patient at bedside. He believes he is at home, outside and watching TV. Re-oriented to [facility]. He then requested a cigarette. Admits to some back pain but denies shortness of breath and nausea at this time. 11/10/2020: This NP met with patient in his room. He is sitting up on side of bed facing the wall. He admits to \"some\" back pain and shortness of breath. Denies nausea.      11/13/2020: Palliative care team including SIMI Valle and this NP met with patient at bedside. He is lying in bed and appears comfortable. Speech is mumbled at this time and he is confused, asking for his wheelchair in the back of his truck. PALLIATIVE DIAGNOSES:   1. Advanced care decisions  2. HCAP/sepsis  3. Encephalopathy  4. COPD  5. Bilateral AKA     PLAN:     11/13/2020: Patient appears comfortable on current comfort measures regimen. He is receiving TID scheduled ativan in addition to his seroquel. He has required no pain medication other than his lidocaine patch and tylenol in the past 24 hours. Continue comfort measures. GOALS OF CARE: DNRDNI, comfort measures only. POST on file. Plan for discharge to LTC with hospice services in place.     (please see below for previous conversations)     11/10/2020: Patient remains on comfort measures. Scheduled tylenol and lidocaine patches are controlling his back pain. Patient has oxygen available per orders PRN shortness of breath. Primary team has adjusted his gabapentin and seroquel doses which seem to have increased his fatigue. In addition, his ativan is now scheduled TID. Will discontinue the scheduled haldol. POST form received back via [employer]. Copy placed on chart. GOALS OF CARE: DNR/DNI, comfort measures only. Plan for discharge to LTC with hospice services in place. 11/5/2020: Telephone call placed to patient's son, Kenia Arreola who then put call on speaker phone so his sister, Bridger, could be involved in the discussion. This conversation includes two of three children and represents a [de-identified]. Tremayne Crouch has not been returning phone calls. Rediscussed patient's MBS results demonstrating severe aspiration on all consistencies. They agree that patient would not tolerate a tube for feeding and every chance he could he would eat and drink what he chooses. For quality of life, they agreed on comfort feeds. Discussed risks and benefits of CPR in the event of cardiopulmonary arrest and intubation for respiratory distress. Explained recommendation for DNR/DNI as patient suffers from COPD with recurrent pneumonias along with his physical debilities and medical intervention would prolong his suffering. They state their father is a tenacious man but they have talked it over and agreed that DNR/DNI/comfort measures only with placement in a LTC facility would provide patient with best quality of life. They believe it may even add quantity with regular meals, medications and care. POST form sent via email to [email or web address] for signatures. Comfort orders initiated. Dr. Elin Crain, nursing and CM notified of change in code status. GOALS OF CARE: DNR/DNI, Comfort measures only. Plan for discharge to LTC facility with hospice services. 11/4/2020: Upon entering patient's room he stated he wasn't feeling well. He complained of back pain, nausea and shortness of breath. He also asked for food and coffee during our visit. Explained NPO status to him secondary to aspiration and needing to find a way to feed him possibly by placing a tube in his stomach. Also explained accepting that time may be short and risk of eating for comfort is an option for him. Further discussed need to discuss these matters with his children so everyone is on the same page. Patient did express a fear of dying with frequent requests \"don't leave me\". Patient also confused by the presence of soft wrist restraints. Much emotional support offered. When describing comfort medications, patient was anxious and requesting ativan. Instructed him I cannot use comfort measures until family agrees. I do not think patient has ability to appreciate how sick he is and would agree to medications without understanding hospice/comfort plan. Telephone call placed to patient's son, Kenia Arreola. His sister was listening in on the phone call. Updated Keegan Art and Stefan Armas regarding patient's MBS results and recommendations for PEG vs comfort feeding.  Discussed risks and benefits of tube-feeding including aspiration and self-discontinuation. They are talking it over and to get back with us with a decision. Family may benefit from discussion with MD. Continue current level of care. Will further clarify code status during next conversation with family. GOALS OF CARE: FULL CODE with FULL INTERVENTIONS. 10/30/2020 ADDENDUM: 1400: Lorenza Mckeon presented to the ICU to visit with his father. Instructed him on COPD and it being a progressive disease especially without smoking cessation. He admits to his father continuing to smoke 2 ppd. Then discussed the superimposing of an aspiration pneumonia on weakened lungs. Lorenza Mckeon was instructed on weakening of swallow and aspiration as he was also fixated on getting his dad to eat and drink. Explained that he needs further swallow evaluation when stable to determine safe diet. Explained that patient is now on large amount of HFNC and next step if he continues to deteriorate would be oral intubation. Explained fears of patient's inability to be weaned from vent and needing trach/PEG and discharge to a facility. Lorenza Mckeon wanted to bring him home on vent if necessary. I explained care for trach with vent is not available in the home. He is aware of what a trach entails as a family friend has a trach for throat cancer. Discussed intubation with patient and he indicated that he wanted to life support on machines if necessary which reinforced his son's determination for aggressive treatment. Discussed risks and benefits of CPR in the event of cardiopulmonary arrest and Lorenza Mckeon was firm that he would want CPR in attempt to prolong patient's life. Lawrence's girlfriend, Bárbara, present for part of conversation and feels that Lorenza Mckeon needs time to process what his father looks like and what he has heard before making decisions. Attempted to get Lorenza Mckeon to think from his dad's standpoint and understand he is suffering; he is sure his dad has pulled through before and can do it again.      [address] Received telephone call from [facility] and [facility]. Was informed by them that Linwood Powell had texted their sister, Bridget Carbone, and told her that Suellen Bell is in the hospital. Johanny Osman and Nathalie Smith (the estranged children) have not been kept in the loop secondary to Linwood Powell non-cooperation. Catrachita Links with medical information. He stated that a physician at [facility] in 4/2019 had talked with the family regarding code status after patient had been admitted in cardiac arrest. Patient was discharged after that hospitalization to LTC. However, when Linwood Powell got out of care home, he took his dad out the [place]. This was against the wishes of the other children. GOALS OF CARE: FULL CODE with FULL INTERVENTIONS. GOC remain unchanged and ICU information provided to [facility] to call after he talks with his sister. Johanny Osman and Nathalie Smith share a home in [place]. 10/30/2020: While in the ICU, Linwood Powell returned telephone call. Instructed him that his father was very ill with worsening respiratory status. Explained he may need intubation and medical concerns regarding inability to eventually be able to liberate patient from ventilator. Described to him that it is likely he would require trach, long term ventilator, PEG tube for feeding and placement in long term care. His concern at that point was to have his father brought home \"we have a nurse that comes to the house\". Explained his father is far too sick to be discharged at this time. He stated he wanted us to continue all aggressive measures. Asked Linwood Powell to come to the hospital to see his father so he could better understand the seriousness of this illness. He said he would be here later today. Linwood Powell stated he had not received any messages on his cellphone. Explained that his VM is full and he needs to delete all his old voicemails. He admitted he would need someone to show him how.     10/28/2020 Seen along with SIMI Evans. Remains in ICU on high flow oxygen. He is alert, oriented x 3 but do not believe at this time he can participate in his medical decisions or complex decisions. There is no AMD on file. Son Kranthi Nath is reported care giver. Very chronically ill gentleman with recent admission whose overall prognosis is poor. We have attempted x 2 today and yesterday to reach Kranthi Nath to discuss his father's care and goals of care, no answer at listed home phone and cell goes to  with no ability to leave a message. We will continue to follow with you to try and reach family and assess if patient can participate can participate in his care decisions. Goals of care full code with full interventions. 10/27/2020  1. Advanced care decisions: Palliative care team including SIMI Calixto and this NP met with patient at bedside in the ICU. Patient is known to the palliative care team from his previous admission. Per notes \"patient is a  and had four children of which one is . Kranthi Nath claims he is the caregiver and lives with his father. ..his half siblings, Kacy Rodriguez and Lori Carrasquillo, do not come around and he does not know where they are.\" Patient currently delirious and continues to yell \"wake me up\" or \"Lawrence\" over and over; although, responded yes to having pain and nausea. Attempted to contact patient's son, Kranthi Nath, via his cell phone (voicemail box is full) and the home phone number (no answer after multiple rings). Need to discuss goals of care for this chronically ill gentleman. GOALS OF CARE: FULL CODE with FULL INTERVENTIONS. 2.  HCAP/sepsis: Patient recently admitted to [facility] with diagnosis of pneumonia 10/14 to 10/20/2020. He was seen at [facility] ER on 10/21 and 10/22 and 10/23/2020. He then presented here with c/o chest pain. Conflicting reports of whether he took antibiotics s/p his discharge on 10/20. Found to be hypothermic and hypotensive in ED. Primary team managing. 3.  Encephalopathy: Presented with confusion and is combative.  ED provider notes that patient likely does not have capacity to make medical decisions for himself on day of admit. Also per notes son reports patient has not been acting appropriately since leaving [facility] ED on 10/23/2020.  4. COPD: CT completed at outside facility reveals question of neoplasm. Will need repeat CT. Primary team managing. 5.  Bilateral AKA: per history. 6.  Initial consult note routed to primary continuity provider  7. Communicated plan of care with: Palliative IDT    GOALS OF CARE:  Patient/Health Care Proxy Stated Goals: Comfort      TREATMENT PREFERENCES:   Code Status: DNR/DNI    Advance Care Planning:  Advance Care Planning 10/14/2020   Confirm Advance Directive None   Patient Would Like to Complete Advance Directive No   Does the patient have other document types Other (comment)       Medical Interventions: Comfort measures   Artificially Administered Nutrition: No feeding tube     Other: As far as possible, the palliative care team has discussed with patient/health care proxy about goals of care/treatment preferences for patient. HISTORY:     History obtained from: chart    CHIEF COMPLAINT: back pain      HPI/SUBJECTIVE:    The patient is:   [x] Verbal and participatory  [] Non-participatory due to:   See summary. Speech is more mumbled and difficult to understand.      Clinical Pain Assessment (nonverbal scale for nonverbal patients): Clinical Pain Assessment  Severity: 3  Location: back  Character: dull  Duration: constant     Activity (Movement): Restless, excessive activity and/or withdrawal reflexes    Duration: for how long has pt been experiencing pain (e.g., 2 days, 1 month, years)  Frequency: how often pain is an issue (e.g., several times per day, once every few days, constant)     FUNCTIONAL ASSESSMENT:     Palliative Performance Scale (PPS):  PPS: 30    ECOG  ECOG Status : Completely disabled     PSYCHOSOCIAL/SPIRITUAL SCREENING:      Any spiritual / Restorationism concerns:  [] Yes / [x] No    Caregiver Burnout:  [] Yes /  [] No /  [x] No Caregiver Present      Anticipatory grief assessment:   [x] Normal  / [] Maladaptive        REVIEW OF SYSTEMS:     Positive and pertinent negative findings in ROS are noted above in HPI. The following systems were [x] reviewed / [] unable to be reviewed as noted in HPI  Other findings are noted below. Systems: constitutional, ears/nose/mouth/throat, respiratory, gastrointestinal, genitourinary, musculoskeletal, integumentary, neurologic, psychiatric, endocrine. Positive findings noted below. Modified ESAS Completed by: provider           Pain: 3     Nausea: 0     Dyspnea: 5           Stool Occurrence(s): 1        PHYSICAL EXAM:     Wt Readings from Last 3 Encounters:   11/05/20 41.1 kg (90 lb 11.2 oz)   10/20/20 34.7 kg (76 lb 9.6 oz)   02/18/19 45 kg (99 lb 3.3 oz)     Blood pressure (!) 140/74, pulse 92, temperature 98.4 °F (36.9 °C), resp. rate 15, height 5' 4\" (1.626 m), weight 41.1 kg (90 lb 11.2 oz), SpO2 98 %. Pain:  Pain Scale 1: Visual  Pain Intensity 1: 0  Pain Onset 1: gradual  Pain Location 1: Leg  Pain Orientation 1: Upper  Pain Description 1: Other (comment)(pt states it just hurts)  Pain Intervention(s) 1: Medication (see MAR)       Constitutional: 63year old gentleman who appears older then stated age, lying in bed, no apparent distress.    Eyes: blind, anicteric  Respiratory: breathing not labored, room air  Musculoskeletal: Bilateral AKA   Skin: warm, dry  Neurologic: following commands, moving all extremities, oriented X 1       HISTORY:     Principal Problem:    Septic shock ([facility] [address] (10/23/2020)    Active Problems:    Hypotension, unspecified (1/27/2014)      Amputation of both lower extremities (HCC) ()      PNA (pneumonia) ()      Marijuana abuse ()      Centrilobular emphysema (HCC) ()      Legal blindness (10/14/2020)      Chronic obstructive pulmonary disease with acute exacerbation ([address] (10/24/2020)      Severe protein-calorie malnutrition ([facility] [address] (21/67/4602)      Metabolic encephalopathy (98/70/9984)      Acute respiratory failure with hypoxia ([address] (10/26/2020)      Aspiration pneumonia ([facility] [address] (10/28/2020)      Deep vein thrombosis (DVT) of lower extremity ([facility] [address] (10/28/2020)      Dysphagia (11/3/2020)      Hypokalemia (11/3/2020)      Hypomagnesemia (11/3/2020)      Comfort measures only status (11/5/2020)      Past Medical History:   Diagnosis Date    Amputation of both lower extremities ([facility] [address]     Amputee, above knee, left ([facility] [address] [facility]     At risk for falls     Chronic pain     back and legs    Diabetes ([facility] [address] [facility]     Hypercholesterolemia     Hypertension     Polio       Past Surgical History:   Procedure Laterality Date    HX HERNIA REPAIR      HX OTHER SURGICAL      carpal tunnel     HX OTHER SURGICAL      cyst      Family History   Problem Relation Age of Onset    Heart Attack Mother     Heart Attack Father     Malignant Hyperthermia Neg Hx     Pseudocholinesterase Deficiency Neg Hx     Delayed Awakening Neg Hx     Post-op Nausea/Vomiting Neg Hx     Emergence Delirium Neg Hx     Post-op Cognitive Dysfunction Neg Hx     Other Neg Hx      History reviewed, no pertinent family history.   Social History     Tobacco Use    Smoking status: Current Every Day Smoker     Packs/day: 2.00     Years: 40.00     Pack years: 80.00    Smokeless tobacco: Current User     Types: Snuff   Substance Use Topics    Alcohol use: No     No Known Allergies   Current Facility-Administered Medications   Medication Dose Route Frequency    QUEtiapine (SEROquel) tablet 300 mg  300 mg Oral BID    LORazepam (ATIVAN) tablet 1 mg  1 mg Oral TID    gabapentin (NEURONTIN) capsule 300 mg  300 mg Oral TID    acetaminophen (TYLENOL) tablet 650 mg  650 mg Oral TID    nicotine (NICODERM CQ) 14 mg/24 hr patch 1 Patch  1 Patch TransDERmal DAILY    LORazepam (ATIVAN) tablet 1 mg  1 mg Oral Q6H PRN    LORazepam (INTENSOL) 2 mg/mL oral concentrate 1 mg  1 mg SubLINGual Q3H PRN    hyoscyamine SL (LEVSIN/SL) tablet 0.125 mg  0.125 mg SubLINGual Q4H PRN    morphine (ROXANOL) concentrated oral syringe 5 mg  5 mg SubLINGual Q1H PRN    pantoprazole (PROTONIX) tablet 40 mg  40 mg Oral DAILY    lidocaine 4 % patch 1 Patch  1 Patch TransDERmal Q24H    metoprolol tartrate (LOPRESSOR) tablet 25 mg  25 mg Oral Q12H    amLODIPine (NORVASC) tablet 10 mg  10 mg Oral DAILY    sodium chloride (NS) flush 5-40 mL  5-40 mL IntraVENous Q8H    sodium chloride (NS) flush 5-40 mL  5-40 mL IntraVENous PRN    acetaminophen (TYLENOL) tablet 650 mg  650 mg Oral Q6H PRN    Or    acetaminophen (TYLENOL) suppository 650 mg  650 mg Rectal Q6H PRN    polyethylene glycol (MIRALAX) packet 17 g  17 g Oral DAILY PRN    promethazine (PHENERGAN) tablet 12.5 mg  12.5 mg Oral Q6H PRN    Or    ondansetron (ZOFRAN) injection 4 mg  4 mg IntraVENous Q6H PRN        LAB AND IMAGING FINDINGS:     Lab Results   Component Value Date/Time    WBC 5.7 11/05/2020 05:00 AM    HGB 9.6 (L) 11/05/2020 05:00 AM    PLATELET 487 57/30/0074 05:00 AM     Lab Results   Component Value Date/Time    Sodium 143 11/05/2020 05:00 AM    Potassium 3.5 11/05/2020 05:00 AM    Chloride 114 (H) 11/05/2020 05:00 AM    CO2 22 11/05/2020 05:00 AM    BUN 5 (L) 11/05/2020 05:00 AM    Creatinine 0.54 (L) 11/05/2020 05:00 AM    Calcium 8.2 (L) 11/05/2020 05:00 AM    Magnesium 1.8 11/05/2020 05:00 AM    Phosphorus 3.3 10/29/2020 05:44 AM      Lab Results   Component Value Date/Time    Alk.  phosphatase 91 11/05/2020 05:00 AM    Protein, total 5.8 (L) 11/05/2020 05:00 AM    Albumin 2.5 (L) 11/05/2020 05:00 AM    Globulin 3.3 11/05/2020 05:00 AM     Lab Results   Component Value Date/Time    INR 1.0 01/27/2014 04:08 PM    Prothrombin time 12.7 01/27/2014 04:08 PM    aPTT 33.7 01/27/2014 04:08 PM      No results found for: IRON, FE, TIBC, IBCT, PSAT, FERR   No results found for: PH, PCO2, PO2  No components found for: [place]   Lab Results Component Value Date/Time     10/26/2020 06:45 PM    CK - MB 2.5 10/26/2020 06:45 PM              Total time: 15 minutes  Counseling / coordination time, spent as noted above > 50% counseling / coordination: 10 minutes with patient and family. Prolonged service was provided for  []30 min   []75 min in face to face time in the presence of the patient, spent as noted above. Time Start:   Time End:   Note: this can only be billed with 13012 (initial) or 65785 (follow up).   If multiple start / stop times, list each separately.    '

## 2020-11-13 NOTE — HOSPICE
[facility]. Thank you for the referral to [facility]. Please contact [phone] with any questions or concerns.       LILY WelchN, RN  Clinical Coordinator  [place] [id number], [address] [place] [address] Str.  [phone]

## 2020-11-13 NOTE — PROGRESS NOTES
Pt had been sleeping since start of shift until midnight when he was fed dinner by CNA. Meds given at this time while awake, bed alarm on.     0500 Pt awake, moving about in bed, calling out in a garbled voice. Assisted to bedpan as requested, had a large brown soft BM. No Haldol nor sitter with pt for 24-48 hrs for nursing home placement - had been without one for shift    0730 Bedside and Verbal shift change report given to Giovanni Chaudhari RN (oncoming nurse) by Barbi Smallwood RN   (offgoing nurse). Report included the following information SBAR, Kardex, Intake/Output, MAR and Recent Results.

## 2020-11-14 PROCEDURE — 74011250637 HC RX REV CODE- 250/637: Performed by: FAMILY MEDICINE

## 2020-11-14 PROCEDURE — 74011250637 HC RX REV CODE- 250/637: Performed by: HOSPITALIST

## 2020-11-14 PROCEDURE — 65270000029 HC RM PRIVATE

## 2020-11-14 PROCEDURE — 74011250637 HC RX REV CODE- 250/637: Performed by: INTERNAL MEDICINE

## 2020-11-14 PROCEDURE — 74011000250 HC RX REV CODE- 250: Performed by: HOSPITALIST

## 2020-11-14 PROCEDURE — 74011250637 HC RX REV CODE- 250/637: Performed by: NURSE PRACTITIONER

## 2020-11-14 RX ADMIN — SODIUM CHLORIDE 10 ML: 9 INJECTION, SOLUTION INTRAMUSCULAR; INTRAVENOUS; SUBCUTANEOUS at 22:00

## 2020-11-14 RX ADMIN — MORPHINE SULFATE 5 MG: 100 SOLUTION ORAL at 22:03

## 2020-11-14 RX ADMIN — MORPHINE SULFATE 5 MG: 100 SOLUTION ORAL at 03:04

## 2020-11-14 RX ADMIN — LORAZEPAM 1 MG: 1 TABLET ORAL at 10:33

## 2020-11-14 RX ADMIN — LORAZEPAM 1 MG: 1 TABLET ORAL at 17:01

## 2020-11-14 RX ADMIN — METOPROLOL TARTRATE 25 MG: 25 TABLET, FILM COATED ORAL at 10:34

## 2020-11-14 RX ADMIN — LORAZEPAM 1 MG: 1 TABLET ORAL at 04:50

## 2020-11-14 RX ADMIN — QUETIAPINE FUMARATE 300 MG: 100 TABLET ORAL at 10:33

## 2020-11-14 RX ADMIN — METOPROLOL TARTRATE 25 MG: 25 TABLET, FILM COATED ORAL at 21:55

## 2020-11-14 RX ADMIN — SODIUM CHLORIDE 10 ML: 9 INJECTION, SOLUTION INTRAMUSCULAR; INTRAVENOUS; SUBCUTANEOUS at 10:41

## 2020-11-14 RX ADMIN — GABAPENTIN 300 MG: 300 CAPSULE ORAL at 21:54

## 2020-11-14 RX ADMIN — ACETAMINOPHEN 650 MG: 325 TABLET ORAL at 10:33

## 2020-11-14 RX ADMIN — QUETIAPINE FUMARATE 300 MG: 100 TABLET ORAL at 21:53

## 2020-11-14 RX ADMIN — AMLODIPINE BESYLATE 10 MG: 5 TABLET ORAL at 10:34

## 2020-11-14 RX ADMIN — LORAZEPAM 1 MG: 1 TABLET ORAL at 14:12

## 2020-11-14 RX ADMIN — LORAZEPAM 1 MG: 1 TABLET ORAL at 21:55

## 2020-11-14 RX ADMIN — GABAPENTIN 300 MG: 300 CAPSULE ORAL at 17:01

## 2020-11-14 RX ADMIN — PANTOPRAZOLE SODIUM 40 MG: 40 TABLET, DELAYED RELEASE ORAL at 10:33

## 2020-11-14 RX ADMIN — GABAPENTIN 300 MG: 300 CAPSULE ORAL at 10:34

## 2020-11-14 RX ADMIN — ACETAMINOPHEN 650 MG: 325 TABLET ORAL at 21:54

## 2020-11-14 RX ADMIN — ACETAMINOPHEN 650 MG: 325 TABLET ORAL at 17:01

## 2020-11-14 RX ADMIN — LORAZEPAM 1 MG: 2 SOLUTION, CONCENTRATE ORAL at 23:14

## 2020-11-14 NOTE — PROGRESS NOTES
Summary -- Pt comfortable throughout shift. Passed large BM this afternoon. Excellent appetite. Requested prn ativan x1, in addition to scheduled ativan. Cooperative with staff. Bed alarm in place. No new concerns/complaints noted. Patient Vitals for the past 12 hrs:   Temp Pulse Resp BP SpO2   11/14/20 0715 98.5 °F (36.9 °C) 98 17 (!) 140/80 99 %     Bedside shift change report given to Tamie Segal RN (oncoming nurse) by Maynor Fox RN (offgoing nurse). Report included the following information SBAR, Kardex, ED Summary, Intake/Output, MAR and Recent Results.

## 2020-11-14 NOTE — PROGRESS NOTES
Problem: Falls - Risk of  Goal: *Absence of Falls  Description: Document Tomas Braun Fall Risk and appropriate interventions in the flowsheet. Outcome: Progressing Towards Goal  Note: Fall Risk Interventions:  Mobility Interventions: Bed/chair exit alarm, Communicate number of staff needed for ambulation/transfer, Patient to call before getting OOB    Mentation Interventions: Bed/chair exit alarm, Adequate sleep, hydration, pain control, Door open when patient unattended, Evaluate medications/consider consulting pharmacy, More frequent rounding, Reorient patient, Room close to nurse's station, Toileting rounds, Update white board    Medication Interventions: Bed/chair exit alarm, Evaluate medications/consider consulting pharmacy, Patient to call before getting OOB, Teach patient to arise slowly    Elimination Interventions: Bed/chair exit alarm, Call light in reach, Patient to call for help with toileting needs, Toilet paper/wipes in reach, Toileting schedule/hourly rounds, Urinal in reach    History of Falls Interventions: Bed/chair exit alarm, Consult care management for discharge planning, Door open when patient unattended, Evaluate medications/consider consulting pharmacy, Room close to nurse's station, Investigate reason for fall         Problem: Pressure Injury - Risk of  Goal: *Prevention of pressure injury  Description: Document Shelton Scale and appropriate interventions in the flowsheet. Outcome: Progressing Towards Goal  Note: Pressure Injury Interventions:  Sensory Interventions: Check visual cues for pain, Keep linens dry and wrinkle-free, Sit a 90-degree angle/use footstool if needed, Turn and reposition approx.  every two hours (pillows and wedges if needed)    Moisture Interventions: Absorbent underpads, Check for incontinence Q2 hours and as needed, Offer toileting Q_hr, Minimize layers    Activity Interventions: Pressure redistribution bed/mattress(bed type)    Mobility Interventions: Pressure redistribution bed/mattress (bed type)    Nutrition Interventions: Document food/fluid/supplement intake, Offer support with meals,snacks and hydration    Friction and Shear Interventions: Foam dressings/transparent film/skin sealants, HOB 30 degrees or less, Lift sheet, Minimize layers                Problem: Patient Education: Go to Patient Education Activity  Goal: Patient/Family Education  Outcome: Progressing Towards Goal     Problem: Patient Education: Go to Patient Education Activity  Goal: Patient/Family Education  Outcome: Not Progressing Towards Goal  Note: Orientation limits ability to participate in education; however pt is cooperative with care. Bed alarm in place. Problem: Patient Education: Go to Patient Education Activity  Goal: Patient/Family Education  Outcome: Not Progressing Towards Goal  Note: Orientation limits ability to participate in education; however pt is cooperative with care.      Problem: Non-Violent Restraints  Goal: *Removal from restraints as soon as assessed to be safe  Outcome: Resolved/Met  Goal: *No harm/injury to patient while restraints in use  Outcome: Resolved/Met  Goal: *Patient's dignity will be maintained  Outcome: Resolved/Met  Goal: *Patient Specific Goal (EDIT GOAL, INSERT TEXT)  Outcome: Resolved/Met  Goal: Non-violent Restaints:Standard Interventions  Outcome: Resolved/Met  Goal: Non-violent Restraints:Patient Interventions  Outcome: Resolved/Met  Goal: Patient/Family Education  Outcome: Resolved/Met

## 2020-11-14 NOTE — PROGRESS NOTES
Problem: Falls - Risk of  Goal: *Absence of Falls  Description: Document Symone Chan Fall Risk and appropriate interventions in the flowsheet. Outcome: Progressing Towards Goal  Note: Fall Risk Interventions:  Mobility Interventions: Bed/chair exit alarm    Mentation Interventions: Adequate sleep, hydration, pain control, Reorient patient, More frequent rounding, Room close to nurse's station    Medication Interventions: Bed/chair exit alarm    Elimination Interventions: Call light in reach    History of Falls Interventions: Door open when patient unattended, Bed/chair exit alarm, Investigate reason for fall, Room close to nurse's station         Problem: Patient Education: Go to Patient Education Activity  Goal: Patient/Family Education  Outcome: Progressing Towards Goal     Problem: Pressure Injury - Risk of  Goal: *Prevention of pressure injury  Description: Document Shelton Scale and appropriate interventions in the flowsheet.   Outcome: Progressing Towards Goal  Note: Pressure Injury Interventions:  Sensory Interventions: Assess changes in LOC    Moisture Interventions: Apply protective barrier, creams and emollients, Limit adult briefs, Absorbent underpads    Activity Interventions: Pressure redistribution bed/mattress(bed type)    Mobility Interventions: Pressure redistribution bed/mattress (bed type)    Nutrition Interventions: Document food/fluid/supplement intake    Friction and Shear Interventions: Minimize layers, Lift sheet                Problem: Patient Education: Go to Patient Education Activity  Goal: Patient/Family Education  Outcome: Progressing Towards Goal     Problem: Patient Education: Go to Patient Education Activity  Goal: Patient/Family Education  Outcome: Progressing Towards Goal

## 2020-11-14 NOTE — ROUTINE PROCESS
Bedside and Verbal shift change report given to THEO Dockery RN (oncoming nurse) by James Villalta. Haritha Pitts RN (offgoing nurse). Report included the following information SBAR, Kardex, Intake/Output, MAR and Recent Results.

## 2020-11-14 NOTE — ROUTINE PROCESS
Bedside and Verbal shift change report given to THEO Robertson RN (oncoming nurse) by Jeancarlos Solitario RN (offgoing nurse). Report included the following information SBAR, Kardex, Intake/Output, MAR and Recent Results. Shift Summary: 
Pt irritable and hollaring out. AxOx1 to person. Pt repositions self in bed. Given prn morphine x2 and prn ativan x1, no improvement. Pt ate 2 tv dinners, 3 cups of pudding, milk, crackers, and 3 cups of coffee throughout shift. Bedside and Verbal shift change report given to LORENZO Szymanski RN (oncoming nurse) by THEO Robertson RN (offgoing nurse). Report included the following information SBAR, Kardex, Intake/Output, MAR and Recent Results.

## 2020-11-14 NOTE — PROGRESS NOTES
Hospitalist Progress Note    Patient: Curry Bird Sr. MRN: [id number]  CSN: [id number]    YOB: 1955  Age: 63 y.o.   Sex: male    DOA: 10/23/2020 LOS:  LOS: 22 days            Patient Active Problem List   Diagnosis Code    Bursitis of elbow M70.30    Medication overdose T50.901A    Polio A80.9    Depressive disorder, not elsewhere classified F32.9    Type II or unspecified type diabetes mellitus without mention of complication, uncontrolled [id number]    Hypotension, unspecified I95.9    Amputation of both lower extremities ([address] S88.911A, B71.162A    PNA (pneumonia) J18.9    ALEXANDER (acute kidney injury) ([address] N17.9    Hyponatremia E87.1    Marijuana abuse F12.10    Centrilobular emphysema ([address] J43.2    CAP (community acquired pneumonia) J18.9    Sepsis ([address] A41.9    Hard of hearing H91.90    Legal blindness H54.8    Acute encephalopathy G93.40    Septic shock ([address] A41.9, R65.21    Chronic obstructive pulmonary disease with acute exacerbation ([address] J44.1    Severe protein-calorie malnutrition ([address] H44    Metabolic encephalopathy Z08.71    Acute respiratory failure with hypoxia (HCC) J96.01    Aspiration pneumonia ([address] J69.0    Deep vein thrombosis (DVT) of lower extremity ([address] I82.409    Dysphagia R13.10    Hypokalemia E87.6    Hypomagnesemia E83.42    Comfort measures only status Z51.5        IMPRESSION and Plan:    Curry Bird Sr. is a 63 y.o. male with   Patient Active Problem List    Diagnosis Date Noted    Comfort measures only status 11/05/2020    Dysphagia 11/03/2020    Hypokalemia 11/03/2020    Hypomagnesemia 11/03/2020    Aspiration pneumonia ([address] 10/28/2020    Deep vein thrombosis (DVT) of lower extremity ([address] 10/28/2020    Acute respiratory failure with hypoxia ([address] 10/26/2020    Chronic obstructive pulmonary disease with acute exacerbation ([address] 10/24/2020    Severe protein-calorie malnutrition ([facility] 84/92/7293    Metabolic encephalopathy 10/24/2020    Septic shock ([address] 10/23/2020    Acute encephalopathy 10/15/2020    CAP (community acquired pneumonia) 10/14/2020    Sepsis ([address] 10/14/2020    Hard of hearing 10/14/2020    Legal blindness 10/14/2020    Amputation of both lower extremities (HCC)     PNA (pneumonia)     ALEXANDER (acute kidney injury) ([address]     Hyponatremia     Marijuana abuse     Centrilobular emphysema ([address]     Medication overdose 01/27/2014    Polio 01/27/2014    Depressive disorder, not elsewhere classified 01/27/2014    Type II or unspecified type diabetes mellitus without mention of complication, uncontrolled 01/27/2014    Hypotension, unspecified 01/27/2014    Bursitis of elbow 08/11/2012     Principal Problem:    Septic shock ([address] (10/23/2020)    Active Problems:    Hypotension, unspecified (1/27/2014)      Amputation of both lower extremities (HCC) ()      PNA (pneumonia) ()      Marijuana abuse ()      Centrilobular emphysema (HCC) ()      Legal blindness (10/14/2020)      Chronic obstructive pulmonary disease with acute exacerbation ([address] (10/24/2020)      Severe protein-calorie malnutrition ([address] (90/38/4321)      Metabolic encephalopathy (26/84/4427)      Acute respiratory failure with hypoxia ([address] (10/26/2020)      Aspiration pneumonia ([address] (10/28/2020)      Deep vein thrombosis (DVT) of lower extremity ([address] (10/28/2020)      Dysphagia (11/3/2020)      Hypokalemia (11/3/2020)      Hypomagnesemia (11/3/2020)      Comfort measures only status (11/5/2020)           A 80-year-old male with a history of a COPD, bilateral above knee amputation, recently discharged from [facility] after treated for pneumonia who was admitted for septic shock and metabolic encephalopathy.           Acute respiratory failure with hypoxia       continue aspiration precaution,            COPD with right upper lobe pneumonia questionable mass/emphysema /aspiration pna  Aspiration precaution  .  Cont iv abx for now     Cardiovascular septic shock: resolved.                 htn continue  Norvasc and metoprolol.    On lovenox before d/c due to comfort management         Acute metabolic encephalopathy   Agitation-continue seroqual and ativan  And halodol      Legal blindness :fall /aspiration precaution       marijuana use      Severe malnutrition     Bilateral AKA     Patient's condition is guarded  Awaiting placement        Recommend to continue hospitalization. Discussed with patient. Chief Complaints:   Chief Complaint   Patient presents with    Chest Pain     SUBJECTIVE:  Pt is seen and examined. Chart reviewed    He is now comfort care        Review of systems:    Review of Systems   Constitutional: Positive for malaise/fatigue. HENT: Negative. Eyes: Negative. Respiratory: Positive for shortness of breath. Cardiovascular: Positive for leg swelling. Negative for chest pain, palpitations and orthopnea. Gastrointestinal: Negative. Negative for abdominal pain, diarrhea and heartburn. Genitourinary: Negative for dysuria and hematuria. Skin: Negative. Neurological: Positive for weakness. Psychiatric/Behavioral: Negative for depression, substance abuse and suicidal ideas. The patient is not nervous/anxious. PE:  Patient Vitals for the past 24 hrs:   BP Temp Pulse Resp SpO2   11/14/20 0715 (!) 140/80 98.5 °F (36.9 °C) 98 17 99 %   11/13/20 2019 126/85 98.6 °F (37 °C) 99 15 98 %       Intake/Output Summary (Last 24 hours) at 11/14/2020 1354  Last data filed at 11/14/2020 1039  Gross per 24 hour   Intake 240 ml   Output 950 ml   Net -710 ml     No data found. Physical Exam  Vitals signs and nursing note reviewed. Constitutional:       General: He is in acute distress. Neck:      Musculoskeletal: Normal range of motion and neck supple. Vascular: No JVD. Cardiovascular:      Rate and Rhythm: Normal rate and regular rhythm. Heart sounds: Normal heart sounds.    Pulmonary:      Effort: Respiratory distress present. Breath sounds: Normal breath sounds. Abdominal:      General: Bowel sounds are normal. There is no distension. Palpations: Abdomen is soft. Tenderness: There is no abdominal tenderness. There is no rebound. Musculoskeletal: Normal range of motion. Skin:     General: Skin is warm and dry. Neurological:      Mental Status: He is alert and oriented to person, place, and time. Psychiatric:         Mood and Affect: Affect normal. Mood is anxious. Intake and Output:  Current Shift:  11/14 0701 - 11/14 1900  In: -   Out: 550 [Urine:550]  Last three shifts:  11/12 1901 - 11/14 0700  In: 760 [P.O.:760]  Out: 400 [Urine:400]    Lab/Data Reviewed:  No results found for this or any previous visit (from the past 8 hour(s)).   Medications:  Current Facility-Administered Medications   Medication Dose Route Frequency    QUEtiapine (SEROquel) tablet 300 mg  300 mg Oral BID    LORazepam (ATIVAN) tablet 1 mg  1 mg Oral TID    gabapentin (NEURONTIN) capsule 300 mg  300 mg Oral TID    acetaminophen (TYLENOL) tablet 650 mg  650 mg Oral TID    nicotine (NICODERM CQ) 14 mg/24 hr patch 1 Patch  1 Patch TransDERmal DAILY    LORazepam (ATIVAN) tablet 1 mg  1 mg Oral Q6H PRN    LORazepam (INTENSOL) 2 mg/mL oral concentrate 1 mg  1 mg SubLINGual Q3H PRN    hyoscyamine SL (LEVSIN/SL) tablet 0.125 mg  0.125 mg SubLINGual Q4H PRN    morphine (ROXANOL) concentrated oral syringe 5 mg  5 mg SubLINGual Q1H PRN    pantoprazole (PROTONIX) tablet 40 mg  40 mg Oral DAILY    lidocaine 4 % patch 1 Patch  1 Patch TransDERmal Q24H    metoprolol tartrate (LOPRESSOR) tablet 25 mg  25 mg Oral Q12H    amLODIPine (NORVASC) tablet 10 mg  10 mg Oral DAILY    sodium chloride (NS) flush 5-40 mL  5-40 mL IntraVENous Q8H    sodium chloride (NS) flush 5-40 mL  5-40 mL IntraVENous PRN    acetaminophen (TYLENOL) tablet 650 mg  650 mg Oral Q6H PRN    Or    acetaminophen (TYLENOL) suppository 650 mg  650 mg Rectal Q6H PRN    polyethylene glycol (MIRALAX) packet 17 g  17 g Oral DAILY PRN    promethazine (PHENERGAN) tablet 12.5 mg  12.5 mg Oral Q6H PRN    Or    ondansetron (ZOFRAN) injection 4 mg  4 mg IntraVENous Q6H PRN       No results found for this or any previous visit (from the past 24 hour(s)).     Procedures/imaging: see electronic medical records for all procedures/Xrays and details which were not copied into this note but were reviewed prior to creation of Arya Park MD   11/14/2020, 1:54 PM

## 2020-11-15 PROCEDURE — 2709999900 HC NON-CHARGEABLE SUPPLY

## 2020-11-15 PROCEDURE — 74011250637 HC RX REV CODE- 250/637: Performed by: FAMILY MEDICINE

## 2020-11-15 PROCEDURE — 65270000029 HC RM PRIVATE

## 2020-11-15 PROCEDURE — 74011250637 HC RX REV CODE- 250/637: Performed by: NURSE PRACTITIONER

## 2020-11-15 PROCEDURE — 74011250637 HC RX REV CODE- 250/637: Performed by: INTERNAL MEDICINE

## 2020-11-15 PROCEDURE — 74011000250 HC RX REV CODE- 250: Performed by: HOSPITALIST

## 2020-11-15 PROCEDURE — 74011250637 HC RX REV CODE- 250/637: Performed by: HOSPITALIST

## 2020-11-15 RX ADMIN — AMLODIPINE BESYLATE 10 MG: 5 TABLET ORAL at 09:08

## 2020-11-15 RX ADMIN — LORAZEPAM 1 MG: 1 TABLET ORAL at 21:12

## 2020-11-15 RX ADMIN — GABAPENTIN 300 MG: 300 CAPSULE ORAL at 15:44

## 2020-11-15 RX ADMIN — LORAZEPAM 1 MG: 2 SOLUTION, CONCENTRATE ORAL at 02:58

## 2020-11-15 RX ADMIN — ACETAMINOPHEN 650 MG: 325 TABLET ORAL at 15:44

## 2020-11-15 RX ADMIN — GABAPENTIN 300 MG: 300 CAPSULE ORAL at 21:12

## 2020-11-15 RX ADMIN — QUETIAPINE FUMARATE 300 MG: 100 TABLET ORAL at 21:11

## 2020-11-15 RX ADMIN — LORAZEPAM 1 MG: 1 TABLET ORAL at 09:07

## 2020-11-15 RX ADMIN — METOPROLOL TARTRATE 25 MG: 25 TABLET, FILM COATED ORAL at 09:08

## 2020-11-15 RX ADMIN — GABAPENTIN 300 MG: 300 CAPSULE ORAL at 09:07

## 2020-11-15 RX ADMIN — QUETIAPINE FUMARATE 300 MG: 100 TABLET ORAL at 09:08

## 2020-11-15 RX ADMIN — SODIUM CHLORIDE 10 ML: 9 INJECTION, SOLUTION INTRAMUSCULAR; INTRAVENOUS; SUBCUTANEOUS at 21:16

## 2020-11-15 RX ADMIN — METOPROLOL TARTRATE 25 MG: 25 TABLET, FILM COATED ORAL at 21:12

## 2020-11-15 RX ADMIN — PANTOPRAZOLE SODIUM 40 MG: 40 TABLET, DELAYED RELEASE ORAL at 09:07

## 2020-11-15 RX ADMIN — ACETAMINOPHEN 650 MG: 325 TABLET ORAL at 09:07

## 2020-11-15 RX ADMIN — ACETAMINOPHEN 650 MG: 325 TABLET ORAL at 21:12

## 2020-11-15 RX ADMIN — LORAZEPAM 1 MG: 1 TABLET ORAL at 15:44

## 2020-11-15 RX ADMIN — SODIUM CHLORIDE 10 ML: 9 INJECTION, SOLUTION INTRAMUSCULAR; INTRAVENOUS; SUBCUTANEOUS at 14:30

## 2020-11-15 RX ADMIN — SODIUM CHLORIDE 10 ML: 9 INJECTION, SOLUTION INTRAMUSCULAR; INTRAVENOUS; SUBCUTANEOUS at 06:00

## 2020-11-15 NOTE — ROUTINE PROCESS
Bedside and Verbal shift change report given to THEO Beyer RN (oncoming nurse) by Tod Carrel. Caluag RN (offgoing nurse). Report included the following information SBAR, Kardex, Intake/Output, MAR and Recent Results.

## 2020-11-15 NOTE — PROGRESS NOTES
Hospitalist Progress Note    Patient: Carolyn Pinedo Sr. MRN: [id number]  [facility]: [id number]    YOB: 1955  Age: 63 y.o.   Sex: male    DOA: 10/23/2020 LOS:  LOS: 23 days            Patient Active Problem List   Diagnosis Code    Bursitis of elbow M70.30    Medication overdose T50.901A    Polio A80.9    Depressive disorder, not elsewhere classified F32.9    Type II or unspecified type diabetes mellitus without mention of complication, uncontrolled [id number]    Hypotension, unspecified I95.9    Amputation of both lower extremities ([address] S88.911A, W89.302S    PNA (pneumonia) J18.9    ALEXANDER (acute kidney injury) ([address] N17.9    Hyponatremia E87.1    Marijuana abuse F12.10    Centrilobular emphysema ([address] J43.2    CAP (community acquired pneumonia) J18.9    Sepsis ([address] A41.9    Hard of hearing H91.90    Legal blindness H54.8    Acute encephalopathy G93.40    Septic shock ([address] A41.9, R65.21    Chronic obstructive pulmonary disease with acute exacerbation ([address] J44.1    Severe protein-calorie malnutrition ([address] Y52    Metabolic encephalopathy O51.44    Acute respiratory failure with hypoxia (HCC) J96.01    Aspiration pneumonia ([address] J69.0    Deep vein thrombosis (DVT) of lower extremity ([address] I82.409    Dysphagia R13.10    Hypokalemia E87.6    Hypomagnesemia E83.42    Comfort measures only status Z51.5        IMPRESSION and Plan:    Carolyn Pinedo Sr. is a 63 y.o. male with   Patient Active Problem List    Diagnosis Date Noted    Comfort measures only status 11/05/2020    Dysphagia 11/03/2020    Hypokalemia 11/03/2020    Hypomagnesemia 11/03/2020    Aspiration pneumonia ([address] 10/28/2020    Deep vein thrombosis (DVT) of lower extremity ([address] 10/28/2020    Acute respiratory failure with hypoxia ([address] 10/26/2020    Chronic obstructive pulmonary disease with acute exacerbation ([address] 10/24/2020    Severe protein-calorie malnutrition ([facility] 42/63/5575    Metabolic encephalopathy 10/24/2020    Septic shock ([address] 10/23/2020    Acute encephalopathy 10/15/2020    CAP (community acquired pneumonia) 10/14/2020    Sepsis ([address] 10/14/2020    Hard of hearing 10/14/2020    Legal blindness 10/14/2020    Amputation of both lower extremities (HCC)     PNA (pneumonia)     ALEXANDER (acute kidney injury) ([address]     Hyponatremia     Marijuana abuse     Centrilobular emphysema ([address]     Medication overdose 01/27/2014    Polio 01/27/2014    Depressive disorder, not elsewhere classified 01/27/2014    Type II or unspecified type diabetes mellitus without mention of complication, uncontrolled 01/27/2014    Hypotension, unspecified 01/27/2014    Bursitis of elbow 08/11/2012     Principal Problem:    Septic shock ([address] (10/23/2020)    Active Problems:    Hypotension, unspecified (1/27/2014)      Amputation of both lower extremities (HCC) ()      PNA (pneumonia) ()      Marijuana abuse ()      Centrilobular emphysema (HCC) ()      Legal blindness (10/14/2020)      Chronic obstructive pulmonary disease with acute exacerbation ([address] (10/24/2020)      Severe protein-calorie malnutrition ([address] (23/39/6421)      Metabolic encephalopathy (93/09/1162)      Acute respiratory failure with hypoxia ([address] (10/26/2020)      Aspiration pneumonia ([address] (10/28/2020)      Deep vein thrombosis (DVT) of lower extremity ([address] (10/28/2020)      Dysphagia (11/3/2020)      Hypokalemia (11/3/2020)      Hypomagnesemia (11/3/2020)      Comfort measures only status (11/5/2020)           A 82-year-old male with a history of a COPD, bilateral above knee amputation, recently discharged from [facility] after treated for pneumonia who was admitted for septic shock and metabolic encephalopathy.           Acute respiratory failure with hypoxia       continue aspiration precaution,            COPD with right upper lobe pneumonia questionable mass/emphysema /aspiration pna  Aspiration precaution  .  Cont iv abx for now     Cardiovascular septic shock: resolved.                 htn continue  Norvasc and metoprolol.    On lovenox before d/c due to comfort management         Acute metabolic encephalopathy   Agitation-continue seroqual and ativan  And halodol      Legal blindness :fall /aspiration precaution       marijuana use      Severe malnutrition     Bilateral AKA     Patient's condition is guarded  Awaiting placement    D/w nursing staff    Recommend to continue hospitalization. Discussed with patient. Chief Complaints:   Chief Complaint   Patient presents with    Chest Pain     SUBJECTIVE:  Pt is seen and examined. No new co        Review of systems:    Review of Systems   Constitutional: Positive for malaise/fatigue. HENT: Negative. Eyes: Negative. Respiratory: Negative for shortness of breath. Cardiovascular: Negative for chest pain, palpitations, orthopnea and leg swelling. Gastrointestinal: Negative. Negative for abdominal pain, diarrhea and heartburn. Genitourinary: Negative for dysuria and hematuria. Skin: Negative. Neurological: Positive for weakness. Psychiatric/Behavioral: Negative for depression, substance abuse and suicidal ideas. The patient is not nervous/anxious. PE:  Patient Vitals for the past 24 hrs:   BP Temp Pulse Resp SpO2   11/15/20 1201 112/74 98.2 °F (36.8 °C) 84 14 96 %   11/15/20 1052 106/65 98.1 °F (36.7 °C) 87 16 94 %   11/15/20 0730 (!) 149/84 99 °F (37.2 °C) 76 16 98 %   11/14/20 2156 135/74 99.4 °F (37.4 °C) 64 16 93 %   11/14/20 2153 135/74  64         Intake/Output Summary (Last 24 hours) at 11/15/2020 1305  Last data filed at 11/15/2020 0905  Gross per 24 hour   Intake 480 ml   Output 500 ml   Net -20 ml     No data found. Physical Exam  Vitals signs and nursing note reviewed. Constitutional:       General: He is in acute distress. Neck:      Musculoskeletal: Normal range of motion and neck supple. Vascular: No JVD.    Cardiovascular:      Rate and Rhythm: Normal rate and regular rhythm. Heart sounds: Normal heart sounds. Pulmonary:      Effort: No respiratory distress. Breath sounds: Normal breath sounds. Abdominal:      General: Bowel sounds are normal. There is no distension. Palpations: Abdomen is soft. Tenderness: There is no abdominal tenderness. There is no rebound. Musculoskeletal: Normal range of motion. Skin:     General: Skin is warm and dry. Neurological:      Mental Status: He is alert and oriented to person, place, and time. Psychiatric:         Mood and Affect: Affect normal. Mood is anxious. Intake and Output:  Current Shift:  11/15 0701 - 11/15 1900  In: 480 [P.O.:480]  Out: 500 [Urine:500]  Last three shifts:  11/13 1901 - 11/15 0700  In: -   Out: 950 [Urine:950]    Lab/Data Reviewed:  No results found for this or any previous visit (from the past 8 hour(s)).   Medications:  Current Facility-Administered Medications   Medication Dose Route Frequency    QUEtiapine (SEROquel) tablet 300 mg  300 mg Oral BID    LORazepam (ATIVAN) tablet 1 mg  1 mg Oral TID    gabapentin (NEURONTIN) capsule 300 mg  300 mg Oral TID    acetaminophen (TYLENOL) tablet 650 mg  650 mg Oral TID    nicotine (NICODERM CQ) 14 mg/24 hr patch 1 Patch  1 Patch TransDERmal DAILY    LORazepam (ATIVAN) tablet 1 mg  1 mg Oral Q6H PRN    LORazepam (INTENSOL) 2 mg/mL oral concentrate 1 mg  1 mg SubLINGual Q3H PRN    hyoscyamine SL (LEVSIN/SL) tablet 0.125 mg  0.125 mg SubLINGual Q4H PRN    morphine (ROXANOL) concentrated oral syringe 5 mg  5 mg SubLINGual Q1H PRN    pantoprazole (PROTONIX) tablet 40 mg  40 mg Oral DAILY    lidocaine 4 % patch 1 Patch  1 Patch TransDERmal Q24H    metoprolol tartrate (LOPRESSOR) tablet 25 mg  25 mg Oral Q12H    amLODIPine (NORVASC) tablet 10 mg  10 mg Oral DAILY    sodium chloride (NS) flush 5-40 mL  5-40 mL IntraVENous Q8H    sodium chloride (NS) flush 5-40 mL  5-40 mL IntraVENous PRN    acetaminophen (TYLENOL) tablet 650 mg  650 mg Oral Q6H PRN    Or    acetaminophen (TYLENOL) suppository 650 mg  650 mg Rectal Q6H PRN    polyethylene glycol (MIRALAX) packet 17 g  17 g Oral DAILY PRN    promethazine (PHENERGAN) tablet 12.5 mg  12.5 mg Oral Q6H PRN    Or    ondansetron (ZOFRAN) injection 4 mg  4 mg IntraVENous Q6H PRN       No results found for this or any previous visit (from the past 24 hour(s)).     Procedures/imaging: see electronic medical records for all procedures/Xrays and details which were not copied into this note but were reviewed prior to creation of Mae Rosario MD   11/15/2020, 1:54 PM

## 2020-11-15 NOTE — PROGRESS NOTES
9262  Bedside and verbal shift change report given to Santy Ruiz RN (on coming nurse) by THEO Desai RN (off going nurse). Report included the following information SBAR, Kardex, OR Summary, Intake/Output and MAR. 1042  Pt found on the floor laying on his left side. A pillow was under pt's body. Skin intact except w/ a tear on his left forearm. No fractures or bruise. Pt states that \"Could you help? \"  Pt still confused. Patient Vitals for the past 4 hrs:   Temp Pulse Resp BP SpO2   11/15/20 1052 98.1 °F (36.7 °C) 87 16 106/65 94 %   11/15/20 0730 99 °F (37.2 °C) 76 16 (!) 149/84 98 %       Vitals stable. 18  Notified Dr. Nichole Cochran. 56  Notified family member, son, Mr. Michelle Mendez  Bedside and verbal shift change report given by ROB Iqbal (off going nurse) to DONNA Oconnell RN(on coming nurse). Report included the following information SBAR, Kardex, OR Summary, Intake/Output and MAR.

## 2020-11-15 NOTE — PROGRESS NOTES
Problem: Falls - Risk of  Goal: *Absence of Falls  Description: Document Corby Rick Fall Risk and appropriate interventions in the flowsheet.   Outcome: Progressing Towards Goal  Note: Fall Risk Interventions:  Mobility Interventions: Bed/chair exit alarm    Mentation Interventions: Bed/chair exit alarm    Medication Interventions: Bed/chair exit alarm    Elimination Interventions: Bed/chair exit alarm    History of Falls Interventions: Bed/chair exit alarm

## 2020-11-15 NOTE — ROUTINE PROCESS
Bedside and Verbal shift change report given to THEO Dockery RN (oncoming nurse) by Evon Szymanski RN (offgoing nurse). Report included the following information SBAR, Kardex, Intake/Output, MAR and Recent Results. Bedside and Verbal shift change report given to ROBERTO Schneider RN (oncoming nurse) by Jessica Trevino. Aaliyah Dockery RN (offgoing nurse). Report included the following information SBAR, Kardex, Intake/Output, MAR and Recent Results.

## 2020-11-16 PROCEDURE — 74011250637 HC RX REV CODE- 250/637: Performed by: FAMILY MEDICINE

## 2020-11-16 PROCEDURE — 74011250637 HC RX REV CODE- 250/637: Performed by: INTERNAL MEDICINE

## 2020-11-16 PROCEDURE — 74011000250 HC RX REV CODE- 250: Performed by: HOSPITALIST

## 2020-11-16 PROCEDURE — 65270000029 HC RM PRIVATE

## 2020-11-16 PROCEDURE — 74011250637 HC RX REV CODE- 250/637: Performed by: NURSE PRACTITIONER

## 2020-11-16 PROCEDURE — 74011250637 HC RX REV CODE- 250/637: Performed by: HOSPITALIST

## 2020-11-16 RX ADMIN — METOPROLOL TARTRATE 25 MG: 25 TABLET, FILM COATED ORAL at 10:20

## 2020-11-16 RX ADMIN — GABAPENTIN 300 MG: 300 CAPSULE ORAL at 10:20

## 2020-11-16 RX ADMIN — QUETIAPINE FUMARATE 300 MG: 100 TABLET ORAL at 21:33

## 2020-11-16 RX ADMIN — QUETIAPINE FUMARATE 300 MG: 100 TABLET ORAL at 10:20

## 2020-11-16 RX ADMIN — GABAPENTIN 300 MG: 300 CAPSULE ORAL at 21:32

## 2020-11-16 RX ADMIN — LORAZEPAM 1 MG: 1 TABLET ORAL at 15:38

## 2020-11-16 RX ADMIN — ACETAMINOPHEN 650 MG: 325 TABLET ORAL at 10:20

## 2020-11-16 RX ADMIN — GABAPENTIN 300 MG: 300 CAPSULE ORAL at 15:39

## 2020-11-16 RX ADMIN — LORAZEPAM 1 MG: 1 TABLET ORAL at 10:20

## 2020-11-16 RX ADMIN — SODIUM CHLORIDE 10 ML: 9 INJECTION, SOLUTION INTRAMUSCULAR; INTRAVENOUS; SUBCUTANEOUS at 21:34

## 2020-11-16 RX ADMIN — MORPHINE SULFATE 5 MG: 100 SOLUTION ORAL at 21:33

## 2020-11-16 RX ADMIN — AMLODIPINE BESYLATE 10 MG: 5 TABLET ORAL at 10:20

## 2020-11-16 RX ADMIN — LORAZEPAM 1 MG: 1 TABLET ORAL at 21:33

## 2020-11-16 RX ADMIN — ACETAMINOPHEN 650 MG: 325 TABLET ORAL at 21:33

## 2020-11-16 RX ADMIN — METOPROLOL TARTRATE 25 MG: 25 TABLET, FILM COATED ORAL at 21:32

## 2020-11-16 RX ADMIN — ACETAMINOPHEN 650 MG: 325 TABLET ORAL at 15:39

## 2020-11-16 RX ADMIN — PANTOPRAZOLE SODIUM 40 MG: 40 TABLET, DELAYED RELEASE ORAL at 10:20

## 2020-11-16 RX ADMIN — SODIUM CHLORIDE 10 ML: 9 INJECTION, SOLUTION INTRAMUSCULAR; INTRAVENOUS; SUBCUTANEOUS at 15:37

## 2020-11-16 NOTE — PROGRESS NOTES
11/16/20 1133   Vital Signs   Temp 99 °F (37.2 °C)   Temp Source Oral   Pulse (Heart Rate) 94   Heart Rate Source Monitor   Cardiac Rhythm NSR   Resp Rate 16   O2 Sat (%) 100 %   Level of Consciousness Alert   BP (!) 154/82   MAP (Calculated) 106   BP 1 Method Automatic   BP 1 Location Left arm   BP Patient Position At rest   MEWS Score 1   Pain 1   Pain Scale 1 Numeric (0 - 10)   Pain Intensity 1 0   Patient Stated Pain Goal 0       S: Agitation management/ MD notified of Fall/Sitter order implemented. B: According to nursing staff, patient was found on floor without any physical injuries and alert, awake and responsive with the following vital signs listed above. Dr. Rhea Blanco was paged by nursing staff for notification. A: At 1423 pm, Nursing Supervisor Thien Licea was contacted and notified about patient fall and Dr. Dennis Yang's order for sitter. Nursing Supervisor stated that patient would receive a sitter as soon as possible. Charge RN Valente Murdock was notified and verbalized understanding. R: Will continue with prescribed plan of care as directed.    Winston Johnson  11/16/2020  2:28 PM

## 2020-11-16 NOTE — PROGRESS NOTES
Shift Summary :  A usual night with no change in status. Though fed regularly during the shift, complaining that we have not fed him and he is hungry. Assured him that breakfast would be here and we would feed him. Had soft brown stool and voided during he shift. Fall precautions and comfort measures in place as well as DNR and aspiration precautions. Jacki Rosa

## 2020-11-16 NOTE — PROGRESS NOTES
Hospitalist Progress Note-critical care note     Patient: Curry Bird Sr. MRN: [id number]  CSN: [id number]    YOB: 1955  Age: 63 y.o.   Sex: male    DOA: 10/23/2020 LOS:  LOS: 24 days            Chief complaint: Pneumonia, marijuana abuse, legal blindness, amputation bilateral lower extremity, encephalopathy, acute respiratory failure with hypoxia    Assessment/Plan         Hospital Problems  Date Reviewed: 10/23/2020          Codes Class Noted POA    Comfort measures only status ICD-10-CM: Z51.5  ICD-9-CM: V66.7  11/5/2020 Unknown        Dysphagia ICD-10-CM: R13.10  ICD-9-CM: 787.20  11/3/2020 Unknown        Hypokalemia ICD-10-CM: E87.6  ICD-9-CM: 276.8  11/3/2020 Unknown        Hypomagnesemia ICD-10-CM: E83.42  ICD-9-CM: 275.2  11/3/2020 Unknown        Aspiration pneumonia ([facility] ICD-10-CM: J69.0  ICD-9-CM: 507.0  10/28/2020 Unknown        Deep vein thrombosis (DVT) of lower extremity ([facility] ICD-10-CM: I82.409  ICD-9-CM: 453.40  10/28/2020 Unknown        Acute respiratory failure with hypoxia (HCC) ICD-10-CM: J96.01  ICD-9-CM: 518.81  10/26/2020 Unknown        Chronic obstructive pulmonary disease with acute exacerbation ([facility] ICD-10-CM: J44.1  ICD-9-CM: 491.21  10/24/2020 Unknown        Severe protein-calorie malnutrition ([facility] ICD-10-CM: E43  ICD-9-CM: 262  10/24/2020 Unknown        Metabolic encephalopathy LBF-60-UR: G93.41  ICD-9-CM: 348.31  10/24/2020 Unknown        * (Principal) Septic shock ([facility] ICD-10-CM: A41.9, R65.21  ICD-9-CM: 038.9, 785.52, 995.92  10/23/2020 Unknown        Amputation of both lower extremities ([address] ICD-10-CM: Y32.163H, K49.550H  ICD-9-CM: 897.6  Unknown Yes        PNA (pneumonia) ICD-10-CM: J18.9  ICD-9-CM: 486  Unknown Yes        Marijuana abuse ICD-10-CM: F12.10  ICD-9-CM: 305.20  Unknown Yes        Centrilobular emphysema ([address] ICD-10-CM: J43.2  ICD-9-CM: 492.8  Unknown Unknown        Legal blindness ICD-10-CM: H54.8  ICD-9-CM: 369.4  10/14/2020 Yes Hypotension, unspecified ICD-10-CM: I95.9  ICD-9-CM: 458.9  1/27/2014 Yes                A 42-year-old male with a history of a COPD, bilateral above knee amputation, recently discharged from [facility] [place] after treated for pneumonia who was admitted for septic shock and metabolic encephalopathy.     Family made final decision and comfort care granted. Abdullahi is on board for hospice. Abdullahi has been working for placement, so far no accepting facility, he has no change overnight,  Fall AM, have to  [employer] for pt's safety. No head hit on the floor per RN reported     Acute respiratory failure with hypoxia   continue aspiration precaution,   Complete iv abx for aspiration pna       COPD with right upper lobe pneumonia questionable mass/emphysema /aspiration pna  Aspiration precaution        Cardiovascular septic shock: resolved.       Prolong Qt   Cardiologist was on board       htn continue  Norvasc and metoprolol.       Chronic dvt noted from pvl   On lovenox before d/c due to comfort management         Acute metabolic encephalopathy   Agitation-continue seroqual and ativan     Legal blindness :fall /aspiration precaution      marijuana use     Severe malnutrition    Bilateral AKA    Subjective : I want to eat     Rn: already feed him   Disposition :tbd   Review of systems:   ros limited due to on and off confusion, denies any chest pain/sob,     Vital signs/Intake and Output:  Visit Vitals  BP (!) 154/82 (BP 1 Location: Left arm, BP Patient Position: At rest)   Pulse 94   Temp 99 °F (37.2 °C)   Resp 16   Ht 5' 4\" (1.626 m)   Wt 40.8 kg (89 lb 15.2 oz)   SpO2 100%   BMI 15.44 kg/m²     Current Shift:  No intake/output data recorded. Last three shifts:  11/14 1901 - 11/16 0700  In: 480 [P.O.:480]  Out: 1875 [Urine:1875]    Physical Exam:  General: Alert and oriented to himself no acute distress   HEENT: NC, Atraumatic. PERRLA, anicteric sclerae.    Lungs: Cta  BS bilaterally   Heart:  Regular  rhythm,  No murmur, No Rubs, No Gallops  Abdomen: Soft, Non distended, Non tender. +Bowel sounds,   Extremities: No c/c, aka  Psych:   Calm,no agitation   Neurologic:   Alert and move upper extremities, no acute neuro deficit       Labs: Results:       Chemistry No results for input(s): GLU, NA, K, CL, CO2, BUN, CREA, CA, AGAP, BUCR, TBIL, AP, TP, ALB, GLOB, AGRAT in the last 72 hours. No lab exists for component: GPT   CBC w/Diff No results for input(s): WBC, RBC, HGB, HCT, PLT, GRANS, LYMPH, EOS, HGBEXT, HCTEXT, PLTEXT, HGBEXT, HCTEXT, PLTEXT in the last 72 hours. Cardiac Enzymes No results for input(s): CPK, CKND1, CHUY in the last 72 hours. No lab exists for component: CKRMB, TROIP   Coagulation No results for input(s): PTP, INR, APTT, INREXT, INREXT in the last 72 hours. Lipid Panel No results found for: CHOL, CHOLPOCT, CHOLX, CHLST, CHOLV, 956942, HDL, HDLP, LDL, LDLC, DLDLP, 912919, VLDLC, VLDL, TGLX, TRIGL, TRIGP, TGLPOCT, CHHD, CHHDX   BNP No results for input(s): BNPP in the last 72 hours. Liver Enzymes No results for input(s): TP, ALB, TBIL, AP in the last 72 hours. No lab exists for component: SGOT, GPT, DBIL   Thyroid Studies Lab Results   Component Value Date/Time    TSH 0.78 10/24/2020 11:30 AM        Procedures/imaging: see electronic medical records for all procedures/Xrays and details which were not copied into this note but were reviewed prior to creation of Plan    Ct Head Wo Cont    Result Date: 10/24/2020  EXAM: CT head INDICATION: Altered mental status. COMPARISON: October 15, 2020. TECHNIQUE: Axial CT imaging of the head was performed without intravenous contrast. Dose reduction techniques used: automated exposure control, adjustment of the mAs and/or kVp according to patient size, and iterative reconstruction techniques.  Digital imaging and communications in Medicine (DICOM) format image data are available to nonaffiliated external healthcare facilities or entities on a secure, media free, reciprocally searchable basis with patient authorization for at least 12 months after this study. _______________ FINDINGS: BRAIN AND POSTERIOR FOSSA: There is cerebral volume loss with prominence of the lateral and the third ventricles. The cortical sulci are widened appropriately. The fourth ventricle and basal cisterns are normally outlined. There is mild bilateral periventricular and central white matter diminished attenuation. There is no acute territorial defect, hemorrhage or midline shift. EXTRA-AXIAL SPACES AND MENINGES: There are no abnormal extra-axial fluid collections. CALVARIUM: Intact. SINUSES: Clear. OTHER: Partial right mastoid opacification is again seen. _______________     IMPRESSION: Cerebral volume loss and mild bilateral periventricular and central white matter diminished attenuation which is nonspecific but likely to represent microvascular disease. There is no acute intracranial abnormality. No significant interval change. Ct Head Wo Cont    Result Date: 10/15/2020  EXAM: CT head INDICATION: Dental status change COMPARISON: None. TECHNIQUE: Axial CT imaging of the head was performed without intravenous contrast. One or more dose reduction techniques were used on this CT: automated exposure control, adjustment of the mAs and/or kVp according to patient size, and iterative reconstruction techniques. The specific techniques used on this CT exam have been documented in the patient's electronic medical record. Digital Imaging and Communications in Medicine (DICOM) format image data are available to nonaffiliated external healthcare facilities or entities on a secure, media free, reciprocally searchable basis with patient authorization for at least a 12 month period after this study.  _______________ FINDINGS: BRAIN AND POSTERIOR FOSSA: The sulci, folia, ventricles and basal cisterns are within normal limits for the patient's with age concordant atrophy There is no intracranial hemorrhage, mass effect, or midline shift. Mild periventricular low-attenuation. Although nonspecific this is frequently seen with chronic small vessel ischemic change. Otherwise, there are no areas of abnormal parenchymal attenuation. EXTRA-AXIAL SPACES AND MENINGES: There are no abnormal extra-axial fluid collections. CALVARIUM: Intact. SINUSES: Clear. OTHER: None. _______________     IMPRESSION: No acute intracranial abnormalities. Xr Chest Port    Result Date: 10/26/2020  EXAM: CHEST RADIOGRAPH CLINICAL INDICATION/HISTORY: Pneumonia   > Additional: None COMPARISON: 10/23/2020 TECHNIQUE: Portable frontal view of the chest _______________ FINDINGS: SUPPORT DEVICES: None. HEART AND MEDIASTINUM: Heart size is stable. Atherosclerotic calcification seen in the aorta. LUNGS AND PLEURAL SPACES: Increased hazy density at the left base. Slight blunting of the right costophrenic angle. No pneumothorax. Patchy right upper lobe opacity is similar to slightly improved. BONES AND SOFT TISSUES: Unremarkable. _______________     IMPRESSION: 1. Increased hazy opacity at the left base compared to the prior exam, possibly developing left lower lobe atelectasis and/or consolidation. Questionable small right effusion. 2. Patchy right upper lobe patchy opacity which is similar to slightly improved. Xr Chest Port    Result Date: 10/24/2020  EXAM: XR CHEST PORT. CLINICAL INDICATION/HISTORY: meets SIRS criteria. -Additional: None. TECHNIQUE: A portable erect AP radiographic view of the chest is compared with several other chest radiographs performed the same month. _______________ FINDINGS: See impression. No pneumothorax or appreciable significant pleural effusion. The cardiac silhouette, vanessa, and mediastinal contours are normal for age. No acute osseous abnormality is revealed.  _______________     IMPRESSION: Slight improvement in multifocal airspace disease most in keeping with pneumonia, again most pronounced at the right lung apex with no new or worsening findings. Recommend continued radiographic follow-up to resolution. _______________     Margene Layer Chest Port    Result Date: 10/18/2020  EXAM: CHEST RADIOGRAPH CLINICAL INDICATION/HISTORY: SBO   > Additional: None COMPARISON: 10/17/2020. TECHNIQUE: Portable frontal view of the chest _______________ FINDINGS: SUPPORT DEVICES: None. HEART AND MEDIASTINUM: No appreciable cardiomegaly. Remaining mediastinal contours within normal limits. LUNGS AND PLEURAL SPACES: No significant change of bilateral upper lobe parenchymal opacities. Hyperexpansion of the lungs. No evidence for pneumothorax. BONY THORAX AND SOFT TISSUES: Unremarkable. _______________     IMPRESSION: 1. No significant change of bilateral lung pneumonia. Follow-up to resolution is recommended. 2. Emphysema. Xr Chest Port    Result Date: 10/18/2020  EXAM: CHEST RADIOGRAPH CLINICAL INDICATION/HISTORY: shortness of breath   > Additional: None COMPARISON: October 14, 2020. TECHNIQUE: Portable frontal view of the chest _______________ FINDINGS: SUPPORT DEVICES: None. HEART AND MEDIASTINUM: No appreciable cardiomegaly. Remaining mediastinal contours within normal limits. LUNGS AND PLEURAL SPACES: No significant change of bilateral upper lobe parenchymal opacities. Hyperexpansion of the lungs. No evidence for pneumothorax or pleural effusion. BONY THORAX AND SOFT TISSUES: Unremarkable. _______________     IMPRESSION: 1. No significant change of bilateral lung pneumonia. Follow-up to resolution is recommended to exclude underlying mass. 2. Emphysema. Xr Chest Port    Result Date: 10/14/2020  EXAM: XR CHEST PORT CLINICAL INDICATION/HISTORY: Shortness of breath with cough -Additional: None COMPARISON: Several prior studies, most recently 2/19/2019 TECHNIQUE: Frontal view of the chest _______________ FINDINGS: HEART AND MEDIASTINUM: Normal cardiac size and mediastinal contours.  LUNGS AND PLEURAL SPACES: Patchy multifocal opacities noted throughout bilateral upper lobes, right greater than left as well as throughout the periphery of the right lower lobe. No evidence of pneumothorax. BONY THORAX AND SOFT TISSUES: No acute osseous abnormality _______________     IMPRESSION: Patchy multifocal airspace disease compatible with pneumonia. Recommend radiographic follow-up to document expected clinical resolution in 4-6 weeks' time.        Chris Walker MD

## 2020-11-16 NOTE — PROGRESS NOTES
Patient was assisted with compliance to current plan of care as directed.    Winston Johnson  11/16/2020  12:17 PM

## 2020-11-16 NOTE — ROUTINE PROCESS
Bedside and Verbal shift change report given to DONNA Luna RN  (oncoming nurse) by  DONNA Hobbs RN  (offgoing nurse). Report included the following information SBAR, Kardex, Intake/Output, MAR and Recent Results.

## 2020-11-16 NOTE — HOSPICE
[facility] will continue to follow for a safe discharge plan.      Whitney Gann RN  [place] [address] [facility], [address] [place] [address] Str.  [phone]  Email: [email or web address]

## 2020-11-16 NOTE — PROGRESS NOTES
Problem: Falls - Risk of  Goal: *Absence of Falls  Description: Document Sintia Patel Fall Risk and appropriate interventions in the flowsheet.   11/16/2020 0307 by Isabella Hall RN  Outcome: Progressing Towards Goal  Note: Fall Risk Interventions:  Mobility Interventions: Patient to call before getting OOB    Mentation Interventions: Bed/chair exit alarm, Evaluate medications/consider consulting pharmacy    Medication Interventions: Evaluate medications/consider consulting pharmacy, Patient to call before getting OOB, Teach patient to arise slowly    Elimination Interventions: Call light in reach, Patient to call for help with toileting needs    History of Falls Interventions: Evaluate medications/consider consulting pharmacy      11/16/2020 0305 by Isabella Hall RN  Outcome: Progressing Towards Goal  Note: Fall Risk Interventions:  Mobility Interventions: Patient to call before getting OOB    Mentation Interventions: Bed/chair exit alarm, Evaluate medications/consider consulting pharmacy    Medication Interventions: Evaluate medications/consider consulting pharmacy, Patient to call before getting OOB, Teach patient to arise slowly    Elimination Interventions: Call light in reach, Patient to call for help with toileting needs    History of Falls Interventions: Evaluate medications/consider consulting pharmacy

## 2020-11-17 PROCEDURE — 74011250637 HC RX REV CODE- 250/637: Performed by: FAMILY MEDICINE

## 2020-11-17 PROCEDURE — 74011250637 HC RX REV CODE- 250/637: Performed by: NURSE PRACTITIONER

## 2020-11-17 PROCEDURE — 74011250637 HC RX REV CODE- 250/637: Performed by: INTERNAL MEDICINE

## 2020-11-17 PROCEDURE — 74011250637 HC RX REV CODE- 250/637: Performed by: HOSPITALIST

## 2020-11-17 PROCEDURE — 87635 SARS-COV-2 COVID-19 AMP PRB: CPT

## 2020-11-17 PROCEDURE — 65270000029 HC RM PRIVATE

## 2020-11-17 PROCEDURE — 74011000250 HC RX REV CODE- 250: Performed by: HOSPITALIST

## 2020-11-17 RX ADMIN — QUETIAPINE FUMARATE 300 MG: 100 TABLET ORAL at 08:13

## 2020-11-17 RX ADMIN — METOPROLOL TARTRATE 25 MG: 25 TABLET, FILM COATED ORAL at 21:12

## 2020-11-17 RX ADMIN — ACETAMINOPHEN 650 MG: 325 TABLET ORAL at 21:13

## 2020-11-17 RX ADMIN — ACETAMINOPHEN 650 MG: 325 TABLET ORAL at 08:13

## 2020-11-17 RX ADMIN — GABAPENTIN 300 MG: 300 CAPSULE ORAL at 21:12

## 2020-11-17 RX ADMIN — SODIUM CHLORIDE 10 ML: 9 INJECTION, SOLUTION INTRAMUSCULAR; INTRAVENOUS; SUBCUTANEOUS at 06:34

## 2020-11-17 RX ADMIN — LORAZEPAM 1 MG: 1 TABLET ORAL at 08:13

## 2020-11-17 RX ADMIN — METOPROLOL TARTRATE 25 MG: 25 TABLET, FILM COATED ORAL at 08:13

## 2020-11-17 RX ADMIN — ACETAMINOPHEN 650 MG: 325 TABLET ORAL at 15:27

## 2020-11-17 RX ADMIN — SODIUM CHLORIDE 10 ML: 9 INJECTION, SOLUTION INTRAMUSCULAR; INTRAVENOUS; SUBCUTANEOUS at 22:00

## 2020-11-17 RX ADMIN — GABAPENTIN 300 MG: 300 CAPSULE ORAL at 08:13

## 2020-11-17 RX ADMIN — PANTOPRAZOLE SODIUM 40 MG: 40 TABLET, DELAYED RELEASE ORAL at 08:13

## 2020-11-17 RX ADMIN — QUETIAPINE FUMARATE 300 MG: 100 TABLET ORAL at 21:12

## 2020-11-17 RX ADMIN — LORAZEPAM 1 MG: 1 TABLET ORAL at 21:12

## 2020-11-17 RX ADMIN — AMLODIPINE BESYLATE 10 MG: 5 TABLET ORAL at 08:13

## 2020-11-17 RX ADMIN — LORAZEPAM 1 MG: 1 TABLET ORAL at 13:28

## 2020-11-17 RX ADMIN — GABAPENTIN 300 MG: 300 CAPSULE ORAL at 15:27

## 2020-11-17 RX ADMIN — LORAZEPAM 1 MG: 1 TABLET ORAL at 15:28

## 2020-11-17 NOTE — PROGRESS NOTES
11:27 am 11/17/2020  During, hygiene assistance patient requested to shave his own mustache and goate. Patient nicked his own  Right lower chin with staff present. Notified RN assigned.

## 2020-11-17 NOTE — PROGRESS NOTES
Problem: Falls - Risk of  Goal: *Absence of Falls  Description: Document Nigel Bruner Fall Risk and appropriate interventions in the flowsheet. Outcome: Progressing Towards Goal  Note: Fall Risk Interventions:  Mobility Interventions: Assess mobility with egress test    Mentation Interventions: Adequate sleep, hydration, pain control    Medication Interventions: Bed/chair exit alarm    Elimination Interventions: Bed/chair exit alarm    History of Falls Interventions: Bed/chair exit alarm         Problem: Pressure Injury - Risk of  Goal: *Prevention of pressure injury  Description: Document Shelton Scale and appropriate interventions in the flowsheet.   Outcome: Progressing Towards Goal  Note: Pressure Injury Interventions:  Sensory Interventions: Assess changes in LOC    Moisture Interventions: Absorbent underpads    Activity Interventions: PT/OT evaluation    Mobility Interventions: Assess need for specialty bed    Nutrition Interventions: Document food/fluid/supplement intake    Friction and Shear Interventions: Apply protective barrier, creams and emollients, Foam dressings/transparent film/skin sealants, HOB 30 degrees or less, Lift team/patient mobility team, Minimize layers, Transferring/repositioning devices

## 2020-11-17 NOTE — PROGRESS NOTES
Bedside and Verbal shift change report given to Sandie 18 (oncoming nurse) by Juju BENNETT, RN (offgoing nurse). Report included the following information SBAR, Kardex and MAR. SHIFT UPDATES:  Patient presented with generalized agitation/confusion and received scheduled Ativan 1 mg by mouth three times daily for management. Patient fell today around 1423 pm and was found down on floor by nursing staff. Bed alarm had gone off at time in which patient was found on floor and patient presented without injury. Patient was assisted back to bed and presented with normal vital signs. Dr. Roshan Maldonado ordered for patient to have sitter Nursing Supervisor Judy Anderson as well as Charge RN Cortez Fuentes were notified of sitter order for patient safety at that time. Patient was assisted back to bed and is pending discharge to nursing home according to nursing staff. Patient presents with bilateral above the knee amputations and has bed side rails advanced and bed alarm activated for patient safety. ABNORMAL LAB:   Results for Jordis Harada. (MRN [id number]) as of 11/16/2020 17:56   Ref.  Range 11/5/2020 05:00 11/5/2020 06:32 11/10/2020 22:10   GLUCOSE,FAST - POC Latest Ref Range: 70 - 110 mg/dL  86 135 (H)   WBC Latest Ref Range: 4.6 - 13.2 K/uL 5.7     RBC Latest Ref Range: 4.70 - 5.50 M/uL 3.42 (L)     HGB Latest Ref Range: 13.0 - 16.0 g/dL 9.6 (L)     HCT Latest Ref Range: 36.0 - 48.0 % 29.7 (L)     MCV Latest Ref Range: 74.0 - 97.0 FL 86.8     MCH Latest Ref Range: 24.0 - 34.0 PG 28.1     MCHC Latest Ref Range: 31.0 - 37.0 g/dL 32.3     RDW Latest Ref Range: 11.6 - 14.5 % 15.8 (H)     PLATELET Latest Ref Range: 135 - 420 K/uL 249     MPV Latest Ref Range: 9.2 - 11.8 FL 10.7     NEUTROPHILS Latest Ref Range: 40 - 73 % 78 (H)     LYMPHOCYTES Latest Ref Range: 21 - 52 % 10 (L)     MONOCYTES Latest Ref Range: 3 - 10 % 10     EOSINOPHILS Latest Ref Range: 0 - 5 % 2     BASOPHILS Latest Ref Range: 0 - 2 % 0 DF Latest Units:   AUTOMATED     ABS. NEUTROPHILS Latest Ref Range: 1.8 - 8.0 K/UL 4.4     ABS. LYMPHOCYTES Latest Ref Range: 0.9 - 3.6 K/UL 0.6 (L)     ABS. MONOCYTES Latest Ref Range: 0.05 - 1.2 K/UL 0.6     ABS. EOSINOPHILS Latest Ref Range: 0.0 - 0.4 K/UL 0.1     ABS. BASOPHILS Latest Ref Range: 0.0 - 0.1 K/UL 0.0     Sodium Latest Ref Range: 136 - 145 mmol/L 143     Potassium Latest Ref Range: 3.5 - 5.5 mmol/L 3.5     Chloride Latest Ref Range: 100 - 111 mmol/L 114 (H)     CO2 Latest Ref Range: 21 - 32 mmol/L 22     Anion gap Latest Ref Range: 3.0 - 18 mmol/L 7     Glucose Latest Ref Range: 74 - 99 mg/dL 88     BUN Latest Ref Range: 7.0 - 18 MG/DL 5 (L)     Creatinine Latest Ref Range: 0.6 - 1.3 MG/DL 0.54 (L)     BUN/Creatinine ratio Latest Ref Range: 12 - 20   9 (L)     Calcium Latest Ref Range: 8.5 - 10.1 MG/DL 8.2 (L)     Magnesium Latest Ref Range: 1.6 - 2.6 mg/dL 1.8     GFR est non-AA Latest Ref Range: >60 ml/min/1.73m2 >60     GFR est AA Latest Ref Range: >60 ml/min/1.73m2 >60     Bilirubin, total Latest Ref Range: 0.2 - 1.0 MG/DL 0.2     Protein, total Latest Ref Range: 6.4 - 8.2 g/dL 5.8 (L)     Albumin Latest Ref Range: 3.4 - 5.0 g/dL 2.5 (L)     Globulin Latest Ref Range: 2.0 - 4.0 g/dL 3.3     A-G Ratio Latest Ref Range: 0.8 - 1.7   0.8     ALT Latest Ref Range: 16 - 61 U/L 19     AST Latest Ref Range: 10 - 38 U/L 21     Alk. phosphatase Latest Ref Range: 45 - 117 U/L 91       Wounds? Intact    Central Lines? None       Last BM:  11/16/2020      Pending Labs for AM Draw: None. Discharge plan:  As of  11/13/2020 at 1412 pm case management note, patient is pending tentative acceptance to [facility] in [place], [address] in [place] upon being found medically stable and without sitter.      Winston Johnson   11/16/2020  7:05 PM

## 2020-11-17 NOTE — PROGRESS NOTES
Bedside and verbal report given to CHANDLER Del Toro, RN (oncoming nurse) by Rob Talley RN  (off going nurse). Report included the following information SBAR, Kardex, OR Summary, Intake/Output, and MAR. Pt summary:  2133  Morphine given for pain  Pt had an uneventful shift with no acute changes. Bedside and verbal report given by (off going nurse) CHANDLER Del Toro RN to (oncoming nurse) Bing Ramires RN. Report included the following information SBAR, Kardex, OR Summary, Intake/Output, and MAR.

## 2020-11-17 NOTE — PROGRESS NOTES
0740 Received bedside shift report from off going nurse Brittni Arndt [facility]. Report included the following information SBAR, Kardex, Intake/Output, MAR and Recent Results. Pt asked to received his meds this morning before breakfast.    1330 Covid test taken to lab.    1525 Pt praying at bedside and singing. Trying to self-soothe.    1955 Gave bedside shift report to oncoming nurse Galindo, ROB. Report included the following information SBAR, Kardex, Intake/Output, MAR and Recent Results.

## 2020-11-17 NOTE — PROGRESS NOTES
D/C Plan: SNF/LTC vs home with family and hospice     Noted pt now has a sitter. Pt will have to be sitter free for 24-48 hours before a facility will consider for admission.   CM to continue to follow and assist.

## 2020-11-17 NOTE — PROGRESS NOTES
Hospitalist Progress Note-critical care note     Patient: Coby Calero Sr. MRN: [id number]  CSN: [id number]    YOB: 1955  Age: 63 y.o.   Sex: male    DOA: 10/23/2020 LOS:  LOS: 25 days            Chief complaint: Pneumonia, marijuana abuse, legal blindness, amputation bilateral lower extremity, encephalopathy, acute respiratory failure with hypoxia    Assessment/Plan         Hospital Problems  Date Reviewed: 10/23/2020          Codes Class Noted POA    Comfort measures only status ICD-10-CM: Z51.5  ICD-9-CM: V66.7  11/5/2020 Unknown        Dysphagia ICD-10-CM: R13.10  ICD-9-CM: 787.20  11/3/2020 Unknown        Hypokalemia ICD-10-CM: E87.6  ICD-9-CM: 276.8  11/3/2020 Unknown        Hypomagnesemia ICD-10-CM: E83.42  ICD-9-CM: 275.2  11/3/2020 Unknown        Aspiration pneumonia ([facility] ICD-10-CM: J69.0  ICD-9-CM: 507.0  10/28/2020 Unknown        Deep vein thrombosis (DVT) of lower extremity ([facility] ICD-10-CM: I82.409  ICD-9-CM: 453.40  10/28/2020 Unknown        Acute respiratory failure with hypoxia (HCC) ICD-10-CM: J96.01  ICD-9-CM: 518.81  10/26/2020 Unknown        Chronic obstructive pulmonary disease with acute exacerbation ([facility] ICD-10-CM: J44.1  ICD-9-CM: 491.21  10/24/2020 Unknown        Severe protein-calorie malnutrition ([facility] ICD-10-CM: E43  ICD-9-CM: 262  10/24/2020 Unknown        Metabolic encephalopathy DQU-85-CY: G93.41  ICD-9-CM: 348.31  10/24/2020 Unknown        * (Principal) Septic shock ([facility] ICD-10-CM: A41.9, R65.21  ICD-9-CM: 038.9, 785.52, 995.92  10/23/2020 Unknown        Amputation of both lower extremities ([facility] ICD-10-CM: K30.813N, Z75.159G  ICD-9-CM: 897.6  Unknown Yes        PNA (pneumonia) ICD-10-CM: J18.9  ICD-9-CM: 486  Unknown Yes        Marijuana abuse ICD-10-CM: F12.10  ICD-9-CM: 305.20  Unknown Yes        Centrilobular emphysema ([address] ICD-10-CM: J43.2  ICD-9-CM: 492.8  Unknown Unknown        Legal blindness ICD-10-CM: H54.8  ICD-9-CM: 369.4  10/14/2020 Yes Hypotension, unspecified ICD-10-CM: I95.9  ICD-9-CM: 458.9  1/27/2014 Yes                A 62-year-old male with a history of a COPD, bilateral above knee amputation, recently discharged from [facility] after treated for pneumonia who was admitted for septic shock and metabolic encephalopathy.     Continue comfort care. No acute event overnight. Pt was clam overnight   Cm is on board for hospice placement. Acute respiratory failure with hypoxia   continue aspiration precaution,   Complete iv abx for aspiration pna       COPD with right upper lobe pneumonia questionable mass/emphysema /aspiration pna  Aspiration precaution        Cardiovascular septic shock: resolved.       Prolong Qt   Cardiologist was on board       htn continue  Norvasc and metoprolol.       Chronic dvt noted from pvl   On lovenox before d/c due to comfort management         Acute metabolic encephalopathy   Agitation-continue seroqual and ativan     Legal blindness :fall /aspiration precaution      marijuana use     Severe malnutrition    Bilateral AKA    Subjective : I feel fine , I want my fentanyl     Rn: already feed him   Disposition :tbd   Review of systems:   ros limited due to on and off confusion,      Vital signs/Intake and Output:  Visit Vitals  BP (!) 115/45 (BP 1 Location: Left arm, BP Patient Position: At rest)   Pulse 81   Temp 98 °F (36.7 °C)   Resp 16   Ht 5' 4\" (1.626 m)   Wt 40.8 kg (89 lb 15.2 oz)   SpO2 97%   BMI 15.44 kg/m²     Current Shift:  11/17 0701 - 11/17 1900  In: 720 [P.O.:720]  Out: -   Last three shifts:  11/15 1901 - 11/17 0700  In: -   Out: 2250 [Urine:2250]    Physical Exam:  General: Alert and oriented ,  no acute distress   HEENT: NC, Atraumatic. PERRLA, anicteric sclerae. Lungs: Cta  BS bilaterally   Heart:  Regular  rhythm,  No murmur, No Rubs, No Gallops  Abdomen: Soft, Non distended, Non tender.   +Bowel sounds,   Extremities: No c/c, aka  Psych:   Calm,no agitation   Neurologic:   No acute neuro deficit Labs: Results:       Chemistry No results for input(s): GLU, NA, K, CL, CO2, BUN, CREA, CA, AGAP, BUCR, TBIL, AP, TP, ALB, GLOB, AGRAT in the last 72 hours. No lab exists for component: GPT   CBC w/Diff No results for input(s): WBC, RBC, HGB, HCT, PLT, GRANS, LYMPH, EOS, HGBEXT, HCTEXT, PLTEXT, HGBEXT, HCTEXT, PLTEXT in the last 72 hours. Cardiac Enzymes No results for input(s): CPK, CKND1, CHUY in the last 72 hours. No lab exists for component: CKRMB, TROIP   Coagulation No results for input(s): PTP, INR, APTT, INREXT, INREXT in the last 72 hours. Lipid Panel No results found for: CHOL, CHOLPOCT, CHOLX, CHLST, CHOLV, 947344, HDL, HDLP, LDL, LDLC, DLDLP, 993170, VLDLC, VLDL, TGLX, TRIGL, TRIGP, TGLPOCT, CHHD, CHHDX   BNP No results for input(s): BNPP in the last 72 hours. Liver Enzymes No results for input(s): TP, ALB, TBIL, AP in the last 72 hours. No lab exists for component: SGOT, GPT, DBIL   Thyroid Studies Lab Results   Component Value Date/Time    TSH 0.78 10/24/2020 11:30 AM        Procedures/imaging: see electronic medical records for all procedures/Xrays and details which were not copied into this note but were reviewed prior to creation of Plan    Ct Head Wo Cont    Result Date: 10/24/2020  EXAM: CT head INDICATION: Altered mental status. COMPARISON: October 15, 2020. TECHNIQUE: Axial CT imaging of the head was performed without intravenous contrast. Dose reduction techniques used: automated exposure control, adjustment of the mAs and/or kVp according to patient size, and iterative reconstruction techniques. Digital imaging and communications in Medicine (DICOM) format image data are available to nonaffiliated external healthcare facilities or entities on a secure, media free, reciprocally searchable basis with patient authorization for at least 12 months after this study.  _______________ FINDINGS: BRAIN AND POSTERIOR FOSSA: There is cerebral volume loss with prominence of the lateral and the third ventricles. The cortical sulci are widened appropriately. The fourth ventricle and basal cisterns are normally outlined. There is mild bilateral periventricular and central white matter diminished attenuation. There is no acute territorial defect, hemorrhage or midline shift. EXTRA-AXIAL SPACES AND MENINGES: There are no abnormal extra-axial fluid collections. CALVARIUM: Intact. SINUSES: Clear. OTHER: Partial right mastoid opacification is again seen. _______________     IMPRESSION: Cerebral volume loss and mild bilateral periventricular and central white matter diminished attenuation which is nonspecific but likely to represent microvascular disease. There is no acute intracranial abnormality. No significant interval change. Ct Head Wo Cont    Result Date: 10/15/2020  EXAM: CT head INDICATION: Dental status change COMPARISON: None. TECHNIQUE: Axial CT imaging of the head was performed without intravenous contrast. One or more dose reduction techniques were used on this CT: automated exposure control, adjustment of the mAs and/or kVp according to patient size, and iterative reconstruction techniques. The specific techniques used on this CT exam have been documented in the patient's electronic medical record. Digital Imaging and Communications in Medicine (DICOM) format image data are available to nonaffiliated external healthcare facilities or entities on a secure, media free, reciprocally searchable basis with patient authorization for at least a 12 month period after this study. _______________ FINDINGS: BRAIN AND POSTERIOR FOSSA: The sulci, folia, ventricles and basal cisterns are within normal limits for the patient's with age concordant atrophy There is no intracranial hemorrhage, mass effect, or midline shift. Mild periventricular low-attenuation. Although nonspecific this is frequently seen with chronic small vessel ischemic change.  Otherwise, there are no areas of abnormal parenchymal attenuation. EXTRA-AXIAL SPACES AND MENINGES: There are no abnormal extra-axial fluid collections. CALVARIUM: Intact. SINUSES: Clear. OTHER: None. _______________     IMPRESSION: No acute intracranial abnormalities. Xr Chest Port    Result Date: 10/26/2020  EXAM: CHEST RADIOGRAPH CLINICAL INDICATION/HISTORY: Pneumonia   > Additional: None COMPARISON: 10/23/2020 TECHNIQUE: Portable frontal view of the chest _______________ FINDINGS: SUPPORT DEVICES: None. HEART AND MEDIASTINUM: Heart size is stable. Atherosclerotic calcification seen in the aorta. LUNGS AND PLEURAL SPACES: Increased hazy density at the left base. Slight blunting of the right costophrenic angle. No pneumothorax. Patchy right upper lobe opacity is similar to slightly improved. BONES AND SOFT TISSUES: Unremarkable. _______________     IMPRESSION: 1. Increased hazy opacity at the left base compared to the prior exam, possibly developing left lower lobe atelectasis and/or consolidation. Questionable small right effusion. 2. Patchy right upper lobe patchy opacity which is similar to slightly improved. Xr Chest Port    Result Date: 10/24/2020  EXAM: XR CHEST PORT. CLINICAL INDICATION/HISTORY: meets SIRS criteria. -Additional: None. TECHNIQUE: A portable erect AP radiographic view of the chest is compared with several other chest radiographs performed the same month. _______________ FINDINGS: See impression. No pneumothorax or appreciable significant pleural effusion. The cardiac silhouette, vanessa, and mediastinal contours are normal for age. No acute osseous abnormality is revealed. _______________     IMPRESSION: Slight improvement in multifocal airspace disease most in keeping with pneumonia, again most pronounced at the right lung apex with no new or worsening findings. Recommend continued radiographic follow-up to resolution.  _______________     [facility] [place] Chest Port    Result Date: 10/18/2020  EXAM: CHEST RADIOGRAPH CLINICAL INDICATION/HISTORY: SBO   > Additional: None COMPARISON: 10/17/2020. TECHNIQUE: Portable frontal view of the chest _______________ FINDINGS: SUPPORT DEVICES: None. HEART AND MEDIASTINUM: No appreciable cardiomegaly. Remaining mediastinal contours within normal limits. LUNGS AND PLEURAL SPACES: No significant change of bilateral upper lobe parenchymal opacities. Hyperexpansion of the lungs. No evidence for pneumothorax. BONY THORAX AND SOFT TISSUES: Unremarkable. _______________     IMPRESSION: 1. No significant change of bilateral lung pneumonia. Follow-up to resolution is recommended. 2. Emphysema. Xr Chest Port    Result Date: 10/18/2020  EXAM: CHEST RADIOGRAPH CLINICAL INDICATION/HISTORY: shortness of breath   > Additional: None COMPARISON: October 14, 2020. TECHNIQUE: Portable frontal view of the chest _______________ FINDINGS: SUPPORT DEVICES: None. HEART AND MEDIASTINUM: No appreciable cardiomegaly. Remaining mediastinal contours within normal limits. LUNGS AND PLEURAL SPACES: No significant change of bilateral upper lobe parenchymal opacities. Hyperexpansion of the lungs. No evidence for pneumothorax or pleural effusion. BONY THORAX AND SOFT TISSUES: Unremarkable. _______________     IMPRESSION: 1. No significant change of bilateral lung pneumonia. Follow-up to resolution is recommended to exclude underlying mass. 2. Emphysema. Xr Chest Port    Result Date: 10/14/2020  EXAM: XR CHEST PORT CLINICAL INDICATION/HISTORY: Shortness of breath with cough -Additional: None COMPARISON: Several prior studies, most recently 2/19/2019 TECHNIQUE: Frontal view of the chest _______________ FINDINGS: HEART AND MEDIASTINUM: Normal cardiac size and mediastinal contours. LUNGS AND PLEURAL SPACES: Patchy multifocal opacities noted throughout bilateral upper lobes, right greater than left as well as throughout the periphery of the right lower lobe. No evidence of pneumothorax.  BONY THORAX AND SOFT TISSUES: No acute osseous abnormality _______________ IMPRESSION: Patchy multifocal airspace disease compatible with pneumonia. Recommend radiographic follow-up to document expected clinical resolution in 4-6 weeks' time.        Quinton Paige MD

## 2020-11-18 LAB
ATRIAL RATE: 101 BPM
CALCULATED P AXIS, ECG09: 56 DEGREES
CALCULATED R AXIS, ECG10: 71 DEGREES
CALCULATED T AXIS, ECG11: 79 DEGREES
DIAGNOSIS, 93000: NORMAL
GLUCOSE BLD STRIP.AUTO-MCNC: 148 MG/DL (ref 70–110)
GLUCOSE BLD STRIP.AUTO-MCNC: 334 MG/DL (ref 70–110)
P-R INTERVAL, ECG05: 156 MS
Q-T INTERVAL, ECG07: 344 MS
QRS DURATION, ECG06: 84 MS
QTC CALCULATION (BEZET), ECG08: 446 MS
SARS-COV-2, COV2NT: NOT DETECTED
SOURCE, COVRS: NORMAL
SPECIMEN TYPE, XMCV1T: NORMAL
VENTRICULAR RATE, ECG03: 101 BPM

## 2020-11-18 PROCEDURE — 74011250637 HC RX REV CODE- 250/637: Performed by: INTERNAL MEDICINE

## 2020-11-18 PROCEDURE — 74011000250 HC RX REV CODE- 250: Performed by: FAMILY MEDICINE

## 2020-11-18 PROCEDURE — 65270000029 HC RM PRIVATE

## 2020-11-18 PROCEDURE — 74011250637 HC RX REV CODE- 250/637: Performed by: NURSE PRACTITIONER

## 2020-11-18 PROCEDURE — 93005 ELECTROCARDIOGRAM TRACING: CPT

## 2020-11-18 PROCEDURE — 74011000250 HC RX REV CODE- 250: Performed by: HOSPITALIST

## 2020-11-18 PROCEDURE — 74011250637 HC RX REV CODE- 250/637: Performed by: HOSPITALIST

## 2020-11-18 PROCEDURE — 74011250637 HC RX REV CODE- 250/637: Performed by: FAMILY MEDICINE

## 2020-11-18 PROCEDURE — 99231 SBSQ HOSP IP/OBS SF/LOW 25: CPT | Performed by: NURSE PRACTITIONER

## 2020-11-18 PROCEDURE — 82962 GLUCOSE BLOOD TEST: CPT

## 2020-11-18 RX ORDER — LORAZEPAM 1 MG/1
1 TABLET ORAL
Status: DISCONTINUED | OUTPATIENT
Start: 2020-11-18 | End: 2020-11-20 | Stop reason: HOSPADM

## 2020-11-18 RX ORDER — NITROGLYCERIN 0.4 MG/1
0.4 TABLET SUBLINGUAL AS NEEDED
Status: DISCONTINUED | OUTPATIENT
Start: 2020-11-18 | End: 2020-11-20 | Stop reason: HOSPADM

## 2020-11-18 RX ORDER — LORAZEPAM 1 MG/1
1 TABLET ORAL EVERY 6 HOURS
Status: DISCONTINUED | OUTPATIENT
Start: 2020-11-18 | End: 2020-11-20 | Stop reason: HOSPADM

## 2020-11-18 RX ADMIN — QUETIAPINE FUMARATE 300 MG: 100 TABLET ORAL at 10:24

## 2020-11-18 RX ADMIN — GABAPENTIN 300 MG: 300 CAPSULE ORAL at 10:24

## 2020-11-18 RX ADMIN — LORAZEPAM 1 MG: 1 TABLET ORAL at 10:25

## 2020-11-18 RX ADMIN — AMLODIPINE BESYLATE 10 MG: 5 TABLET ORAL at 10:24

## 2020-11-18 RX ADMIN — METOPROLOL TARTRATE 25 MG: 25 TABLET, FILM COATED ORAL at 10:24

## 2020-11-18 RX ADMIN — LORAZEPAM 1 MG: 1 TABLET ORAL at 06:57

## 2020-11-18 RX ADMIN — GABAPENTIN 300 MG: 300 CAPSULE ORAL at 23:19

## 2020-11-18 RX ADMIN — SODIUM CHLORIDE 10 ML: 9 INJECTION, SOLUTION INTRAMUSCULAR; INTRAVENOUS; SUBCUTANEOUS at 14:15

## 2020-11-18 RX ADMIN — ACETAMINOPHEN 650 MG: 325 TABLET ORAL at 10:24

## 2020-11-18 RX ADMIN — LORAZEPAM 1 MG: 1 TABLET ORAL at 15:03

## 2020-11-18 RX ADMIN — MORPHINE SULFATE 5 MG: 100 SOLUTION ORAL at 17:15

## 2020-11-18 RX ADMIN — LORAZEPAM 1 MG: 1 TABLET ORAL at 20:43

## 2020-11-18 RX ADMIN — ACETAMINOPHEN 650 MG: 325 TABLET ORAL at 15:45

## 2020-11-18 RX ADMIN — LIDOCAINE HYDROCHLORIDE 40 ML: 20 SOLUTION ORAL; TOPICAL at 17:15

## 2020-11-18 RX ADMIN — METOPROLOL TARTRATE 25 MG: 25 TABLET, FILM COATED ORAL at 20:43

## 2020-11-18 RX ADMIN — SODIUM CHLORIDE 10 ML: 9 INJECTION, SOLUTION INTRAMUSCULAR; INTRAVENOUS; SUBCUTANEOUS at 06:42

## 2020-11-18 RX ADMIN — SODIUM CHLORIDE 10 ML: 9 INJECTION, SOLUTION INTRAMUSCULAR; INTRAVENOUS; SUBCUTANEOUS at 23:26

## 2020-11-18 RX ADMIN — QUETIAPINE FUMARATE 300 MG: 100 TABLET ORAL at 20:43

## 2020-11-18 RX ADMIN — LORAZEPAM 1 MG: 1 TABLET ORAL at 17:19

## 2020-11-18 RX ADMIN — ACETAMINOPHEN 650 MG: 325 TABLET ORAL at 23:19

## 2020-11-18 RX ADMIN — ACETAMINOPHEN 650 MG: 325 TABLET ORAL at 12:26

## 2020-11-18 RX ADMIN — LORAZEPAM 1 MG: 1 TABLET ORAL at 17:15

## 2020-11-18 RX ADMIN — PANTOPRAZOLE SODIUM 40 MG: 40 TABLET, DELAYED RELEASE ORAL at 10:24

## 2020-11-18 RX ADMIN — GABAPENTIN 300 MG: 300 CAPSULE ORAL at 15:03

## 2020-11-18 RX ADMIN — LORAZEPAM 1 MG: 2 SOLUTION, CONCENTRATE ORAL at 02:33

## 2020-11-18 RX ADMIN — LORAZEPAM 1 MG: 1 TABLET ORAL at 23:30

## 2020-11-18 NOTE — PROGRESS NOTES
2027 - Head to toe assessment completed at this time. Pt lungs clear and bowel sounds active. 2300 - Pt in bed sleeping    0100 - Pt in bed sleeping    0430 - Pt in bed sleeping. Shift summary - patient had an unevenful night. Pt left in bed with the call light within reach,bed in the low position, and wheels locked.

## 2020-11-18 NOTE — PROGRESS NOTES
Hospitalist Progress Note-critical care note     Patient: Drake Egan Sr. MRN: [id number]  [facility]: [id number]    YOB: 1955  Age: 63 y.o.   Sex: male    DOA: 10/23/2020 LOS:  LOS: 26 days            Chief complaint: Pneumonia, marijuana abuse, legal blindness, amputation bilateral lower extremity, encephalopathy, acute respiratory failure with hypoxia    Assessment/Plan         Hospital Problems  Date Reviewed: 10/23/2020          Codes Class Noted POA    Comfort measures only status ICD-10-CM: Z51.5  ICD-9-CM: V66.7  11/5/2020 Unknown        Dysphagia ICD-10-CM: R13.10  ICD-9-CM: 787.20  11/3/2020 Unknown        Hypokalemia ICD-10-CM: E87.6  ICD-9-CM: 276.8  11/3/2020 Unknown        Hypomagnesemia ICD-10-CM: E83.42  ICD-9-CM: 275.2  11/3/2020 Unknown        Aspiration pneumonia ([facility] ICD-10-CM: J69.0  ICD-9-CM: 507.0  10/28/2020 Unknown        Deep vein thrombosis (DVT) of lower extremity ([facility] ICD-10-CM: I82.409  ICD-9-CM: 453.40  10/28/2020 Unknown        Acute respiratory failure with hypoxia (HCC) ICD-10-CM: J96.01  ICD-9-CM: 518.81  10/26/2020 Unknown        Chronic obstructive pulmonary disease with acute exacerbation ([facility] ICD-10-CM: J44.1  ICD-9-CM: 491.21  10/24/2020 Unknown        Severe protein-calorie malnutrition ([facility] ICD-10-CM: E43  ICD-9-CM: 262  10/24/2020 Unknown        Metabolic encephalopathy [facility]-02-RW: G93.41  ICD-9-CM: 348.31  10/24/2020 Unknown        * (Principal) Septic shock ([facility] ICD-10-CM: A41.9, R65.21  ICD-9-CM: 038.9, 785.52, 995.92  10/23/2020 Unknown        Amputation of both lower extremities ([facility] ICD-10-CM: I33.073S, V49.187Q  ICD-9-CM: 897.6  Unknown Yes        PNA (pneumonia) ICD-10-CM: J18.9  ICD-9-CM: 486  Unknown Yes        Marijuana abuse ICD-10-CM: F12.10  ICD-9-CM: 305.20  Unknown Yes        Centrilobular emphysema ([address] ICD-10-CM: J43.2  ICD-9-CM: 492.8  Unknown Unknown        Legal blindness ICD-10-CM: H54.8  ICD-9-CM: 369.4  10/14/2020 Yes Hypotension, unspecified ICD-10-CM: I95.9  ICD-9-CM: 458.9  1/27/2014 Yes                A 74-year-old male with a history of a COPD, bilateral above knee amputation, recently discharged from [facility] after treated for pneumonia who was admitted for septic shock and metabolic encephalopathy.     Continue comfort care. No acute event overnight. Cm still works on  hospice placement. Acute respiratory failure with hypoxia   continue aspiration precaution,   Complete iv abx for aspiration pna       COPD with right upper lobe pneumonia questionable mass/emphysema /aspiration pna  Aspiration precaution        Cardiovascular septic shock: resolved.       Prolong Qt   Cardiologist was on board       htn continue  Norvasc and metoprolol.       Chronic dvt noted from pvl   On lovenox before d/c due to comfort management         Acute metabolic encephalopathy   Agitation-continue seroqual and ativan     Legal blindness :fall /aspiration precaution      marijuana use     Severe malnutrition    Bilateral AKA    Subjective : I want to urinate, why takes so long   Rn: no acute issue     Disposition :tbd   Review of systems:    limited due to on and off confusion,      Vital signs/Intake and Output:  Visit Vitals  /73   Pulse 93   Temp 98 °F (36.7 °C)   Resp 18   Ht 5' 4\" (1.626 m)   Wt 40.8 kg (89 lb 15.2 oz)   SpO2 96%   BMI 15.44 kg/m²     Current Shift:  11/18 0701 - 11/18 1900  In: -   Out: 450 [Urine:450]  Last three shifts:  11/16 1901 - 11/18 0700  In: 720 [P.O.:720]  Out: 1950 [Urine:1950]    Physical Exam:  General: Alert and oriented ,  no acute distress   HEENT: NC, Atraumatic. PERRLA, anicteric sclerae. Lungs: Cta  BS bilaterally   Heart:  Regular  rhythm,  No murmur, No Rubs, No Gallops  Abdomen: Soft, Non distended, Non tender.   +Bowel sounds,   Extremities: No c/c, aka  Psych:   Anxious, no agitation   Neurologic:   No acute neuro deficit       Labs: Results:       Chemistry No results for input(s): GLU, NA, K, CL, CO2, BUN, CREA, CA, AGAP, BUCR, TBIL, AP, TP, ALB, GLOB, AGRAT in the last 72 hours. No lab exists for component: GPT   CBC w/Diff No results for input(s): WBC, RBC, HGB, HCT, PLT, GRANS, LYMPH, EOS, HGBEXT, HCTEXT, PLTEXT, HGBEXT, HCTEXT, PLTEXT in the last 72 hours. Cardiac Enzymes No results for input(s): CPK, CKND1, CHUY in the last 72 hours. No lab exists for component: CKRMB, TROIP   Coagulation No results for input(s): PTP, INR, APTT, INREXT, INREXT in the last 72 hours. Lipid Panel No results found for: CHOL, CHOLPOCT, CHOLX, CHLST, CHOLV, 110101, HDL, HDLP, LDL, LDLC, DLDLP, 523487, VLDLC, VLDL, TGLX, TRIGL, TRIGP, TGLPOCT, CHHD, CHHDX   BNP No results for input(s): BNPP in the last 72 hours. Liver Enzymes No results for input(s): TP, ALB, TBIL, AP in the last 72 hours. No lab exists for component: SGOT, GPT, DBIL   Thyroid Studies Lab Results   Component Value Date/Time    TSH 0.78 10/24/2020 11:30 AM        Procedures/imaging: see electronic medical records for all procedures/Xrays and details which were not copied into this note but were reviewed prior to creation of Plan    Ct Head Wo Cont    Result Date: 10/24/2020  EXAM: CT head INDICATION: Altered mental status. COMPARISON: October 15, 2020. TECHNIQUE: Axial CT imaging of the head was performed without intravenous contrast. Dose reduction techniques used: automated exposure control, adjustment of the mAs and/or kVp according to patient size, and iterative reconstruction techniques. Digital imaging and communications in Medicine (DICOM) format image data are available to nonaffiliated external healthcare facilities or entities on a secure, media free, reciprocally searchable basis with patient authorization for at least 12 months after this study. _______________ FINDINGS: BRAIN AND POSTERIOR FOSSA: There is cerebral volume loss with prominence of the lateral and the third ventricles.  The cortical sulci are widened appropriately. The fourth ventricle and basal cisterns are normally outlined. There is mild bilateral periventricular and central white matter diminished attenuation. There is no acute territorial defect, hemorrhage or midline shift. EXTRA-AXIAL SPACES AND MENINGES: There are no abnormal extra-axial fluid collections. CALVARIUM: Intact. SINUSES: Clear. OTHER: Partial right mastoid opacification is again seen. _______________     IMPRESSION: Cerebral volume loss and mild bilateral periventricular and central white matter diminished attenuation which is nonspecific but likely to represent microvascular disease. There is no acute intracranial abnormality. No significant interval change. Ct Head Wo Cont    Result Date: 10/15/2020  EXAM: CT head INDICATION: Dental status change COMPARISON: None. TECHNIQUE: Axial CT imaging of the head was performed without intravenous contrast. One or more dose reduction techniques were used on this CT: automated exposure control, adjustment of the mAs and/or kVp according to patient size, and iterative reconstruction techniques. The specific techniques used on this CT exam have been documented in the patient's electronic medical record. Digital Imaging and Communications in Medicine (DICOM) format image data are available to nonaffiliated external healthcare facilities or entities on a secure, media free, reciprocally searchable basis with patient authorization for at least a 12 month period after this study. _______________ FINDINGS: BRAIN AND POSTERIOR FOSSA: The sulci, folia, ventricles and basal cisterns are within normal limits for the patient's with age concordant atrophy There is no intracranial hemorrhage, mass effect, or midline shift. Mild periventricular low-attenuation. Although nonspecific this is frequently seen with chronic small vessel ischemic change. Otherwise, there are no areas of abnormal parenchymal attenuation.  EXTRA-AXIAL SPACES AND MENINGES: There are no abnormal extra-axial fluid collections. CALVARIUM: Intact. SINUSES: Clear. OTHER: None. _______________     IMPRESSION: No acute intracranial abnormalities. Xr Chest Port    Result Date: 10/26/2020  EXAM: CHEST RADIOGRAPH CLINICAL INDICATION/HISTORY: Pneumonia   > Additional: None COMPARISON: 10/23/2020 TECHNIQUE: Portable frontal view of the chest _______________ FINDINGS: SUPPORT DEVICES: None. HEART AND MEDIASTINUM: Heart size is stable. Atherosclerotic calcification seen in the aorta. LUNGS AND PLEURAL SPACES: Increased hazy density at the left base. Slight blunting of the right costophrenic angle. No pneumothorax. Patchy right upper lobe opacity is similar to slightly improved. BONES AND SOFT TISSUES: Unremarkable. _______________     IMPRESSION: 1. Increased hazy opacity at the left base compared to the prior exam, possibly developing left lower lobe atelectasis and/or consolidation. Questionable small right effusion. 2. Patchy right upper lobe patchy opacity which is similar to slightly improved. Xr Chest Port    Result Date: 10/24/2020  EXAM: XR CHEST PORT. CLINICAL INDICATION/HISTORY: meets SIRS criteria. -Additional: None. TECHNIQUE: A portable erect AP radiographic view of the chest is compared with several other chest radiographs performed the same month. _______________ FINDINGS: See impression. No pneumothorax or appreciable significant pleural effusion. The cardiac silhouette, vanessa, and mediastinal contours are normal for age. No acute osseous abnormality is revealed. _______________     IMPRESSION: Slight improvement in multifocal airspace disease most in keeping with pneumonia, again most pronounced at the right lung apex with no new or worsening findings. Recommend continued radiographic follow-up to resolution. _______________     Niharika Ribeiro Chest Port    Result Date: 10/18/2020  EXAM: CHEST RADIOGRAPH CLINICAL INDICATION/HISTORY: SBO   > Additional: None COMPARISON: 10/17/2020.  TECHNIQUE: Portable frontal view of the chest _______________ FINDINGS: SUPPORT DEVICES: None. HEART AND MEDIASTINUM: No appreciable cardiomegaly. Remaining mediastinal contours within normal limits. LUNGS AND PLEURAL SPACES: No significant change of bilateral upper lobe parenchymal opacities. Hyperexpansion of the lungs. No evidence for pneumothorax. BONY THORAX AND SOFT TISSUES: Unremarkable. _______________     IMPRESSION: 1. No significant change of bilateral lung pneumonia. Follow-up to resolution is recommended. 2. Emphysema. Xr Chest Port    Result Date: 10/18/2020  EXAM: CHEST RADIOGRAPH CLINICAL INDICATION/HISTORY: shortness of breath   > Additional: None COMPARISON: October 14, 2020. TECHNIQUE: Portable frontal view of the chest _______________ FINDINGS: SUPPORT DEVICES: None. HEART AND MEDIASTINUM: No appreciable cardiomegaly. Remaining mediastinal contours within normal limits. LUNGS AND PLEURAL SPACES: No significant change of bilateral upper lobe parenchymal opacities. Hyperexpansion of the lungs. No evidence for pneumothorax or pleural effusion. BONY THORAX AND SOFT TISSUES: Unremarkable. _______________     IMPRESSION: 1. No significant change of bilateral lung pneumonia. Follow-up to resolution is recommended to exclude underlying mass. 2. Emphysema. Xr Chest Port    Result Date: 10/14/2020  EXAM: XR CHEST PORT CLINICAL INDICATION/HISTORY: Shortness of breath with cough -Additional: None COMPARISON: Several prior studies, most recently 2/19/2019 TECHNIQUE: Frontal view of the chest _______________ FINDINGS: HEART AND MEDIASTINUM: Normal cardiac size and mediastinal contours. LUNGS AND PLEURAL SPACES: Patchy multifocal opacities noted throughout bilateral upper lobes, right greater than left as well as throughout the periphery of the right lower lobe. No evidence of pneumothorax.  BONY THORAX AND SOFT TISSUES: No acute osseous abnormality _______________     IMPRESSION: Patchy multifocal airspace disease compatible with pneumonia. Recommend radiographic follow-up to document expected clinical resolution in 4-6 weeks' time.        Gómez Cruz MD

## 2020-11-18 NOTE — PROGRESS NOTES
D/C Plan: SNF/LTC comfort care/hospice    Noted [facility] is reviewing and will conduct an on site visit tomorrow to assist with making a determination. Noted [facility] and [facility] are still following. They are not able to accept pt with a sitter. These facilities are not able to proved medications for agitation as this can be considered a chemical restraint. CM contacted Stefan Vo with [facility] to see if they are able to assist.  These facilities are also reviewing. Clinical information has been opened to the state for review. CM to continue to follow and assist.    Care Management Interventions  PCP Verified by CM:  Yes  Mode of Transport at Discharge: BLS  Transition of Care Consult (CM Consult): [address] [facility], Discharge Planning, SNF  Current Support Network: Family Lives Nearby  The Plan for Transition of Care is Related to the Following Treatment Goals : LTC  The Patient and/or Patient Representative was Provided with a Choice of Provider and Agrees with the Discharge Plan?: Yes  Name of the Patient Representative Who was Provided with a Choice of Provider and Agrees with the Discharge Plan: son/Jerry Michele  Freedom of Choice List was Provided with Basic Dialogue that Supports the Patient's Individualized Plan of Care/Goals, Treatment Preferences and Shares the Quality Data Associated with the Providers?: Yes  Discharge Location  Discharge Placement: [address] [facility]

## 2020-11-18 NOTE — PROGRESS NOTES
Problem: Falls - Risk of  Goal: *Absence of Falls  Description: Document Mikala Simms Fall Risk and appropriate interventions in the flowsheet.   Outcome: Progressing Towards Goal  Note: Fall Risk Interventions:  Mobility Interventions: Assess mobility with egress test    Mentation Interventions: Adequate sleep, hydration, pain control, Update white board, Bed/chair exit alarm    Medication Interventions: Bed/chair exit alarm    Elimination Interventions: Bed/chair exit alarm, Call light in reach    History of Falls Interventions: Bed/chair exit alarm, Door open when patient unattended, Investigate reason for fall, Room close to nurse's station

## 2020-11-18 NOTE — PROGRESS NOTES
[facility]: [phone]  [facility]: [phone]   [facility]: [phone]    Patient Name: Yesenia Barron YOB: 1955    Date of Initial Consult: 10/27/2020, follow up 11/18/2020   Reason for Consult: care decisions  Requesting Provider: Kirstin Hunter MD  Primary Care Physician: Cain Diaz MD      SUMMARY:   Yesenia Barron is a 63 y.o. male with a past history of legally blind, bilateral AKA, emphysema, HTN, chronic pain, HLD, T2DM, polio, CAD, PVD who was admitted on 10/23/2020 from home with a diagnosis of pneumonia/sepsis. Current medical issues leading to Palliative Medicine involvement include: recurrent admissions with multiple co-morbidities and goals of care. 10/28/2020 Mr Rob Thorpe is alert, oriented to place and why he is in the hospital. Denies pain. He is softly restrained for his safety. Just says \" cut me loose, give a knife\" ( referring to his restraints). 10/30/2020: Palliative care team including SIMI Price and this NP met with patient in his ICU room. He is quietly laying in bed. When asked how he was his speech was mumbled. Was finally able to understand his c/o being cold. 11/4/2020: Palliative care team including SIMI Price and this NP met with patient at bedside. He is confused; unaware of time of day (likely related to blindness) and where he is. Asked to be taken to [facility]. 11/5/2020: This NP met with patient at bedside. He believes he is at home, outside and watching TV. Re-oriented to THE [facility]. He then requested a cigarette. Admits to some back pain but denies shortness of breath and nausea at this time. 11/10/2020: This NP met with patient in his room. He is sitting up on side of bed facing the wall. He admits to \"some\" back pain and shortness of breath. Denies nausea.      11/13/2020: Palliative care team including SIMI Price and this NP met with patient at bedside. He is lying in bed and appears comfortable. Speech is mumbled at this time and he is confused, asking for his wheelchair in the back of his truck. 11/18/2020: This NP observed patient at bedside this morning and he was comfortably sleeping. This afternoon when I returned to floor, I assisted him with urinal and he was asking for food and saying thank you. PALLIATIVE DIAGNOSES:   1. Advanced care decisions  2. HCAP/sepsis  3. Encephalopathy  4. COPD  5. Bilateral AKA     PLAN:     11/18/2020: Patient appears comfortable. Reviewed MAR for medication usage and patient noted to be receiving ativan tid with PRN doses at night. Rescheduled ativan to every 6 hours with breakthrough PRN doses available. This should allow more peaceful rest at night. Continue all other comfort measures. GOALS OF CARE: DNR/DNI, comfort measures only. Plan to discharge to LTC with hospice when ready. POST form is on the chart.     (please see below for previous conversations)   11/13/2020: Patient appears comfortable on current comfort measures regimen. He is receiving TID scheduled ativan in addition to his seroquel. He has required no pain medication other than his lidocaine patch and tylenol in the past 24 hours. Continue comfort measures. GOALS OF CARE: DNRDNI, comfort measures only. POST on file. Plan for discharge to LTC with hospice services in place. 11/10/2020: Patient remains on comfort measures. Scheduled tylenol and lidocaine patches are controlling his back pain. Patient has oxygen available per orders PRN shortness of breath. Primary team has adjusted his gabapentin and seroquel doses which seem to have increased his fatigue. In addition, his ativan is now scheduled TID. Will discontinue the scheduled haldol. POST form received back via [employer]. Copy placed on chart. GOALS OF CARE: DNR/DNI, comfort measures only. Plan for discharge to LTC with hospice services in place.    11/5/2020: Telephone call placed to patient's son, Reina German who then put call on speaker phone so his sister, Christopher Villela, could be involved in the discussion. This conversation includes two of three children and represents a [de-identified]. Patric Chatman has not been returning phone calls. Rediscussed patient's MBS results demonstrating severe aspiration on all consistencies. They agree that patient would not tolerate a tube for feeding and every chance he could he would eat and drink what he chooses. For quality of life, they agreed on comfort feeds. Discussed risks and benefits of CPR in the event of cardiopulmonary arrest and intubation for respiratory distress. Explained recommendation for DNR/DNI as patient suffers from COPD with recurrent pneumonias along with his physical debilities and medical intervention would prolong his suffering. They state their father is a tenacious man but they have talked it over and agreed that DNR/DNI/comfort measures only with placement in a LTC facility would provide patient with best quality of life. They believe it may even add quantity with regular meals, medications and care. POST form sent via email to [email or web address] for signatures. Comfort orders initiated. Dr. Nida Asher, nursing and CM notified of change in code status. GOALS OF CARE: DNR/DNI, Comfort measures only. Plan for discharge to LTC facility with hospice services. 11/4/2020: Upon entering patient's room he stated he wasn't feeling well. He complained of back pain, nausea and shortness of breath. He also asked for food and coffee during our visit. Explained NPO status to him secondary to aspiration and needing to find a way to feed him possibly by placing a tube in his stomach. Also explained accepting that time may be short and risk of eating for comfort is an option for him. Further discussed need to discuss these matters with his children so everyone is on the same page. Patient did express a fear of dying with frequent requests \"don't leave me\". Patient also confused by the presence of soft wrist restraints. Much emotional support offered. When describing comfort medications, patient was anxious and requesting ativan. Instructed him I cannot use comfort measures until family agrees. I do not think patient has ability to appreciate how sick he is and would agree to medications without understanding hospice/comfort plan. Telephone call placed to patient's son, Mary Dawson. His sister was listening in on the phone call. Updated Sondra Rey and Yoselin Townsends regarding patient's MBS results and recommendations for PEG vs comfort feeding. Discussed risks and benefits of tube-feeding including aspiration and self-discontinuation. They are talking it over and to get back with us with a decision. Family may benefit from discussion with MD. Continue current level of care. Will further clarify code status during next conversation with family. GOALS OF CARE: FULL CODE with FULL INTERVENTIONS. 10/30/2020 ADDENDUM: 1400: Neto Brand presented to the ICU to visit with his father. Instructed him on COPD and it being a progressive disease especially without smoking cessation. He admits to his father continuing to smoke 2 ppd. Then discussed the superimposing of an aspiration pneumonia on weakened lungs. Neto Brand was instructed on weakening of swallow and aspiration as he was also fixated on getting his dad to eat and drink. Explained that he needs further swallow evaluation when stable to determine safe diet. Explained that patient is now on large amount of HFNC and next step if he continues to deteriorate would be oral intubation. Explained fears of patient's inability to be weaned from vent and needing trach/PEG and discharge to a facility. Neto Brand wanted to bring him home on vent if necessary. I explained care for trach with vent is not available in the home. He is aware of what a trach entails as a family friend has a trach for throat cancer.  Discussed intubation with patient and he indicated that he wanted to life support on machines if necessary which reinforced his son's determination for aggressive treatment. Discussed risks and benefits of CPR in the event of cardiopulmonary arrest and Kalie Coon was firm that he would want CPR in attempt to prolong patient's life. Lawrence's girlfriend, Bárbara, present for part of conversation and feels that Kalie Coon needs time to process what his father looks like and what he has heard before making decisions. Attempted to get Kalie Coon to think from his dad's standpoint and understand he is suffering; he is sure his dad has pulled through before and can do it again. 1531 Received telephone call from Zaina Benitez and [facility]. Was informed by them that Kalie Coon had texted their sister, Ashly Covarrubias, and told her that Leeroy Woods is in the hospital. Gilford Reilly and [facility] (the estranged children) have not been kept in the loop secondary to Kalie Coon non-cooperation. Geetha New with medical information. He stated that a physician at [facility] in 4/2019 had talked with the family regarding code status after patient had been admitted in cardiac arrest. Patient was discharged after that hospitalization to LTC. However, when Kalie Coon got out of snf, he took his dad out the [place]. This was against the wishes of the other children. GOALS OF CARE: FULL CODE with FULL INTERVENTIONS. GOC remain unchanged and ICU information provided to Zaina Emmanuel to call after he talks with his sister. Gilford Reilly and [facility] share a home in [place]. 10/30/2020: While in the ICU, Kalie Coon returned telephone call. Instructed him that his father was very ill with worsening respiratory status. Explained he may need intubation and medical concerns regarding inability to eventually be able to liberate patient from ventilator. Described to him that it is likely he would require trach, long term ventilator, PEG tube for feeding and placement in long term care.  His concern at that point was to have his father brought home \"we have a nurse that comes to the house\". Explained his father is far too sick to be discharged at this time. He stated he wanted us to continue all aggressive measures. Asked Leigha Cardenas to come to the hospital to see his father so he could better understand the seriousness of this illness. He said he would be here later today. Leigha Cardenas stated he had not received any messages on his cellphone. Explained that his VM is full and he needs to delete all his old voicemails. He admitted he would need someone to show him how. 10/28/2020 Seen along with SIMI Cárdenas. Remains in ICU on high flow oxygen. He is alert, oriented x 3 but do not believe at this time he can participate in his medical decisions or complex decisions. There is no AMD on file. Son Leigha Cardenas is reported care giver. Very chronically ill gentleman with recent admission whose overall prognosis is poor. We have attempted x 2 today and yesterday to reach Leigha Cardenas to discuss his father's care and goals of care, no answer at listed home phone and cell goes to  with no ability to leave a message. We will continue to follow with you to try and reach family and assess if patient can participate can participate in his care decisions. Goals of care full code with full interventions. 10/27/2020  1. Advanced care decisions: Palliative care team including SIMI Valle and this NP met with patient at bedside in the ICU. Patient is known to the palliative care team from his previous admission. Per notes \"patient is a  and had four children of which one is . Leigha Cardenas claims he is the caregiver and lives with his father. ..his half siblings, Tyler Colvin and Alexander Villanueva, do not come around and he does not know where they are.\" Patient currently delirious and continues to yell \"wake me up\" or \"Lawrence\" over and over; although, responded yes to having pain and nausea.  Attempted to contact patient's son, Leigha Cardenas, via his cell phone (voicemail box is full) and the home phone number (no answer after multiple rings). Need to discuss goals of care for this chronically ill gentleman. GOALS OF CARE: FULL CODE with FULL INTERVENTIONS. 2.  HCAP/sepsis: Patient recently admitted to [facility] with diagnosis of pneumonia 10/14 to 10/20/2020. He was seen at [facility] ER on 10/21 and 10/22 and 10/23/2020. He then presented here with c/o chest pain. Conflicting reports of whether he took antibiotics s/p his discharge on 10/20. Found to be hypothermic and hypotensive in ED. Primary team managing. 3.  Encephalopathy: Presented with confusion and is combative. ED provider notes that patient likely does not have capacity to make medical decisions for himself on day of admit. Also per notes son reports patient has not been acting appropriately since leaving [facility] ED on 10/23/2020.  4. COPD: CT completed at outside facility reveals question of neoplasm. Will need repeat CT. Primary team managing. 5.  Bilateral AKA: per history. 6.  Initial consult note routed to primary continuity provider  7. Communicated plan of care with: Palliative IDT    GOALS OF CARE:  Patient/Health Care Proxy Stated Goals: Comfort      TREATMENT PREFERENCES:   Code Status: DNR/DNI    Advance Care Planning:  Advance Care Planning 10/14/2020   Confirm Advance Directive None   Patient Would Like to Complete Advance Directive No   Does the patient have other document types Other (comment)       Medical Interventions: Comfort measures   Artificially Administered Nutrition: No feeding tube     Other: As far as possible, the palliative care team has discussed with patient/health care proxy about goals of care/treatment preferences for patient. HISTORY:     History obtained from: chart    CHIEF COMPLAINT: I need the urinal.      HPI/SUBJECTIVE:    The patient is:   [x] Verbal and participatory  [] Non-participatory due to:   Speech clear.  Denies pain, nausea and shortness of breath    Clinical Pain Assessment (nonverbal scale for nonverbal patients): Clinical Pain Assessment  Severity: 0  Location: back  Character: dull  Duration: constant     Activity (Movement): Restless, excessive activity and/or withdrawal reflexes    Duration: for how long has pt been experiencing pain (e.g., 2 days, 1 month, years)  Frequency: how often pain is an issue (e.g., several times per day, once every few days, constant)     FUNCTIONAL ASSESSMENT:     Palliative Performance Scale (PPS):  PPS: 30    ECOG  ECOG Status : Completely disabled     PSYCHOSOCIAL/SPIRITUAL SCREENING:      Any spiritual / Confucianism concerns:  [] Yes /  [x] No    Caregiver Burnout:  [] Yes /  [] No /  [x] No Caregiver Present      Anticipatory grief assessment:   [x] Normal  / [] Maladaptive        REVIEW OF SYSTEMS:     Positive and pertinent negative findings in ROS are noted above in HPI. The following systems were [x] reviewed / [] unable to be reviewed as noted in HPI  Other findings are noted below. Systems: constitutional, ears/nose/mouth/throat, respiratory, gastrointestinal, genitourinary, musculoskeletal, integumentary, neurologic, psychiatric, endocrine. Positive findings noted below. Modified ESAS Completed by: provider           Pain: 0     Nausea: 0     Dyspnea: 0           Stool Occurrence(s): 1        PHYSICAL EXAM:     Wt Readings from Last 3 Encounters:   11/15/20 40.8 kg (89 lb 15.2 oz)   10/20/20 34.7 kg (76 lb 9.6 oz)   02/18/19 45 kg (99 lb 3.3 oz)     Blood pressure 125/73, pulse 93, temperature 98 °F (36.7 °C), resp. rate 18, height 5' 4\" (1.626 m), weight 40.8 kg (89 lb 15.2 oz), SpO2 96 %. Pain:  Pain Scale 1: Numeric (0 - 10)  Pain Intensity 1: 10  Pain Onset 1: ACUTE  Pain Location 1: Back, Leg  Pain Orientation 1: Mid, Left, Right  Pain Description 1: Aching  Pain Intervention(s) 1: Medication (see MAR)       Constitutional: Frail, cachectic gentleman who appears older then stated age, lying in bed, no apparent distress. Eyes: blind, anicteric  Respiratory: breathing not labored, room air  Musculoskeletal: Bilateral AKA   Skin: warm, dry  Neurologic: following commands, moving all extremities, oriented X 3       HISTORY:     Principal Problem:    Septic shock ([address] (10/23/2020)    Active Problems:    Hypotension, unspecified (1/27/2014)      Amputation of both lower extremities (HCC) ()      PNA (pneumonia) ()      Marijuana abuse ()      Centrilobular emphysema (HCC) ()      Legal blindness (10/14/2020)      Chronic obstructive pulmonary disease with acute exacerbation ([address] (10/24/2020)      Severe protein-calorie malnutrition ([address] (36/79/4734)      Metabolic encephalopathy (37/53/0825)      Acute respiratory failure with hypoxia ([address] (10/26/2020)      Aspiration pneumonia ([address] (10/28/2020)      Deep vein thrombosis (DVT) of lower extremity ([address] (10/28/2020)      Dysphagia (11/3/2020)      Hypokalemia (11/3/2020)      Hypomagnesemia (11/3/2020)      Comfort measures only status (11/5/2020)      Past Medical History:   Diagnosis Date    Amputation of both lower extremities ([address]     Amputee, above knee, left ([address]     At risk for falls     Chronic pain     back and legs    Diabetes ([address]     Hypercholesterolemia     Hypertension     Polio       Past Surgical History:   Procedure Laterality Date    HX HERNIA REPAIR      HX OTHER SURGICAL      carpal tunnel     HX OTHER SURGICAL      cyst      Family History   Problem Relation Age of Onset    Heart Attack Mother     Heart Attack Father     Malignant Hyperthermia Neg Hx     Pseudocholinesterase Deficiency Neg Hx     Delayed Awakening Neg Hx     Post-op Nausea/Vomiting Neg Hx     Emergence Delirium Neg Hx     Post-op Cognitive Dysfunction Neg Hx     Other Neg Hx      History reviewed, no pertinent family history.   Social History     Tobacco Use    Smoking status: Current Every Day Smoker     Packs/day: 2.00     Years: 40.00     Pack years: 80.00    Smokeless tobacco: Current User     Types: Snuff   Substance Use Topics    Alcohol use: No     No Known Allergies   Current Facility-Administered Medications   Medication Dose Route Frequency    LORazepam (ATIVAN) tablet 1 mg  1 mg Oral Q6H    LORazepam (ATIVAN) tablet 1 mg  1 mg Oral Q3H PRN    QUEtiapine (SEROquel) tablet 300 mg  300 mg Oral BID    gabapentin (NEURONTIN) capsule 300 mg  300 mg Oral TID    acetaminophen (TYLENOL) tablet 650 mg  650 mg Oral TID    nicotine (NICODERM CQ) 14 mg/24 hr patch 1 Patch  1 Patch TransDERmal DAILY    hyoscyamine SL (LEVSIN/SL) tablet 0.125 mg  0.125 mg SubLINGual Q4H PRN    morphine (ROXANOL) concentrated oral syringe 5 mg  5 mg SubLINGual Q1H PRN    pantoprazole (PROTONIX) tablet 40 mg  40 mg Oral DAILY    lidocaine 4 % patch 1 Patch  1 Patch TransDERmal Q24H    metoprolol tartrate (LOPRESSOR) tablet 25 mg  25 mg Oral Q12H    amLODIPine (NORVASC) tablet 10 mg  10 mg Oral DAILY    sodium chloride (NS) flush 5-40 mL  5-40 mL IntraVENous Q8H    sodium chloride (NS) flush 5-40 mL  5-40 mL IntraVENous PRN    acetaminophen (TYLENOL) tablet 650 mg  650 mg Oral Q6H PRN    Or    acetaminophen (TYLENOL) suppository 650 mg  650 mg Rectal Q6H PRN    polyethylene glycol (MIRALAX) packet 17 g  17 g Oral DAILY PRN    promethazine (PHENERGAN) tablet 12.5 mg  12.5 mg Oral Q6H PRN    Or    ondansetron (ZOFRAN) injection 4 mg  4 mg IntraVENous Q6H PRN        LAB AND IMAGING FINDINGS:     Lab Results   Component Value Date/Time    WBC 5.7 11/05/2020 05:00 AM    HGB 9.6 (L) 11/05/2020 05:00 AM    PLATELET 236 63/28/2839 05:00 AM     Lab Results   Component Value Date/Time    Sodium 143 11/05/2020 05:00 AM    Potassium 3.5 11/05/2020 05:00 AM    Chloride 114 (H) 11/05/2020 05:00 AM    CO2 22 11/05/2020 05:00 AM    BUN 5 (L) 11/05/2020 05:00 AM    Creatinine 0.54 (L) 11/05/2020 05:00 AM    Calcium 8.2 (L) 11/05/2020 05:00 AM    Magnesium 1.8 11/05/2020 05:00 AM    Phosphorus 3.3 10/29/2020 05:44 AM      Lab Results   Component Value Date/Time    Alk. phosphatase 91 11/05/2020 05:00 AM    Protein, total 5.8 (L) 11/05/2020 05:00 AM    Albumin 2.5 (L) 11/05/2020 05:00 AM    Globulin 3.3 11/05/2020 05:00 AM     Lab Results   Component Value Date/Time    INR 1.0 01/27/2014 04:08 PM    Prothrombin time 12.7 01/27/2014 04:08 PM    aPTT 33.7 01/27/2014 04:08 PM      No results found for: IRON, FE, TIBC, IBCT, PSAT, FERR   No results found for: PH, PCO2, PO2  No components found for: [place]   Lab Results   Component Value Date/Time     10/26/2020 06:45 PM    CK - MB 2.5 10/26/2020 06:45 PM              Total time: 15 minutes  Counseling / coordination time, spent as noted above > 50% counseling / coordination: 10 minutes with patient and family. Prolonged service was provided for  []30 min   []75 min in face to face time in the presence of the patient, spent as noted above. Time Start:   Time End:   Note: this can only be billed with 59925 (initial) or 72608 (follow up).   If multiple start / stop times, list each separately.    '

## 2020-11-18 NOTE — ROUTINE PROCESS
Bedside and Verbal shift change report given to SEAN Thapa RN (oncoming nurse) by HERNANDEZ Kauffman RN (offgoing nurse). Report included the following information SBAR, Kardex, Intake/Output, MAR and Recent Results.

## 2020-11-18 NOTE — PROGRESS NOTES
0730 Report received from Yissel Bear RN. Patient in satisfactory condition, asleep during bedside report. Bed alarm on. Last set of VS stable. Call/bell phone within reach. Will monitor for changes. 0930 Patient in bed, CNA at bedside. Consuming breakfast at this time. Will continue to monitor. 1025 Scheduled medications given. Pain voiced at 6/10 to back, medicated per MAR. No needs at this time. Will monitor for changes. 1200 Patient in bed, VS stable. Nursing assisting with lunch. Medicated per [facility] for back pain. Will monitor for changes. 1500 Scheduled medications given. Patient requesting medication at agitation. Given. Will monitor for changes. 1545 Scheduled medications given. Patient requesting to rest at this time. 1658 RRT initiated. Patient complaining of CP and LUE pain/paresthesia. Patient states \"my heart ain't beating right\". Patient does have an active comfort care order and DNR. EKG, VS, and glucose reading obtained. GI cocktail, Nitro, Roxanol, and Ativan PO given. Will continue to monitor patient for changes. 1730 Patient currently consuming dinner. CNA at bedside. 1930 Bedside and Verbal shift change report given to Thomas Horton RN  (oncoming nurse) by Magda Kaur RN (offgoing nurse). Report included the following information SBAR, Kardex, MAR, Recent Results and Med Rec Status.

## 2020-11-18 NOTE — PROGRESS NOTES
Rapid Response called because pt c/o chest pain. Checked chart, comfort measures order in chart. Nitroglycerin ordered prn   GI cocktail, 1 dose now. Pain control   No acute interventions.

## 2020-11-18 NOTE — PROGRESS NOTES
Problem: Falls - Risk of  Goal: *Absence of Falls  Description: Document Andrew Ballard Fall Risk and appropriate interventions in the flowsheet. Outcome: Progressing Towards Goal  Note: Fall Risk Interventions:  Mobility Interventions: Assess mobility with egress test    Mentation Interventions: Adequate sleep, hydration, pain control, Update white board, Bed/chair exit alarm    Medication Interventions: Bed/chair exit alarm    Elimination Interventions: Bed/chair exit alarm, Call light in reach    History of Falls Interventions: Bed/chair exit alarm, Door open when patient unattended, Investigate reason for fall, Room close to nurse's station         Problem: Pressure Injury - Risk of  Goal: *Prevention of pressure injury  Description: Document Shelton Scale and appropriate interventions in the flowsheet.   Outcome: Progressing Towards Goal  Note: Pressure Injury Interventions:  Sensory Interventions: Assess changes in LOC, Pressure redistribution bed/mattress (bed type)    Moisture Interventions: Absorbent underpads    Activity Interventions: PT/OT evaluation    Mobility Interventions: Assess need for specialty bed    Nutrition Interventions: Document food/fluid/supplement intake    Friction and Shear Interventions: Apply protective barrier, creams and emollients, Foam dressings/transparent film/skin sealants, HOB 30 degrees or less, Lift team/patient mobility team, Minimize layers, Transferring/repositioning devices

## 2020-11-19 VITALS
TEMPERATURE: 98.3 F | DIASTOLIC BLOOD PRESSURE: 96 MMHG | OXYGEN SATURATION: 96 % | SYSTOLIC BLOOD PRESSURE: 148 MMHG | HEART RATE: 110 BPM | BODY MASS INDEX: 15.36 KG/M2 | RESPIRATION RATE: 20 BRPM | WEIGHT: 89.95 LBS | HEIGHT: 64 IN

## 2020-11-19 PROCEDURE — 74011250637 HC RX REV CODE- 250/637: Performed by: INTERNAL MEDICINE

## 2020-11-19 PROCEDURE — 74011250637 HC RX REV CODE- 250/637: Performed by: FAMILY MEDICINE

## 2020-11-19 PROCEDURE — 74011250637 HC RX REV CODE- 250/637: Performed by: NURSE PRACTITIONER

## 2020-11-19 PROCEDURE — 74011250637 HC RX REV CODE- 250/637: Performed by: HOSPITALIST

## 2020-11-19 PROCEDURE — 74011000250 HC RX REV CODE- 250: Performed by: HOSPITALIST

## 2020-11-19 PROCEDURE — 65270000029 HC RM PRIVATE

## 2020-11-19 RX ORDER — OXYCODONE AND ACETAMINOPHEN 5; 325 MG/1; MG/1
1 TABLET ORAL EVERY 6 HOURS
Status: DISCONTINUED | OUTPATIENT
Start: 2020-11-19 | End: 2020-11-20 | Stop reason: HOSPADM

## 2020-11-19 RX ADMIN — PANTOPRAZOLE SODIUM 40 MG: 40 TABLET, DELAYED RELEASE ORAL at 09:30

## 2020-11-19 RX ADMIN — METOPROLOL TARTRATE 25 MG: 25 TABLET, FILM COATED ORAL at 09:30

## 2020-11-19 RX ADMIN — LORAZEPAM 1 MG: 1 TABLET ORAL at 09:37

## 2020-11-19 RX ADMIN — OXYCODONE HYDROCHLORIDE AND ACETAMINOPHEN 1 TABLET: 5; 325 TABLET ORAL at 15:46

## 2020-11-19 RX ADMIN — OXYCODONE HYDROCHLORIDE AND ACETAMINOPHEN 1 TABLET: 5; 325 TABLET ORAL at 22:49

## 2020-11-19 RX ADMIN — ACETAMINOPHEN 325 MG: 325 TABLET ORAL at 09:31

## 2020-11-19 RX ADMIN — AMLODIPINE BESYLATE 10 MG: 5 TABLET ORAL at 09:30

## 2020-11-19 RX ADMIN — GABAPENTIN 300 MG: 300 CAPSULE ORAL at 09:30

## 2020-11-19 RX ADMIN — QUETIAPINE FUMARATE 300 MG: 100 TABLET ORAL at 21:21

## 2020-11-19 RX ADMIN — QUETIAPINE FUMARATE 300 MG: 100 TABLET ORAL at 09:30

## 2020-11-19 RX ADMIN — SODIUM CHLORIDE 10 ML: 9 INJECTION, SOLUTION INTRAMUSCULAR; INTRAVENOUS; SUBCUTANEOUS at 05:17

## 2020-11-19 RX ADMIN — SODIUM CHLORIDE 5 ML: 9 INJECTION, SOLUTION INTRAMUSCULAR; INTRAVENOUS; SUBCUTANEOUS at 14:00

## 2020-11-19 RX ADMIN — GABAPENTIN 300 MG: 300 CAPSULE ORAL at 15:46

## 2020-11-19 RX ADMIN — GABAPENTIN 300 MG: 300 CAPSULE ORAL at 22:49

## 2020-11-19 RX ADMIN — ACETAMINOPHEN 650 MG: 325 TABLET ORAL at 15:46

## 2020-11-19 RX ADMIN — LORAZEPAM 1 MG: 1 TABLET ORAL at 18:42

## 2020-11-19 RX ADMIN — LORAZEPAM 1 MG: 1 TABLET ORAL at 05:15

## 2020-11-19 RX ADMIN — LORAZEPAM 1 MG: 1 TABLET ORAL at 12:35

## 2020-11-19 RX ADMIN — LORAZEPAM 1 MG: 1 TABLET ORAL at 22:49

## 2020-11-19 RX ADMIN — METOPROLOL TARTRATE 25 MG: 25 TABLET, FILM COATED ORAL at 21:21

## 2020-11-19 RX ADMIN — SODIUM CHLORIDE 10 ML: 9 INJECTION, SOLUTION INTRAMUSCULAR; INTRAVENOUS; SUBCUTANEOUS at 22:00

## 2020-11-19 NOTE — PROGRESS NOTES
Hospitalist Progress Note-critical care note     Patient: Maximino Tuttle Sr. MRN: [id number]  [facility]: [id number]    YOB: 1955  Age: 63 y.o.   Sex: male    DOA: 10/23/2020 LOS:  LOS: 27 days            Chief complaint: Pneumonia, marijuana abuse, legal blindness, amputation bilateral lower extremity, encephalopathy, acute respiratory failure with hypoxia    Assessment/Plan         Hospital Problems  Date Reviewed: 10/23/2020          Codes Class Noted POA    Comfort measures only status ICD-10-CM: Z51.5  ICD-9-CM: V66.7  11/5/2020 Unknown        Dysphagia ICD-10-CM: R13.10  ICD-9-CM: 787.20  11/3/2020 Unknown        Hypokalemia ICD-10-CM: E87.6  ICD-9-CM: 276.8  11/3/2020 Unknown        Hypomagnesemia ICD-10-CM: E83.42  ICD-9-CM: 275.2  11/3/2020 Unknown        Aspiration pneumonia ([facility] ICD-10-CM: J69.0  ICD-9-CM: 507.0  10/28/2020 Unknown        Deep vein thrombosis (DVT) of lower extremity ([facility] ICD-10-CM: I82.409  ICD-9-CM: 453.40  10/28/2020 Unknown        Acute respiratory failure with hypoxia (HCC) ICD-10-CM: J96.01  ICD-9-CM: 518.81  10/26/2020 Unknown        Chronic obstructive pulmonary disease with acute exacerbation ([facility] ICD-10-CM: J44.1  ICD-9-CM: 491.21  10/24/2020 Unknown        Severe protein-calorie malnutrition ([facility] ICD-10-CM: E43  ICD-9-CM: 262  10/24/2020 Unknown        Metabolic encephalopathy GOG-02-KF: G93.41  ICD-9-CM: 348.31  10/24/2020 Unknown        * (Principal) Septic shock ([facility] ICD-10-CM: A41.9, R65.21  ICD-9-CM: 038.9, 785.52, 995.92  10/23/2020 Unknown        Amputation of both lower extremities ([facility] ICD-10-CM: G33.503Q, N77.932Z  ICD-9-CM: 897.6  Unknown Yes        PNA (pneumonia) ICD-10-CM: J18.9  ICD-9-CM: 486  Unknown Yes        Marijuana abuse ICD-10-CM: F12.10  ICD-9-CM: 305.20  Unknown Yes        Centrilobular emphysema ([address] ICD-10-CM: J43.2  ICD-9-CM: 492.8  Unknown Unknown        Legal blindness ICD-10-CM: H54.8  ICD-9-CM: 369.4  10/14/2020 Yes Hypotension, unspecified ICD-10-CM: I95.9  ICD-9-CM: 458.9  1/27/2014 Yes                A 19-year-old male with a history of a COPD, bilateral above knee amputation, recently discharged from [facility] after treated for pneumonia who was admitted for septic shock and metabolic encephalopathy. He completed treatment for aspiration pna, unable to pass speech evaluation due to high risk of aspiration. Cardiologist and pulmonologist were on board for prolong QT and acute respiratory failure. Palliative care on board for care goal. Dnr/I and comfort care were granted.      Continue comfort care. RRT called yesterday due to chest pain, resolved per gi cocktail. Morphine prn for pain, no pain reported AM .Cm still works on  hospice placement. Challenge for placement       Acute respiratory failure with hypoxia   continue aspiration precaution,   Complete iv abx for aspiration pna       COPD with right upper lobe pneumonia questionable mass/emphysema /aspiration pna  Aspiration precaution        Cardiovascular septic shock: resolved.       Prolong Qt   Cardiologist was on board       htn continue  Norvasc and metoprolol.       Chronic dvt noted from pvl   On lovenox before d/c due to comfort management         Acute metabolic encephalopathy   Agitation-continue seroqual and ativan     Legal blindness :fall /aspiration precaution      marijuana use     Severe malnutrition    Bilateral AKA    Subjective : you are so nice to me.  I need some coffee- it is not coffee   Rn: no acute issue     Disposition :tbd   Review of systems:    limited due to on and off confusion and agitation     Vital signs/Intake and Output:  Visit Vitals  BP (!) 148/96 (BP 1 Location: Left arm, BP Patient Position: At rest)   Pulse (!) 110   Temp 98.3 °F (36.8 °C)   Resp 20   Ht 5' 4\" (1.626 m)   Wt 40.8 kg (89 lb 15.2 oz)   SpO2 96%   BMI 15.44 kg/m²     Current Shift:  11/19 0701 - 11/19 1900  In: 220 [P.O.:220]  Out: 400 [Urine:400]  Last three shifts: 11/17 1901 - 11/19 0700  In: -   Out: 2650 [Urine:2650]    Physical Exam:  General: Alert and oriented ,  no acute distress   HEENT: NC, Atraumatic. PERRLA, anicteric sclerae. Lungs: Cta  BS bilaterally   Heart:  Regular  rhythm,  No murmur, No Rubs, No Gallops  Abdomen: Soft, Non distended, Non tender. +Bowel sounds,   Extremities: No c/c, aka  Psych:   No Anxious, +agitation   Neurologic:   No acute neuro deficit       Labs: Results:       Chemistry No results for input(s): GLU, NA, K, CL, CO2, BUN, CREA, CA, AGAP, BUCR, TBIL, AP, TP, ALB, GLOB, AGRAT in the last 72 hours. No lab exists for component: GPT   CBC w/Diff No results for input(s): WBC, RBC, HGB, HCT, PLT, GRANS, LYMPH, EOS, HGBEXT, HCTEXT, PLTEXT, HGBEXT, HCTEXT, PLTEXT in the last 72 hours. Cardiac Enzymes No results for input(s): CPK, CKND1, CHUY in the last 72 hours. No lab exists for component: CKRMB, TROIP   Coagulation No results for input(s): PTP, INR, APTT, INREXT, INREXT in the last 72 hours. Lipid Panel No results found for: CHOL, CHOLPOCT, CHOLX, CHLST, CHOLV, 091953, HDL, HDLP, LDL, LDLC, DLDLP, 766660, VLDLC, VLDL, TGLX, TRIGL, TRIGP, TGLPOCT, CHHD, CHHDX   BNP No results for input(s): BNPP in the last 72 hours. Liver Enzymes No results for input(s): TP, ALB, TBIL, AP in the last 72 hours. No lab exists for component: SGOT, GPT, DBIL   Thyroid Studies Lab Results   Component Value Date/Time    TSH 0.78 10/24/2020 11:30 AM        Procedures/imaging: see electronic medical records for all procedures/Xrays and details which were not copied into this note but were reviewed prior to creation of Plan    Ct Head Wo Cont    Result Date: 10/24/2020  EXAM: CT head INDICATION: Altered mental status. COMPARISON: October 15, 2020.  TECHNIQUE: Axial CT imaging of the head was performed without intravenous contrast. Dose reduction techniques used: automated exposure control, adjustment of the mAs and/or kVp according to patient size, and iterative reconstruction techniques. Digital imaging and communications in Medicine (DICOM) format image data are available to nonaffiliated external healthcare facilities or entities on a secure, media free, reciprocally searchable basis with patient authorization for at least 12 months after this study. _______________ FINDINGS: BRAIN AND POSTERIOR FOSSA: There is cerebral volume loss with prominence of the lateral and the third ventricles. The cortical sulci are widened appropriately. The fourth ventricle and basal cisterns are normally outlined. There is mild bilateral periventricular and central white matter diminished attenuation. There is no acute territorial defect, hemorrhage or midline shift. EXTRA-AXIAL SPACES AND MENINGES: There are no abnormal extra-axial fluid collections. CALVARIUM: Intact. SINUSES: Clear. OTHER: Partial right mastoid opacification is again seen. _______________     IMPRESSION: Cerebral volume loss and mild bilateral periventricular and central white matter diminished attenuation which is nonspecific but likely to represent microvascular disease. There is no acute intracranial abnormality. No significant interval change. Ct Head Wo Cont    Result Date: 10/15/2020  EXAM: CT head INDICATION: Dental status change COMPARISON: None. TECHNIQUE: Axial CT imaging of the head was performed without intravenous contrast. One or more dose reduction techniques were used on this CT: automated exposure control, adjustment of the mAs and/or kVp according to patient size, and iterative reconstruction techniques. The specific techniques used on this CT exam have been documented in the patient's electronic medical record. Digital Imaging and Communications in Medicine (DICOM) format image data are available to nonaffiliated external healthcare facilities or entities on a secure, media free, reciprocally searchable basis with patient authorization for at least a 12 month period after this study. _______________ FINDINGS: BRAIN AND POSTERIOR FOSSA: The sulci, folia, ventricles and basal cisterns are within normal limits for the patient's with age concordant atrophy There is no intracranial hemorrhage, mass effect, or midline shift. Mild periventricular low-attenuation. Although nonspecific this is frequently seen with chronic small vessel ischemic change. Otherwise, there are no areas of abnormal parenchymal attenuation. EXTRA-AXIAL SPACES AND MENINGES: There are no abnormal extra-axial fluid collections. CALVARIUM: Intact. SINUSES: Clear. OTHER: None. _______________     IMPRESSION: No acute intracranial abnormalities. Xr Chest Port    Result Date: 10/26/2020  EXAM: CHEST RADIOGRAPH CLINICAL INDICATION/HISTORY: Pneumonia   > Additional: None COMPARISON: 10/23/2020 TECHNIQUE: Portable frontal view of the chest _______________ FINDINGS: SUPPORT DEVICES: None. HEART AND MEDIASTINUM: Heart size is stable. Atherosclerotic calcification seen in the aorta. LUNGS AND PLEURAL SPACES: Increased hazy density at the left base. Slight blunting of the right costophrenic angle. No pneumothorax. Patchy right upper lobe opacity is similar to slightly improved. BONES AND SOFT TISSUES: Unremarkable. _______________     IMPRESSION: 1. Increased hazy opacity at the left base compared to the prior exam, possibly developing left lower lobe atelectasis and/or consolidation. Questionable small right effusion. 2. Patchy right upper lobe patchy opacity which is similar to slightly improved. Xr Chest Port    Result Date: 10/24/2020  EXAM: XR CHEST PORT. CLINICAL INDICATION/HISTORY: meets SIRS criteria. -Additional: None. TECHNIQUE: A portable erect AP radiographic view of the chest is compared with several other chest radiographs performed the same month. _______________ FINDINGS: See impression. No pneumothorax or appreciable significant pleural effusion.  The cardiac silhouette, vanessa, and mediastinal contours are normal for age. No acute osseous abnormality is revealed. _______________     IMPRESSION: Slight improvement in multifocal airspace disease most in keeping with pneumonia, again most pronounced at the right lung apex with no new or worsening findings. Recommend continued radiographic follow-up to resolution. _______________     Susannah Francise Chest Port    Result Date: 10/18/2020  EXAM: CHEST RADIOGRAPH CLINICAL INDICATION/HISTORY: SBO   > Additional: None COMPARISON: 10/17/2020. TECHNIQUE: Portable frontal view of the chest _______________ FINDINGS: SUPPORT DEVICES: None. HEART AND MEDIASTINUM: No appreciable cardiomegaly. Remaining mediastinal contours within normal limits. LUNGS AND PLEURAL SPACES: No significant change of bilateral upper lobe parenchymal opacities. Hyperexpansion of the lungs. No evidence for pneumothorax. BONY THORAX AND SOFT TISSUES: Unremarkable. _______________     IMPRESSION: 1. No significant change of bilateral lung pneumonia. Follow-up to resolution is recommended. 2. Emphysema. Xr Chest Port    Result Date: 10/18/2020  EXAM: CHEST RADIOGRAPH CLINICAL INDICATION/HISTORY: shortness of breath   > Additional: None COMPARISON: October 14, 2020. TECHNIQUE: Portable frontal view of the chest _______________ FINDINGS: SUPPORT DEVICES: None. HEART AND MEDIASTINUM: No appreciable cardiomegaly. Remaining mediastinal contours within normal limits. LUNGS AND PLEURAL SPACES: No significant change of bilateral upper lobe parenchymal opacities. Hyperexpansion of the lungs. No evidence for pneumothorax or pleural effusion. BONY THORAX AND SOFT TISSUES: Unremarkable. _______________     IMPRESSION: 1. No significant change of bilateral lung pneumonia. Follow-up to resolution is recommended to exclude underlying mass. 2. Emphysema.     Xr Chest Port    Result Date: 10/14/2020  EXAM: XR CHEST PORT CLINICAL INDICATION/HISTORY: Shortness of breath with cough -Additional: None COMPARISON: Several prior studies, most recently 2/19/2019 TECHNIQUE: Frontal view of the chest _______________ FINDINGS: HEART AND MEDIASTINUM: Normal cardiac size and mediastinal contours. LUNGS AND PLEURAL SPACES: Patchy multifocal opacities noted throughout bilateral upper lobes, right greater than left as well as throughout the periphery of the right lower lobe. No evidence of pneumothorax. BONY THORAX AND SOFT TISSUES: No acute osseous abnormality _______________     IMPRESSION: Patchy multifocal airspace disease compatible with pneumonia. Recommend radiographic follow-up to document expected clinical resolution in 4-6 weeks' time.        Indra Wan MD

## 2020-11-19 NOTE — PROGRESS NOTES
D/C Plan: SNF/LTC comfort care/hospice    CM and liaison from [facility] met with pt to  conducted an on site evaluation. During evaluation stated \"please somebody shoot me\". CM noticed provider. [facility] has declined this pt. CM contacted pt's son, Vance Mendoza ([phone]), and notified him that facilities will be contacting him regarding placement. Family verbalized an understanding. CM to continue to follow and assist.    1400:  CM has been notified [address] is currently looking at an on site evaluation to assist with a determination. CM to continue to follow and assist.     Care Management Interventions  PCP Verified by CM:  Yes  Mode of Transport at Discharge: BLS  Transition of Care Consult (CM Consult): [address] [place] [address], Discharge Planning, SNF  Current Support Network: Family Lives Nearby  The Plan for Transition of Care is Related to the Following Treatment Goals : LTC  The Patient and/or Patient Representative was Provided with a Choice of Provider and Agrees with the Discharge Plan?: Yes  Name of the Patient Representative Who was Provided with a Choice of Provider and Agrees with the Discharge Plan: annette/Jerry Michele  Freedom of Choice List was Provided with Basic Dialogue that Supports the Patient's Individualized Plan of Care/Goals, Treatment Preferences and Shares the Quality Data Associated with the Providers?: Yes  Discharge Location  [facility]

## 2020-11-19 NOTE — PROGRESS NOTES
1930 - Bedside and Verbal shift change report given to Hank Mishra RN  (oncoming nurse) by SENA Sigala RN (offgoing nurse). Report included the following information SBAR, Kardex, Intake/Output, MAR and Recent Results. Shift summary -- Pt calm and cooperative majority of shift. Had episodes of hollering and crying out, mainly for food. Late meals provided for comfort. Scheduled medications given per order. PRN PO Ativan given x 1 to promote comfort/rest.     0730 - Bedside and Verbal shift change report given to Alexy Terry RN (oncoming nurse) by Hank Mishra RN (offgoing nurse). Report included the following information SBAR, Kardex, Intake/Output and MAR.

## 2020-11-19 NOTE — PROGRESS NOTES
NUTRITION update    ASSESSMENT/PLAN:     Current Diet: dental soft Magic Cup TID     Chart reviewed, note change in goals of care now focused on comfort measures only. Aggressive nutrition intervention is not indicated at this time. Will assist as needed; please consult if nutrition intervention is medically indicated and consistent with patient's goals of care.       Haroon Baig RD  Pager:  [phone]

## 2020-11-20 PROCEDURE — 74011250637 HC RX REV CODE- 250/637: Performed by: INTERNAL MEDICINE

## 2020-11-20 PROCEDURE — 74011250637 HC RX REV CODE- 250/637: Performed by: HOSPITALIST

## 2020-11-20 PROCEDURE — 74011000250 HC RX REV CODE- 250: Performed by: HOSPITALIST

## 2020-11-20 PROCEDURE — 74011250637 HC RX REV CODE- 250/637: Performed by: FAMILY MEDICINE

## 2020-11-20 PROCEDURE — 74011250637 HC RX REV CODE- 250/637: Performed by: NURSE PRACTITIONER

## 2020-11-20 RX ORDER — LORAZEPAM 1 MG/1
1 TABLET ORAL 3 TIMES DAILY
Qty: 3 TAB | Refills: 0 | Status: SHIPPED | OUTPATIENT
Start: 2020-11-20 | End: 2020-11-21

## 2020-11-20 RX ORDER — LIDOCAINE 4 G/100G
PATCH TOPICAL
Qty: 3 PATCH | Refills: 0 | Status: SHIPPED | OUTPATIENT
Start: 2020-11-20

## 2020-11-20 RX ORDER — QUETIAPINE FUMARATE 300 MG/1
300 TABLET, FILM COATED ORAL 2 TIMES DAILY
Qty: 2 TAB | Refills: 0 | Status: SHIPPED | OUTPATIENT
Start: 2020-11-20 | End: 2020-11-21

## 2020-11-20 RX ORDER — OXYCODONE AND ACETAMINOPHEN 5; 325 MG/1; MG/1
1 TABLET ORAL EVERY 6 HOURS
Qty: 4 TAB | Refills: 0 | Status: SHIPPED | OUTPATIENT
Start: 2020-11-20 | End: 2020-11-21

## 2020-11-20 RX ADMIN — OXYCODONE HYDROCHLORIDE AND ACETAMINOPHEN 1 TABLET: 5; 325 TABLET ORAL at 03:08

## 2020-11-20 RX ADMIN — GABAPENTIN 300 MG: 300 CAPSULE ORAL at 15:19

## 2020-11-20 RX ADMIN — QUETIAPINE FUMARATE 300 MG: 100 TABLET ORAL at 10:34

## 2020-11-20 RX ADMIN — PANTOPRAZOLE SODIUM 40 MG: 40 TABLET, DELAYED RELEASE ORAL at 10:34

## 2020-11-20 RX ADMIN — OXYCODONE HYDROCHLORIDE AND ACETAMINOPHEN 1 TABLET: 5; 325 TABLET ORAL at 10:34

## 2020-11-20 RX ADMIN — LORAZEPAM 1 MG: 1 TABLET ORAL at 06:00

## 2020-11-20 RX ADMIN — OXYCODONE HYDROCHLORIDE AND ACETAMINOPHEN 1 TABLET: 5; 325 TABLET ORAL at 15:19

## 2020-11-20 RX ADMIN — LORAZEPAM 1 MG: 1 TABLET ORAL at 03:10

## 2020-11-20 RX ADMIN — AMLODIPINE BESYLATE 10 MG: 5 TABLET ORAL at 10:34

## 2020-11-20 RX ADMIN — GABAPENTIN 300 MG: 300 CAPSULE ORAL at 10:34

## 2020-11-20 RX ADMIN — LORAZEPAM 1 MG: 1 TABLET ORAL at 12:28

## 2020-11-20 RX ADMIN — METOPROLOL TARTRATE 25 MG: 25 TABLET, FILM COATED ORAL at 10:34

## 2020-11-20 NOTE — PROGRESS NOTES
D/C Plan: [facility], [address] and Rehab    CM has been notified pt has been accepted by [facility] Juan Carlos Mcnally) for admission today. CM contacted pt's son, Jamal Em ([phone]) and provided an update. Pt's family is in agreement with plan transition to [facility] today. A LTSS/UAI screening has been forwarded to [facility]. Transition of care to SNF: [facility], [address] and Rehab     Communication to Patient/Family:  Met with patient and family, and they are agreeable to the transition plan. The Plan for Transition of Care is related to the following treatment goals: LTC    The Patient and/or patient representative, Jason Pal, was provided with a choice of provider and agrees   with the discharge plan. Yes [] No []    Freedom of choice list was provided with basic dialogue that supports the patient's individualized plan of care/goals and shares the quality data associated with the providers. Yes [] No []    SNF/Rehab Transition:  Patient has been accepted to [address] [place] [address],  [address] [place] [address] [facility] [address], and meets criteria for admission. Patient will transported by medical transport and expected to leave at 3:30pm/4:00pm.    Communication to SNF/Rehab:  Bedside RN, has been notified to update the transition plan to the facility and call report [phone]     Discharge information has been updated on the AVS and sent via [facility] and/or CC link. Discharge instructions to be fax'd to facility at [phone]     Please include all hard scripts for controlled substances, med rec and dc summary, and AVS in packet. Please medicate for pain prior to dc if possible and needed to help offset delay when patient first arrives to facility.     Reviewed and confirmed with facility, [facility] can manage the patient care needs for the following:     Rhona Cevallos with (X) only those applicable:  Medication:  []Medications are available at the facility  []IV Antibiotics []Controlled Substance  hard copies available sent. []Weekly Labs    Equipment:  []CPAP/BiPAP  []Wound Vacuum  []Lindo or Urinary Device  []PICC/Central Line  []Nebulizer  []Ventilator    Treatment:  []Isolation (for MRSA, VRE, etc.)  []Surgical Drain Management  []Tracheostomy Care  []Dressing Changes  []Dialysis with transportation  []PEG Care  []Oxygen  []Daily Weights for Heart Failure    Dietary:  []Any diet limitations  []Tube Feedings   []Total Parenteral Management (TPN)    Financial Resources:  []Medicaid Application Completed    []UAI Completed and copy given to pt/family    [x]A screening has previously been completed. []Level II Completed    [] Private pay individual who will not become financially eligible for Medicaid within 6 months from admission to a [address] [place] [address]. [] Individual refused to have screening conducted. []Medicaid Application Completed    []The screening denied because it was determined individual did not need/did not qualify for nursing facility level of care. [] Out of state residents seeking direct admission to a [address] facility. [] Individuals who are inpatients of an out of state hospital, or in state or out of state veterans/ hospital and seek direct admission to a [address] facility  [] Individuals who are pateints or residents of a state owned/operated facility that is licensed by [employer] of [employer] ([employer]) and seek direct admission to [address] [place] [address]  [] A screening not required for enrollment in [facility] services as set out in [address] [place] [address] 62-  [] [facility]) staff shall perform screenings of the [facility] clients. Advanced Care Plan:  []Surrogate Decision Maker of Care  []POA  []Communicated Code Status and copy sent. Other:           Care Management Interventions  PCP Verified by CM:  Yes  Mode of Transport at Discharge: BLS  Transition of Care Consult (CM Consult): [facility], Discharge Planning, SNF  Current Support Network: Family Lives Nearby  The Plan for Transition of Care is Related to the Following Treatment Goals : LTC  The Patient and/or Patient Representative was Provided with a Choice of Provider and Agrees with the Discharge Plan?: Yes  Name of the Patient Representative Who was Provided with a Choice of Provider and Agrees with the Discharge Plan: annette/Jerry Michele  Freedom of Choice List was Provided with Basic Dialogue that Supports the Patient's Individualized Plan of Care/Goals, Treatment Preferences and Shares the Quality Data Associated with the Providers?: Yes  Discharge Location  Discharge Placement: [address] [place] [address]

## 2020-11-20 NOTE — PROGRESS NOTES
Problem: Falls - Risk of  Goal: *Absence of Falls  Description: Document Albino Messing Fall Risk and appropriate interventions in the flowsheet.   Outcome: Progressing Towards Goal  Note: Fall Risk Interventions:  Mobility Interventions: Bed/chair exit alarm    Mentation Interventions: Bed/chair exit alarm    Medication Interventions: Bed/chair exit alarm    Elimination Interventions: Call light in reach, Urinal in reach    History of Falls Interventions: Bed/chair exit alarm

## 2020-11-20 NOTE — PROGRESS NOTES
Assessment completed at this time. Pt denies chest pain or SOB. Fall risk band in place. Pt educated on side effects of medication . Pt encouraged to manage pain and call for assistance. Bed in low position and wheels locked. Will continue to monitor.

## 2020-11-20 NOTE — DISCHARGE SUMMARY
Discharge Summary    Patient: Curry Bird Sr. MRN: [id number]  CSN: [id number]    YOB: 1955  Age: 63 y.o.   Sex: male    DOA: 10/23/2020 LOS:  LOS: 28 days   Discharge Date:      Primary Care Provider:  Clarita Pablo MD    Admission Diagnoses: Septic shock ([facility] [A41.9, R65.21]    Discharge Diagnoses:    Hospital Problems  Date Reviewed: 10/23/2020          Codes Class Noted POA    Comfort measures only status ICD-10-CM: Z51.5  ICD-9-CM: V66.7  11/5/2020 Unknown        Dysphagia ICD-10-CM: R13.10  ICD-9-CM: 787.20  11/3/2020 Unknown        Hypokalemia ICD-10-CM: E87.6  ICD-9-CM: 276.8  11/3/2020 Unknown        Hypomagnesemia ICD-10-CM: E83.42  ICD-9-CM: 275.2  11/3/2020 Unknown        Aspiration pneumonia ([facility] ICD-10-CM: J69.0  ICD-9-CM: 507.0  10/28/2020 Unknown        Deep vein thrombosis (DVT) of lower extremity ([facility] ICD-10-CM: I82.409  ICD-9-CM: 453.40  10/28/2020 Unknown        Acute respiratory failure with hypoxia ([facility] ICD-10-CM: J96.01  ICD-9-CM: 518.81  10/26/2020 Unknown        Chronic obstructive pulmonary disease with acute exacerbation ([facility] ICD-10-CM: J44.1  ICD-9-CM: 491.21  10/24/2020 Unknown        Severe protein-calorie malnutrition ([facility] ICD-10-CM: E43  ICD-9-CM: 262  10/24/2020 Unknown        Metabolic encephalopathy AQY-62-XV: G93.41  ICD-9-CM: 348.31  10/24/2020 Unknown        * (Principal) Septic shock ([facility] ICD-10-CM: A41.9, R65.21  ICD-9-CM: 038.9, 785.52, 995.92  10/23/2020 Unknown        Amputation of both lower extremities ([facility] ICD-10-CM: K78.896S, Q73.597P  ICD-9-CM: 897.6  Unknown Yes        PNA (pneumonia) ICD-10-CM: J18.9  ICD-9-CM: 040  Unknown Yes        Marijuana abuse ICD-10-CM: F12.10  ICD-9-CM: 305.20  Unknown Yes        Centrilobular emphysema ([facility] ICD-10-CM: J43.2  ICD-9-CM: 492.8  Unknown Unknown        Legal blindness ICD-10-CM: H54.8  ICD-9-CM: 369.4  10/14/2020 Yes        Hypotension, unspecified ICD-10-CM: I95.9  ICD-9-CM: 458.9  1/27/2014 Yes Discharge Condition: stable     Discharge Medications:     Current Discharge Medication List      START taking these medications    Details   LORazepam (ATIVAN) 1 mg tablet Take 1 Tab by mouth three (3) times daily for 1 day. Max Daily Amount: 3 mg. Qty: 3 Tab, Refills: 0    Associated Diagnoses: Comfort measures only status      QUEtiapine (SEROquel) 300 mg tablet Take 1 Tab by mouth two (2) times a day for 1 day. Qty: 2 Tab, Refills: 0      lidocaine 4 % patch Apply patch to back Apply patch to the affected area for 12 hours a day and remove for 12 hours a day. Qty: 3 Patch, Refills: 0      naloxone (NARCAN) 4 mg/actuation nasal spray Use 1 spray intranasally, then discard. Repeat with new spray every 2 min as needed for opioid overdose symptoms, alternating nostrils. Qty: 2 Each, Refills: 0         CONTINUE these medications which have CHANGED    Details   oxyCODONE-acetaminophen (PERCOCET) 5-325 mg per tablet Take 1 Tab by mouth every six (6) hours for 1 day. Max Daily Amount: 4 Tabs. Qty: 4 Tab, Refills: 0    Associated Diagnoses: Comfort measures only status         CONTINUE these medications which have NOT CHANGED    Details   amLODIPine (NORVASC) 5 mg tablet Take 5 mg by mouth daily. atorvastatin (LIPITOR) 20 mg tablet Take 20 mg by mouth daily. nitroglycerin (NITROSTAT) 0.4 mg SL tablet 0.4 mg by SubLINGual route every five (5) minutes as needed for Chest Pain. Up to 3 doses. metoprolol succinate (TOPROL XL) 25 mg XL tablet Take 25 mg by mouth daily. isosorbide mononitrate ER (IMDUR) 30 mg tablet Take 30 mg by mouth daily.          STOP taking these medications       gabapentin (NEURONTIN) 300 mg capsule Comments:   Reason for Stopping:         amoxicillin-clavulanate (AUGMENTIN) 875-125 mg per tablet Comments:   Reason for Stopping:         acetaminophen (TYLENOL) 325 mg tablet Comments:   Reason for Stopping:         gabapentin (NEURONTIN) 100 mg capsule Comments: Reason for Stopping:         clonazePAM (KLONOPIN) 0.5 mg tablet Comments:   Reason for Stopping:         traMADol (ULTRAM) 50 mg tablet Comments:   Reason for Stopping:         simvastatin (ZOCOR) 20 mg tablet Comments:   Reason for Stopping:         lisinopril-hydrochlorothiazide (PRINZIDE, ZESTORETIC) 20-12.5 mg per tablet Comments:   Reason for Stopping:               Procedures : none     Consults: Pulmonary/Critical Care, cardiologist       PHYSICAL EXAM  Visit Vitals  BP (!) 148/96 (BP 1 Location: Left arm, BP Patient Position: At rest)   Pulse (!) 110   Temp 98.3 °F (36.8 °C)   Resp 20   Ht 5' 4\" (1.626 m)   Wt 40.8 kg (89 lb 15.2 oz)   SpO2 96%   BMI 15.44 kg/m²     General: Awake, cooperative, no acute distress    HEENT: NC, Atraumatic. PERRLA, EOMI. Anicteric sclerae. Lungs:  CTA Bilaterally. No Wheezing/Rhonchi/Rales. Heart:  Regular  rhythm,  No murmur, No Rubs, No Gallops  Abdomen: Soft, Non distended, Non tender. +Bowel sounds,   Extremities: No c/c/e-bilateral aka   Psych:   Not anxious or agitated. Neurologic:  No acute neurological deficits.                                      Admission HPI :   Unique Landeros. is a 63 y.o.  male who has history of COPD emphysema bilateral above-knee amputation recently discharged from our hospital with pneumonia since discharge about a week ago patient has been in the emergency room 3-4 times at other facilities with complaints of cough shortness of breath and hypoxemia 2 days ago he had a CT scan done at [facility] which showed persistent pneumonia patient had not picked up his antibiotics that he was discharged on until recently or his medications   In the emergency room he was found to be confused and combative with hypothermia and hypotension he was treated with fluids 30 cc/kg and Levophed hypothermia and hypotension improved with pressor support and warming I am asked to admit for further treatment  With convincing a central line was placed in the femoral area for [facility] Course :   A 59-year-old male with a history of a COPD, bilateral above knee amputation, recently discharged from [facility] after treated for pneumonia who was admitted for septic shock and metabolic encephalopathy. He was put on high flow O2 due to acute respiratory failure s/p aspiration. He completed treatment for aspiration pna and weaning off nc O2. He was  unable to pass speech evaluation due to high risk of aspiration. Cardiologist and pulmonologist were on board for prolong QT and acute respiratory failure. Palliative care on board for care goal. Dnr/I and comfort care were granted.      Discharge planning discussed with patient, pt agrees  with the plan and no questions and concerns at this point. Activity: Activity as tolerated    Diet: Comfort feeding    Follow-up: hospice physician. Disposition: snf for hospice     Minutes spent on discharge: 45 min       Labs: Results:       Chemistry No results for input(s): GLU, NA, K, CL, CO2, BUN, CREA, CA, AGAP, BUCR, TBIL, AP, TP, ALB, GLOB, AGRAT in the last 72 hours. No lab exists for component: GPT   CBC w/Diff No results for input(s): WBC, RBC, HGB, HCT, PLT, GRANS, LYMPH, EOS, HGBEXT, HCTEXT, PLTEXT in the last 72 hours. Cardiac Enzymes No results for input(s): CPK, CKND1, CHUY in the last 72 hours. No lab exists for component: CKRMB, TROIP   Coagulation No results for input(s): PTP, INR, APTT, INREXT in the last 72 hours. Lipid Panel No results found for: CHOL, CHOLPOCT, CHOLX, CHLST, CHOLV, 003672, HDL, HDLP, LDL, LDLC, DLDLP, 360037, VLDLC, VLDL, TGLX, TRIGL, TRIGP, TGLPOCT, CHHD, CHHDX   BNP No results for input(s): BNPP in the last 72 hours. Liver Enzymes No results for input(s): TP, ALB, TBIL, AP in the last 72 hours.     No lab exists for component: SGOT, GPT, DBIL   Thyroid Studies Lab Results   Component Value Date/Time    TSH 0.78 10/24/2020 11:30 AM          @micro    Significant Diagnostic Studies: Zenon Dempsey [facility] Video    Result Date: 11/2/2020  Barium speech study History: Dysphagia, feeding difficulty. Barium pharyngogram performed under the guidance of speech therapist, with fluoroscopic guidance by Erwin Turcios. Barium nectar via via straw, barium honey, and semisolid barium pudding administered. Fluoro Dose (reference air kerma): 8.14 mGy. Findings: Nectar: Swallowing delay with silent aspiration noted. Residual in the vallecula and pyriform sinus. Honey: Swallowing delay. Aspiration with cough noted. Residual in the vallecula and pyriform sinus. Pudding: Swallowing delay. Aspiration with cough noted. Residual in the vallecula and pyriform sinus. Impression: Abnormal examination with aspiration as detailed above. Swallowing delay and residuals noted. Please refer to the full report from speech pathologist submitted separately. Ct Head Wo Cont    Result Date: 10/24/2020  EXAM: CT head INDICATION: Altered mental status. COMPARISON: October 15, 2020. TECHNIQUE: Axial CT imaging of the head was performed without intravenous contrast. Dose reduction techniques used: automated exposure control, adjustment of the mAs and/or kVp according to patient size, and iterative reconstruction techniques. Digital imaging and communications in Medicine (DICOM) format image data are available to nonaffiliated external healthcare facilities or entities on a secure, media free, reciprocally searchable basis with patient authorization for at least 12 months after this study. _______________ FINDINGS: BRAIN AND POSTERIOR FOSSA: There is cerebral volume loss with prominence of the lateral and the third ventricles. The cortical sulci are widened appropriately. The fourth ventricle and basal cisterns are normally outlined. There is mild bilateral periventricular and central white matter diminished attenuation. There is no acute territorial defect, hemorrhage or midline shift.  EXTRA-AXIAL SPACES AND MENINGES: There are no abnormal extra-axial fluid collections. CALVARIUM: Intact. SINUSES: Clear. OTHER: Partial right mastoid opacification is again seen. _______________     IMPRESSION: Cerebral volume loss and mild bilateral periventricular and central white matter diminished attenuation which is nonspecific but likely to represent microvascular disease. There is no acute intracranial abnormality. No significant interval change. Cta Chest W Or W Wo Cont    Result Date: 10/27/2020  EXAM: CTA Chest INDICATION: 63year-old patient presently admitted with pneumonia, worsening of hypoxia. Evaluation for pulmonary embolism. COMPARISON: CT chest 9/7/2012; prior chest radiographs, as recently 10/26/2020. TECHNIQUE: Axial CT imaging from the thoracic inlet through the diaphragm with intravenous contrast utilizing CTA study for pulmonary artery evaluation. Coronal and sagittal MIP reformations were generated at a separate workstation. One or more dose reduction techniques were used on this CT: automated exposure control, adjustment of the mAs and/or kVp according to patient size, and iterative reconstruction techniques. The specific techniques used on this CT exam have been documented in the patient's electronic medical record. Digital Imaging and Communications in Medicine (DICOM) format image data are available to nonaffiliated external healthcare facilities or entities on a secure, media free, reciprocally searchable basis with patient authorization for at least a 12-month period after this study. _______________ FINDINGS: EXAM QUALITY: Overall exam quality is adequate. Pulmonary arterial enhancement is adequate. The breath hold is satisfactory. PULMONARY ARTERIES: No convincing evidence of pulmonary embolism. MEDIASTINUM: Included gland is unremarkable. Cardiac size is normal. There is no pericardial effusion. Multivessel coronary arterial atherosclerotic vascular calcification is present.  Extensive atherosclerotic vascular calcification of the thoracic aorta noted without evidence of aneurysmal dilatation. LYMPH NODES: No enlarged mediastinal or hilar nodes by size criteria. AIRWAY: Diffuse bronchial wall thickening with endobronchial debris present within the bronchus intermedius and right lower lobe segmental bronchi. Additional left lower lobe and segmental bronchial impaction. LUNGS: Focal area of low attenuating consolidation within the medial portion left lower lobe with adjacent areas of atelectasis. Patchy peribronchial consolidation within the posterior aspect right upper lobe. Moderately severe upper lobe predominant centrilobular emphysema. No significant groundglass abnormality or abnormal septal line thickening. PLEURA: Trace right and small left pleural effusions. No evidence of pneumothorax. UPPER ABDOMEN: Marked atherosclerotic vascular calcifications present throughout the suprarenal and infrarenal abdominal aorta. Likely layering biliary sludge within the gallbladder. OTHER: No acute or aggressive osseous abnormalities identified. Multilevel degenerative wedging of the thoracic spine noted. Prominent Schmorl's node superior endplate A85. _______________     IMPRESSION: 1. No evidence of pulmonary embolism. 2.  Relatively diffuse bronchial wall thickening and several areas of endobronchial impaction/occlusion. 3. Left lower lobe pneumonia with additional area of peribronchial alveolar consolidation posterior right upper lobe.   > Recommend radiographic follow-up to document expected clinical resolution following appropriate treatment in 4-6 weeks. 4. Moderately severe upper lobe predominant centrilobular emphysema. 5. Small left and trace right pleural effusions. 6. Extensive atherosclerotic vascular disease both above and below the diaphragm.     Xr Chest Port    Result Date: 11/2/2020  EXAM: Portable Chest CLINICAL INDICATION: hypoxia -Additional: None COMPARISON: 10/29/20 TECHNIQUE: AP portable view at 5659 ______________ FINDINGS: HEART AND MEDIASTINUM: The heart size is normal. LUNGS AND AIRWAYS: The lungs appear hyperexpanded suggesting COPD. Infiltrative changes are evident involving the upper right chest and lower left chest. The lungs appear stable PLEURA: No pleural effusion or pneumothorax. BONES: Chronic spondylosis is present OTHER: NG tube enters the stomach. Cardiac monitor leads overlie the chest. ______________     IMPRESSION: Stable appearance of the chest with infiltrative changes at the left lung base and upper right chest    Xr Chest Port    Result Date: 10/29/2020  EXAM: XR CHEST PORT CLINICAL INDICATION/HISTORY: Shortness of breath with possible aspiration event -Additional: None COMPARISON: Radiographs 10/6/2020 TECHNIQUE: Frontal view of the chest _______________ FINDINGS: HEART AND MEDIASTINUM: Stable appearing cardiac size and mediastinal contours. LUNGS AND PLEURAL SPACES: Faint peripheral right upper lung opacity noted with additional left basilar density and accompanying pleural effusion. Right lung bases clear. Lungs are hyperinflated. No pneumothorax. BONY THORAX AND SOFT TISSUES: No acute osseous abnormality _______________     IMPRESSION: Hyperinflated lungs without radiographic stigmata paralleling those seen on recent prior chest CT, to include right upper and left basilar infiltrates with small left pleural effusion. Xr Chest Port    Result Date: 10/26/2020  EXAM: CHEST RADIOGRAPH CLINICAL INDICATION/HISTORY: Pneumonia   > Additional: None COMPARISON: 10/23/2020 TECHNIQUE: Portable frontal view of the chest _______________ FINDINGS: SUPPORT DEVICES: None. HEART AND MEDIASTINUM: Heart size is stable. Atherosclerotic calcification seen in the aorta. LUNGS AND PLEURAL SPACES: Increased hazy density at the left base. Slight blunting of the right costophrenic angle. No pneumothorax. Patchy right upper lobe opacity is similar to slightly improved. BONES AND SOFT TISSUES: Unremarkable. _______________     IMPRESSION: 1. Increased hazy opacity at the left base compared to the prior exam, possibly developing left lower lobe atelectasis and/or consolidation. Questionable small right effusion. 2. Patchy right upper lobe patchy opacity which is similar to slightly improved. Xr Chest Port    Result Date: 10/24/2020  EXAM: XR CHEST PORT. CLINICAL INDICATION/HISTORY: meets SIRS criteria. -Additional: None. TECHNIQUE: A portable erect AP radiographic view of the chest is compared with several other chest radiographs performed the same month. _______________ FINDINGS: See impression. No pneumothorax or appreciable significant pleural effusion. The cardiac silhouette, vanessa, and mediastinal contours are normal for age. No acute osseous abnormality is revealed. _______________     IMPRESSION: Slight improvement in multifocal airspace disease most in keeping with pneumonia, again most pronounced at the right lung apex with no new or worsening findings. Recommend continued radiographic follow-up to resolution. _______________     Beckie Parish Port  1 V    Result Date: 10/31/2020  EXAM: KUB INDICATION: NG tube placement COMPARISON: None. _______________ FINDINGS: Supine abdominal film was performed. NG tube is present with the tip extending into the distal body of the stomach. Nonobstructive bowel gas pattern. Extensive vascular calcifications are seen of the aorta and iliac arteries. Asymmetric markings left mid and lower lung field similar to chest x-ray 10/29/2020. _______________     IMPRESSION: NG tube good position. Nonobstructive bowel gas pattern.             [facility]     CC: Heather Lambert MD

## 2020-11-20 NOTE — PROGRESS NOTES
D/C Plan: SNF/LTC comfort care/hospice     CM has been notified [address] and [facility] facilities are currently looking at an on site evaluation to assist with a determination. CM to continue to follow and assist.        Care Management Interventions  PCP Verified by CM:  Yes  Mode of Transport at Discharge: S  Transition of Care Consult (CM Consult): [address] [place] [address], Discharge Planning, SNF  Current Support Network: Family Lives Nearby  The Plan for Transition of Care is Related to the Following Treatment Goals : LTC  The Patient and/or Patient Representative was Provided with a Choice of Provider and Agrees with the Discharge Plan?: Yes  Name of the Patient Representative Who was Provided with a Choice of Provider and Agrees with the Discharge Plan: annette/Jerry Michele  Freedom of Choice List was Provided with Basic Dialogue that Supports the Patient's Individualized Plan of Care/Goals, Treatment Preferences and Shares the Quality Data Associated with the Providers?: Yes  Discharge Location  Discharge Placement: [address] [place] [address]

## 2020-11-20 NOTE — PROGRESS NOTES
Problem: Falls - Risk of  Goal: *Absence of Falls  Description: Document Symone Chan Fall Risk and appropriate interventions in the flowsheet. Outcome: Not Progressing Towards Goal  Note: Fall Risk Interventions:  Mobility Interventions: Assess mobility with egress test, Bed/chair exit alarm, Patient to call before getting OOB    Mentation Interventions: Adequate sleep, hydration, pain control, Bed/chair exit alarm, Door open when patient unattended, More frequent rounding, Reorient patient, Room close to nurse's station    Medication Interventions: Bed/chair exit alarm    Elimination Interventions: Call light in reach, Patient to call for help with toileting needs, Urinal in reach    History of Falls Interventions:  Investigate reason for fall, Room close to nurse's station

## 2023-06-12 NOTE — PROGRESS NOTES
Bedside and Verbal shift change report given to Beti Franco (oncoming nurse) by Chung Kong (offgoing nurse). Report included the following information SBAR, Kardex, Intake/Output and MAR. Patient in bed resting, Bed alarm set. Patient refused vital signs. Discharge orders placed. Patient going to [place] [id number] via medical transport set up by the unit secretary for [phone].     1630-Medical transport arrived to transport patient. 1645-Report given to Kisha at the facility. Patient discharged. Chief Complaints and History of Present Illnesses   Patient presents with     Visual Field Defect Follow Up     Chief Complaint(s) and History of Present Illness(es)     Visual Field Defect Follow Up            Laterality: both eyes    Associated symptoms: Negative for dryness, eye pain, tearing, flashes and floaters    Pain scale: 0/10          Comments    Martita is here to continue care for Visual field defect. Today she states no changes since last visit.     Abdluaziz Borges COT 2:45 PM June 12, 2023

## 2024-08-07 NOTE — PROGRESS NOTES
[facility]  Pulmonary, Critical Care, and Sleep Medicine    Name: Alistair Field. MRN: [id number]   : 1955 Hospital: [facility] [place]   Date: 10/28/2020        Pulmonary Critical Care Note    IMPRESSION:     Acute respiratory failure with hypoxia (HCC) J96.01     Sepsis with organ dysfunction, resolved shock ([address] A41.9, R65.20     Aspiration pneumonia ([address] J69.0     Deep vein thrombosis (DVT) of lower extremity ([address] I82.409       Patient Active Problem List   Diagnosis Code    Bursitis of elbow M70.30    Medication overdose T50.901A    Polio A80.9    Depressive disorder, not elsewhere classified F32.9    Type II or unspecified type diabetes mellitus without mention of complication, uncontrolled [id number]    Hypotension, unspecified I95.9    Amputation of both lower extremities ([address] S88.911A, H84.653X    PNA (pneumonia) J18.9    ALEXANDER (acute kidney injury) ([address] N17.9    Hyponatremia E87.1    Marijuana abuse F12.10    Centrilobular emphysema ([address] J43.2    CAP (community acquired pneumonia) J18.9    Sepsis ([address] A41.9    Hard of hearing H91.90    Legal blindness H54.8    Acute encephalopathy G93.40    Septic shock (HCC) A41.9, R65.21    Chronic obstructive pulmonary disease with acute exacerbation (HCC) J44.1    Severe protein-calorie malnutrition ([address] Q07    Metabolic encephalopathy A47.77    Acute respiratory failure with hypoxia (HCC) J96.01    Aspiration pneumonia ([address] J69.0    Deep vein thrombosis (DVT) of lower extremity ([address] I82.409          RECOMMENDATIONS:   Respiratory: Hx smoking. CTA chest 10/27/2020: No PE. Diffuse peribronchial wall thickening and several areas of endobronchial impaction/occlusion. LLL pneumonia with peribronchial consolidation posterior RUL. Moderately severe upper lobe predominant centrilobular emphysema. Small left and trace right pleural effusion. Recommend repeat CT in 4 to 6 weeks as outpatient.   Hypoxemia requiring HFNC since 9/26/2020. Yesterday evening patient was on 4-6 LPM NC, now requiring 30 L HFNC. Required frequent orotracheal/nasotracheal suctioning and chest percussion vest/chest PT. With about CT findings and access to oral tracheal secretions/requiring suctioning and chest percussion; there is concern of aspiration pneumonia. Speech therapy evaluation ordered. Start chest percussion frequently. Repeat CXR intermittently. Titrate FiO2 to keep SPO2 > 91%. Steroids: Reduce Solu-Medrol to 40 mg IV every 24 hours; taper per clinical course. Bronchodilators: Continue Pulmicort twice daily, Atrovent 4 times daily. Continue DuoNeb as needed. Continue pulmonary hygiene and toileting. Continue aspiration precautions. HOB more than 30 degrees all the time. ID:   No fever. Mild leukocytosis could be due to steroids and aspiration pneumonia. Urine culture: <100 K. Blood culture: NGTD. Urine antigen panel negative. COVID-19 10/14/2020: Negative. Antibiotic choice: Continue Zosyn, Vancomycin. DC doxycycline. Follow cultures. Deescalate antibiotic when appropriate. CVS: Hemodynamically stable. Echo 10/25/2020: LVEF 50 to 55%. Grade 1 DD. Estimated PASP 74 mmHg. Continue Norvasc and metoprolol, home dose. Continue Lipitor. Estimated RVSP 74 mmHg on echo, could be due to hypoxic vasoconstriction. Recommend repeat echo as outpatient. PVL LE 10/27/2020: Chronic nonocclusive thrombus left proximal femoral vein. Age indeterminate nonocclusive thrombus right femoral vein. Due to DVT, will continue Lovenox 40 mg subcutaneously every 12 hours. May transition to oral anticoagulants after speech therapy evaluation. Renal: Monitor renal functions, lytes  Normal creatinine. Hypokalemia being replaced. Urine output 751. Closely monitor urine output and renal function. Heme: Mild anemia. No active external bleeding. Normal platelets and coags.   No evidence of active bleeding    Endo: Glycemic control  TSH normal    GI: Speech therapy evaluation ordered. Neurology:   Alert awake oriented x3. Continues to moan all day long. Urine drug screen positive for THC on admission. ??  Baseline mild dementia. CT head on admission chronic microvascular disease, no acute intracranial abnormality. Ammonia normal.  Receiving Haldol intermittently. Continue Seroquel to 50 mg p.o. nightly. PAIN AND SEDATION: none    Skin/Wound: skin of LT face    Prophylaxis:  DVT treatment Lovenox. GI prophylaxis Protonix, changed to p.o. Restraints: Wrist soft restraints as needed for patient interfering with medical therapy/management and patient safety. PT/OT eval and treat: as needed when stable     Lines/Tubes:   Central line: Right femoral 10/23/20discontinued 10/26/2020. [place]: 10/23/20discontinued 10/27/2020. Condom catheter: 10/27/2020. Overall poor prognosis. ADVANCE DIRECTIVE: Full code    Will defer respective systems problem management to primary and other consultant and follow patient in ICU with primary and other medical team  Further recommendations will be based on the patient's response to recommended treatment and results of the investigation ordered. Quality Care: PPI, DVT prophylaxis, HOB elevated, Infection control all reviewed and addressed. DISCUSSION: Events and notes from last 24 hours reviewed. Care plan discussed with nursing, hospitalist, ICU staff, RT, MDR.  D/w patient (answered all questions to satisfaction). High complexity decision making was performed during the evaluation of this patient at high risk for decompensation with multiple organ involvement. Total critical care time spent rendering care exclusive of procedures/family discussion/coordination of care: 33 minutes. Subjective/History:   Mr. Swartz Mt. has been seen and evaluated as Dr. Melvi Lewis requested for assisting with ICU care of septic shock. Patient unable to provide history.    Patient is a 63 y.o. male COPD, bilateral above-knee amputation, recently discharged from [facility] after treated for pneumonia. Per chart, since discharge about a week ago patient has been in the ER few times at other facilities with complaints of cough, shortness of breath and hypoxemia; had a CT scan done at [facility] which showed persistent pneumonia. Per chart, patient had not picked up his antibiotics that he was discharged on until recently. He was brought to ER with c/o of chest pain between shoulders by EMS. In the ER he was found to be confused and combative with hypothermia, hypotension. He was treated with IVF boluses and Levophed. CT head on admission nil acute. CXR showed improving pneumonia. He has remained confused at the time of this evaluation at bedside in rm 102 in ICU.      10/28/20   Patient remained in ICU room 102. Yesterday evening patient was on 4-6 LPM NC. Overnight patient required frequent orotracheal and nasotracheal suctioning and chest percussion with lots of secretion suction. CTA chest negative for PE but suggestive of pneumonia and other abnormal findings. PVL LE limited due to bilateral AKA but did show chronic and age-indeterminate DVT. On Lovenox, tolerating well without any external bleeding. Mental status unchanged. He continued to moan throughout the day. Making good urine output. Hemodynamically stable. [facility] was not called for any issues overnight. No other overnight issues reported. Review of Systems:  Review of systems not obtained due to patient factors.       Intake/Output Summary (Last 24 hours) at 10/28/2020 7445  Last data filed at 10/27/2020 1917  Gross per 24 hour   Intake 940 ml   Output 751 ml   Net 189 ml                 Latest lactic acid:   Lactic acid   Date Value Ref Range Status   10/24/2020 0.9 0.4 - 2.0 MMOL/L Final   10/23/2020 2.7 (HH) 0.4 - 2.0 MMOL/L Final     Comment:     CALLED TO AND CORRECTLY REPEATED BY:  Tony Moy RN ED BY CAM ON 10/23/20 AT 1738.     10/23/2020 3.3 (HH) 0.4 - 2.0 MMOL/L Final     Comment:     CALLED TO AND CORRECTLY REPEATED BY:  LEXIE ROJAS RN ED BY CAM ON 10/23/20 AT 1700. Past Medical History:  Past Medical History:   Diagnosis Date    [facility] of both lower [facility]     Amputee, above knee, left ([facility]     At risk for falls     Chronic pain     back and legs    Diabetes ([facility]     Hypercholesterolemia     Hypertension     Polio         Past Surgical History:  Past Surgical History:   Procedure Laterality Date    HX HERNIA REPAIR      HX OTHER SURGICAL      carpal tunnel     HX OTHER SURGICAL      cyst        Medications:  Prior to Admission medications    Medication Sig Start Date End Date Taking? Authorizing Provider   amoxicillin-clavulanate (AUGMENTIN) 875-125 mg per tablet Take 1 Tab by mouth every twelve (12) hours. 10/20/20   Nickolas Gregg MD   acetaminophen (TYLENOL) 325 mg tablet Take  by mouth every four (4) hours as needed for Pain. Tiffany Saravia MD   amLODIPine (NORVASC) 5 mg tablet Take 5 mg by mouth daily. Tiffany Saravia MD   gabapentin (NEURONTIN) 100 mg capsule Take 100 mg by mouth three (3) times daily. Tiffany Saravia MD   clonazePAM (KLONOPIN) 0.5 mg tablet Take 0.5 mg by mouth nightly as needed. Tiffany Saravia MD   atorvastatin (LIPITOR) 20 mg tablet Take 20 mg by mouth daily. Tiffany Saravia MD   nitroglycerin (NITROSTAT) 0.4 mg SL tablet 0.4 mg by SubLINGual route every five (5) minutes as needed for Chest Pain. Up to 3 doses. Tiffany Saravia MD   metoprolol succinate (TOPROL XL) 25 mg XL tablet Take 25 mg by mouth daily. Tiffany Saravia MD   oxyCODONE-acetaminophen (PERCOCET) 5-325 mg per tablet Take  by mouth every four (4) hours as needed for Pain. Tiffany Saravia MD   traMADol (ULTRAM) 50 mg tablet Take 1 Tab by mouth every six (6) hours as needed for Pain. Max Daily Amount: 200 mg. 12/4/18   Wen Clayton MD   simvastatin (ZOCOR) 20 mg tablet Take 20 mg by mouth nightly. Provider, Historical   lisinopril-hydrochlorothiazide (PRINZIDE, ZESTORETIC) 20-12.5 mg per tablet Take 1 Tab by mouth daily. Provider, Historical   isosorbide mononitrate ER (IMDUR) 30 mg tablet Take 30 mg by mouth daily.     Provider, Historical       Current Facility-Administered Medications   Medication Dose Route Frequency    methylPREDNISolone (PF) (SOLU-MEDROL) injection 40 mg  40 mg IntraVENous Q12H    pantoprazole (PROTONIX) tablet 40 mg  40 mg Oral ACB    QUEtiapine (SEROquel) tablet 50 mg  50 mg Oral QHS    doxycycline (MONODOX) capsule 100 mg  100 mg Oral Q12H    amLODIPine (NORVASC) tablet 10 mg  10 mg Oral DAILY    metoprolol tartrate (LOPRESSOR) tablet 12.5 mg  12.5 mg Oral Q12H    enoxaparin (LOVENOX) injection 40 mg  1 mg/kg SubCUTAneous Q12H    ipratropium (ATROVENT) 0.02 % nebulizer solution 0.5 mg  0.5 mg Nebulization QID RT    vancomycin (VANCOCIN) 500 mg in 0.9% sodium chloride (MBP/ADV) 100 mL MBP  500 mg IntraVENous Q12H    atorvastatin (LIPITOR) tablet 20 mg  20 mg Oral QHS    gabapentin (NEURONTIN) capsule 100 mg  100 mg Oral TID    piperacillin-tazobactam (ZOSYN) 3.375 g in 0.9% sodium chloride (MBP/ADV) 100 mL MBP  3.375 g IntraVENous Q8H    nicotine (NICODERM CQ) 21 mg/24 hr patch 1 Patch  1 Patch TransDERmal DAILY    Vancomycin - Pharmacokinetic Dosing  1 Each Other Rx Dosing/Monitoring    insulin lispro (HUMALOG) injection   SubCUTAneous AC&HS    budesonide (PULMICORT) 250 mcg/2ml nebulizer susp  500 mcg Nebulization BID RT    sodium chloride (NS) flush 5-40 mL  5-40 mL IntraVENous Q8H       Allergy:  No Known Allergies     Social History:  Social History     Tobacco Use    Smoking status: Current Every Day Smoker     Packs/day: 2.00     Years: 40.00     Pack years: 80.00    Smokeless tobacco: Current User     Types: Snuff   Substance Use Topics    Alcohol use: No    Drug use: No        Family History:  Family History   Problem Relation Age of Onset    Heart Attack Mother     Heart Attack Father     Malignant Hyperthermia Neg Hx     Pseudocholinesterase Deficiency Neg Hx     Delayed Awakening Neg Hx     Post-op Nausea/Vomiting Neg Hx     Emergence Delirium Neg Hx     Post-op Cognitive Dysfunction Neg Hx     Other Neg Hx           Objective:   Vital Signs:    Blood pressure (!) 148/89, pulse 85, temperature 97.7 °F (36.5 °C), resp. rate 24, height 5' 4\" (1.626 m), weight 43.1 kg (95 lb), SpO2 96 %. Body mass index is 16.31 kg/m². O2 Device: Heated, Hi flow nasal cannula   O2 Flow Rate (L/min): 30 l/min   Temp (24hrs), Av.1 °F (36.7 °C), Min:97.7 °F (36.5 °C), Max:98.5 °F (36.9 °C)         Intake/Output:   Last shift:      No intake/output data recorded. Last 3 shifts: 10/26 1901 - 10/28 0700  In: 4909 [P.O.:480; I.V.:1100]  Out: 1651 [Urine:1650]    Intake/Output Summary (Last 24 hours) at 10/28/2020 0928  Last data filed at 10/27/2020 1917  Gross per 24 hour   Intake 940 ml   Output 751 ml   Net 189 ml       Physical Exam:  General/Neurology: Alert, awake and oriented. NAD, but moans all day long. Thin. Temporal muscle wasting. Head:   NCAT. Eye:   EOM intact, no icterus/pallor/cyanosis. Throat:  Moist mucosa. No oral thrush. Lung:   Barrel chest.  Moderate air entry bilateral equal.  Bilateral scattered rales. No wheezing. No prolonged expiration. No accessory muscle use. Heart:   S1 S2 present. No murmur or JVD. Abdomen:  Soft. NT. ND. No palpable masses. Extremities:  Bilateral AKA. Pulses: 2+ and symmetric in DP. Lymphatic:  No cervical or supraclavicular palpable lymphadenopathy.          Data:     Recent Results (from the past 24 hour(s))   GLUCOSE, POC    Collection Time: 10/27/20 11:10 AM   Result Value Ref Range    Glucose (POC) 105 70 - 110 mg/dL   GLUCOSE, POC    Collection Time: 10/27/20  4:56 PM   Result Value Ref Range    Glucose (POC) 98 70 - 110 mg/dL   POTASSIUM    Collection Time: 10/27/20  5:58 PM   Result Value Ref Range    Potassium 3.6 3.5 - 5.5 mmol/L   GLUCOSE, POC    Collection Time: 10/27/20 10:20 PM   Result Value Ref Range    Glucose (POC) 91 70 - 110 mg/dL   MAGNESIUM    Collection Time: 10/28/20  5:00 AM   Result Value Ref Range    Magnesium 2.0 1.6 - 2.6 mg/dL   CBC WITH AUTOMATED DIFF    Collection Time: 10/28/20  5:00 AM   Result Value Ref Range    WBC 14.2 (H) 4.6 - 13.2 K/uL    RBC 3.91 (L) 4.70 - 5.50 M/uL    HGB 10.9 (L) 13.0 - 16.0 g/dL    HCT 33.4 (L) 36.0 - 48.0 %    MCV 85.4 74.0 - 97.0 FL    MCH 27.9 24.0 - 34.0 PG    MCHC 32.6 31.0 - 37.0 g/dL    RDW 17.7 (H) 11.6 - 14.5 %    PLATELET 550 973 - 109 K/uL    MPV 10.0 9.2 - 11.8 FL    NEUTROPHILS 94 (H) 40 - 73 %    LYMPHOCYTES 3 (L) 21 - 52 %    MONOCYTES 3 3 - 10 %    EOSINOPHILS 0 0 - 5 %    BASOPHILS 0 0 - 2 %    ABS. NEUTROPHILS 13.4 (H) 1.8 - 8.0 K/UL    ABS. LYMPHOCYTES 0.4 (L) 0.9 - 3.6 K/UL    ABS. MONOCYTES 0.4 0.05 - 1.2 K/UL    ABS. EOSINOPHILS 0.0 0.0 - 0.4 K/UL    ABS. BASOPHILS 0.0 0.0 - 0.1 K/UL    DF AUTOMATED     METABOLIC PANEL, COMPREHENSIVE    Collection Time: 10/28/20  5:00 AM   Result Value Ref Range    Sodium 142 136 - 145 mmol/L    Potassium 4.2 3.5 - 5.5 mmol/L    Chloride 110 100 - 111 mmol/L    CO2 23 21 - 32 mmol/L    Anion gap 9 3.0 - 18 mmol/L    Glucose 92 74 - 99 mg/dL    BUN 20 (H) 7.0 - 18 MG/DL    Creatinine 0.78 0.6 - 1.3 MG/DL    BUN/Creatinine ratio 26 (H) 12 - 20      GFR est AA >60 >60 ml/min/1.73m2    GFR est non-AA >60 >60 ml/min/1.73m2    Calcium 9.3 8.5 - 10.1 MG/DL    Bilirubin, total 0.4 0.2 - 1.0 MG/DL    ALT (SGPT) 22 16 - 61 U/L    AST (SGOT) 16 10 - 38 U/L    Alk.  phosphatase 71 45 - 117 U/L    Protein, total 6.2 (L) 6.4 - 8.2 g/dL    Albumin 3.3 (L) 3.4 - 5.0 g/dL    Globulin 2.9 2.0 - 4.0 g/dL    A-G Ratio 1.1 0.8 - 1.7     CALCIUM, IONIZED    Collection Time: 10/28/20  5:00 AM   Result Value Ref Range    Ionized Calcium 1.23 1.12 - 1.32 MMOL/L   PHOSPHORUS    Collection Time: 10/28/20  5:00 AM   Result Value Ref Range    Phosphorus 2.6 2.5 - 4.9 MG/DL   GLUCOSE, POC    Collection Time: 10/28/20  6:21 AM   Result Value Ref Range    Glucose (POC) 98 70 - 110 mg/dL           Recent Labs     10/26/20  0647   FIO2I 1.0   HCO3I 19.5*   PCO2I 24.1*   PHI 7.52*   PO2I 50*       All Micro Results     Procedure Component Value Units Date/Time    CULTURE, BLOOD [[id number]] Collected:  10/23/20 1611    Order Status:  Completed Specimen:  Blood Updated:  10/28/20 0618     Special Requests: NO SPECIAL REQUESTS        Culture result: NO GROWTH 5 DAYS       CULTURE, BLOOD [[id number]] Collected:  10/23/20 1615    Order Status:  Completed Specimen:  Blood Updated:  10/28/20 0618     Special Requests: NO SPECIAL REQUESTS        Culture result: NO GROWTH 5 DAYS       CULTURE, URINE [[id number]] Collected:  10/23/20 1630    Order Status:  Completed Specimen:  Urine from Clean catch Updated:  10/24/20 2026     Special Requests: NO SPECIAL REQUESTS        Culture result: No growth (<1,000 CFU/ML)             Echo 10/25/2020:  Result status: Final result    · Left Ventricle: Normal cavity size and wall thickness. The estimated EF is 50 - 55%. Low normal systolic function. There is mild (grade 1) left ventricular diastolic dysfunction E/E' ratio is 10.31.  · Pulmonary Artery: Pulmonary arteries not well visualized. Pulmonary arterial systolic pressure (PASP) is 74 mmHg. Pulmonary hypertension found to be severe. PVL LE 10/27/2020:   · Chronic non-occlusive thrombus present in the left proximal femoral vein. · Age indeterminate non-occlusive thrombus present in the right common femoral vein. Limited study secondary to bilateral AKA       CTA chest 10/27/2020:  1. No evidence of pulmonary embolism. 2.  Relatively diffuse bronchial wall thickening and several areas of  endobronchial impaction/occlusion. 3. Left lower lobe pneumonia with additional area of peribronchial alveolar  consolidation posterior right upper lobe.      > Recommend radiographic follow-up to document expected clinical resolution  following appropriate treatment in 4-6 weeks. 4. Moderately severe upper lobe predominant centrilobular emphysema. 5. Small left and trace right pleural effusions. 6. Extensive atherosclerotic vascular disease both above and below the  diaphragm. Imaging:  [x]I have personally reviewed the patients chest radiographs images and report   Results from Hospital Encounter encounter on 10/23/20   XR CHEST PORT    Narrative EXAM: CHEST RADIOGRAPH    CLINICAL INDICATION/HISTORY: Pneumonia    > Additional: None    COMPARISON: 10/23/2020    TECHNIQUE: Portable frontal view of the chest    _______________    FINDINGS:    SUPPORT DEVICES: None. HEART AND MEDIASTINUM: Heart size is stable. Atherosclerotic calcification seen  in the aorta. LUNGS AND PLEURAL SPACES: Increased hazy density at the left base. Slight  blunting of the right costophrenic angle. No pneumothorax. Patchy right upper  lobe opacity is similar to slightly improved. BONES AND SOFT TISSUES: Unremarkable.    _______________      Impression IMPRESSION:    1. Increased hazy opacity at the left base compared to the prior exam, possibly  developing left lower lobe atelectasis and/or consolidation. Questionable small  right effusion. 2. Patchy right upper lobe patchy opacity which is similar to slightly improved. Results from [place] encounter on 10/23/20   CTA CHEST W OR W WO CONT    Narrative EXAM: CTA Chest    INDICATION: 63year-old patient presently admitted with pneumonia, worsening of  hypoxia. Evaluation for pulmonary embolism. COMPARISON: CT chest 9/7/2012; prior chest radiographs, as recently 10/26/2020. TECHNIQUE: Axial CT imaging from the thoracic inlet through the diaphragm with  intravenous contrast utilizing CTA study for pulmonary artery evaluation. Coronal and sagittal MIP reformations were generated at a separate workstation.   One or more dose reduction techniques were used on this CT: automated exposure  control, adjustment of the mAs and/or kVp according to patient size, and  iterative reconstruction techniques. The specific techniques used on this CT  exam have been documented in the patient's electronic medical record. Digital  Imaging and Communications in Medicine (DICOM) format image data are available  to nonaffiliated external healthcare facilities or entities on a secure, media  free, reciprocally searchable basis with patient authorization for at least a  12-month period after this study. _______________    FINDINGS:    EXAM QUALITY: Overall exam quality is adequate. Pulmonary arterial enhancement  is adequate. The breath hold is satisfactory. PULMONARY ARTERIES: No convincing evidence of pulmonary embolism. MEDIASTINUM: Included gland is unremarkable. Cardiac size is normal. There is no  pericardial effusion. Multivessel coronary arterial atherosclerotic vascular  calcification is present. Extensive atherosclerotic vascular calcification of  the thoracic aorta noted without evidence of aneurysmal dilatation. LYMPH NODES: No enlarged mediastinal or hilar nodes by size criteria. AIRWAY: Diffuse bronchial wall thickening with endobronchial debris present  within the bronchus intermedius and right lower lobe segmental bronchi. Additional left lower lobe and segmental bronchial impaction. LUNGS: Focal area of low attenuating consolidation within the medial portion  left lower lobe with adjacent areas of atelectasis. Patchy peribronchial  consolidation within the posterior aspect right upper lobe. Moderately severe  upper lobe predominant centrilobular emphysema. No significant groundglass  abnormality or abnormal septal line thickening. PLEURA: Trace right and small left pleural effusions. No evidence of  pneumothorax.     UPPER ABDOMEN: Marked atherosclerotic vascular calcifications present throughout  the suprarenal and infrarenal abdominal aorta. Likely layering biliary sludge  within the gallbladder. OTHER: No acute or aggressive osseous abnormalities identified. Multilevel  degenerative wedging of the thoracic spine noted. Prominent Schmorl's node  superior endplate I81.    _______________      Impression IMPRESSION:    1. No evidence of pulmonary embolism. 2.  Relatively diffuse bronchial wall thickening and several areas of  endobronchial impaction/occlusion. 3. Left lower lobe pneumonia with additional area of peribronchial alveolar  consolidation posterior right upper lobe.     > Recommend radiographic follow-up to document expected clinical resolution  following appropriate treatment in 4-6 weeks. 4. Moderately severe upper lobe predominant centrilobular emphysema. 5. Small left and trace right pleural effusions. 6. Extensive atherosclerotic vascular disease both above and below the  diaphragm.            Shaniqua Malin MD  10/28/2020 [Pain related to present condition?] : The patient's  pain is not related to present condition. [] : No [de-identified] : 0/10